# Patient Record
Sex: MALE | Race: WHITE | NOT HISPANIC OR LATINO | Employment: OTHER | URBAN - NONMETROPOLITAN AREA
[De-identification: names, ages, dates, MRNs, and addresses within clinical notes are randomized per-mention and may not be internally consistent; named-entity substitution may affect disease eponyms.]

---

## 2017-01-10 ENCOUNTER — TRANSCRIBE ORDERS (OUTPATIENT)
Dept: ADMINISTRATIVE | Facility: HOSPITAL | Age: 82
End: 2017-01-10

## 2017-01-10 DIAGNOSIS — R13.10 DYSPHAGIA, UNSPECIFIED TYPE: Primary | ICD-10-CM

## 2017-01-12 ENCOUNTER — HOSPITAL ENCOUNTER (OUTPATIENT)
Dept: GENERAL RADIOLOGY | Facility: HOSPITAL | Age: 82
Discharge: HOME | End: 2017-01-12
Admitting: NURSE PRACTITIONER

## 2017-01-12 DIAGNOSIS — R13.10 DYSPHAGIA, UNSPECIFIED TYPE: ICD-10-CM

## 2017-01-12 PROCEDURE — 74220 X-RAY XM ESOPHAGUS 1CNTRST: CPT | Performed by: RADIOLOGY

## 2017-01-12 PROCEDURE — 74220 X-RAY XM ESOPHAGUS 1CNTRST: CPT

## 2017-11-03 ENCOUNTER — TRANSCRIBE ORDERS (OUTPATIENT)
Dept: ADMINISTRATIVE | Facility: HOSPITAL | Age: 82
End: 2017-11-03

## 2017-11-03 DIAGNOSIS — I77.9 RIGHT-SIDED CAROTID ARTERY DISEASE (HCC): Primary | ICD-10-CM

## 2017-11-06 ENCOUNTER — TRANSCRIBE ORDERS (OUTPATIENT)
Dept: ADMINISTRATIVE | Facility: HOSPITAL | Age: 82
End: 2017-11-06

## 2017-11-06 DIAGNOSIS — I65.29 OCCLUSION AND STENOSIS OF CAROTID ARTERY: Primary | ICD-10-CM

## 2017-11-10 ENCOUNTER — TRANSCRIBE ORDERS (OUTPATIENT)
Dept: ADMINISTRATIVE | Facility: HOSPITAL | Age: 82
End: 2017-11-10

## 2017-11-10 DIAGNOSIS — R09.89 RESPIRATORY SYMPTOMS: Primary | ICD-10-CM

## 2017-11-27 ENCOUNTER — TELEPHONE (OUTPATIENT)
Dept: GENERAL RADIOLOGY | Facility: HOSPITAL | Age: 82
End: 2017-11-27

## 2017-11-27 ENCOUNTER — HOSPITAL ENCOUNTER (OUTPATIENT)
Dept: CARDIOLOGY | Facility: HOSPITAL | Age: 82
Discharge: HOME | End: 2017-11-27
Admitting: NURSE PRACTITIONER

## 2017-11-27 DIAGNOSIS — R09.89 RESPIRATORY SYMPTOMS: ICD-10-CM

## 2017-11-27 PROCEDURE — 93880 EXTRACRANIAL BILAT STUDY: CPT

## 2017-11-27 PROCEDURE — 93880 EXTRACRANIAL BILAT STUDY: CPT | Performed by: RADIOLOGY

## 2018-01-10 ENCOUNTER — OFFICE VISIT (OUTPATIENT)
Dept: CARDIAC SURGERY | Facility: CLINIC | Age: 83
End: 2018-01-10

## 2018-01-10 VITALS
WEIGHT: 162 LBS | HEIGHT: 68 IN | BODY MASS INDEX: 24.55 KG/M2 | SYSTOLIC BLOOD PRESSURE: 116 MMHG | DIASTOLIC BLOOD PRESSURE: 59 MMHG

## 2018-01-10 DIAGNOSIS — I65.23 BILATERAL CAROTID ARTERY STENOSIS: Primary | ICD-10-CM

## 2018-01-10 PROCEDURE — 99204 OFFICE O/P NEW MOD 45 MIN: CPT | Performed by: THORACIC SURGERY (CARDIOTHORACIC VASCULAR SURGERY)

## 2018-01-10 RX ORDER — ASPIRIN 81 MG/1
81 TABLET ORAL DAILY
COMMUNITY

## 2018-01-10 RX ORDER — LOSARTAN POTASSIUM 100 MG/1
100 TABLET ORAL DAILY
COMMUNITY
End: 2018-02-04

## 2018-01-10 RX ORDER — CLOPIDOGREL BISULFATE 75 MG/1
75 TABLET ORAL DAILY
COMMUNITY

## 2018-01-10 RX ORDER — AMLODIPINE BESYLATE 5 MG/1
5 TABLET ORAL DAILY
COMMUNITY

## 2018-01-10 RX ORDER — ALLOPURINOL 300 MG/1
300 TABLET ORAL DAILY
COMMUNITY

## 2018-01-15 ENCOUNTER — TRANSCRIBE ORDERS (OUTPATIENT)
Dept: ADMINISTRATIVE | Facility: HOSPITAL | Age: 83
End: 2018-01-15

## 2018-01-24 ENCOUNTER — HOSPITAL ENCOUNTER (OUTPATIENT)
Dept: MRI IMAGING | Facility: HOSPITAL | Age: 83
Discharge: HOME | End: 2018-01-24

## 2018-01-24 ENCOUNTER — HOSPITAL ENCOUNTER (OUTPATIENT)
Dept: CT IMAGING | Facility: HOSPITAL | Age: 83
Discharge: HOME | End: 2018-01-24
Admitting: THORACIC SURGERY (CARDIOTHORACIC VASCULAR SURGERY)

## 2018-01-24 LAB — CREAT BLDA-MCNC: 1.2 MG/DL (ref 0.6–1.3)

## 2018-01-24 PROCEDURE — 70498 CT ANGIOGRAPHY NECK: CPT

## 2018-01-24 PROCEDURE — 70498 CT ANGIOGRAPHY NECK: CPT | Performed by: RADIOLOGY

## 2018-01-24 PROCEDURE — 0 IOPAMIDOL 61 % SOLUTION: Performed by: THORACIC SURGERY (CARDIOTHORACIC VASCULAR SURGERY)

## 2018-01-24 PROCEDURE — 82565 ASSAY OF CREATININE: CPT

## 2018-01-24 RX ADMIN — IOPAMIDOL 60 ML: 612 INJECTION, SOLUTION INTRAVENOUS at 09:30

## 2018-01-25 ENCOUNTER — EPISODE CHANGES (OUTPATIENT)
Dept: CASE MANAGEMENT | Facility: OTHER | Age: 83
End: 2018-01-25

## 2018-01-26 ENCOUNTER — PREP FOR SURGERY (OUTPATIENT)
Dept: OTHER | Facility: HOSPITAL | Age: 83
End: 2018-01-26

## 2018-01-26 DIAGNOSIS — I65.22 STENOSIS OF LEFT CAROTID ARTERY: Primary | ICD-10-CM

## 2018-01-26 RX ORDER — CHLORHEXIDINE GLUCONATE 500 MG/1
1 CLOTH TOPICAL EVERY 12 HOURS PRN
Status: CANCELLED | OUTPATIENT
Start: 2018-02-04

## 2018-01-26 RX ORDER — CEFAZOLIN SODIUM 2 G/100ML
2 INJECTION, SOLUTION INTRAVENOUS ONCE
Status: CANCELLED | OUTPATIENT
Start: 2018-02-05 | End: 2018-02-05

## 2018-01-31 ENCOUNTER — HOSPITAL ENCOUNTER (OUTPATIENT)
Dept: MRI IMAGING | Facility: HOSPITAL | Age: 83
Discharge: HOME | End: 2018-01-31
Admitting: THORACIC SURGERY (CARDIOTHORACIC VASCULAR SURGERY)

## 2018-01-31 PROCEDURE — 70553 MRI BRAIN STEM W/O & W/DYE: CPT

## 2018-01-31 PROCEDURE — 70553 MRI BRAIN STEM W/O & W/DYE: CPT | Performed by: RADIOLOGY

## 2018-01-31 PROCEDURE — A9577 INJ MULTIHANCE: HCPCS | Performed by: THORACIC SURGERY (CARDIOTHORACIC VASCULAR SURGERY)

## 2018-01-31 PROCEDURE — 0 GADOBENATE DIMEGLUMINE 529 MG/ML SOLUTION: Performed by: THORACIC SURGERY (CARDIOTHORACIC VASCULAR SURGERY)

## 2018-01-31 RX ADMIN — GADOBENATE DIMEGLUMINE 14 ML: 529 INJECTION, SOLUTION INTRAVENOUS at 08:30

## 2018-02-04 ENCOUNTER — APPOINTMENT (OUTPATIENT)
Dept: PREADMISSION TESTING | Facility: HOSPITAL | Age: 83
End: 2018-02-04

## 2018-02-04 ENCOUNTER — ANESTHESIA EVENT (OUTPATIENT)
Dept: PERIOP | Facility: HOSPITAL | Age: 83
End: 2018-02-04

## 2018-02-04 ENCOUNTER — HOSPITAL ENCOUNTER (OUTPATIENT)
Dept: GENERAL RADIOLOGY | Facility: HOSPITAL | Age: 83
Discharge: HOME | End: 2018-02-04
Admitting: PHYSICIAN ASSISTANT

## 2018-02-04 VITALS — HEIGHT: 68 IN | BODY MASS INDEX: 25.73 KG/M2 | WEIGHT: 169.75 LBS

## 2018-02-04 DIAGNOSIS — I65.22 STENOSIS OF LEFT CAROTID ARTERY: ICD-10-CM

## 2018-02-04 LAB
ABO GROUP BLD: NORMAL
ANION GAP SERPL CALCULATED.3IONS-SCNC: 10 MMOL/L (ref 3–11)
BLD GP AB SCN SERPL QL: NEGATIVE
BUN BLD-MCNC: 27 MG/DL (ref 9–23)
BUN/CREAT SERPL: 20.8 (ref 7–25)
CALCIUM SPEC-SCNC: 9.9 MG/DL (ref 8.7–10.4)
CHLORIDE SERPL-SCNC: 104 MMOL/L (ref 99–109)
CO2 SERPL-SCNC: 27 MMOL/L (ref 20–31)
CREAT BLD-MCNC: 1.3 MG/DL (ref 0.6–1.3)
DEPRECATED RDW RBC AUTO: 50.4 FL (ref 37–54)
ERYTHROCYTE [DISTWIDTH] IN BLOOD BY AUTOMATED COUNT: 13.9 % (ref 11.3–14.5)
GFR SERPL CREATININE-BSD FRML MDRD: 53 ML/MIN/1.73
GLUCOSE BLD-MCNC: 168 MG/DL (ref 70–100)
HBA1C MFR BLD: 6 % (ref 4.8–5.6)
HCT VFR BLD AUTO: 48.8 % (ref 38.9–50.9)
HGB BLD-MCNC: 16.1 G/DL (ref 13.1–17.5)
INR PPP: 0.97
MCH RBC QN AUTO: 32.8 PG (ref 27–31)
MCHC RBC AUTO-ENTMCNC: 33 G/DL (ref 32–36)
MCV RBC AUTO: 99.4 FL (ref 80–99)
PLATELET # BLD AUTO: 222 10*3/MM3 (ref 150–450)
PMV BLD AUTO: 13.4 FL (ref 6–12)
POTASSIUM BLD-SCNC: 3.8 MMOL/L (ref 3.5–5.5)
PROTHROMBIN TIME: 10.6 SECONDS (ref 9.6–11.5)
RBC # BLD AUTO: 4.91 10*6/MM3 (ref 4.2–5.76)
RH BLD: POSITIVE
SODIUM BLD-SCNC: 141 MMOL/L (ref 132–146)
WBC NRBC COR # BLD: 8.27 10*3/MM3 (ref 3.5–10.8)

## 2018-02-04 PROCEDURE — 71046 X-RAY EXAM CHEST 2 VIEWS: CPT

## 2018-02-04 PROCEDURE — 36415 COLL VENOUS BLD VENIPUNCTURE: CPT

## 2018-02-04 PROCEDURE — 85027 COMPLETE CBC AUTOMATED: CPT | Performed by: PHYSICIAN ASSISTANT

## 2018-02-04 PROCEDURE — 85610 PROTHROMBIN TIME: CPT | Performed by: PHYSICIAN ASSISTANT

## 2018-02-04 PROCEDURE — 83036 HEMOGLOBIN GLYCOSYLATED A1C: CPT | Performed by: PHYSICIAN ASSISTANT

## 2018-02-04 PROCEDURE — 86850 RBC ANTIBODY SCREEN: CPT | Performed by: PHYSICIAN ASSISTANT

## 2018-02-04 PROCEDURE — 93005 ELECTROCARDIOGRAM TRACING: CPT

## 2018-02-04 PROCEDURE — 86900 BLOOD TYPING SEROLOGIC ABO: CPT | Performed by: PHYSICIAN ASSISTANT

## 2018-02-04 PROCEDURE — 86901 BLOOD TYPING SEROLOGIC RH(D): CPT | Performed by: PHYSICIAN ASSISTANT

## 2018-02-04 PROCEDURE — 80048 BASIC METABOLIC PNL TOTAL CA: CPT | Performed by: PHYSICIAN ASSISTANT

## 2018-02-04 PROCEDURE — 93010 ELECTROCARDIOGRAM REPORT: CPT | Performed by: INTERNAL MEDICINE

## 2018-02-04 RX ORDER — LOSARTAN POTASSIUM AND HYDROCHLOROTHIAZIDE 25; 100 MG/1; MG/1
1 TABLET ORAL DAILY
COMMUNITY

## 2018-02-05 ENCOUNTER — APPOINTMENT (OUTPATIENT)
Dept: NEUROLOGY | Facility: HOSPITAL | Age: 83
End: 2018-02-05
Attending: THORACIC SURGERY (CARDIOTHORACIC VASCULAR SURGERY)

## 2018-02-05 ENCOUNTER — HOSPITAL ENCOUNTER (INPATIENT)
Facility: HOSPITAL | Age: 83
LOS: 1 days | Discharge: HOME | End: 2018-02-06
Attending: THORACIC SURGERY (CARDIOTHORACIC VASCULAR SURGERY) | Admitting: THORACIC SURGERY (CARDIOTHORACIC VASCULAR SURGERY)

## 2018-02-05 ENCOUNTER — ANESTHESIA (OUTPATIENT)
Dept: PERIOP | Facility: HOSPITAL | Age: 83
End: 2018-02-05

## 2018-02-05 DIAGNOSIS — I65.23 BILATERAL CAROTID ARTERY STENOSIS: ICD-10-CM

## 2018-02-05 DIAGNOSIS — I65.22 STENOSIS OF LEFT CAROTID ARTERY: ICD-10-CM

## 2018-02-05 PROBLEM — I10 HYPERTENSION: Status: ACTIVE | Noted: 2018-02-05

## 2018-02-05 PROBLEM — K21.9 GERD (GASTROESOPHAGEAL REFLUX DISEASE): Status: ACTIVE | Noted: 2018-02-05

## 2018-02-05 PROBLEM — I65.29 CAROTID STENOSIS: Status: ACTIVE | Noted: 2018-02-05

## 2018-02-05 PROCEDURE — 25010000002 NEOSTIGMINE 10 MG/10ML SOLUTION: Performed by: NURSE ANESTHETIST, CERTIFIED REGISTERED

## 2018-02-05 PROCEDURE — 88304 TISSUE EXAM BY PATHOLOGIST: CPT | Performed by: THORACIC SURGERY (CARDIOTHORACIC VASCULAR SURGERY)

## 2018-02-05 PROCEDURE — 25010000002 PROPOFOL 10 MG/ML EMULSION: Performed by: NURSE ANESTHETIST, CERTIFIED REGISTERED

## 2018-02-05 PROCEDURE — 95955 EEG DURING SURGERY: CPT

## 2018-02-05 PROCEDURE — 25010000002 DEXAMETHASONE PER 1 MG: Performed by: NURSE ANESTHETIST, CERTIFIED REGISTERED

## 2018-02-05 PROCEDURE — 35301 RECHANNELING OF ARTERY: CPT | Performed by: PHYSICIAN ASSISTANT

## 2018-02-05 PROCEDURE — 25010000002 HEPARIN (PORCINE) PER 1000 UNITS: Performed by: THORACIC SURGERY (CARDIOTHORACIC VASCULAR SURGERY)

## 2018-02-05 PROCEDURE — 03CL0ZZ EXTIRPATION OF MATTER FROM LEFT INTERNAL CAROTID ARTERY, OPEN APPROACH: ICD-10-PCS | Performed by: THORACIC SURGERY (CARDIOTHORACIC VASCULAR SURGERY)

## 2018-02-05 PROCEDURE — 25010000002 FENTANYL CITRATE (PF) 100 MCG/2ML SOLUTION: Performed by: NURSE ANESTHETIST, CERTIFIED REGISTERED

## 2018-02-05 PROCEDURE — 25010000003 CEFAZOLIN IN DEXTROSE 2-4 GM/100ML-% SOLUTION: Performed by: PHYSICIAN ASSISTANT

## 2018-02-05 PROCEDURE — C1768 GRAFT, VASCULAR: HCPCS | Performed by: THORACIC SURGERY (CARDIOTHORACIC VASCULAR SURGERY)

## 2018-02-05 PROCEDURE — 25010000002 PHENYLEPHRINE PER 1 ML: Performed by: NURSE ANESTHETIST, CERTIFIED REGISTERED

## 2018-02-05 PROCEDURE — 25010000002 HEPARIN (PORCINE) PER 1000 UNITS: Performed by: NURSE ANESTHETIST, CERTIFIED REGISTERED

## 2018-02-05 PROCEDURE — 88311 DECALCIFY TISSUE: CPT | Performed by: THORACIC SURGERY (CARDIOTHORACIC VASCULAR SURGERY)

## 2018-02-05 PROCEDURE — 03UL0KZ SUPPLEMENT LEFT INTERNAL CAROTID ARTERY WITH NONAUTOLOGOUS TISSUE SUBSTITUTE, OPEN APPROACH: ICD-10-PCS | Performed by: THORACIC SURGERY (CARDIOTHORACIC VASCULAR SURGERY)

## 2018-02-05 PROCEDURE — 35301 RECHANNELING OF ARTERY: CPT | Performed by: THORACIC SURGERY (CARDIOTHORACIC VASCULAR SURGERY)

## 2018-02-05 PROCEDURE — 95816 EEG AWAKE AND DROWSY: CPT

## 2018-02-05 PROCEDURE — 25010000002 ONDANSETRON PER 1 MG: Performed by: NURSE ANESTHETIST, CERTIFIED REGISTERED

## 2018-02-05 PROCEDURE — 99291 CRITICAL CARE FIRST HOUR: CPT | Performed by: NURSE PRACTITIONER

## 2018-02-05 DEVICE — VASCU-GUARD PERIPHERAL VASCULAR PATCH IS PREPARED FROM BOVINE PERICARDIUM WHICH IS CROSS-LINKED WITH GLUTARALDEHYDE. THE PERICARDIUM IS PROCURED FROM CATTLE ORIGINATING IN THE UNITED STATES. VASCU-GUARD PERIPHERAL VASCULAR PATCH IS CHEMICALLY STERILIZED USING ETHANOL AND PROPYLENE OXIDE. VASCU-GUARD PERIPHERAL VASCULAR PATCH HAS BEEN TREATED WITH 1 MOLAR SODIUM HYDROXIDE FOR A MINIMUM OF 60 MINUTES AT 20 - 25 C.  VASCU-GUARD PERIPHERAL VASCULAR PATCH IS PACKAGED IN A CONTAINER FILLED WITH STERILE, NON-PYROGENIC WATER CONTAINING PROPYLENE OXIDE. THE CONTENTS OF THE UNOPENED, UNDAMAGED CONTAINER ARE STERILE.
Type: IMPLANTABLE DEVICE | Site: CAROTID | Status: FUNCTIONAL
Brand: VASCU-GUARD PERIPHERAL VASCULAR PATCH

## 2018-02-05 RX ORDER — NEOSTIGMINE METHYLSULFATE 1 MG/ML
INJECTION, SOLUTION INTRAVENOUS AS NEEDED
Status: DISCONTINUED | OUTPATIENT
Start: 2018-02-05 | End: 2018-02-05 | Stop reason: SURG

## 2018-02-05 RX ORDER — SODIUM CHLORIDE 0.9 % (FLUSH) 0.9 %
1-10 SYRINGE (ML) INJECTION AS NEEDED
Status: DISCONTINUED | OUTPATIENT
Start: 2018-02-05 | End: 2018-02-06 | Stop reason: HOSPADM

## 2018-02-05 RX ORDER — FAMOTIDINE 20 MG/1
20 TABLET, FILM COATED ORAL DAILY
Status: DISCONTINUED | OUTPATIENT
Start: 2018-02-06 | End: 2018-02-06 | Stop reason: HOSPADM

## 2018-02-05 RX ORDER — IPRATROPIUM BROMIDE AND ALBUTEROL SULFATE 2.5; .5 MG/3ML; MG/3ML
3 SOLUTION RESPIRATORY (INHALATION) EVERY 6 HOURS PRN
Status: DISCONTINUED | OUTPATIENT
Start: 2018-02-05 | End: 2018-02-06 | Stop reason: HOSPADM

## 2018-02-05 RX ORDER — HYDROCODONE BITARTRATE AND ACETAMINOPHEN 7.5; 325 MG/1; MG/1
1 TABLET ORAL EVERY 4 HOURS PRN
Status: DISCONTINUED | OUTPATIENT
Start: 2018-02-05 | End: 2018-02-06 | Stop reason: HOSPADM

## 2018-02-05 RX ORDER — SODIUM CHLORIDE 450 MG/100ML
30 INJECTION, SOLUTION INTRAVENOUS CONTINUOUS
Status: DISCONTINUED | OUTPATIENT
Start: 2018-02-05 | End: 2018-02-06 | Stop reason: HOSPADM

## 2018-02-05 RX ORDER — NALOXONE HCL 0.4 MG/ML
0.4 VIAL (ML) INJECTION
Status: DISCONTINUED | OUTPATIENT
Start: 2018-02-05 | End: 2018-02-06 | Stop reason: HOSPADM

## 2018-02-05 RX ORDER — PROPOFOL 10 MG/ML
VIAL (ML) INTRAVENOUS AS NEEDED
Status: DISCONTINUED | OUTPATIENT
Start: 2018-02-05 | End: 2018-02-05 | Stop reason: SURG

## 2018-02-05 RX ORDER — HYDROMORPHONE HYDROCHLORIDE 1 MG/ML
0.5 INJECTION, SOLUTION INTRAMUSCULAR; INTRAVENOUS; SUBCUTANEOUS
Status: DISCONTINUED | OUTPATIENT
Start: 2018-02-05 | End: 2018-02-05 | Stop reason: HOSPADM

## 2018-02-05 RX ORDER — ONDANSETRON 2 MG/ML
4 INJECTION INTRAMUSCULAR; INTRAVENOUS ONCE AS NEEDED
Status: DISCONTINUED | OUTPATIENT
Start: 2018-02-05 | End: 2018-02-05 | Stop reason: HOSPADM

## 2018-02-05 RX ORDER — LIDOCAINE HYDROCHLORIDE 10 MG/ML
INJECTION, SOLUTION EPIDURAL; INFILTRATION; INTRACAUDAL; PERINEURAL AS NEEDED
Status: DISCONTINUED | OUTPATIENT
Start: 2018-02-05 | End: 2018-02-05 | Stop reason: SURG

## 2018-02-05 RX ORDER — FENTANYL CITRATE 50 UG/ML
INJECTION, SOLUTION INTRAMUSCULAR; INTRAVENOUS AS NEEDED
Status: DISCONTINUED | OUTPATIENT
Start: 2018-02-05 | End: 2018-02-05 | Stop reason: SURG

## 2018-02-05 RX ORDER — SODIUM CHLORIDE 9 MG/ML
INJECTION, SOLUTION INTRAVENOUS AS NEEDED
Status: DISCONTINUED | OUTPATIENT
Start: 2018-02-05 | End: 2018-02-05 | Stop reason: HOSPADM

## 2018-02-05 RX ORDER — HEPARIN SODIUM 1000 [USP'U]/ML
INJECTION, SOLUTION INTRAVENOUS; SUBCUTANEOUS AS NEEDED
Status: DISCONTINUED | OUTPATIENT
Start: 2018-02-05 | End: 2018-02-05 | Stop reason: SURG

## 2018-02-05 RX ORDER — LIDOCAINE HYDROCHLORIDE 10 MG/ML
0.5 INJECTION, SOLUTION EPIDURAL; INFILTRATION; INTRACAUDAL; PERINEURAL ONCE AS NEEDED
Status: COMPLETED | OUTPATIENT
Start: 2018-02-05 | End: 2018-02-05

## 2018-02-05 RX ORDER — CEFAZOLIN SODIUM 2 G/100ML
2 INJECTION, SOLUTION INTRAVENOUS EVERY 8 HOURS
Status: COMPLETED | OUTPATIENT
Start: 2018-02-05 | End: 2018-02-06

## 2018-02-05 RX ORDER — DEXAMETHASONE SODIUM PHOSPHATE 4 MG/ML
INJECTION, SOLUTION INTRA-ARTICULAR; INTRALESIONAL; INTRAMUSCULAR; INTRAVENOUS; SOFT TISSUE AS NEEDED
Status: DISCONTINUED | OUTPATIENT
Start: 2018-02-05 | End: 2018-02-05 | Stop reason: SURG

## 2018-02-05 RX ORDER — AMLODIPINE BESYLATE 5 MG/1
5 TABLET ORAL DAILY
Status: DISCONTINUED | OUTPATIENT
Start: 2018-02-06 | End: 2018-02-06 | Stop reason: HOSPADM

## 2018-02-05 RX ORDER — LIDOCAINE HYDROCHLORIDE 10 MG/ML
INJECTION, SOLUTION INFILTRATION; PERINEURAL AS NEEDED
Status: DISCONTINUED | OUTPATIENT
Start: 2018-02-05 | End: 2018-02-05 | Stop reason: HOSPADM

## 2018-02-05 RX ORDER — CHLORHEXIDINE GLUCONATE 500 MG/1
1 CLOTH TOPICAL EVERY 12 HOURS PRN
Status: DISCONTINUED | OUTPATIENT
Start: 2018-02-05 | End: 2018-02-06 | Stop reason: HOSPADM

## 2018-02-05 RX ORDER — CEFAZOLIN SODIUM 2 G/100ML
2 INJECTION, SOLUTION INTRAVENOUS ONCE
Status: COMPLETED | OUTPATIENT
Start: 2018-02-05 | End: 2018-02-05

## 2018-02-05 RX ORDER — SODIUM CHLORIDE 0.9 % (FLUSH) 0.9 %
1-10 SYRINGE (ML) INJECTION AS NEEDED
Status: DISCONTINUED | OUTPATIENT
Start: 2018-02-05 | End: 2018-02-05 | Stop reason: HOSPADM

## 2018-02-05 RX ORDER — FAMOTIDINE 20 MG/1
20 TABLET, FILM COATED ORAL ONCE
Status: COMPLETED | OUTPATIENT
Start: 2018-02-05 | End: 2018-02-05

## 2018-02-05 RX ORDER — ATRACURIUM BESYLATE 10 MG/ML
INJECTION, SOLUTION INTRAVENOUS AS NEEDED
Status: DISCONTINUED | OUTPATIENT
Start: 2018-02-05 | End: 2018-02-05 | Stop reason: SURG

## 2018-02-05 RX ORDER — LOSARTAN POTASSIUM AND HYDROCHLOROTHIAZIDE 12.5; 5 MG/1; MG/1
2 TABLET ORAL DAILY
Status: DISCONTINUED | OUTPATIENT
Start: 2018-02-06 | End: 2018-02-06 | Stop reason: HOSPADM

## 2018-02-05 RX ORDER — FAMOTIDINE 10 MG/ML
20 INJECTION, SOLUTION INTRAVENOUS ONCE
Status: DISCONTINUED | OUTPATIENT
Start: 2018-02-05 | End: 2018-02-05

## 2018-02-05 RX ORDER — MORPHINE SULFATE 2 MG/ML
4 INJECTION, SOLUTION INTRAMUSCULAR; INTRAVENOUS EVERY 4 HOURS PRN
Status: DISCONTINUED | OUTPATIENT
Start: 2018-02-05 | End: 2018-02-06 | Stop reason: HOSPADM

## 2018-02-05 RX ORDER — GLYCOPYRROLATE 0.2 MG/ML
INJECTION INTRAMUSCULAR; INTRAVENOUS AS NEEDED
Status: DISCONTINUED | OUTPATIENT
Start: 2018-02-05 | End: 2018-02-05 | Stop reason: SURG

## 2018-02-05 RX ORDER — ASPIRIN 81 MG/1
81 TABLET ORAL DAILY
Status: DISCONTINUED | OUTPATIENT
Start: 2018-02-06 | End: 2018-02-06 | Stop reason: HOSPADM

## 2018-02-05 RX ORDER — ALLOPURINOL 300 MG/1
300 TABLET ORAL DAILY
Status: DISCONTINUED | OUTPATIENT
Start: 2018-02-05 | End: 2018-02-06 | Stop reason: HOSPADM

## 2018-02-05 RX ORDER — SODIUM CHLORIDE, SODIUM LACTATE, POTASSIUM CHLORIDE, CALCIUM CHLORIDE 600; 310; 30; 20 MG/100ML; MG/100ML; MG/100ML; MG/100ML
9 INJECTION, SOLUTION INTRAVENOUS CONTINUOUS
Status: DISCONTINUED | OUTPATIENT
Start: 2018-02-05 | End: 2018-02-05 | Stop reason: HOSPADM

## 2018-02-05 RX ORDER — CLOPIDOGREL BISULFATE 75 MG/1
75 TABLET ORAL DAILY
Status: DISCONTINUED | OUTPATIENT
Start: 2018-02-06 | End: 2018-02-06 | Stop reason: HOSPADM

## 2018-02-05 RX ORDER — FENTANYL CITRATE 50 UG/ML
50 INJECTION, SOLUTION INTRAMUSCULAR; INTRAVENOUS
Status: DISCONTINUED | OUTPATIENT
Start: 2018-02-05 | End: 2018-02-05 | Stop reason: HOSPADM

## 2018-02-05 RX ORDER — ONDANSETRON 2 MG/ML
INJECTION INTRAMUSCULAR; INTRAVENOUS AS NEEDED
Status: DISCONTINUED | OUTPATIENT
Start: 2018-02-05 | End: 2018-02-05 | Stop reason: SURG

## 2018-02-05 RX ADMIN — GLYCOPYRROLATE 0.2 MG: 0.2 INJECTION, SOLUTION INTRAMUSCULAR; INTRAVENOUS at 09:05

## 2018-02-05 RX ADMIN — HEPARIN SODIUM 5000 UNITS: 1000 INJECTION, SOLUTION INTRAVENOUS; SUBCUTANEOUS at 08:08

## 2018-02-05 RX ADMIN — SODIUM CHLORIDE 0.1 MG/HR: 9 INJECTION, SOLUTION INTRAVENOUS at 07:40

## 2018-02-05 RX ADMIN — ATRACURIUM BESYLATE 10 MG: 10 INJECTION, SOLUTION INTRAVENOUS at 08:53

## 2018-02-05 RX ADMIN — LIDOCAINE HYDROCHLORIDE 60 MG: 10 INJECTION, SOLUTION EPIDURAL; INFILTRATION; INTRACAUDAL; PERINEURAL at 07:29

## 2018-02-05 RX ADMIN — LIDOCAINE HYDROCHLORIDE 2 ML: 10 INJECTION, SOLUTION EPIDURAL; INFILTRATION; INTRACAUDAL; PERINEURAL at 06:39

## 2018-02-05 RX ADMIN — PHENYLEPHRINE HYDROCHLORIDE 1 MCG/KG/MIN: 10 INJECTION INTRAVENOUS at 07:43

## 2018-02-05 RX ADMIN — CEFAZOLIN SODIUM 2 G: 2 INJECTION, SOLUTION INTRAVENOUS at 16:44

## 2018-02-05 RX ADMIN — PROPOFOL 100 MG: 10 INJECTION, EMULSION INTRAVENOUS at 07:29

## 2018-02-05 RX ADMIN — FENTANYL CITRATE 100 MCG: 50 INJECTION, SOLUTION INTRAMUSCULAR; INTRAVENOUS at 08:52

## 2018-02-05 RX ADMIN — SODIUM CHLORIDE 30 ML/HR: 4.5 INJECTION, SOLUTION INTRAVENOUS at 10:19

## 2018-02-05 RX ADMIN — PHENYLEPHRINE HYDROCHLORIDE 200 MCG: 10 INJECTION INTRAVENOUS at 07:35

## 2018-02-05 RX ADMIN — FENTANYL CITRATE 150 MCG: 50 INJECTION, SOLUTION INTRAMUSCULAR; INTRAVENOUS at 07:29

## 2018-02-05 RX ADMIN — CEFAZOLIN SODIUM 2 G: 2 INJECTION, SOLUTION INTRAVENOUS at 07:45

## 2018-02-05 RX ADMIN — NICARDIPINE HYDROCHLORIDE 2.5 MG/HR: 0.1 INJECTION, SOLUTION INTRAVENOUS at 16:44

## 2018-02-05 RX ADMIN — FAMOTIDINE 20 MG: 20 TABLET, FILM COATED ORAL at 06:36

## 2018-02-05 RX ADMIN — SODIUM CHLORIDE, POTASSIUM CHLORIDE, SODIUM LACTATE AND CALCIUM CHLORIDE 9 ML/HR: 600; 310; 30; 20 INJECTION, SOLUTION INTRAVENOUS at 06:40

## 2018-02-05 RX ADMIN — ATRACURIUM BESYLATE 50 MG: 10 INJECTION, SOLUTION INTRAVENOUS at 07:29

## 2018-02-05 RX ADMIN — NICARDIPINE HYDROCHLORIDE 7.5 MG/HR: 0.1 INJECTION, SOLUTION INTRAVENOUS at 23:29

## 2018-02-05 RX ADMIN — NEOSTIGMINE METHYLSULFATE 2 MG: 1 INJECTION, SOLUTION INTRAVENOUS at 09:05

## 2018-02-05 RX ADMIN — FENTANYL CITRATE 50 MCG: 50 INJECTION, SOLUTION INTRAMUSCULAR; INTRAVENOUS at 10:03

## 2018-02-05 RX ADMIN — CEFAZOLIN SODIUM 2 G: 2 INJECTION, SOLUTION INTRAVENOUS at 23:52

## 2018-02-05 RX ADMIN — ONDANSETRON 4 MG: 2 INJECTION INTRAMUSCULAR; INTRAVENOUS at 08:58

## 2018-02-05 RX ADMIN — NICARDIPINE HYDROCHLORIDE 10 MG/HR: 0.1 INJECTION, SOLUTION INTRAVENOUS at 10:43

## 2018-02-05 RX ADMIN — DEXAMETHASONE SODIUM PHOSPHATE 4 MG: 4 INJECTION, SOLUTION INTRAMUSCULAR; INTRAVENOUS at 07:29

## 2018-02-05 RX ADMIN — PHENYLEPHRINE HYDROCHLORIDE 200 MCG: 10 INJECTION INTRAVENOUS at 07:50

## 2018-02-05 RX ADMIN — SODIUM CHLORIDE, POTASSIUM CHLORIDE, SODIUM LACTATE AND CALCIUM CHLORIDE 9 ML/HR: 600; 310; 30; 20 INJECTION, SOLUTION INTRAVENOUS at 06:39

## 2018-02-05 RX ADMIN — NICARDIPINE HYDROCHLORIDE 7.5 MG/HR: 0.1 INJECTION, SOLUTION INTRAVENOUS at 20:34

## 2018-02-05 NOTE — INTERVAL H&P NOTE
"Pre-Op H&P (See Recent Office Note Attached for Full H&P)    Chief complaint: Carotid stenosis    Review of Systems:  General ROS:  no fever, chills, rashes, No change since last office visit  Cardiovascular ROS: no chest pain or dyspnea on exertion  Respiratory ROS: no cough, shortness of breath, or wheezing  Neuro:  No TIA/CVA symptoms    Immunization History:   Influenza:  Yes 2017  Pneumococcal:  Yes < 5 years  Tetanus:  unknown    Meds:    No current facility-administered medications on file prior to encounter.      Current Outpatient Prescriptions on File Prior to Encounter   Medication Sig Dispense Refill   • allopurinol (ZYLOPRIM) 300 MG tablet Take 300 mg by mouth Daily.     • amLODIPine (NORVASC) 5 MG tablet Take 5 mg by mouth Daily.     • aspirin 81 MG EC tablet Take 81 mg by mouth Daily.     • clopidogrel (PLAVIX) 75 MG tablet Take 75 mg by mouth Daily. Patient stated Dr. Neal told him to continue         Vital Signs:  /69 (BP Location: Right arm, Patient Position: Lying)  Pulse 64  Temp 98.2 °F (36.8 °C) (Temporal Artery )   Resp 18  Ht 172.7 cm (67.99\")  Wt 77 kg (169 lb 12.1 oz)  SpO2 96%  BMI 25.82 kg/m2    Physical Exam:    CV:  S1S2 regular rate and rhythm, no murmur               Resp:  Clear to auscultation; respirations regular, even and unlabored    Results Review:    I reviewed the patient's new clinical results.  1/24/18 CT angio carotid:  The right internal carotid artery appears occluded with  reconstitution of the right M1 segment probably from posterior  circulation flow. There is near complete left proximal internal carotid  artery occlusion of probably about 90%. The left vertebral artery is  enlarged and may be participating in reconstitution from a left  subclavian vessel and evaluation for subclavian steal syndrome is  suggested.   1/31/18:  MRI brain:  IMPRESSION:  1. Minimal chronic microangiopathic changes.  2. No mass, hemorrhage, or midline shift.  3. No " contrast-enhancing lesions.  Cancer Staging (if applicable)  Cancer Patient: __ yes _x_no __unknown; If yes, clinical stage T:__ N:__M:__, stage group or __N/A    A/P:  Bilateral carotid stenosis - BUDDY  and here today for left carotid endarterectomy    Marce Ayala, APRN  2/5/2018   6:50 AM

## 2018-02-05 NOTE — ANESTHESIA PROCEDURE NOTES
Arterial Line    Patient location during procedure: pre-op   Line placed for hemodynamic monitoring.  Preanesthetic Checklist  Completed: patient identified, site marked, surgical consent, pre-op evaluation, timeout performed, IV checked, risks and benefits discussed and monitors and equipment checked  Arterial Line Prep   Sterile Tech: cap, gloves and sterile barriers  Prep: ChloraPrep  Patient monitoring: blood pressure monitoring, continuous pulse oximetry and EKG  Arterial Line Procedure   Laterality:right  Location:  radial artery  Catheter size: 20 G   Guidance: palpation technique  Number of attempts: 1  Successful placement: yes          Post Assessment   Dressing Type: line sutured, occlusive dressing applied, secured with tape and wrist guard applied.   Complications no  Circ/Move/Sens Assessment: normal and unchanged.   Patient Tolerance: patient tolerated the procedure well with no apparent complications

## 2018-02-05 NOTE — PROGRESS NOTES
Pulmonary/Critical Care Follow-up     LOS: 0 days   Patient Care Team:  Del Lira MD as PCP - General  Del Lira MD as PCP - Family Medicine  Char Porras RN as Care Coordinator ([facility])    Chief Complaint:    No chief complaint on file.    Subjective     Interval History:   Chinedu Silva is a 84 y.o. male presented to [facility] for surgery today.  He had a left carotid endarterectomy per Dr. Neal.    He was having some mild confusion and difficulty getting his words out for approximately 3 months.  He saw his PCP in November 2017, a carotid ultrasound was ordered. This subsequently found a right internal carotid artery occlusion and a critical stenosis of the left carotid artery with a peak systolic velocity of 479cm/s.  He was referred to Dr. Neal for surgical options.         Currently, he is resting in bed.  He denies pain at this time.  He denies n/v/d/cough prior to his arrival today.  He is a non-smoker.  His family is not present at this time.      History taken from: patient    PMH/FH/Social History were reviewed and updated appropriately in the electronic medical record.     Review of Systems:    Review of 14 systems was completed with positives and pertinent negatives noted in the subjective section.  All other systems reviewed and are negative.     ENT:  positive for sore throat    Objective     Vital Signs  Temp:  [97 °F (36.1 °C)-98.2 °F (36.8 °C)] 97 °F (36.1 °C)  Heart Rate:  [57-68] 57  Resp:  [16-18] 16  BP: (111-150)/(59-69) 111/63  Arterial Line BP: (111-125)/(51-59) 111/53     Body mass index is 25.82 kg/(m^2).     Physical Exam:     Constitutional:    Alert, cooperative, in no acute distress   Head:    Normocephalic, without obvious abnormality, atraumatic   Eyes:            Lids and lashes normal, conjunctivae and sclerae normal, no   icterus, no pallor, corneas clear, PER   ENMT:   Ears appear intact with no abnormalities noted      No oral lesions, no  thrush, oral mucosa moist   Neck:   No adenopathy, supple, trachea midline, no thyromegaly, no JVD, left neck incision CDI, left LORENZA drain intact       Lungs/Resp:     Normal effort, symmetric chest rise, no crepitus, clear to      auscultation bilaterally, no chest wall tenderness                Heart/CV:    Regular rhythm and normal rate, normal S1 and S2, no            murmur   Abdomen/GI:     Normal bowel sounds, no masses, no organomegaly, soft        non-tender, non-distended   :     Deferred   Extremities/MSK:   No clubbing or cyanosis.  No edema.  Normal tone.  No deformities.    Pulses:   Pulses palpable and equal bilaterally   Skin:   No bleeding, bruising or rash   Heme/Lymph:   No cervical or supraclavicular adenopathy.    Neurologic:    Psychiatric:       Moves all extremities with no obvious focal motor deficit.  Cranial nerves 2 - 12 grossly intact, tongue midline   Oriented x 3, normal affect.             Results Review:     I reviewed the patient's new clinical results.     Results from last 7 days  Lab Units 02/04/18  1504   SODIUM mmol/L 141   POTASSIUM mmol/L 3.8   CHLORIDE mmol/L 104   CO2 mmol/L 27.0   BUN mg/dL 27*   CREATININE mg/dL 1.30   CALCIUM mg/dL 9.9   GLUCOSE mg/dL 168*       Results from last 7 days  Lab Units 02/04/18  1504   WBC 10*3/mm3 8.27   HEMOGLOBIN g/dL 16.1   HEMATOCRIT % 48.8   PLATELETS 10*3/mm3 222                   I reviewed the patient's new imaging including images and reports.    Medication Review:     allopurinol 300 mg Oral Daily   [START ON 2/6/2018] amLODIPine 5 mg Oral Daily   [START ON 2/6/2018] aspirin 81 mg Oral Daily   ceFAZolin 2 g Intravenous Q8H   [START ON 2/6/2018] clopidogrel 75 mg Oral Daily   [START ON 2/6/2018] losartan-hydrochlorothiazide 2 tablet Oral Daily       niCARdipine 5-15 mg/hr Last Rate: 10 mg/hr (02/05/18 1043)   phenylephrine (KEILY-SYNEPHRINE) 50 mg/250 (0.2 mg/mL) infusion 0.5-3 mcg/kg/min Last Rate: Stopped (02/05/18 1030)   sodium  chloride 30 mL/hr Last Rate: 30 mL/hr (02/05/18 1019)       Assessment/Plan     Principal Problem:    Carotid stenosis  Active Problems:    Bilateral carotid artery stenosis    GERD (gastroesophageal reflux disease)    Hypertension      Monitor in ICU  Post-op care per CTS  GI/DVT prophylaxis  Labs in am      [facility] Miller  Pulmonary and Critical Care Service  2/5/2018 11:11 AM      [facility] Chase      Time: Critical care 30 min

## 2018-02-05 NOTE — ANESTHESIA PROCEDURE NOTES
Airway  Urgency: elective    Airway not difficult    General Information and Staff    Patient location during procedure: OR    Indications and Patient Condition  Indications for airway management: airway protection    Preoxygenated: yes  MILS not maintained throughout  Mask difficulty assessment: 1 - vent by mask    Final Airway Details  Final airway type: endotracheal airway      Successful airway: ETT  Cuffed: yes   Successful intubation technique: direct laryngoscopy  Endotracheal tube insertion site: oral  Blade: Lenore  Blade size: #3  ETT size: 7.5 mm  Cormack-Lehane Classification: grade I - full view of glottis  Placement verified by: chest auscultation and capnometry   Number of attempts at approach: 1    Additional Comments  Negative epigastric sounds, Breath sound equal bilaterally with symmetric chest rise and fall

## 2018-02-05 NOTE — ANESTHESIA PREPROCEDURE EVALUATION
Anesthesia Evaluation            Airway   Mallampati: II  Dental      Pulmonary    Cardiovascular     (+) hypertension,       Neuro/Psych  GI/Hepatic/Renal/Endo    (+) obesity,      Musculoskeletal     Abdominal    Substance History      OB/GYN          Other                                                Anesthesia Plan    ASA 3     general     intravenous induction   Anesthetic plan and risks discussed with patient.

## 2018-02-05 NOTE — OP NOTE
DATE OF PROCEDURE: 2/5/2018     PREOPERATIVE DIAGNOSES:  1. Bilateral carotid artery disease  2. Right carotid artery occlusion  3. Critical left carotid artery stenosis  4. Hypertension  5. Hyperlipidemia  6. Tobacco abuse     POSTOPERATIVE DIAGNOSES:    1. Bilateral carotid artery disease  2. Right carotid artery occlusion  3. Critical left carotid artery stenosis  4. Hypertension  5. Hyperlipidemia  6. Tobacco abuse     PROCEDURES PERFORMED:    1. Left carotid endarterectomy with patch angioplasty and EEG monitoring    SURGEON: Del Neal MD       ASSISTANTS:    1. Faby Richey PA-C      ANESTHESIA: General endotracheal anesthesia with Leeann Ryan CRNA     ESTIMATED BLOOD LOSS: 100 mL.       Carotid clamp time: 36 minutes    INDICATIONS:  84 year old  male with a history of hypertension, hyperlipidemia and tobacco abuse who originally presented with dysphagia, confusion and word finding difficulties.  He was found to have greater than 90% left carotid stenosis and occlusion of the right carotid artery.  The patient was felt to be a reasonable candidate for left carotid endarterectomy. The risks and benefits of surgery were discussed with the patient including pain, bleeding, infection, stroke, dysphagia, hoarseness, myocardial infarction and death. The patient understood these risks and wished to proceed with surgery.      DESCRIPTION OF PROCEDURE: The patient was taken to the operating room and placed under general endotracheal anesthesia. He was prepped and draped in the usual sterile fashion and a timeout was performed including the patient's name, procedure and antibiotic administration.  An incision was made along the anterior border of the left sternocleidomastoid.  Electrocautery was utilized to dissect down to the carotid sheath.  The common, external and internal carotid arteries were circumferentially dissected sharply and encircled with vessel loops.  The vagus and hypoglossal  nerves were identified and carefully avoided during surgery.  The bifurcation was high in the neck.  5000 units of heparin were administered systemically.  A clamp was then placed on the internal carotid artery with no EEG changes at 2 minutes.  Clamps were also applied to the common and external carotid arteries.  A longitudinal arteriotomy was performed from the common to internal carotid artery.  A very soft yellow cheezy plaque was encountered that extended from the common to the internal and external carotid arteries.  Manipulation of this plaque with forceps would ooze cheezy material from the arterial wall.  This was difficult to remove and was very adherent to instrumentation.  The remaining wall plaque would not remove in one contiguous piece as in a normal carotid stenosis patient.  This plaque had to be removed carefully in piecemeal.  Around this point, there were minor EEG changes that subsequently normalized and did not require shunting.  The plaque was sent for permanent pathology.  Smooth proximal and distal transitions were achieved.  In order to achieve an adequate distal transition, a 7-0 Prolene tacking suture was placed.  This transition was high and above the hypoglossal nerve.  Visualization was difficult.  A bovine pericardial patch was fashioned and secured using a running 6-0 Prolene suture.  Prior to tying down this suture, the internal carotid artery was flashed and clamp reapplied.  The common carotid artery was also flashed and clamp reapplied.  The prolene suture was then tied down and clamps to the external and common carotid artery were removed.  After several seconds, the clamp to the internal carotid artery was removed and hemostasis was achieved. An excellent doppler signal was obtained on the distal internal carotid artery.  A 15 Thai drain was placed within the wound bed and secured using a 0 silk suture.  The sternocleidomastoid was reapproximated with a running 3-0 Vicryl  suture.  The platysma was reapproximated with a running 3-0 Vicryl suture followed by a 4-0 Monocryl subcuticular stitch.  Overlying skin glue was applied to this incision and gauze and tape to the drain site.  The patient was extubated in the operating room and moving all 4 extremities to command prior to transport to the recovery room in stable condition.

## 2018-02-05 NOTE — ANESTHESIA POSTPROCEDURE EVALUATION
Patient: Chinedu Silva    Procedure Summary     Date Anesthesia Start Anesthesia Stop Room / Location    02/05/18 0729 0932  [facility] / [facility]       Procedure Diagnosis Surgeon Provider    CAROTID ENDARTERECTOMY WITH EEG LEFT (Left Neck) Bilateral carotid artery stenosis  (Bilateral carotid artery stenosis [I65.23]) MD Sharad Rodriguez MD          Anesthesia Type: general  Last vitals  /68     Temp 97     Pulse 83     Resp     16   SpO2   94     Post Anesthesia Care and Evaluation    Patient location during evaluation: PACU  Patient participation: complete - patient participated  Level of consciousness: awake and alert  Pain score: 0  Pain management: adequate  Airway patency: patent  Anesthetic complications: No anesthetic complications  PONV Status: none  Cardiovascular status: hemodynamically stable and acceptable  Respiratory status: nonlabored ventilation, acceptable and nasal cannula  Hydration status: acceptable

## 2018-02-05 NOTE — H&P (VIEW-ONLY)
01/10/2018  Patient Information  Chinedu Silva                                                                                          [address]  [place] [address]   2/14/1933  'PCP/Referring Physician'  Trinidad Sandhu MD  [phone]  No ref. provider found    Chief Complaint   Patient presents with   • Consult     np per dr. trinidad sandhu for bilateral carotid artery stenosis   • Difficulty Swallowing   • Altered Mental Status       History of Present Illness:  Mr. Silva is an 84 year old  male with a history of hypertension, hyperlipidemia and tobacco abuse who presents for evaluation of his carotid disease.  He has been having problems with confusion and getting words out for approximately three months.  Patient has also had trouble swallowing for about a year.  Mr Silva denies focal weakness or vision loss.  He saw his primary care who ordered a carotid ultrasound which demonstrated continued complete occlusion of the right internal carotid artery and high grade stenosis on the left.     Patient Active Problem List   Diagnosis   • Bilateral carotid artery stenosis     Past Medical History:   Diagnosis Date   • Diverticulitis    • GERD (gastroesophageal reflux disease)    • Hypertension    • Prostate cancer      Past Surgical History:   Procedure Laterality Date   • CATARACT EXTRACTION     • CHOLECYSTECTOMY     • HERNIA REPAIR Right    • TOTAL KNEE ARTHROPLASTY Right    • TURP / TRANSURETHRAL INCISION / DRAINAGE PROSTATE         Current Outpatient Prescriptions:   •  allopurinol (ZYLOPRIM) 300 MG tablet, Take 300 mg by mouth Daily., Disp: , Rfl:   •  amLODIPine (NORVASC) 5 MG tablet, Take 5 mg by mouth Daily., Disp: , Rfl:   •  aspirin 81 MG EC tablet, Take 81 mg by mouth Daily., Disp: , Rfl:   •  clopidogrel (PLAVIX) 75 MG tablet, Take 75 mg by mouth Daily., Disp: , Rfl:   •  losartan (COZAAR) 100 MG tablet, Take 100 mg by mouth Daily., Disp: , Rfl:   Allergies   Allergen Reactions    • Ciprofloxacin    • Sulfa Antibiotics      Social History     Social History   • Marital status:      Spouse name: N/A   • Number of children: 1   • Years of education: N/A     Occupational History   • 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   HENT: Positive for hearing loss. Negative for odynophagia and sore throat.    Eyes: Positive for blurred vision.   Cardiovascular: Negative for chest pain, dyspnea on exertion, leg swelling, orthopnea and palpitations.   Respiratory: Negative for cough and hemoptysis.    Endocrine: Negative for cold intolerance, heat intolerance, polydipsia, polyphagia and polyuria.   Hematologic/Lymphatic: Bruises/bleeds easily.   Skin: Negative for itching and rash.   Musculoskeletal: Positive for joint pain and stiffness. Negative for joint swelling and myalgias.   Gastrointestinal: Positive for dysphagia. Negative for abdominal pain, constipation, diarrhea, hematemesis, hematochezia, melena, nausea and vomiting.   Genitourinary: Positive for hesitancy and incomplete emptying. Negative for dysuria, frequency and hematuria.   Neurological: Negative for focal weakness, headaches, numbness and seizures.   Psychiatric/Behavioral: Negative for suicidal ideas.   All other systems reviewed and are negative.    Vitals:    01/10/18 0935   BP: 116/59   BP Location: Left arm   Patient Position: Sitting   Weight: 73.5  "kg (162 lb)   Height: 172.7 cm (68\")      Physical Exam   Constitutional: He is oriented to person, place, and time. He appears well-developed and well-nourished. No distress.   HENT:   Head: Normocephalic and atraumatic.   Eyes: Conjunctivae are normal. No scleral icterus.   Neck: Normal range of motion. No JVD present. Carotid bruit is not present. No tracheal deviation present.   Cardiovascular: Normal rate, regular rhythm and normal heart sounds.  Exam reveals no gallop and no friction rub.    No murmur heard.  Pulmonary/Chest: Effort normal and breath sounds normal. No stridor. No respiratory distress. He has no wheezes. He has no rales.   Abdominal: Soft. He exhibits no distension and no mass. There is no tenderness. There is no rebound and no guarding.   Musculoskeletal: Normal range of motion. He exhibits no edema.   Neurological: He is alert and oriented to person, place, and time.   Skin: Skin is warm and dry. No rash noted. He is not diaphoretic. No erythema.   Psychiatric: He has a normal mood and affect. His behavior is normal. Judgment and thought content normal.       Labs/Imaging:  -Carotid ultrasound performed 11/27/17, personally reviewed, demonstrates right internal carotid artery occlusion.  The left internal carotid artery has critical stenosis from a plaque with a peak systolic velocity of 479 cm/s.   There is antegrade vertebral artery flow bilaterally.      Assessment/Plan:  84 year old  male with a history of hypertension, hyperlipidemia and tobacco abuse who presents with dysphagia, confusion and word finding difficulties.  He has right internal carotid occlusion and critical left carotid stenosis.  I recommended a CT scan of the carotids to better assess his carotid stenosis.  He has not had any imaging to assess for stroke and will need a brain MRI.  Assuming his CTA correlates with the carotid duplex, the plan will be to proceed with left carotid endarterectomy.  The risks and " benefits of surgery were discussed with the patient including pain, bleeding, infection, hoarseness, dysphagia, stroke, myocardial infarction and death.  He understood these risks and wished to proceed with surgery.  He and his wife are agreeable with the above plan.    I, Dr. Del Neal, personally performed the services described in the documentation as scribed in my presence and the documentation is both accurate and complete.        Patient Active Problem List   Diagnosis   • Bilateral carotid artery stenosis     Signed by: Del Neal MD    1/10/2018    CC:  Del Lira MD    Scribed for Del Neal MD by Breana Daniels. 1/10/2018  10:07 AM

## 2018-02-05 NOTE — PLAN OF CARE
Problem: Perioperative Period (Adult)  Goal: Signs and Symptoms of Listed Potential Problems Will be Absent or Manageable (Perioperative Period)  Outcome: Ongoing (interventions implemented as appropriate)   02/05/18 0634   Perioperative Period   Problems Assessed (Perioperative Period) all   Problems Present (Perioperative Period) none

## 2018-02-06 ENCOUNTER — PATIENT OUTREACH (OUTPATIENT)
Dept: CASE MANAGEMENT | Facility: OTHER | Age: 83
End: 2018-02-06

## 2018-02-06 VITALS
BODY MASS INDEX: 25.73 KG/M2 | SYSTOLIC BLOOD PRESSURE: 120 MMHG | TEMPERATURE: 98.8 F | OXYGEN SATURATION: 94 % | DIASTOLIC BLOOD PRESSURE: 62 MMHG | HEART RATE: 64 BPM | HEIGHT: 68 IN | RESPIRATION RATE: 18 BRPM | WEIGHT: 169.75 LBS

## 2018-02-06 LAB
ANION GAP SERPL CALCULATED.3IONS-SCNC: 5 MMOL/L (ref 3–11)
BASOPHILS # BLD AUTO: 0 10*3/MM3 (ref 0–0.2)
BASOPHILS NFR BLD AUTO: 0 % (ref 0–1)
BUN BLD-MCNC: 22 MG/DL (ref 9–23)
BUN/CREAT SERPL: 18.3 (ref 7–25)
CALCIUM SPEC-SCNC: 8.8 MG/DL (ref 8.7–10.4)
CHLORIDE SERPL-SCNC: 106 MMOL/L (ref 99–109)
CO2 SERPL-SCNC: 26 MMOL/L (ref 20–31)
CREAT BLD-MCNC: 1.2 MG/DL (ref 0.6–1.3)
CYTO UR: NORMAL
DEPRECATED RDW RBC AUTO: 47.7 FL (ref 37–54)
EOSINOPHIL # BLD AUTO: 0 10*3/MM3 (ref 0–0.3)
EOSINOPHIL NFR BLD AUTO: 0 % (ref 0–3)
ERYTHROCYTE [DISTWIDTH] IN BLOOD BY AUTOMATED COUNT: 13.4 % (ref 11.3–14.5)
GFR SERPL CREATININE-BSD FRML MDRD: 58 ML/MIN/1.73
GLUCOSE BLD-MCNC: 143 MG/DL (ref 70–100)
HCT VFR BLD AUTO: 39.6 % (ref 38.9–50.9)
HGB BLD-MCNC: 13.2 G/DL (ref 13.1–17.5)
IMM GRANULOCYTES # BLD: 0.04 10*3/MM3 (ref 0–0.03)
IMM GRANULOCYTES NFR BLD: 0.3 % (ref 0–0.6)
LAB AP CASE REPORT: NORMAL
LAB AP CLINICAL INFORMATION: NORMAL
LYMPHOCYTES # BLD AUTO: 1.08 10*3/MM3 (ref 0.6–4.8)
LYMPHOCYTES NFR BLD AUTO: 7.5 % (ref 24–44)
Lab: NORMAL
MCH RBC QN AUTO: 32.6 PG (ref 27–31)
MCHC RBC AUTO-ENTMCNC: 33.3 G/DL (ref 32–36)
MCV RBC AUTO: 97.8 FL (ref 80–99)
MONOCYTES # BLD AUTO: 0.91 10*3/MM3 (ref 0–1)
MONOCYTES NFR BLD AUTO: 6.4 % (ref 0–12)
NEUTROPHILS # BLD AUTO: 12.28 10*3/MM3 (ref 1.5–8.3)
NEUTROPHILS NFR BLD AUTO: 85.8 % (ref 41–71)
PATH REPORT.FINAL DX SPEC: NORMAL
PATH REPORT.GROSS SPEC: NORMAL
PLATELET # BLD AUTO: 177 10*3/MM3 (ref 150–450)
PMV BLD AUTO: 13 FL (ref 6–12)
POTASSIUM BLD-SCNC: 3.7 MMOL/L (ref 3.5–5.5)
RBC # BLD AUTO: 4.05 10*6/MM3 (ref 4.2–5.76)
SODIUM BLD-SCNC: 137 MMOL/L (ref 132–146)
WBC NRBC COR # BLD: 14.31 10*3/MM3 (ref 3.5–10.8)

## 2018-02-06 PROCEDURE — 80048 BASIC METABOLIC PNL TOTAL CA: CPT | Performed by: PHYSICIAN ASSISTANT

## 2018-02-06 PROCEDURE — 94799 UNLISTED PULMONARY SVC/PX: CPT

## 2018-02-06 PROCEDURE — 85025 COMPLETE CBC W/AUTO DIFF WBC: CPT | Performed by: PHYSICIAN ASSISTANT

## 2018-02-06 RX ORDER — HYDROCODONE BITARTRATE AND ACETAMINOPHEN 7.5; 325 MG/1; MG/1
1 TABLET ORAL EVERY 6 HOURS PRN
Qty: 20 TABLET | Refills: 0 | Status: SHIPPED | OUTPATIENT
Start: 2018-02-06 | End: 2018-02-15

## 2018-02-06 RX ADMIN — CLOPIDOGREL BISULFATE 75 MG: 75 TABLET ORAL at 08:15

## 2018-02-06 RX ADMIN — NICARDIPINE HYDROCHLORIDE 10 MG/HR: 0.1 INJECTION, SOLUTION INTRAVENOUS at 02:13

## 2018-02-06 RX ADMIN — NICARDIPINE HYDROCHLORIDE 10 MG/HR: 0.1 INJECTION, SOLUTION INTRAVENOUS at 07:02

## 2018-02-06 RX ADMIN — ASPIRIN 81 MG: 81 TABLET, COATED ORAL at 08:15

## 2018-02-06 RX ADMIN — FAMOTIDINE 20 MG: 20 TABLET, FILM COATED ORAL at 08:16

## 2018-02-06 RX ADMIN — NICARDIPINE HYDROCHLORIDE 10 MG/HR: 0.1 INJECTION, SOLUTION INTRAVENOUS at 03:57

## 2018-02-06 RX ADMIN — LOSARTAN POTASSIUM AND HYDROCHLOROTHIAZIDE 2 TABLET: 12.5; 5 TABLET ORAL at 08:16

## 2018-02-06 RX ADMIN — ALLOPURINOL 300 MG: 300 TABLET ORAL at 08:15

## 2018-02-06 RX ADMIN — AMLODIPINE BESYLATE 5 MG: 5 TABLET ORAL at 08:16

## 2018-02-06 NOTE — PLAN OF CARE
Problem: Patient Care Overview (Adult)  Goal: Plan of Care Review  Outcome: Outcome(s) achieved Date Met: 02/06/18    Goal: Adult Individualization and Mutuality  Outcome: Outcome(s) achieved Date Met: 02/06/18    Goal: Discharge Needs Assessment  Outcome: Outcome(s) achieved Date Met: 02/06/18      Problem: Perioperative Period (Adult)  Goal: Signs and Symptoms of Listed Potential Problems Will be Absent or Manageable (Perioperative Period)  Outcome: Outcome(s) achieved Date Met: 02/06/18      Problem: Fall Risk (Adult)  Goal: Identify Related Risk Factors and Signs and Symptoms  Outcome: Outcome(s) achieved Date Met: 02/06/18    Goal: Absence of Falls  Outcome: Outcome(s) achieved Date Met: 02/06/18      Problem: Pain, Acute (Adult)  Goal: Identify Related Risk Factors and Signs and Symptoms  Outcome: Outcome(s) achieved Date Met: 02/06/18    Goal: Acceptable Pain Control/Comfort Level  Outcome: Outcome(s) achieved Date Met: 02/06/18

## 2018-02-06 NOTE — PLAN OF CARE
Problem: Patient Care Overview (Adult)  Goal: Plan of Care Review  Outcome: Ongoing (interventions implemented as appropriate)   02/06/18 0423   Coping/Psychosocial Response Interventions   Plan Of Care Reviewed With patient;spouse   Patient Care Overview   Progress progress toward functional goals as expected   Outcome Evaluation   Outcome Summary/Follow up Plan POD 1 CEA. Pt stable, however requiring high dose cardene . Per MAR PO antihypertensives to be re-started in AM. no acute events over shift.     Goal: Adult Individualization and Mutuality  Outcome: Ongoing (interventions implemented as appropriate)   02/06/18 0423   Individualization   Patient Specific Preferences Pt prefers to not take pain meds   Patient Specific Goals to go home   Patient Specific Interventions educated on nonpharm pain control methods        Problem: Perioperative Period (Adult)  Goal: Signs and Symptoms of Listed Potential Problems Will be Absent or Manageable (Perioperative Period)  Outcome: Ongoing (interventions implemented as appropriate)   02/05/18 0634 02/06/18 0423   Perioperative Period   Problems Assessed (Perioperative Period) all --    Problems Present (Perioperative Period) --  none       Problem: Fall Risk (Adult)  Goal: Identify Related Risk Factors and Signs and Symptoms  Outcome: Ongoing (interventions implemented as appropriate)   02/06/18 0423   Fall Risk   Fall Risk: Related Risk Factors age-related changes;impaired vision;polypharmacy;sleep pattern alteration;environment unfamiliar   Fall Risk: Signs and Symptoms presence of risk factors     Goal: Absence of Falls  Outcome: Ongoing (interventions implemented as appropriate)   02/06/18 0423   Fall Risk (Adult)   Absence of Falls making progress toward outcome       Problem: Pain, Acute (Adult)  Goal: Identify Related Risk Factors and Signs and Symptoms  Outcome: Ongoing (interventions implemented as appropriate)   02/06/18 0423   Pain, Acute   Signs and Symptoms  (Acute Pain) sleep pattern alteration     Goal: Acceptable Pain Control/Comfort Level  Outcome: Ongoing (interventions implemented as appropriate)   02/06/18 0423   Pain, Acute (Adult)   Acceptable Pain Control/Comfort Level making progress toward outcome

## 2018-02-06 NOTE — PROGRESS NOTES
Cardiothoracic Surgery Progress Note      POD # 1 s/p Left CEA       LOS: 1 day      Subjective:  No complaints.  Pain controlled.  Denies dysphagia      Objective:  Vital Signs  Temp:  [96.9 °F (36.1 °C)-98.9 °F (37.2 °C)] 98.9 °F (37.2 °C)  Heart Rate:  [] 93  Resp:  [15-20] 18  BP: ()/() 103/76  Arterial Line BP: ()/(33-80) 123/54    Physical Exam:   General Appearance: alert, appears stated age and cooperative   Lungs: clear to auscultation, respirations regular, respirations even and respirations unlabored   Heart: regular rhythm & normal rate, normal S1, S2 and no murmur, no chika, no rub   Skin: Incision c/d/i   Neuro: Voice mildly scratchy, but patient feels that this is his baseline, tongue midline.  Moving all 4 ext  Results:    Results from last 7 days  Lab Units 02/06/18  0510   WBC 10*3/mm3 14.31*   HEMOGLOBIN g/dL 13.2   HEMATOCRIT % 39.6   PLATELETS 10*3/mm3 177       Results from last 7 days  Lab Units 02/06/18  0510   SODIUM mmol/L 137   POTASSIUM mmol/L 3.7   CHLORIDE mmol/L 106   CO2 mmol/L 26.0   BUN mg/dL 22   CREATININE mg/dL 1.20   GLUCOSE mg/dL 143*   CALCIUM mg/dL 8.8         Assessment:  POD # 1 s/p Left CEA, expected recovery, neurologically intact    Plan:  D/C A-line  Wean off cardene, oral antihypertensives  D/C home  Follow-up 2 weeks    Del Neal MD  02/06/18  8:06 AM

## 2018-02-06 NOTE — PROGRESS NOTES
Continued Stay Note  [facility]     Patient Name: Chinedu Silva  MRN: [id number]  Today's Date: 2/6/2018    Admit Date: 2/5/2018          Discharge Plan       02/06/18 1128    Case Management/Social Work Plan    Plan Home with wife's assistance    Patient/Family In Agreement With Plan yes    Additional Comments Spoke briefly with Mr. Silva at the bedside for discharge planning as he is being discharged to home.  He uses a rolling walker PRN for ambulation.  Is independent with his ADL's.  Mr. Silva's wife will be available to assist him at home as needed and his son is driving him home today.  No needs identified.                Discharge Codes     None        Expected Discharge Date and Time     Expected Discharge Date Expected Discharge Time    Feb 6, 2018             Brandy Shoemaker

## 2018-02-12 ENCOUNTER — PATIENT OUTREACH (OUTPATIENT)
Dept: CASE MANAGEMENT | Facility: OTHER | Age: 83
End: 2018-02-12

## 2018-02-12 NOTE — OUTREACH NOTE
Outreach with patient who reports he is doing well.  Patient is not affected by flood and has a follow up appointment tomorrow with PCP.  Will continue to monitor and outreach as needed.

## 2018-02-22 NOTE — OUTREACH NOTE
Outreach with patient who reports he is doing okay, but sore.  No questions or concerns at this time.  Will continue to monitor and follow up as needed.

## 2018-03-14 ENCOUNTER — OFFICE VISIT (OUTPATIENT)
Dept: CARDIAC SURGERY | Facility: CLINIC | Age: 83
End: 2018-03-14

## 2018-03-14 VITALS
SYSTOLIC BLOOD PRESSURE: 99 MMHG | HEART RATE: 89 BPM | BODY MASS INDEX: 25.19 KG/M2 | DIASTOLIC BLOOD PRESSURE: 60 MMHG | HEIGHT: 68 IN | WEIGHT: 166.2 LBS

## 2018-03-14 DIAGNOSIS — I65.23 BILATERAL CAROTID ARTERY STENOSIS: Primary | ICD-10-CM

## 2018-03-14 PROCEDURE — 99024 POSTOP FOLLOW-UP VISIT: CPT | Performed by: THORACIC SURGERY (CARDIOTHORACIC VASCULAR SURGERY)

## 2018-03-14 RX ORDER — AMOXICILLIN 500 MG/1
1000 CAPSULE ORAL 2 TIMES DAILY
COMMUNITY
End: 2018-11-14

## 2018-08-30 ENCOUNTER — HOSPITAL ENCOUNTER (EMERGENCY)
Facility: HOSPITAL | Age: 83
Discharge: HOME | End: 2018-08-31
Attending: FAMILY MEDICINE | Admitting: FAMILY MEDICINE

## 2018-08-30 ENCOUNTER — APPOINTMENT (OUTPATIENT)
Dept: CT IMAGING | Facility: HOSPITAL | Age: 83
End: 2018-08-30

## 2018-08-30 ENCOUNTER — APPOINTMENT (OUTPATIENT)
Dept: GENERAL RADIOLOGY | Facility: HOSPITAL | Age: 83
End: 2018-08-30

## 2018-08-30 DIAGNOSIS — R10.13 EPIGASTRIC PAIN: Primary | ICD-10-CM

## 2018-08-30 LAB
ALBUMIN SERPL-MCNC: 4 G/DL (ref 3.4–4.8)
ALBUMIN/GLOB SERPL: 1.4 G/DL (ref 1.5–2.5)
ALP SERPL-CCNC: 75 U/L (ref 40–129)
ALT SERPL W P-5'-P-CCNC: 37 U/L (ref 10–44)
ANION GAP SERPL CALCULATED.3IONS-SCNC: 7.6 MMOL/L (ref 3.6–11.2)
AST SERPL-CCNC: 64 U/L (ref 10–34)
BASOPHILS # BLD AUTO: 0.03 10*3/MM3 (ref 0–0.3)
BASOPHILS NFR BLD AUTO: 0.3 % (ref 0–2)
BILIRUB SERPL-MCNC: 1 MG/DL (ref 0.2–1.8)
BUN BLD-MCNC: 29 MG/DL (ref 7–21)
BUN/CREAT SERPL: 19.5 (ref 7–25)
CALCIUM SPEC-SCNC: 9.8 MG/DL (ref 7.7–10)
CHLORIDE SERPL-SCNC: 105 MMOL/L (ref 99–112)
CK MB SERPL-CCNC: 1.52 NG/ML (ref 0–5)
CK MB SERPL-RTO: 2 % (ref 0–3)
CK SERPL-CCNC: 76 U/L (ref 24–204)
CO2 SERPL-SCNC: 28.4 MMOL/L (ref 24.3–31.9)
CREAT BLD-MCNC: 1.49 MG/DL (ref 0.43–1.29)
DEPRECATED RDW RBC AUTO: 51.9 FL (ref 37–54)
EOSINOPHIL # BLD AUTO: 0.42 10*3/MM3 (ref 0–0.7)
EOSINOPHIL NFR BLD AUTO: 4.4 % (ref 0–7)
ERYTHROCYTE [DISTWIDTH] IN BLOOD BY AUTOMATED COUNT: 14.9 % (ref 11.5–14.5)
GFR SERPL CREATININE-BSD FRML MDRD: 45 ML/MIN/1.73
GLOBULIN UR ELPH-MCNC: 2.9 GM/DL
GLUCOSE BLD-MCNC: 139 MG/DL (ref 70–110)
GLUCOSE BLDC GLUCOMTR-MCNC: 113 MG/DL (ref 70–130)
HCT VFR BLD AUTO: 44.4 % (ref 42–52)
HGB BLD-MCNC: 14.8 G/DL (ref 14–18)
IMM GRANULOCYTES # BLD: 0.02 10*3/MM3 (ref 0–0.03)
IMM GRANULOCYTES NFR BLD: 0.2 % (ref 0–0.5)
LIPASE SERPL-CCNC: 43 U/L (ref 13–60)
LYMPHOCYTES # BLD AUTO: 2.63 10*3/MM3 (ref 1–3)
LYMPHOCYTES NFR BLD AUTO: 27.9 % (ref 16–46)
MCH RBC QN AUTO: 33 PG (ref 27–33)
MCHC RBC AUTO-ENTMCNC: 33.3 G/DL (ref 33–37)
MCV RBC AUTO: 99.1 FL (ref 80–94)
MONOCYTES # BLD AUTO: 0.84 10*3/MM3 (ref 0.1–0.9)
MONOCYTES NFR BLD AUTO: 8.9 % (ref 0–12)
MYOGLOBIN SERPL-MCNC: 90 NG/ML (ref 0–109)
NEUTROPHILS # BLD AUTO: 5.5 10*3/MM3 (ref 1.4–6.5)
NEUTROPHILS NFR BLD AUTO: 58.3 % (ref 40–75)
OSMOLALITY SERPL CALC.SUM OF ELEC: 289.3 MOSM/KG (ref 273–305)
PLATELET # BLD AUTO: 218 10*3/MM3 (ref 130–400)
PMV BLD AUTO: 13 FL (ref 6–10)
POTASSIUM BLD-SCNC: 3.8 MMOL/L (ref 3.5–5.3)
PROT SERPL-MCNC: 6.9 G/DL (ref 6–8)
RBC # BLD AUTO: 4.48 10*6/MM3 (ref 4.7–6.1)
SODIUM BLD-SCNC: 141 MMOL/L (ref 135–153)
TROPONIN I SERPL-MCNC: 0.01 NG/ML
TROPONIN I SERPL-MCNC: 0.01 NG/ML
WBC NRBC COR # BLD: 9.44 10*3/MM3 (ref 4.5–12.5)

## 2018-08-30 PROCEDURE — 82553 CREATINE MB FRACTION: CPT | Performed by: FAMILY MEDICINE

## 2018-08-30 PROCEDURE — 85025 COMPLETE CBC W/AUTO DIFF WBC: CPT | Performed by: FAMILY MEDICINE

## 2018-08-30 PROCEDURE — 36415 COLL VENOUS BLD VENIPUNCTURE: CPT

## 2018-08-30 PROCEDURE — 74018 RADEX ABDOMEN 1 VIEW: CPT

## 2018-08-30 PROCEDURE — 82962 GLUCOSE BLOOD TEST: CPT

## 2018-08-30 PROCEDURE — 83690 ASSAY OF LIPASE: CPT | Performed by: FAMILY MEDICINE

## 2018-08-30 PROCEDURE — 80053 COMPREHEN METABOLIC PANEL: CPT | Performed by: FAMILY MEDICINE

## 2018-08-30 PROCEDURE — 74176 CT ABD & PELVIS W/O CONTRAST: CPT | Performed by: RADIOLOGY

## 2018-08-30 PROCEDURE — 93010 ELECTROCARDIOGRAM REPORT: CPT | Performed by: INTERNAL MEDICINE

## 2018-08-30 PROCEDURE — 83874 ASSAY OF MYOGLOBIN: CPT | Performed by: FAMILY MEDICINE

## 2018-08-30 PROCEDURE — 82550 ASSAY OF CK (CPK): CPT | Performed by: FAMILY MEDICINE

## 2018-08-30 PROCEDURE — 99284 EMERGENCY DEPT VISIT MOD MDM: CPT

## 2018-08-30 PROCEDURE — 84484 ASSAY OF TROPONIN QUANT: CPT | Performed by: FAMILY MEDICINE

## 2018-08-30 PROCEDURE — 93005 ELECTROCARDIOGRAM TRACING: CPT | Performed by: FAMILY MEDICINE

## 2018-08-30 PROCEDURE — 71045 X-RAY EXAM CHEST 1 VIEW: CPT | Performed by: RADIOLOGY

## 2018-08-30 PROCEDURE — 74018 RADEX ABDOMEN 1 VIEW: CPT | Performed by: RADIOLOGY

## 2018-08-30 PROCEDURE — 71045 X-RAY EXAM CHEST 1 VIEW: CPT

## 2018-08-30 PROCEDURE — 74176 CT ABD & PELVIS W/O CONTRAST: CPT

## 2018-08-30 PROCEDURE — 93005 ELECTROCARDIOGRAM TRACING: CPT | Performed by: EMERGENCY MEDICINE

## 2018-08-30 RX ORDER — ALUMINA, MAGNESIA, AND SIMETHICONE 2400; 2400; 240 MG/30ML; MG/30ML; MG/30ML
15 SUSPENSION ORAL ONCE
Status: COMPLETED | OUTPATIENT
Start: 2018-08-30 | End: 2018-08-30

## 2018-08-30 RX ORDER — ASPIRIN 81 MG/1
324 TABLET, CHEWABLE ORAL ONCE
Status: DISCONTINUED | OUTPATIENT
Start: 2018-08-30 | End: 2018-08-31 | Stop reason: HOSPADM

## 2018-08-30 RX ORDER — PHENOBARBITAL, HYOSCYAMINE SULFATE, ATROPINE SULFATE AND SCOPOLAMINE HYDROBROMIDE .0194; .1037; 16.2; .0065 MG/1; MG/1; MG/1; MG/1
2 TABLET ORAL ONCE
Status: COMPLETED | OUTPATIENT
Start: 2018-08-30 | End: 2018-08-30

## 2018-08-30 RX ORDER — SODIUM CHLORIDE 0.9 % (FLUSH) 0.9 %
10 SYRINGE (ML) INJECTION AS NEEDED
Status: DISCONTINUED | OUTPATIENT
Start: 2018-08-30 | End: 2018-08-31 | Stop reason: HOSPADM

## 2018-08-30 RX ADMIN — SODIUM CHLORIDE 500 ML: 9 INJECTION, SOLUTION INTRAVENOUS at 22:49

## 2018-08-30 RX ADMIN — ALUMINUM HYDROXIDE, MAGNESIUM HYDROXIDE, AND DIMETHICONE 15 ML: 400; 400; 40 SUSPENSION ORAL at 20:21

## 2018-08-30 RX ADMIN — LIDOCAINE HYDROCHLORIDE 15 ML: 20 SOLUTION ORAL; TOPICAL at 20:21

## 2018-08-30 RX ADMIN — PHENOBARBITAL, HYOSCYAMINE SULFATE, ATROPINE SULFATE, SCOPOLAMINE HYDROBROMIDE 32.4 MG: 16.2; .1037; .0194; .0065 TABLET ORAL at 20:21

## 2018-08-31 ENCOUNTER — TRANSCRIBE ORDERS (OUTPATIENT)
Dept: ADMINISTRATIVE | Facility: HOSPITAL | Age: 83
End: 2018-08-31

## 2018-08-31 VITALS
WEIGHT: 162 LBS | BODY MASS INDEX: 25.43 KG/M2 | TEMPERATURE: 98.2 F | HEART RATE: 88 BPM | HEIGHT: 67 IN | SYSTOLIC BLOOD PRESSURE: 136 MMHG | DIASTOLIC BLOOD PRESSURE: 82 MMHG | RESPIRATION RATE: 16 BRPM | OXYGEN SATURATION: 96 %

## 2018-08-31 DIAGNOSIS — R07.9 CHEST PAIN, UNSPECIFIED TYPE: Primary | ICD-10-CM

## 2018-09-06 ENCOUNTER — EPISODE CHANGES (OUTPATIENT)
Dept: CASE MANAGEMENT | Facility: OTHER | Age: 83
End: 2018-09-06

## 2018-09-06 ENCOUNTER — APPOINTMENT (OUTPATIENT)
Dept: CARDIOLOGY | Facility: HOSPITAL | Age: 83
End: 2018-09-06
Attending: FAMILY MEDICINE

## 2018-09-12 ENCOUNTER — OFFICE VISIT (OUTPATIENT)
Dept: ORTHOPEDIC SURGERY | Facility: CLINIC | Age: 83
End: 2018-09-12

## 2018-09-12 ENCOUNTER — HOSPITAL ENCOUNTER (OUTPATIENT)
Dept: GENERAL RADIOLOGY | Facility: HOSPITAL | Age: 83
Discharge: HOME | End: 2018-09-12
Attending: ORTHOPAEDIC SURGERY | Admitting: ORTHOPAEDIC SURGERY

## 2018-09-12 ENCOUNTER — HOSPITAL ENCOUNTER (OUTPATIENT)
Dept: GENERAL RADIOLOGY | Facility: HOSPITAL | Age: 83
End: 2018-09-12
Attending: ORTHOPAEDIC SURGERY

## 2018-09-12 VITALS
DIASTOLIC BLOOD PRESSURE: 61 MMHG | WEIGHT: 168 LBS | SYSTOLIC BLOOD PRESSURE: 108 MMHG | HEART RATE: 80 BPM | HEIGHT: 67 IN | BODY MASS INDEX: 26.37 KG/M2

## 2018-09-12 DIAGNOSIS — M51.36 DEGENERATIVE DISC DISEASE, LUMBAR: Primary | ICD-10-CM

## 2018-09-12 DIAGNOSIS — M25.552 LEFT HIP PAIN: ICD-10-CM

## 2018-09-12 DIAGNOSIS — M54.5 CHRONIC LEFT-SIDED LOW BACK PAIN, WITH SCIATICA PRESENCE UNSPECIFIED: ICD-10-CM

## 2018-09-12 DIAGNOSIS — G89.29 CHRONIC LEFT-SIDED LOW BACK PAIN, WITH SCIATICA PRESENCE UNSPECIFIED: ICD-10-CM

## 2018-09-12 PROBLEM — Z97.3 WEARS GLASSES: Status: ACTIVE | Noted: 2018-09-12

## 2018-09-12 PROBLEM — I25.10 CORONARY ARTERY DISEASE: Status: ACTIVE | Noted: 2018-09-12

## 2018-09-12 PROCEDURE — 72100 X-RAY EXAM L-S SPINE 2/3 VWS: CPT | Performed by: RADIOLOGY

## 2018-09-12 PROCEDURE — 99203 OFFICE O/P NEW LOW 30 MIN: CPT | Performed by: ORTHOPAEDIC SURGERY

## 2018-09-12 PROCEDURE — 99406 BEHAV CHNG SMOKING 3-10 MIN: CPT | Performed by: ORTHOPAEDIC SURGERY

## 2018-09-12 PROCEDURE — 72100 X-RAY EXAM L-S SPINE 2/3 VWS: CPT

## 2018-09-12 NOTE — PROGRESS NOTES
New Patient Visit      Patient: Chinedu Silva  YOB: 1933  Date of Encounter: 09/12/2018        Chief Complaint:   Chief Complaint   Patient presents with   • Right Hip - Pain       HPI:   Chinedu Silva, 85 y.o. male, referred by Del Lira MD presents today with posterior left hip pain.  He reports symptoms for the past 6 months and possibly longer.  He denies injury.  At times he feels the pain radiate from his left hip to the mid lateral aspect of his left leg.  He denies weakness in his leg.  He denies bowel or bladder problems.  He has no significant pain in his right leg.  He has had right total knee replacement about 6 years ago in [place].  Since his knee replacement he has always felt that his left leg is his strongest leg and often leads with his left leg.  Recently he has converted to leading to his right leg especially going up and down steps.  He feels somewhat unstable but has not fallen.  A month or 2 ago he was so weak in his left leg that he required a cane.  He has improved somewhat since then.  His medical history is significant for gout.  He also had mother and brother with gout.  His social history is remarkable for occasional alcohol.  He also acknowledges a balance issue.  He quit smoking many years ago.    Active Problem List:  Patient Active Problem List   Diagnosis   • Bilateral carotid artery stenosis   • Carotid stenosis   • GERD (gastroesophageal reflux disease)   • Hypertension   • Coronary artery disease   • Wears glasses       Past Medical History:  Past Medical History:   Diagnosis Date   • Coronary artery disease    • Diverticulitis    • GERD (gastroesophageal reflux disease)    • Gout    • Hearing aid worn    • Hypertension    • Obese    • Prostate cancer (CMS/[facility], located [facility])    • Wears glasses        Past Surgical History:  Past Surgical History:   Procedure Laterality Date   • CAROTID ENDARTERECTOMY Left 2/5/2018    Procedure: CAROTID ENDARTERECTOMY WITH  EEG LEFT;  Surgeon: Del Neal MD;  Location: [facility];  Service:    • CATARACT EXTRACTION     • CATARACT EXTRACTION WITH INTRAOCULAR LENS IMPLANT Right    • CHOLECYSTECTOMY     • COLONOSCOPY W/ POLYPECTOMY     • HERNIA REPAIR Right    • TOTAL KNEE ARTHROPLASTY Right    • TURP / TRANSURETHRAL INCISION / DRAINAGE PROSTATE         Family History:  Family History   Problem Relation Age of Onset   • Hypertension Mother    • Glaucoma Mother    • Diverticulitis Mother    • Gout Mother    • ALS Father    • Gout Sister    • Broken bones Paternal Grandfather    • Broken bones Son        Social History:  Social History     Social History   • Marital status:      Spouse name: N/A   • Number of children: 1   • Years of education: N/A     Occupational History   • retired      [employer]     Social History Main Topics   • Smoking status: Former Smoker     Packs/day: 1.00     Years: 12.00     Types: Cigarettes     Quit date: 1962   • Smokeless tobacco: Current User     Types: Chew   • Alcohol use No   • Drug use: No   • Sexual activity: Defer     Other Topics Concern   • Not on file     Social History Narrative   • No narrative on file     Patient's Body mass index is 26.31 kg/m². BMI is above normal parameters. Recommendations include: educational material.  I advised Chinedu of the risks of continuing to use tobacco, and I provided him with tobacco cessation educational materials in the After Visit Summary.     During this visit, I spent 3 minutes counseling the patient regarding tobacco cessation.      Medications:  Current Outpatient Prescriptions   Medication Sig Dispense Refill   • allopurinol (ZYLOPRIM) 300 MG tablet Take 300 mg by mouth Daily.     • amLODIPine (NORVASC) 5 MG tablet Take 5 mg by mouth Daily.     • aspirin 81 MG EC tablet Take 81 mg by mouth Daily.     • clopidogrel (PLAVIX) 75 MG tablet Take 75 mg by mouth Daily. Patient stated Dr. Neal told him to continue     • losartan-hydrochlorothiazide  "(HYZAAR) 100-25 MG per tablet Take 1 tablet by mouth Daily.     • Misc Natural Products (OSTEO BI-FLEX/5-LOXIN ADVANCED PO) Take  by mouth 2 (Two) Times a Day.     • amoxicillin (AMOXIL) 500 MG capsule Take 1,000 mg by mouth 2 (Two) Times a Day.       No current facility-administered medications for this visit.        Allergies:  Allergies   Allergen Reactions   • Ciprofloxacin Other (See Comments)     Muscle Pains   • Lisinopril Cough   • Sulfa Antibiotics Itching and Rash       Review of Systems:   Review of Systems   Constitutional: Positive for activity change and appetite change.   HENT: Positive for hearing loss, sore throat, tinnitus and voice change.    Eyes: Negative.    Respiratory: Negative.    Cardiovascular: Negative.    Gastrointestinal: Positive for abdominal distention.   Endocrine: Negative.    Genitourinary: Negative.    Musculoskeletal: Positive for gait problem and myalgias.   Skin: Negative.    Allergic/Immunologic: Negative.    Neurological:        Negative   Hematological: Bruises/bleeds easily.   Psychiatric/Behavioral: Negative.        Physical Exam:   Physical Exam  GENERAL: 85 y.o. male, alert and oriented X 3 in no acute distress.    Visit Vitals  /61   Pulse 80   Ht 170.2 cm (67\")   Wt 76.2 kg (168 lb)   BMI 26.31 kg/m²     Musculoskeletal:   Dorsolumbar spine evaluation reveals mild tenderness lower lumbar area.  He can forward flex to 90°.  I am unable to elicit any tenderness along the posterior or lateral aspect of his left hip.  He demonstrates negative straight leg raising on the left.      Left hip examination reveals to 90° with 30° of internal rotation 40° of external rotation.  Carly test is negative.  He demonstrates no sensory deficit to his left leg and no focal areas of muscle weakness.  His quadriceps is equal in size and strength to the right.      On the right lower extremity he has moderate discomfort at 60° of flexion.  He feels a tightness in the posterior " aspect of his hip.  His internal and external rotation slightly greater than the left.  He has no motor or sensory deficit.     Radiology/Labs:    Radiographs that he brings with him today bilateral hips show minimal if any degenerative changes of the hip joints bilaterally.  There is evidence of degenerative disc disease lumbar spine.      X-rays obtained today AP lateral lumbar spine reviewed show significant degenerative disc disease at the L4 5 and L5-S1 level.  There are moderate osteophytes anteriorly and mild encroachment on foramina bilaterally.     Assessment & Plan:   85 y.o. male with clinical presentation today consistent with lumbar spine as the source of his pain.  Given his near full motion of bilateral hips with relatively negative x-rays, I do not think he has  significant arthritis.  Today we discussed his options.  We will schedule MRI of his lumbar spine and see him back when completed.  I cannot elicit an area of tenderness left hip worthy of steroid injection.      ICD-10-CM ICD-9-CM   1. Degenerative disc disease, lumbar M51.36 722.52   2. Left hip pain M25.552 719.45   3. Chronic left-sided low back pain, with sciatica presence unspecified M54.5 724.2    G89.29 338.29               Cc:   Del Lira MD          Scribed for Vahe Amaya MD by Megan Amaya RN.8:15 AM 09/12/2018

## 2018-09-17 ENCOUNTER — HOSPITAL ENCOUNTER (OUTPATIENT)
Dept: MRI IMAGING | Facility: HOSPITAL | Age: 83
Discharge: HOME | End: 2018-09-17
Attending: ORTHOPAEDIC SURGERY | Admitting: ORTHOPAEDIC SURGERY

## 2018-09-17 DIAGNOSIS — M51.36 DEGENERATIVE DISC DISEASE, LUMBAR: ICD-10-CM

## 2018-09-17 DIAGNOSIS — M54.5 CHRONIC LEFT-SIDED LOW BACK PAIN, WITH SCIATICA PRESENCE UNSPECIFIED: ICD-10-CM

## 2018-09-17 DIAGNOSIS — G89.29 CHRONIC LEFT-SIDED LOW BACK PAIN, WITH SCIATICA PRESENCE UNSPECIFIED: ICD-10-CM

## 2018-09-17 DIAGNOSIS — M25.552 LEFT HIP PAIN: ICD-10-CM

## 2018-09-17 PROCEDURE — 72148 MRI LUMBAR SPINE W/O DYE: CPT

## 2018-09-17 PROCEDURE — 72148 MRI LUMBAR SPINE W/O DYE: CPT | Performed by: RADIOLOGY

## 2018-09-26 ENCOUNTER — OFFICE VISIT (OUTPATIENT)
Dept: ORTHOPEDIC SURGERY | Facility: CLINIC | Age: 83
End: 2018-09-26

## 2018-09-26 VITALS — BODY MASS INDEX: 26.37 KG/M2 | WEIGHT: 167.99 LBS | HEIGHT: 67 IN

## 2018-09-26 DIAGNOSIS — M51.36 DEGENERATIVE DISC DISEASE, LUMBAR: Primary | ICD-10-CM

## 2018-09-26 DIAGNOSIS — M54.5 CHRONIC LEFT-SIDED LOW BACK PAIN, WITH SCIATICA PRESENCE UNSPECIFIED: ICD-10-CM

## 2018-09-26 DIAGNOSIS — G89.29 CHRONIC LEFT-SIDED LOW BACK PAIN, WITH SCIATICA PRESENCE UNSPECIFIED: ICD-10-CM

## 2018-09-26 DIAGNOSIS — M25.552 LEFT HIP PAIN: ICD-10-CM

## 2018-09-26 PROCEDURE — 99406 BEHAV CHNG SMOKING 3-10 MIN: CPT | Performed by: ORTHOPAEDIC SURGERY

## 2018-09-26 PROCEDURE — 99213 OFFICE O/P EST LOW 20 MIN: CPT | Performed by: ORTHOPAEDIC SURGERY

## 2018-09-26 NOTE — PROGRESS NOTES
Follow-up Visit         Patient: Chinedu Silva  YOB: 1933  Date of Encounter: 09/26/2018      Chief  Complaint:   Chief Complaint   Patient presents with   • Lumbar Spine - Follow-up         HPI:  Chinedu Silva, 85 y.o. male returns today in follow-up with posterior left hip pain is his primary complaint.  He does have radiation of pain distally.  He uses a cane at times but is not totally dependent.  He uses his cane primarily for weakness in his left leg.  He has had right total knee replacement in [place] about 6 years ago this is his strongest leg for most activities.  He's frustrated that he is not able to do the things that he is done in the past.  When last seen MRI was requested of his lumbar spine and he presents today for review.  His symptoms have not significantly worsened.    Medical History:  Patient Active Problem List   Diagnosis   • Bilateral carotid artery stenosis   • Carotid stenosis   • GERD (gastroesophageal reflux disease)   • Hypertension   • Coronary artery disease   • Wears glasses     Past Medical History:   Diagnosis Date   • Coronary artery disease    • Diverticulitis    • GERD (gastroesophageal reflux disease)    • Gout    • Hearing aid worn    • Hypertension    • Obese    • Prostate cancer (CMS/HCC)    • Wears glasses        Social History:  Social History     Social History   • Marital status:      Spouse name: N/A   • Number of children: 1   • Years of education: N/A     Occupational History   • 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     During this visit, I spent 3 minutes counseling the patient regarding tobacco cessation.    Surgical History:  Past Surgical History:   Procedure Laterality Date   • CAROTID ENDARTERECTOMY Left 2/5/2018    Procedure: CAROTID ENDARTERECTOMY WITH EEG LEFT;  Surgeon: Del Neal MD;  Location: [facility];  Service:    • CATARACT EXTRACTION     • CATARACT EXTRACTION WITH INTRAOCULAR LENS IMPLANT Right    • CHOLECYSTECTOMY     • COLONOSCOPY W/ POLYPECTOMY     • HERNIA REPAIR Right    • TOTAL KNEE ARTHROPLASTY Right    • TURP / TRANSURETHRAL INCISION / DRAINAGE PROSTATE         Radiology:   MRI Lumbar Spine: My review of his MRI shows degenerative disc disease at L3-4, L4-5 and L5-S1.  At L4-5 there is near complete loss of joint  with disc bulging posteriorly and on axial films it appears to encroach the left 4- 5 foramina.    IMPRESSION: Arthritic change. Disc space at L4-5 is narrowed and there  are endplate changes at L4-5.     Disc bulges at L3-4, L4-5, and L5-S1      This report was finalized on 9/17/2018 3:25 PM by Dr. Bakari Jensen MD.    Examination:   He demonstrates 90° of forward flexion and reasonable extension lumbar spine without significant pain.      Assessment & Plan:   85 y.o. male with moderate lower back and left posterior hip pain with MRI showing significant degenerative disc disease at the lower  3 lumbar disc with what appears to be foraminal encroachment left greater than right.  We discussed his options.  I think he would benefit from consultation with neurosurgeon although I'm not convinced that he wishes to proceed with surgery, he is very interested in the finding a solution to increase his activity level.         Diagnosis Plan   1. Degenerative disc disease, lumbar     2. Left hip pain     3. Chronic left-sided low back pain, with sciatica presence unspecified               Cc:  Del Lira MD  Cc   Cosmo Ambrose    Scribed for Vahe Amaya MD by Megan Amaya RN.9:45 AM 09/26/2018

## 2018-11-14 RX ORDER — OMEPRAZOLE 20 MG/1
CAPSULE, DELAYED RELEASE ORAL
COMMUNITY
Start: 2018-09-05 | End: 2018-11-14

## 2018-11-14 RX ORDER — OMEPRAZOLE 20 MG/1
CAPSULE, DELAYED RELEASE ORAL
COMMUNITY
Start: 2018-09-05

## 2018-11-15 ENCOUNTER — OFFICE VISIT (OUTPATIENT)
Dept: NEUROSURGERY | Facility: CLINIC | Age: 83
End: 2018-11-15

## 2018-11-15 VITALS
BODY MASS INDEX: 26.78 KG/M2 | SYSTOLIC BLOOD PRESSURE: 110 MMHG | DIASTOLIC BLOOD PRESSURE: 63 MMHG | WEIGHT: 171 LBS | OXYGEN SATURATION: 94 % | HEART RATE: 69 BPM

## 2018-11-15 DIAGNOSIS — M51.36 DEGENERATIVE DISC DISEASE, LUMBAR: Primary | ICD-10-CM

## 2018-11-15 PROCEDURE — 99203 OFFICE O/P NEW LOW 30 MIN: CPT | Performed by: NEUROLOGICAL SURGERY

## 2018-11-15 RX ORDER — NABUMETONE 750 MG/1
750 TABLET, FILM COATED ORAL 2 TIMES DAILY
Qty: 30 TABLET | Refills: 0 | Status: SHIPPED | OUTPATIENT
Start: 2018-11-15

## 2018-11-15 RX ORDER — METHOCARBAMOL 750 MG/1
750 TABLET, FILM COATED ORAL NIGHTLY
Qty: 30 TABLET | Refills: 0 | Status: SHIPPED | OUTPATIENT
Start: 2018-11-15 | End: 2018-12-15

## 2018-11-15 NOTE — PROGRESS NOTES
"Chinedu RUBIO Ricardo  2/14/1933  [id number]      Chief Complaint   Patient presents with   • Hip Pain     LEFT       HISTORY OF PRESENT ILLNESS:  [This is a very youthful 85-year-old male presenting with pain in his left hip with some radiation his left leg of several months duration.  He is seen in orthopedics who have told him his hip is \"okay\".  Lumbar MRI was performed and he is referred for neurosurgical consultation.  The predominance of his pain is in his left hip.  As a past history of degenerative joint disease; having had his right knee replaced.  The symptoms that he has are most consistent with the clinical diagnosis of degenerative joint disease.  He has no history of claudication or radiculopathy. ]    Past Medical History:   Diagnosis Date   • Coronary artery disease    • Diverticulitis    • GERD (gastroesophageal reflux disease)    • Gout    • Hearing aid worn    • Hypertension    • Obese    • Prostate cancer (CMS/HCC)    • Wears glasses        Past Surgical History:   Procedure Laterality Date   • CATARACT EXTRACTION     • CATARACT EXTRACTION WITH INTRAOCULAR LENS IMPLANT Right    • CHOLECYSTECTOMY     • COLONOSCOPY W/ POLYPECTOMY     • HERNIA REPAIR Right    • TOTAL KNEE ARTHROPLASTY Right    • TURP / TRANSURETHRAL INCISION / DRAINAGE PROSTATE         Family History   Problem Relation Age of Onset   • Hypertension Mother    • Glaucoma Mother    • Diverticulitis Mother    • Gout Mother    • ALS Father    • Gout Sister    • Broken bones Paternal Grandfather    • Broken bones Son    • Cancer Son    • Diabetes Brother        Social History     Socioeconomic History   • Marital status:      Spouse name: Not on file   • Number of children: 1   • Years of education: Not on file   • Highest education level: Not on file   Social Needs   • Financial resource strain: Not on file   • Food insecurity - worry: Not on file   • Food insecurity - inability: Not on file   • Transportation needs - medical: Not on " file   • Transportation needs - non-medical: Not on file   Occupational History   • 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   Tobacco Use   • Smoking status: Former Smoker     Packs/day: 1.00     Years: 12.00     Pack years: 12.00     Types: Cigarettes     Last attempt to quit:      Years since quittin.9   • Smokeless tobacco: Current User     Types: Chew   Substance and Sexual Activity   • Alcohol use: No   • Drug use: No   • Sexual activity: Defer   Other Topics Concern   • Not on file   Social History Narrative   • Not on file       Allergies   Allergen Reactions   • Ciprofloxacin Other (See Comments)     Muscle Pains   • Lisinopril Cough   • Sulfa Antibiotics Itching and Rash         Current Outpatient Medications:   •  allopurinol (ZYLOPRIM) 300 MG tablet, Take 300 mg by mouth Daily., Disp: , Rfl:   •  amLODIPine (NORVASC) 5 MG tablet, Take 5 mg by mouth Daily., Disp: , Rfl:   •  aspirin 81 MG EC tablet, Take 81 mg by mouth Daily., Disp: , Rfl:   •  clopidogrel (PLAVIX) 75 MG tablet, Take 75 mg by mouth Daily. Patient stated Dr. Neal told him to continue, Disp: , Rfl:   •  losartan-hydrochlorothiazide (HYZAAR) 100-25 MG per tablet, Take 1 tablet by mouth Daily., Disp: , Rfl:   •  Misc Natural Products (OSTEO BI-FLEX/5-LOXIN ADVANCED PO), Take  by mouth 2 (Two) Times a Day., Disp: , Rfl:   •  omeprazole (priLOSEC) 20 MG capsule, , Disp: , Rfl:     Review of Systems   HENT: Positive for hearing loss and trouble swallowing.    Gastrointestinal: Positive for abdominal pain.   Musculoskeletal: Positive for back pain, gait problem and joint swelling.       Vitals:    11/15/18 0902   BP: 110/63   BP Location: Left arm   Patient Position: Sitting   Cuff Size: Adult   Pulse: 69   SpO2: 94%   Weight: 77.6 kg (171 lb)       Neurological Examination: Mental status/speech: The patient is alert and oriented.  Speech is clear without aphysia or dysarthria.  No overt cognitive deficits.    Cranial nerve  examination:    Olfaction: Smell is intact.  Vision: Vision is intact without visual field abnormalities.  Funduscopic examination is normal.  No pupillary irregularity.  Ocular motor examination: The extraocular muscles are intact.  There is no diplopia.  The pupil is round and reactive to both light and accommodation.  There is no nystagmus.  Facial movement/sensation: There is no facial weakness.  Sensation is intact in the first, second, and third divisions of the trigeminal nerve.  The corneal reflex is intact.  Auditory: Hearing is intact to finger rub bilaterally.  Cranial nerves IX, X, XI, XII: Phonation is normal.  No dysphagia.  Tongue is protruded in the midline without atrophy.  The gag reflex is intact.  Shoulder shrug is normal.    Musculoligamentous ligamentous examination: He has decreased range of motion of lumbar spine.  Straight leg raising and Maxi's test are mildly positive.  His strength is excellent.  Sensation is intact.  The deep tendon reflexes are slightly hypoactive.  There is no Babinski, David or clonus.            Medical Decision Making:     Diagnostic Data Set:  The lumbar MRI data set show the presence of degenerative osteoarthritis in the lumbar spine particularly at L4-L5 and L5-S1.  He does not have high-grade spinal stenosis.  There is no evidence of an acquired spondylolisthesis.  X-rays of the hip show degenerative changes bilaterally perhaps a bit worse on the left than the right.      Assessment:  Degenerative osteoarthritis of the lumbar spine          Recommendations:  I have recommended a course of physical therapy; Relafen 750 mg twice a day and Robaxin 750 mg at night.  He will call me in 1 month if he is not better.  If that be the case I would recommend facet block and further investigation of his hip to include lumbar MRI.  I would expect, however, for him to improve considerably with medications and therapy.        I greatly appreciate the opportunity to see  and evaluate this individual.  If you have questions or concerns regarding issues that I may have overlooked please call me at any time: [phone].  Nikhil Ambrose M.D.  [facility]  [address].  [facility]  [id number]

## 2018-11-15 NOTE — PATIENT INSTRUCTIONS
call Dr. Ambrose on a Monday or Tuesday with an update in 1 month.  Ask for  Lucina  and leave a message for  Dr. Ambrose.  He will call you back at the end of the day as soon as he can.     [phone]

## 2019-03-20 ENCOUNTER — TELEPHONE (OUTPATIENT)
Dept: CARDIAC SURGERY | Facility: CLINIC | Age: 84
End: 2019-03-20

## 2019-03-20 DIAGNOSIS — I65.23 BILATERAL CAROTID ARTERY STENOSIS: Primary | ICD-10-CM

## 2019-03-20 NOTE — TELEPHONE ENCOUNTER
PT CALLING TO SCHEDULE YEARLY F/U. PT DIDN'T RECEIVE A RECALL LETTER, BUT I VERIFIED ADDRESS AND IT IS CORRECT. PT STATED THAT HE IS HAVING HEADACHES AND NOT BEEN FEELING WELL. PT STATES HE CAN BE SEEN AT [facility] OR [place].

## 2019-04-02 ENCOUNTER — HOSPITAL ENCOUNTER (OUTPATIENT)
Dept: CARDIOLOGY | Facility: HOSPITAL | Age: 84
Discharge: HOME | End: 2019-04-02
Admitting: PHYSICIAN ASSISTANT

## 2019-04-02 DIAGNOSIS — I65.23 BILATERAL CAROTID ARTERY STENOSIS: ICD-10-CM

## 2019-04-02 PROCEDURE — 93880 EXTRACRANIAL BILAT STUDY: CPT

## 2019-04-02 PROCEDURE — 93880 EXTRACRANIAL BILAT STUDY: CPT | Performed by: RADIOLOGY

## 2019-06-12 ENCOUNTER — OFFICE VISIT (OUTPATIENT)
Dept: CARDIAC SURGERY | Facility: CLINIC | Age: 84
End: 2019-06-12

## 2019-06-12 VITALS
DIASTOLIC BLOOD PRESSURE: 60 MMHG | HEART RATE: 65 BPM | OXYGEN SATURATION: 97 % | SYSTOLIC BLOOD PRESSURE: 120 MMHG | WEIGHT: 162 LBS | BODY MASS INDEX: 25.43 KG/M2 | HEIGHT: 67 IN

## 2019-06-12 DIAGNOSIS — I65.23 BILATERAL CAROTID ARTERY STENOSIS: Primary | ICD-10-CM

## 2019-06-12 PROCEDURE — 99213 OFFICE O/P EST LOW 20 MIN: CPT | Performed by: PHYSICIAN ASSISTANT

## 2019-06-12 NOTE — PROGRESS NOTES
06/12/2019  Patient Information  Chinedu Silva                                                                                          [address] [facility]  [place] [address]   2/14/1933  'PCP/Referring Physician'  Del Lira MD  [phone]  No ref. provider found    Chief Complaint   Patient presents with   • Follow-up     1 YR FU with Carotid Duplex for Carotid Stenosis       History of Present Illness:  Patient is an 86-year-old  male with history of hypertension, GERD and bilateral carotid artery stenosis who underwent left carotid endarterectomy on 2/5/2018 who presents to office for 1 year follow-up for review and discussion of recent carotid duplex.  The patient was last seen on 3/14/2018 and states that he has an occasional left-sided headache and forgetfulness with word finding, but otherwise denies any unilateral weakness, speech difficulty or visual loss.  Family members are present today in office who states that sometimes it takes him a little bit of time to understand what you are saying during discussion.  The patient is on Plavix and 81 mg of aspirin.  He is otherwise doing well.    Patient Active Problem List   Diagnosis   • Bilateral carotid artery stenosis   • Carotid stenosis   • GERD (gastroesophageal reflux disease)   • Hypertension   • Coronary artery disease   • Wears glasses     Past Medical History:   Diagnosis Date   • Carotid stenosis    • Coronary artery disease    • Diverticulitis    • GERD (gastroesophageal reflux disease)    • Gout    • Hearing aid worn    • Hypertension    • Obese    • Prostate cancer (CMS/HCC)    • Wears glasses      Past Surgical History:   Procedure Laterality Date   • CAROTID ENDARTERECTOMY Left 2/5/2018    Procedure: CAROTID ENDARTERECTOMY WITH EEG LEFT;  Surgeon: Del Neal MD;  Location: [facility];  Service:    • CATARACT EXTRACTION     • CATARACT EXTRACTION WITH INTRAOCULAR LENS IMPLANT Right    • CHOLECYSTECTOMY     • COLONOSCOPY  W/ POLYPECTOMY     • HERNIA REPAIR Right    • TOTAL KNEE ARTHROPLASTY Right    • TURP / TRANSURETHRAL INCISION / DRAINAGE PROSTATE         Current Outpatient Medications:   •  allopurinol (ZYLOPRIM) 300 MG tablet, Take 300 mg by mouth Daily., Disp: , Rfl:   •  amLODIPine (NORVASC) 5 MG tablet, Take 5 mg by mouth Daily., Disp: , Rfl:   •  aspirin 81 MG EC tablet, Take 81 mg by mouth Daily., Disp: , Rfl:   •  clopidogrel (PLAVIX) 75 MG tablet, Take 75 mg by mouth Daily. Patient stated Dr. Neal told him to continue, Disp: , Rfl:   •  losartan-hydrochlorothiazide (HYZAAR) 100-25 MG per tablet, Take 1 tablet by mouth Daily., Disp: , Rfl:   •  Misc Natural Products (OSTEO BI-FLEX/5-LOXIN ADVANCED PO), Take  by mouth 2 (Two) Times a Day., Disp: , Rfl:   •  nabumetone (RELAFEN) 750 MG tablet, Take 1 tablet by mouth 2 (Two) Times a Day., Disp: 30 tablet, Rfl: 0  •  omeprazole (priLOSEC) 20 MG capsule, , Disp: , Rfl:   Allergies   Allergen Reactions   • Ciprofloxacin Other (See Comments)     Muscle Pains   • Lisinopril Cough   • Sulfa Antibiotics Itching and Rash     Social History     Socioeconomic History   • Marital status:      Spouse name: Not on file   • Number of children: 1   • Years of education: Not on file   • Highest education level: Not on file   Occupational History   • 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      Family History   Problem Relation Age of Onset   • Hypertension Mother    • Glaucoma Mother    • Diverticulitis Mother    • Gout Mother    • ALS Father    • Gout Sister    • Broken bones Paternal Grandfather  "   • Broken bones Son    • Cancer Son    • Diabetes Brother      Review of Systems   Constitution: Negative for chills, fever, malaise/fatigue, night sweats and weight loss.   HENT: Negative for hearing loss, odynophagia and sore throat.    Cardiovascular: Negative for chest pain, dyspnea on exertion, leg swelling, orthopnea and palpitations.   Respiratory: Negative for cough and hemoptysis.    Endocrine: Negative for cold intolerance, heat intolerance, polydipsia, polyphagia and polyuria.   Hematologic/Lymphatic: Does not bruise/bleed easily.   Skin: Negative for itching and rash.   Musculoskeletal: Positive for back pain and joint pain. Negative for joint swelling and myalgias.   Gastrointestinal: Negative for abdominal pain, constipation, diarrhea, hematemesis, hematochezia, melena, nausea and vomiting.   Genitourinary: Negative for dysuria, frequency and hematuria.   Neurological: Negative for focal weakness, headaches, numbness and seizures.   Psychiatric/Behavioral: Negative for suicidal ideas.   Allergic/Immunologic: Positive for environmental allergies.   All other systems reviewed and are negative.    Vitals:    19 1226   BP: 120/60   BP Location: Left arm   Patient Position: Sitting   Pulse: 65   SpO2: 97%   Weight: 73.5 kg (162 lb)   Height: 170.2 cm (67\")      Physical Exam:  Gen - NAD, pleasant, cooperative  CV - Regular rate and rhythm, no murmur gallop or rub  Pulm - Lungs clear to auscultation without wheeze or rhonchi   GI - Soft, normoactive bowel sounds, non-tender  Ext - Without edema  Neuro - CN II - XII grossly intact, tongue midline, voice normal    Labs/Imagin19 carotid duplex  Today's exam is compared with an earlier exam from 2017.     TECHNIQUE:  Multiple real-time color doppler images were obtained.  M-mode Doppler was used for velocity determination and spectral pattern.  Stenosis was evaluated using the NASCET or similar measurement  technique.     RIGHT SIDE: The common " carotid artery is normal in caliber. Peak  systolic and diastolic velocities were 79 and 0 cm/s. At the carotid  bifurcation, there was no demonstratable flow in the ICA. There is  antegrade flow in the vertebral.     LEFT SIDE: The common carotid artery is normal in caliber. Peak systolic  and diastolic velocities are 113 and 18 cm/s. At the carotid  bifurcation, there was no stenosis in the internal carotid artery. Peak  systolic and diastolic velocities were 94 and 27 cm/s. Vertebral artery  flow is antegrade.     IMPRESSION:  Continued complete occlusion of the internal carotid artery  near its origin. On the left side, no significant plaques or stenoses  were demonstrated. Both vertebral arteries show antegrade flow.    Assessment/Plan:  Patient is an 86-year-old  male with history of hypertension, GERD and bilateral carotid artery stenosis who underwent left carotid endarterectomy on 2/5/2018 who presents to office for 1 year follow-up for review and discussion of recent carotid duplex.  Since last being seen, the patient has occasional headache with forgetfulness and word finding, but is otherwise doing well.  He takes his Plavix and aspirin 81 mg without difficulty.  I reviewed the patient's recent carotid duplex which shows continued total occlusion of the right internal carotid artery and no significant plaque in the left internal carotid artery.  I would like for the patient to follow-up in 1 year with carotid duplex for continued evaluation.  If he has any questions, concerns or acutely worsening symptoms during the interval he may call our office or present to the emergency department immediately.    Patient Active Problem List   Diagnosis   • Bilateral carotid artery stenosis   • Carotid stenosis   • GERD (gastroesophageal reflux disease)   • Hypertension   • Coronary artery disease   • Wears glasses

## 2020-06-22 ENCOUNTER — TELEPHONE (OUTPATIENT)
Dept: CARDIAC SURGERY | Facility: CLINIC | Age: 85
End: 2020-06-22

## 2020-06-23 DIAGNOSIS — I65.23 BILATERAL CAROTID ARTERY STENOSIS: Primary | ICD-10-CM

## 2020-07-09 ENCOUNTER — HOSPITAL ENCOUNTER (OUTPATIENT)
Dept: CARDIOLOGY | Facility: HOSPITAL | Age: 85
Discharge: HOME | End: 2020-07-09
Admitting: PHYSICIAN ASSISTANT

## 2020-07-09 ENCOUNTER — APPOINTMENT (OUTPATIENT)
Dept: ULTRASOUND IMAGING | Facility: HOSPITAL | Age: 85
End: 2020-07-09

## 2020-07-09 DIAGNOSIS — I65.23 BILATERAL CAROTID ARTERY STENOSIS: ICD-10-CM

## 2020-07-09 PROCEDURE — 93880 EXTRACRANIAL BILAT STUDY: CPT | Performed by: RADIOLOGY

## 2020-07-09 PROCEDURE — 93880 EXTRACRANIAL BILAT STUDY: CPT

## 2020-11-11 ENCOUNTER — OFFICE VISIT (OUTPATIENT)
Dept: CARDIAC SURGERY | Facility: CLINIC | Age: 85
End: 2020-11-11

## 2020-11-11 VITALS
HEIGHT: 68 IN | HEART RATE: 66 BPM | OXYGEN SATURATION: 97 % | BODY MASS INDEX: 24.71 KG/M2 | TEMPERATURE: 97.7 F | DIASTOLIC BLOOD PRESSURE: 68 MMHG | WEIGHT: 163 LBS | SYSTOLIC BLOOD PRESSURE: 137 MMHG

## 2020-11-11 DIAGNOSIS — I65.23 BILATERAL CAROTID ARTERY STENOSIS: Primary | ICD-10-CM

## 2020-11-11 PROCEDURE — 99213 OFFICE O/P EST LOW 20 MIN: CPT | Performed by: NURSE PRACTITIONER

## 2020-11-11 NOTE — PROGRESS NOTES
[facility] Cardiothoracic Surgery Office Follow Up Note     Date of Encounter: 11/11/2020     MRN Number: [id number]  Name: Chinedu Silva  Phone Number: [phone]     Referred By: No ref. provider found  PCP: Jr Segura PA    Chief Complaint:    Chief Complaint   Patient presents with   • Follow-up     1 year follow up for carotid stenosis to discuss carotid duplex results.   • Carotid Artery Disease       History of Present Illness:    Chinedu Silva is a 87 y.o. male with a history of hypertension, nonobstructive coronary artery disease and bilateral carotid stenosis with known right carotid occlusion status post left carotid endarterectomy EEG on 2/5/2018 with Dr. Neal.  Presents today for annual follow-up with his carotid duplex.  Last seen in our office on 6/12/2019. Continues on aspirin and Plavix.  Denies any TIA/CVA symptoms.    Review of Systems:  Review of Systems   Constitution: Positive for weight gain. Negative for chills, decreased appetite, diaphoresis, fever, malaise/fatigue, night sweats and weight loss.   HENT: Positive for hearing loss. Negative for congestion, hoarse voice, sore throat and stridor.    Cardiovascular: Positive for dyspnea on exertion (Baseline). Negative for chest pain, claudication, irregular heartbeat, leg swelling, near-syncope, orthopnea, palpitations, paroxysmal nocturnal dyspnea and syncope.   Respiratory: Negative for cough, hemoptysis, shortness of breath, sleep disturbances due to breathing, snoring, sputum production and wheezing.    Hematologic/Lymphatic: Negative for adenopathy and bleeding problem. Bruises/bleeds easily.   Skin: Negative for color change, dry skin, itching, poor wound healing and rash.   Musculoskeletal: Positive for back pain and joint pain. Negative for arthritis, falls and muscle weakness.   Gastrointestinal: Positive for diarrhea. Negative for abdominal pain, anorexia, constipation, hematochezia, melena, nausea and  "vomiting.   Neurological: Positive for dizziness and loss of balance. Negative for difficulty with concentration, disturbances in coordination, numbness, seizures, vertigo and weakness.   Psychiatric/Behavioral: Negative for altered mental status, depression, memory loss and substance abuse. The patient does not have insomnia and is not nervous/anxious.    Allergic/Immunologic: Negative for persistent infections.       I have reviewed the review of systems as entered by my clinical support staff and have updated it as appropriate.     Allergies:  Allergies   Allergen Reactions   • Ciprofloxacin Other (See Comments)     Muscle Pains   • Lisinopril Cough   • Sulfa Antibiotics Itching and Rash       Medications:      Current Outpatient Medications:   •  allopurinol (ZYLOPRIM) 300 MG tablet, Take 300 mg by mouth Daily., Disp: , Rfl:   •  amLODIPine (NORVASC) 5 MG tablet, Take 5 mg by mouth Daily., Disp: , Rfl:   •  aspirin 81 MG EC tablet, Take 81 mg by mouth Daily., Disp: , Rfl:   •  clopidogrel (PLAVIX) 75 MG tablet, Take 75 mg by mouth Daily. Patient stated Dr. Neal told him to continue, Disp: , Rfl:   •  losartan-hydrochlorothiazide (HYZAAR) 100-25 MG per tablet, Take 1 tablet by mouth Daily., Disp: , Rfl:   •  Misc Natural Products (OSTEO BI-FLEX/5-LOXIN ADVANCED PO), Take  by mouth 2 (Two) Times a Day., Disp: , Rfl:   •  nabumetone (RELAFEN) 750 MG tablet, Take 1 tablet by mouth 2 (Two) Times a Day., Disp: 30 tablet, Rfl: 0  •  omeprazole (priLOSEC) 20 MG capsule, , Disp: , Rfl:     Physical Exam:  Vital Signs:    Vitals:    11/11/20 0917   BP: 137/68   BP Location: Right arm   Patient Position: Sitting   Pulse: 66   Temp: 97.7 °F (36.5 °C)   SpO2: 97%   Weight: 73.9 kg (163 lb)   Height: 172.7 cm (68\")     Body mass index is 24.78 kg/m².     Physical Exam  Vitals signs and nursing note reviewed.   Constitutional:       Appearance: Normal appearance. He is well-developed and well-groomed.   HENT:      Head: " Normocephalic and atraumatic.   Neck:      Musculoskeletal: Neck supple.   Cardiovascular:      Rate and Rhythm: Normal rate and regular rhythm.      Heart sounds: Normal heart sounds, S1 normal and S2 normal. No murmur. No friction rub.   Pulmonary:      Comments: Unlabored, Clear to auscultation bilaterally  Abdominal:      General: Bowel sounds are normal.      Palpations: Abdomen is soft.      Tenderness: There is no abdominal tenderness.   Musculoskeletal:      Right lower leg: No edema.      Left lower leg: No edema.   Skin:     General: Skin is warm and dry.   Neurological:      Mental Status: He is alert and oriented to person, place, and time.   Psychiatric:         Attention and Perception: Attention normal.         Mood and Affect: Mood normal.         Speech: Speech normal.         Behavior: Behavior is cooperative.         Labs/Imagin/9/20 carotid duplex:  RIGHT:  Continued occlusion of the right ICA is noted.  RIGHT CCA PSV:82 cm/s  RIGHT ICA PSV: Occluded.   RIGHT ICA EDV: Occluded.   Right ICA/CCA Ratio: N/A  Anterograde flow is demonstrated in RIGHT vertebral artery.     LEFT:  Left endarterectomy.  LEFT CCA PSV: 132 cm/s  LEFT ICA PSV: 106 cm/s  LEFT EDV: 31 cm/s  Left ICA/CCA Ratio: 0.8  Anterograde flow is demonstrated in LEFT vertebral artery.        IMPRESSION:  1. Occlusion of right ICA.  2. No hemodynamically significant stenosis of LEFT ICA.  3. Antegrade flow noted both vertebral arteries.    Assessment / Plan:  Diagnoses and all orders for this visit:    1. Bilateral carotid artery stenosis (Primary)     Chinedu Silva is a 87 y.o. male with a history of hypertension, nonobstructive coronary artery disease and bilateral carotid stenosis with known right carotid occlusion status post left carotid endarterectomy EEG on 2018 with Dr. Neal.  Reviewed carotid duplex findings with patient.  Patient is now 2 years postop left carotid endarterectomy with stable carotid duplex.   Denies any TIA/CVA symptoms.  Continues on aspirin and Plavix.  Patient to return to clinic on an as-needed basis.    Marce Ayala [facility] Cardiothoracic Surgery    Please note that portions of this note may have been completed with a voice recognition program. Efforts were made to edit the dictations, but occasionally words are mistranscribed.

## 2021-02-11 ENCOUNTER — IMMUNIZATION (OUTPATIENT)
Dept: VACCINE CLINIC | Facility: HOSPITAL | Age: 86
End: 2021-02-11

## 2021-02-11 PROCEDURE — 0001A: CPT | Performed by: INTERNAL MEDICINE

## 2021-02-11 PROCEDURE — 91300 HC SARSCOV02 VAC 30MCG/0.3ML IM: CPT | Performed by: INTERNAL MEDICINE

## 2021-03-04 ENCOUNTER — IMMUNIZATION (OUTPATIENT)
Dept: VACCINE CLINIC | Facility: HOSPITAL | Age: 86
End: 2021-03-04

## 2021-03-04 PROCEDURE — 91300 HC SARSCOV02 VAC 30MCG/0.3ML IM: CPT | Performed by: INTERNAL MEDICINE

## 2021-03-04 PROCEDURE — 0002A: CPT | Performed by: INTERNAL MEDICINE

## 2021-12-21 ENCOUNTER — TRANSCRIBE ORDERS (OUTPATIENT)
Dept: ADMINISTRATIVE | Facility: HOSPITAL | Age: 86
End: 2021-12-21

## 2021-12-21 DIAGNOSIS — G44.52 NEW DAILY PERSISTENT HEADACHE: Primary | ICD-10-CM

## 2021-12-30 ENCOUNTER — APPOINTMENT (OUTPATIENT)
Dept: MRI IMAGING | Facility: HOSPITAL | Age: 86
End: 2021-12-30

## 2022-08-17 ENCOUNTER — HOSPITAL ENCOUNTER (EMERGENCY)
Facility: HOSPITAL | Age: 87
Discharge: ANOTHER HOSPITAL | End: 2022-08-18
Attending: STUDENT IN AN ORGANIZED HEALTH CARE EDUCATION/TRAINING PROGRAM | Admitting: STUDENT IN AN ORGANIZED HEALTH CARE EDUCATION/TRAINING PROGRAM

## 2022-08-17 ENCOUNTER — APPOINTMENT (OUTPATIENT)
Dept: GENERAL RADIOLOGY | Facility: HOSPITAL | Age: 87
End: 2022-08-17

## 2022-08-17 DIAGNOSIS — K80.50 CHOLEDOCHOLITHIASIS: Primary | ICD-10-CM

## 2022-08-17 LAB
A-A DO2: 19.4 MMHG (ref 0–300)
ALBUMIN SERPL-MCNC: 4.31 G/DL (ref 3.5–5.2)
ALBUMIN/GLOB SERPL: 1.5 G/DL
ALP SERPL-CCNC: 145 U/L (ref 39–117)
ALT SERPL W P-5'-P-CCNC: 331 U/L (ref 1–41)
ANION GAP SERPL CALCULATED.3IONS-SCNC: 10.8 MMOL/L (ref 5–15)
APTT PPP: 32.3 SECONDS (ref 26.5–34.5)
ARTERIAL PATENCY WRIST A: ABNORMAL
AST SERPL-CCNC: 560 U/L (ref 1–40)
ATMOSPHERIC PRESS: 727 MMHG
BASE EXCESS BLDA CALC-SCNC: -0.7 MMOL/L (ref 0–2)
BASOPHILS # BLD AUTO: 0.03 10*3/MM3 (ref 0–0.2)
BASOPHILS NFR BLD AUTO: 0.3 % (ref 0–1.5)
BDY SITE: ABNORMAL
BILIRUB SERPL-MCNC: 1.5 MG/DL (ref 0–1.2)
BODY TEMPERATURE: 0 C
BUN SERPL-MCNC: 25 MG/DL (ref 8–23)
BUN/CREAT SERPL: 17.4 (ref 7–25)
CALCIUM SPEC-SCNC: 10.4 MG/DL (ref 8.6–10.5)
CHLORIDE SERPL-SCNC: 100 MMOL/L (ref 98–107)
CK SERPL-CCNC: 35 U/L (ref 20–200)
CO2 BLDA-SCNC: 25.2 MMOL/L (ref 22–33)
CO2 SERPL-SCNC: 26.2 MMOL/L (ref 22–29)
COHGB MFR BLD: 0.6 % (ref 0–5)
CREAT SERPL-MCNC: 1.44 MG/DL (ref 0.76–1.27)
D-LACTATE SERPL-SCNC: 2.2 MMOL/L (ref 0.5–2)
DEPRECATED RDW RBC AUTO: 54.4 FL (ref 37–54)
EGFRCR SERPLBLD CKD-EPI 2021: 46.4 ML/MIN/1.73
EOSINOPHIL # BLD AUTO: 0.07 10*3/MM3 (ref 0–0.4)
EOSINOPHIL NFR BLD AUTO: 0.7 % (ref 0.3–6.2)
ERYTHROCYTE [DISTWIDTH] IN BLOOD BY AUTOMATED COUNT: 13.5 % (ref 12.3–15.4)
FLUAV SUBTYP SPEC NAA+PROBE: NOT DETECTED
FLUBV RNA ISLT QL NAA+PROBE: NOT DETECTED
GLOBULIN UR ELPH-MCNC: 2.9 GM/DL
GLUCOSE SERPL-MCNC: 190 MG/DL (ref 65–99)
HCO3 BLDA-SCNC: 24 MMOL/L (ref 20–26)
HCT VFR BLD AUTO: 43.3 % (ref 37.5–51)
HCT VFR BLD CALC: 43.4 % (ref 38–51)
HGB BLD-MCNC: 14.5 G/DL (ref 13–17.7)
HGB BLDA-MCNC: 14.2 G/DL (ref 14–18)
IMM GRANULOCYTES # BLD AUTO: 0.04 10*3/MM3 (ref 0–0.05)
IMM GRANULOCYTES NFR BLD AUTO: 0.4 % (ref 0–0.5)
INHALED O2 CONCENTRATION: 21 %
INR PPP: 0.94 (ref 0.9–1.1)
LYMPHOCYTES # BLD AUTO: 1 10*3/MM3 (ref 0.7–3.1)
LYMPHOCYTES NFR BLD AUTO: 9.3 % (ref 19.6–45.3)
Lab: ABNORMAL
MAGNESIUM SERPL-MCNC: 1.8 MG/DL (ref 1.6–2.4)
MCH RBC QN AUTO: 36.3 PG (ref 26.6–33)
MCHC RBC AUTO-ENTMCNC: 33.5 G/DL (ref 31.5–35.7)
MCV RBC AUTO: 108.3 FL (ref 79–97)
METHGB BLD QL: 0.4 % (ref 0–3)
MODALITY: ABNORMAL
MONOCYTES # BLD AUTO: 0.22 10*3/MM3 (ref 0.1–0.9)
MONOCYTES NFR BLD AUTO: 2.1 % (ref 5–12)
NEUTROPHILS NFR BLD AUTO: 87.2 % (ref 42.7–76)
NEUTROPHILS NFR BLD AUTO: 9.37 10*3/MM3 (ref 1.7–7)
NOTE: ABNORMAL
NRBC BLD AUTO-RTO: 0 /100 WBC (ref 0–0.2)
NT-PROBNP SERPL-MCNC: 270.5 PG/ML (ref 0–1800)
OXYHGB MFR BLDV: 95 % (ref 94–99)
PCO2 BLDA: 39.1 MM HG (ref 35–45)
PCO2 TEMP ADJ BLD: ABNORMAL MM[HG]
PH BLDA: 7.4 PH UNITS (ref 7.35–7.45)
PH, TEMP CORRECTED: ABNORMAL
PLATELET # BLD AUTO: 191 10*3/MM3 (ref 140–450)
PMV BLD AUTO: 12.5 FL (ref 6–12)
PO2 BLDA: 79.4 MM HG (ref 83–108)
PO2 TEMP ADJ BLD: ABNORMAL MM[HG]
POTASSIUM SERPL-SCNC: 4.7 MMOL/L (ref 3.5–5.2)
PROT SERPL-MCNC: 7.2 G/DL (ref 6–8.5)
PROTHROMBIN TIME: 12.8 SECONDS (ref 12.1–14.7)
RBC # BLD AUTO: 4 10*6/MM3 (ref 4.14–5.8)
SAO2 % BLDCOA: 96 % (ref 94–99)
SARS-COV-2 RNA PNL SPEC NAA+PROBE: NOT DETECTED
SODIUM SERPL-SCNC: 137 MMOL/L (ref 136–145)
TROPONIN T SERPL-MCNC: <0.01 NG/ML (ref 0–0.03)
VENTILATOR MODE: ABNORMAL
WBC NRBC COR # BLD: 10.73 10*3/MM3 (ref 3.4–10.8)

## 2022-08-17 PROCEDURE — 83050 HGB METHEMOGLOBIN QUAN: CPT

## 2022-08-17 PROCEDURE — 82550 ASSAY OF CK (CPK): CPT | Performed by: PHYSICIAN ASSISTANT

## 2022-08-17 PROCEDURE — 82375 ASSAY CARBOXYHB QUANT: CPT

## 2022-08-17 PROCEDURE — 82805 BLOOD GASES W/O2 SATURATION: CPT

## 2022-08-17 PROCEDURE — 85025 COMPLETE CBC W/AUTO DIFF WBC: CPT | Performed by: PHYSICIAN ASSISTANT

## 2022-08-17 PROCEDURE — 84484 ASSAY OF TROPONIN QUANT: CPT | Performed by: PHYSICIAN ASSISTANT

## 2022-08-17 PROCEDURE — 83880 ASSAY OF NATRIURETIC PEPTIDE: CPT | Performed by: PHYSICIAN ASSISTANT

## 2022-08-17 PROCEDURE — 87636 SARSCOV2 & INF A&B AMP PRB: CPT | Performed by: STUDENT IN AN ORGANIZED HEALTH CARE EDUCATION/TRAINING PROGRAM

## 2022-08-17 PROCEDURE — 93010 ELECTROCARDIOGRAM REPORT: CPT | Performed by: INTERNAL MEDICINE

## 2022-08-17 PROCEDURE — 83735 ASSAY OF MAGNESIUM: CPT | Performed by: PHYSICIAN ASSISTANT

## 2022-08-17 PROCEDURE — 93005 ELECTROCARDIOGRAM TRACING: CPT | Performed by: STUDENT IN AN ORGANIZED HEALTH CARE EDUCATION/TRAINING PROGRAM

## 2022-08-17 PROCEDURE — 87150 DNA/RNA AMPLIFIED PROBE: CPT | Performed by: PHYSICIAN ASSISTANT

## 2022-08-17 PROCEDURE — 83605 ASSAY OF LACTIC ACID: CPT | Performed by: PHYSICIAN ASSISTANT

## 2022-08-17 PROCEDURE — 85730 THROMBOPLASTIN TIME PARTIAL: CPT | Performed by: PHYSICIAN ASSISTANT

## 2022-08-17 PROCEDURE — 87186 SC STD MICRODIL/AGAR DIL: CPT | Performed by: PHYSICIAN ASSISTANT

## 2022-08-17 PROCEDURE — 99283 EMERGENCY DEPT VISIT LOW MDM: CPT

## 2022-08-17 PROCEDURE — 80053 COMPREHEN METABOLIC PANEL: CPT | Performed by: PHYSICIAN ASSISTANT

## 2022-08-17 PROCEDURE — 36600 WITHDRAWAL OF ARTERIAL BLOOD: CPT

## 2022-08-17 PROCEDURE — 80074 ACUTE HEPATITIS PANEL: CPT | Performed by: STUDENT IN AN ORGANIZED HEALTH CARE EDUCATION/TRAINING PROGRAM

## 2022-08-17 PROCEDURE — 71045 X-RAY EXAM CHEST 1 VIEW: CPT

## 2022-08-17 PROCEDURE — 36415 COLL VENOUS BLD VENIPUNCTURE: CPT

## 2022-08-17 PROCEDURE — 74018 RADEX ABDOMEN 1 VIEW: CPT

## 2022-08-17 PROCEDURE — 87040 BLOOD CULTURE FOR BACTERIA: CPT | Performed by: PHYSICIAN ASSISTANT

## 2022-08-17 PROCEDURE — 87077 CULTURE AEROBIC IDENTIFY: CPT | Performed by: PHYSICIAN ASSISTANT

## 2022-08-17 PROCEDURE — 85610 PROTHROMBIN TIME: CPT | Performed by: PHYSICIAN ASSISTANT

## 2022-08-18 ENCOUNTER — APPOINTMENT (OUTPATIENT)
Dept: CT IMAGING | Facility: HOSPITAL | Age: 87
End: 2022-08-18

## 2022-08-18 VITALS
SYSTOLIC BLOOD PRESSURE: 103 MMHG | HEART RATE: 81 BPM | WEIGHT: 157 LBS | RESPIRATION RATE: 18 BRPM | OXYGEN SATURATION: 96 % | HEIGHT: 67 IN | DIASTOLIC BLOOD PRESSURE: 70 MMHG | TEMPERATURE: 99 F | BODY MASS INDEX: 24.64 KG/M2

## 2022-08-18 LAB
BACTERIA BLD CULT: ABNORMAL
BACTERIA BLD CULT: ABNORMAL
BACTERIA ID TEST ISLT QL CULT: ABNORMAL
BOTTLE TYPE: ABNORMAL
BOTTLE TYPE: ABNORMAL
D-LACTATE SERPL-SCNC: 1.7 MMOL/L (ref 0.5–2)
HAV IGM SERPL QL IA: NORMAL
HBV CORE IGM SERPL QL IA: NORMAL
HBV SURFACE AG SERPL QL IA: NORMAL
HCV AB SER DONR QL: NORMAL
HOLD SPECIMEN: NORMAL
HOLD SPECIMEN: NORMAL
LIPASE SERPL-CCNC: 39 U/L (ref 13–60)
QT INTERVAL: 352 MS
QTC INTERVAL: 428 MS
TROPONIN T SERPL-MCNC: <0.01 NG/ML (ref 0–0.03)
WHOLE BLOOD HOLD COAG: NORMAL
WHOLE BLOOD HOLD SPECIMEN: NORMAL

## 2022-08-18 PROCEDURE — 84484 ASSAY OF TROPONIN QUANT: CPT | Performed by: PHYSICIAN ASSISTANT

## 2022-08-18 PROCEDURE — 25010000002 IOPAMIDOL 61 % SOLUTION: Performed by: STUDENT IN AN ORGANIZED HEALTH CARE EDUCATION/TRAINING PROGRAM

## 2022-08-18 PROCEDURE — 83605 ASSAY OF LACTIC ACID: CPT | Performed by: PHYSICIAN ASSISTANT

## 2022-08-18 PROCEDURE — 83690 ASSAY OF LIPASE: CPT | Performed by: STUDENT IN AN ORGANIZED HEALTH CARE EDUCATION/TRAINING PROGRAM

## 2022-08-18 PROCEDURE — 74177 CT ABD & PELVIS W/CONTRAST: CPT

## 2022-08-18 RX ORDER — DEXTROSE AND SODIUM CHLORIDE 5; .45 G/100ML; G/100ML
75 INJECTION, SOLUTION INTRAVENOUS CONTINUOUS
Status: DISCONTINUED | OUTPATIENT
Start: 2022-08-18 | End: 2022-08-18 | Stop reason: HOSPADM

## 2022-08-18 RX ADMIN — DEXTROSE AND SODIUM CHLORIDE 75 ML/HR: 5; 450 INJECTION, SOLUTION INTRAVENOUS at 07:30

## 2022-08-18 RX ADMIN — IOPAMIDOL 70 ML: 612 INJECTION, SOLUTION INTRAVENOUS at 03:21

## 2022-08-18 NOTE — ED PROVIDER NOTES
Subjective   History of Present Illness     Mr. Silva is a 88 yo male w/ PMH for CAD, GERD, cholecystectomy (2012), presenting to the emergency department for epigastric abdominal pain. Patient reports that he began to have severe, sharp pain around 5pm which has since resolved. He also describes diaphoresis during this time. He denies any chest pain. No dyspnea. No fevers, chills, N/V, bowel changes or urinary sx. No sick contacts or recent illness prior to symptom onset. Reports similar symptoms, which typically resolve with biscodyl, which have not been effective durign current episodes.     Review of Systems   Constitutional: Negative.  Negative for fever.   HENT: Negative.    Respiratory: Negative.    Cardiovascular: Negative.  Negative for chest pain.        Diaphoresis   Gastrointestinal: Positive for abdominal pain (epigastric pain).   Endocrine: Negative.    Genitourinary: Negative.  Negative for dysuria.   Skin: Negative.    Neurological: Negative.    Psychiatric/Behavioral: Negative.    All other systems reviewed and are negative.      Past Medical History:   Diagnosis Date   • Carotid stenosis    • Coronary artery disease    • Diverticulitis    • GERD (gastroesophageal reflux disease)    • Gout    • Hearing aid worn    • Hypertension    • Obese    • Prostate cancer (CMS/HCC)    • Wears glasses        Allergies   Allergen Reactions   • Ciprofloxacin Other (See Comments)     Muscle Pains   • Lisinopril Cough   • Sulfa Antibiotics Itching and Rash       Past Surgical History:   Procedure Laterality Date   • CAROTID ENDARTERECTOMY Left 2/5/2018    Procedure: CAROTID ENDARTERECTOMY WITH EEG LEFT;  Surgeon: Del Neal MD;  Location: [facility];  Service:    • CATARACT EXTRACTION     • CATARACT EXTRACTION WITH INTRAOCULAR LENS IMPLANT Right    • CHOLECYSTECTOMY     • COLONOSCOPY W/ POLYPECTOMY     • HERNIA REPAIR Right    • TOTAL KNEE ARTHROPLASTY Right    • TURP / TRANSURETHRAL INCISION / DRAINAGE  PROSTATE         Family History   Problem Relation Age of Onset   • Hypertension Mother    • Glaucoma Mother    • Diverticulitis Mother    • Gout Mother    • ALS Father    • Gout Sister    • Broken bones Paternal Grandfather    • Broken bones Son    • Cancer Son    • Diabetes Brother        Social History     Socioeconomic History   • Marital status:    • Number of children: 1   Tobacco Use   • Smoking status: Former Smoker     Packs/day: 1.00     Years: 12.00     Pack years: 12.00     Types: Cigarettes     Quit date:      Years since quittin.6   • Smokeless tobacco: Current User     Types: Chew   Substance and Sexual Activity   • Alcohol use: No   • Drug use: No   • Sexual activity: Defer           Objective   Physical Exam  Constitutional:       General: He is not in acute distress.     Appearance: Normal appearance. He is not ill-appearing.   HENT:      Head: Normocephalic and atraumatic.      Nose: No congestion or rhinorrhea.   Eyes:      Extraocular Movements: Extraocular movements intact.      Pupils: Pupils are equal, round, and reactive to light.   Cardiovascular:      Rate and Rhythm: Normal rate and regular rhythm.   Pulmonary:      Effort: Pulmonary effort is normal.      Breath sounds: Normal breath sounds.   Abdominal:      General: Bowel sounds are normal.      Palpations: Abdomen is soft.   Musculoskeletal:         General: Normal range of motion.      Cervical back: Normal range of motion.   Skin:     General: Skin is warm.      Capillary Refill: Capillary refill takes less than 2 seconds.   Neurological:      General: No focal deficit present.      Mental Status: He is alert and oriented to person, place, and time.      Cranial Nerves: No cranial nerve deficit.      Sensory: No sensory deficit.      Motor: No weakness.      Deep Tendon Reflexes: Reflexes normal.         Procedures           ED Course                                           MDM  Number of Diagnoses or Management  Options  Choledocholithiasis  Diagnosis management comments: Mr. Silva is a 88 yo male presenting to the ED for acute onset epigastric abdominal pain, which has since resolved. Patient is afebrile and hemodynamically stable on arrival, non toxic appearing. Physical exam patient has no abdominal pain at this time. Otherwise benign exam. Differentials to consider but not limited to include: GERD/PUD, ACS, SBO, MSK. ECG shows no acute signs of ischemia. Bedside CXR shows normal cardiopulmonary silhouette. Basic labs, lactic acid, lipase and trop are obtained for further evaluation results are remarkable for transaminiits, hyperbilirubinemia, normal lipase and normal WBC. Labs draw concern for choledoclethiaisis. Given clinical picture low suspicion for ascending cholangitis at this time. CT abdomen pelvis w/ contrast obtained which shows evidence of biliary duct dilation, no obstructive process noted or evidence of malignancy. GI consulted for ERCP at [facility]. Dr. Vargas is the admit physician. Bed #308. Patient awaiting transfer to [facility].        Amount and/or Complexity of Data Reviewed  Clinical lab tests: ordered and reviewed  Tests in the radiology section of CPT®: ordered and reviewed  Tests in the medicine section of CPT®: ordered and reviewed  Decide to obtain previous medical records or to obtain history from someone other than the patient: yes  Review and summarize past medical records: yes  Independent visualization of images, tracings, or specimens: yes    Risk of Complications, Morbidity, and/or Mortality  Presenting problems: high  Diagnostic procedures: high  Management options: high        Final diagnoses:   Choledocholithiasis       ED Disposition  ED Disposition     ED Disposition   Transfer to Another Facility     Condition   --    Comment   --             No follow-up provider specified.       Medication List      No changes were made to your prescriptions during this visit.           Yoanna Mayorga MD  08/18/22 0650       Yoanna Mayorga MD  08/18/22 8893

## 2022-08-18 NOTE — ED NOTES
Marce with [facility] called. Patient is going to [facility]. Number for report is [phone], fax number for face sheet is [phone].

## 2022-08-18 NOTE — ED NOTES
Called [facility] spoke with [facility], they do not have any beds to accept transfers at this time.

## 2022-08-18 NOTE — ED NOTES
Called [facility] spoke with [facility]. She states they do not have beds, and there is 3 patients on the admit list at this time. Per Dr. Mayorga try some where else.

## 2022-08-18 NOTE — ED NOTES
MEDICAL SCREENING:    Reason for Visit: chest pain    Patient initially seen in triage.  The patient was advised further evaluation and diagnostic testing will be needed, some of the treatment and testing will be initiated in the lobby in order to begin the process.  The patient will be returned to the waiting area for the time being and possibly be re-assessed by a subsequent ED provider.  The patient will be brought back to the treatment area in as timely manner as possible.         Young Cuevas II, PA  08/17/22 1796

## 2022-08-18 NOTE — ED NOTES
Called [facility] to see if they have GI at [place]. They do not have GI the rest of the week.

## 2022-08-18 NOTE — ED NOTES
Called the [facility] center for GI transfer. Spoke with Bret, he states they have GI but they don't take call till the morning.

## 2022-08-18 NOTE — ED NOTES
ASSESSMENT NOTE- Pt said he started having pain below his sternum at 1700 today. Pt says he has no pain now. Pt said his pain was a 10 at 1700, he drank milk and took tums for relief. No s/sx of acute distress noted. No nausea and no vomiting noted.

## 2022-08-18 NOTE — ED NOTES
Called [facility] spoke with Marce for transfer. She will check bed status at all facilities and call me back.

## 2022-08-20 LAB
BACTERIA SPEC AEROBE CULT: ABNORMAL
GRAM STN SPEC: ABNORMAL
ISOLATED FROM: ABNORMAL

## 2022-08-23 LAB
BACTERIA SPEC AEROBE CULT: ABNORMAL
GRAM STN SPEC: ABNORMAL
ISOLATED FROM: ABNORMAL

## 2024-03-12 ENCOUNTER — TRANSCRIBE ORDERS (OUTPATIENT)
Dept: ADMINISTRATIVE | Facility: HOSPITAL | Age: 89
End: 2024-03-12
Payer: MEDICARE

## 2024-03-12 DIAGNOSIS — E04.1 THYROID NODULE: Primary | ICD-10-CM

## 2024-03-22 ENCOUNTER — HOSPITAL ENCOUNTER (OUTPATIENT)
Dept: ULTRASOUND IMAGING | Facility: HOSPITAL | Age: 89
Discharge: HOME | End: 2024-03-22
Payer: MEDICARE

## 2024-03-22 DIAGNOSIS — E04.1 THYROID NODULE: ICD-10-CM

## 2024-03-22 PROCEDURE — 76536 US EXAM OF HEAD AND NECK: CPT

## 2024-06-24 ENCOUNTER — APPOINTMENT (OUTPATIENT)
Dept: GENERAL RADIOLOGY | Facility: HOSPITAL | Age: 89
DRG: 808 | End: 2024-06-24
Payer: MEDICARE

## 2024-06-24 ENCOUNTER — HOSPITAL ENCOUNTER (INPATIENT)
Facility: HOSPITAL | Age: 89
LOS: 2 days | Discharge: HOME | DRG: 808 | End: 2024-06-26
Attending: EMERGENCY MEDICINE | Admitting: INTERNAL MEDICINE
Payer: MEDICARE

## 2024-06-24 DIAGNOSIS — D64.9 ANEMIA, UNSPECIFIED TYPE: Primary | ICD-10-CM

## 2024-06-24 DIAGNOSIS — N18.9 CHRONIC KIDNEY DISEASE, UNSPECIFIED CKD STAGE: ICD-10-CM

## 2024-06-24 DIAGNOSIS — E87.5 HYPERKALEMIA: ICD-10-CM

## 2024-06-24 PROBLEM — D61.9 APLASTIC ANEMIA: Status: ACTIVE | Noted: 2024-06-24

## 2024-06-24 LAB
ABO GROUP BLD: NORMAL
ABO GROUP BLD: NORMAL
ALBUMIN SERPL-MCNC: 3.7 G/DL (ref 3.5–5.2)
ALBUMIN/GLOB SERPL: 1.3 G/DL
ALP SERPL-CCNC: 79 U/L (ref 39–117)
ALT SERPL W P-5'-P-CCNC: 25 U/L (ref 1–41)
ANION GAP SERPL CALCULATED.3IONS-SCNC: 10.6 MMOL/L (ref 5–15)
ANION GAP SERPL CALCULATED.3IONS-SCNC: 15.4 MMOL/L (ref 5–15)
ANISOCYTOSIS BLD QL: NORMAL
AST SERPL-CCNC: 17 U/L (ref 1–40)
BASOPHILS # BLD AUTO: 0.01 10*3/MM3 (ref 0–0.2)
BASOPHILS # BLD AUTO: 0.02 10*3/MM3 (ref 0–0.2)
BASOPHILS NFR BLD AUTO: 0.1 % (ref 0–1.5)
BASOPHILS NFR BLD AUTO: 0.2 % (ref 0–1.5)
BILIRUB SERPL-MCNC: 1.1 MG/DL (ref 0–1.2)
BLD GP AB SCN SERPL QL: NEGATIVE
BUN SERPL-MCNC: 35 MG/DL (ref 8–23)
BUN SERPL-MCNC: 36 MG/DL (ref 8–23)
BUN/CREAT SERPL: 18.8 (ref 7–25)
BUN/CREAT SERPL: 19.8 (ref 7–25)
CALCIUM SPEC-SCNC: 10.1 MG/DL (ref 8.2–9.6)
CALCIUM SPEC-SCNC: 10.2 MG/DL (ref 8.2–9.6)
CHLORIDE SERPL-SCNC: 105 MMOL/L (ref 98–107)
CHLORIDE SERPL-SCNC: 105 MMOL/L (ref 98–107)
CO2 SERPL-SCNC: 19.6 MMOL/L (ref 22–29)
CO2 SERPL-SCNC: 23.4 MMOL/L (ref 22–29)
CREAT SERPL-MCNC: 1.82 MG/DL (ref 0.76–1.27)
CREAT SERPL-MCNC: 1.86 MG/DL (ref 0.76–1.27)
DAT POLY-SP REAG RBC QL: NEGATIVE
DEPRECATED RDW RBC AUTO: 59 FL (ref 37–54)
DEPRECATED RDW RBC AUTO: 74.3 FL (ref 37–54)
EGFRCR SERPLBLD CKD-EPI 2021: 33.7 ML/MIN/1.73
EGFRCR SERPLBLD CKD-EPI 2021: 34.6 ML/MIN/1.73
ELLIPTOCYTES BLD QL SMEAR: NORMAL
EOSINOPHIL # BLD AUTO: 0 10*3/MM3 (ref 0–0.4)
EOSINOPHIL # BLD AUTO: 0.01 10*3/MM3 (ref 0–0.4)
EOSINOPHIL NFR BLD AUTO: 0 % (ref 0.3–6.2)
EOSINOPHIL NFR BLD AUTO: 0.1 % (ref 0.3–6.2)
ERYTHROCYTE [DISTWIDTH] IN BLOOD BY AUTOMATED COUNT: 14.7 % (ref 12.3–15.4)
ERYTHROCYTE [DISTWIDTH] IN BLOOD BY AUTOMATED COUNT: 20.4 % (ref 12.3–15.4)
FERRITIN SERPL-MCNC: 1420 NG/ML (ref 30–400)
GEN 5 2HR TROPONIN T REFLEX: 92 NG/L
GLOBULIN UR ELPH-MCNC: 2.9 GM/DL
GLUCOSE SERPL-MCNC: 120 MG/DL (ref 65–99)
GLUCOSE SERPL-MCNC: 124 MG/DL (ref 65–99)
HCT VFR BLD AUTO: 11.4 % (ref 37.5–51)
HCT VFR BLD AUTO: 14 % (ref 37.5–51)
HGB BLD-MCNC: 3.7 G/DL (ref 13–17.7)
HGB BLD-MCNC: 4.6 G/DL (ref 13–17.7)
HOLD SPECIMEN: NORMAL
HOLD SPECIMEN: NORMAL
HYPOCHROMIA BLD QL: NORMAL
IMM GRANULOCYTES # BLD AUTO: 0.03 10*3/MM3 (ref 0–0.05)
IMM GRANULOCYTES # BLD AUTO: 0.05 10*3/MM3 (ref 0–0.05)
IMM GRANULOCYTES NFR BLD AUTO: 0.3 % (ref 0–0.5)
IMM GRANULOCYTES NFR BLD AUTO: 0.5 % (ref 0–0.5)
IRON 24H UR-MRATE: 254 MCG/DL (ref 59–158)
IRON SATN MFR SERPL: 80 % (ref 20–50)
LDH SERPL-CCNC: 178 U/L (ref 135–225)
LIPASE SERPL-CCNC: 33 U/L (ref 13–60)
LYMPHOCYTES # BLD AUTO: 1.15 10*3/MM3 (ref 0.7–3.1)
LYMPHOCYTES # BLD AUTO: 1.21 10*3/MM3 (ref 0.7–3.1)
LYMPHOCYTES NFR BLD AUTO: 10.9 % (ref 19.6–45.3)
LYMPHOCYTES NFR BLD AUTO: 12.4 % (ref 19.6–45.3)
MAGNESIUM SERPL-MCNC: 2.2 MG/DL (ref 1.7–2.3)
MCH RBC QN AUTO: 33.6 PG (ref 26.6–33)
MCH RBC QN AUTO: 37 PG (ref 26.6–33)
MCHC RBC AUTO-ENTMCNC: 32.5 G/DL (ref 31.5–35.7)
MCHC RBC AUTO-ENTMCNC: 32.9 G/DL (ref 31.5–35.7)
MCV RBC AUTO: 102.2 FL (ref 79–97)
MCV RBC AUTO: 114 FL (ref 79–97)
MONOCYTES # BLD AUTO: 0.77 10*3/MM3 (ref 0.1–0.9)
MONOCYTES # BLD AUTO: 0.87 10*3/MM3 (ref 0.1–0.9)
MONOCYTES NFR BLD AUTO: 6.9 % (ref 5–12)
MONOCYTES NFR BLD AUTO: 9.4 % (ref 5–12)
NEUTROPHILS NFR BLD AUTO: 7.15 10*3/MM3 (ref 1.7–7)
NEUTROPHILS NFR BLD AUTO: 77.4 % (ref 42.7–76)
NEUTROPHILS NFR BLD AUTO: 81.8 % (ref 42.7–76)
NEUTROPHILS NFR BLD AUTO: 9.08 10*3/MM3 (ref 1.7–7)
NRBC BLD AUTO-RTO: 0 /100 WBC (ref 0–0.2)
NRBC BLD AUTO-RTO: 0 /100 WBC (ref 0–0.2)
PLAT MORPH BLD: NORMAL
PLATELET # BLD AUTO: 251 10*3/MM3 (ref 140–450)
PLATELET # BLD AUTO: 284 10*3/MM3 (ref 140–450)
PMV BLD AUTO: 11.7 FL (ref 6–12)
PMV BLD AUTO: 12 FL (ref 6–12)
POTASSIUM SERPL-SCNC: 4.7 MMOL/L (ref 3.5–5.2)
POTASSIUM SERPL-SCNC: 5.8 MMOL/L (ref 3.5–5.2)
PROT SERPL-MCNC: 6.6 G/DL (ref 6–8.5)
PTH-INTACT SERPL-MCNC: 124.4 PG/ML (ref 15–65)
QT INTERVAL: 344 MS
QTC INTERVAL: 434 MS
RBC # BLD AUTO: 1 10*6/MM3 (ref 4.14–5.8)
RBC # BLD AUTO: 1.37 10*6/MM3 (ref 4.14–5.8)
RETICS # AUTO: <0.01 10*6/MM3 (ref 0.02–0.13)
RETICS/RBC NFR AUTO: 0.4 % (ref 0.7–1.9)
RH BLD: POSITIVE
RH BLD: POSITIVE
SODIUM SERPL-SCNC: 139 MMOL/L (ref 136–145)
SODIUM SERPL-SCNC: 140 MMOL/L (ref 136–145)
T&S EXPIRATION DATE: NORMAL
TIBC SERPL-MCNC: 319 MCG/DL (ref 298–536)
TRANSFERRIN SERPL-MCNC: 214 MG/DL (ref 200–360)
TROPONIN T DELTA: 26 NG/L
TROPONIN T SERPL HS-MCNC: 66 NG/L
TSH SERPL DL<=0.05 MIU/L-ACNC: 1.28 UIU/ML (ref 0.27–4.2)
VIT B12 BLD-MCNC: 412 PG/ML (ref 211–946)
WBC NRBC COR # BLD AUTO: 11.1 10*3/MM3 (ref 3.4–10.8)
WBC NRBC COR # BLD AUTO: 9.25 10*3/MM3 (ref 3.4–10.8)
WHOLE BLOOD HOLD COAG: NORMAL
WHOLE BLOOD HOLD SPECIMEN: NORMAL

## 2024-06-24 PROCEDURE — 83521 IG LIGHT CHAINS FREE EACH: CPT | Performed by: NURSE PRACTITIONER

## 2024-06-24 PROCEDURE — 93005 ELECTROCARDIOGRAM TRACING: CPT | Performed by: EMERGENCY MEDICINE

## 2024-06-24 PROCEDURE — 82397 CHEMILUMINESCENT ASSAY: CPT | Performed by: INTERNAL MEDICINE

## 2024-06-24 PROCEDURE — 94640 AIRWAY INHALATION TREATMENT: CPT

## 2024-06-24 PROCEDURE — 84165 PROTEIN E-PHORESIS SERUM: CPT | Performed by: NURSE PRACTITIONER

## 2024-06-24 PROCEDURE — 86900 BLOOD TYPING SEROLOGIC ABO: CPT

## 2024-06-24 PROCEDURE — 83970 ASSAY OF PARATHORMONE: CPT | Performed by: INTERNAL MEDICINE

## 2024-06-24 PROCEDURE — 83690 ASSAY OF LIPASE: CPT | Performed by: EMERGENCY MEDICINE

## 2024-06-24 PROCEDURE — 82668 ASSAY OF ERYTHROPOIETIN: CPT | Performed by: NURSE PRACTITIONER

## 2024-06-24 PROCEDURE — 82728 ASSAY OF FERRITIN: CPT | Performed by: NURSE PRACTITIONER

## 2024-06-24 PROCEDURE — 82607 VITAMIN B-12: CPT | Performed by: INTERNAL MEDICINE

## 2024-06-24 PROCEDURE — 86901 BLOOD TYPING SEROLOGIC RH(D): CPT | Performed by: EMERGENCY MEDICINE

## 2024-06-24 PROCEDURE — 85007 BL SMEAR W/DIFF WBC COUNT: CPT | Performed by: INTERNAL MEDICINE

## 2024-06-24 PROCEDURE — 99223 1ST HOSP IP/OBS HIGH 75: CPT | Performed by: INTERNAL MEDICINE

## 2024-06-24 PROCEDURE — 25810000003 SODIUM CHLORIDE 0.9 % SOLUTION: Performed by: INTERNAL MEDICINE

## 2024-06-24 PROCEDURE — P9016 RBC LEUKOCYTES REDUCED: HCPCS

## 2024-06-24 PROCEDURE — 93010 ELECTROCARDIOGRAM REPORT: CPT | Performed by: INTERNAL MEDICINE

## 2024-06-24 PROCEDURE — 83540 ASSAY OF IRON: CPT | Performed by: INTERNAL MEDICINE

## 2024-06-24 PROCEDURE — 71045 X-RAY EXAM CHEST 1 VIEW: CPT

## 2024-06-24 PROCEDURE — 86850 RBC ANTIBODY SCREEN: CPT | Performed by: EMERGENCY MEDICINE

## 2024-06-24 PROCEDURE — 86923 COMPATIBILITY TEST ELECTRIC: CPT

## 2024-06-24 PROCEDURE — 83735 ASSAY OF MAGNESIUM: CPT | Performed by: EMERGENCY MEDICINE

## 2024-06-24 PROCEDURE — 84466 ASSAY OF TRANSFERRIN: CPT | Performed by: INTERNAL MEDICINE

## 2024-06-24 PROCEDURE — 94799 UNLISTED PULMONARY SVC/PX: CPT

## 2024-06-24 PROCEDURE — 36415 COLL VENOUS BLD VENIPUNCTURE: CPT | Performed by: EMERGENCY MEDICINE

## 2024-06-24 PROCEDURE — 85045 AUTOMATED RETICULOCYTE COUNT: CPT | Performed by: INTERNAL MEDICINE

## 2024-06-24 PROCEDURE — 83615 LACTATE (LD) (LDH) ENZYME: CPT | Performed by: INTERNAL MEDICINE

## 2024-06-24 PROCEDURE — 84484 ASSAY OF TROPONIN QUANT: CPT | Performed by: EMERGENCY MEDICINE

## 2024-06-24 PROCEDURE — 83010 ASSAY OF HAPTOGLOBIN QUANT: CPT | Performed by: NURSE PRACTITIONER

## 2024-06-24 PROCEDURE — 36430 TRANSFUSION BLD/BLD COMPNT: CPT

## 2024-06-24 PROCEDURE — 86900 BLOOD TYPING SEROLOGIC ABO: CPT | Performed by: EMERGENCY MEDICINE

## 2024-06-24 PROCEDURE — 84443 ASSAY THYROID STIM HORMONE: CPT | Performed by: INTERNAL MEDICINE

## 2024-06-24 PROCEDURE — 86880 COOMBS TEST DIRECT: CPT | Performed by: INTERNAL MEDICINE

## 2024-06-24 PROCEDURE — 71045 X-RAY EXAM CHEST 1 VIEW: CPT | Performed by: RADIOLOGY

## 2024-06-24 PROCEDURE — 80048 BASIC METABOLIC PNL TOTAL CA: CPT | Performed by: INTERNAL MEDICINE

## 2024-06-24 PROCEDURE — 85025 COMPLETE CBC W/AUTO DIFF WBC: CPT | Performed by: EMERGENCY MEDICINE

## 2024-06-24 PROCEDURE — 99285 EMERGENCY DEPT VISIT HI MDM: CPT

## 2024-06-24 PROCEDURE — 80053 COMPREHEN METABOLIC PANEL: CPT | Performed by: EMERGENCY MEDICINE

## 2024-06-24 PROCEDURE — 99223 1ST HOSP IP/OBS HIGH 75: CPT | Performed by: NURSE PRACTITIONER

## 2024-06-24 PROCEDURE — 85025 COMPLETE CBC W/AUTO DIFF WBC: CPT | Performed by: INTERNAL MEDICINE

## 2024-06-24 PROCEDURE — 86901 BLOOD TYPING SEROLOGIC RH(D): CPT

## 2024-06-24 RX ORDER — SODIUM CHLORIDE 9 MG/ML
40 INJECTION, SOLUTION INTRAVENOUS AS NEEDED
Status: DISCONTINUED | OUTPATIENT
Start: 2024-06-24 | End: 2024-06-26 | Stop reason: HOSPADM

## 2024-06-24 RX ORDER — ASPIRIN 81 MG/1
324 TABLET, CHEWABLE ORAL ONCE
Status: DISCONTINUED | OUTPATIENT
Start: 2024-06-24 | End: 2024-06-24

## 2024-06-24 RX ORDER — ALLOPURINOL 300 MG/1
300 TABLET ORAL DAILY
Status: DISCONTINUED | OUTPATIENT
Start: 2024-06-25 | End: 2024-06-26 | Stop reason: HOSPADM

## 2024-06-24 RX ORDER — SODIUM CHLORIDE 0.9 % (FLUSH) 0.9 %
10 SYRINGE (ML) INJECTION AS NEEDED
Status: DISCONTINUED | OUTPATIENT
Start: 2024-06-24 | End: 2024-06-26 | Stop reason: HOSPADM

## 2024-06-24 RX ORDER — ASPIRIN 81 MG/1
243 TABLET, CHEWABLE ORAL ONCE
Status: COMPLETED | OUTPATIENT
Start: 2024-06-24 | End: 2024-06-24

## 2024-06-24 RX ORDER — NITROGLYCERIN 0.4 MG/1
0.4 TABLET SUBLINGUAL
Status: DISCONTINUED | OUTPATIENT
Start: 2024-06-24 | End: 2024-06-26 | Stop reason: HOSPADM

## 2024-06-24 RX ORDER — ASPIRIN 81 MG/1
81 TABLET ORAL DAILY
Status: CANCELLED | OUTPATIENT
Start: 2024-06-25

## 2024-06-24 RX ORDER — POLYETHYLENE GLYCOL 3350 17 G/17G
17 POWDER, FOR SOLUTION ORAL DAILY PRN
Status: DISCONTINUED | OUTPATIENT
Start: 2024-06-24 | End: 2024-06-26 | Stop reason: HOSPADM

## 2024-06-24 RX ORDER — SODIUM CHLORIDE 9 MG/ML
75 INJECTION, SOLUTION INTRAVENOUS CONTINUOUS
Status: DISCONTINUED | OUTPATIENT
Start: 2024-06-24 | End: 2024-06-26

## 2024-06-24 RX ORDER — AMOXICILLIN 250 MG
2 CAPSULE ORAL 2 TIMES DAILY PRN
Status: DISCONTINUED | OUTPATIENT
Start: 2024-06-24 | End: 2024-06-26 | Stop reason: HOSPADM

## 2024-06-24 RX ORDER — BISACODYL 5 MG/1
5 TABLET, DELAYED RELEASE ORAL DAILY PRN
Status: DISCONTINUED | OUTPATIENT
Start: 2024-06-24 | End: 2024-06-26 | Stop reason: HOSPADM

## 2024-06-24 RX ORDER — BISACODYL 10 MG
10 SUPPOSITORY, RECTAL RECTAL DAILY PRN
Status: DISCONTINUED | OUTPATIENT
Start: 2024-06-24 | End: 2024-06-26 | Stop reason: HOSPADM

## 2024-06-24 RX ORDER — IPRATROPIUM BROMIDE AND ALBUTEROL SULFATE 2.5; .5 MG/3ML; MG/3ML
3 SOLUTION RESPIRATORY (INHALATION) ONCE
Status: COMPLETED | OUTPATIENT
Start: 2024-06-24 | End: 2024-06-24

## 2024-06-24 RX ORDER — VALSARTAN 80 MG/1
80 TABLET ORAL DAILY
COMMUNITY

## 2024-06-24 RX ORDER — SODIUM CHLORIDE 0.9 % (FLUSH) 0.9 %
10 SYRINGE (ML) INJECTION EVERY 12 HOURS SCHEDULED
Status: DISCONTINUED | OUTPATIENT
Start: 2024-06-24 | End: 2024-06-26 | Stop reason: HOSPADM

## 2024-06-24 RX ORDER — VALSARTAN 80 MG/1
80 TABLET ORAL DAILY
Status: CANCELLED | OUTPATIENT
Start: 2024-06-25

## 2024-06-24 RX ORDER — CLOPIDOGREL BISULFATE 75 MG/1
75 TABLET ORAL DAILY
Status: CANCELLED | OUTPATIENT
Start: 2024-06-25

## 2024-06-24 RX ADMIN — SODIUM CHLORIDE 75 ML/HR: 9 INJECTION, SOLUTION INTRAVENOUS at 16:28

## 2024-06-24 RX ADMIN — ASPIRIN 243 MG: 81 TABLET, CHEWABLE ORAL at 08:39

## 2024-06-24 RX ADMIN — IPRATROPIUM BROMIDE AND ALBUTEROL SULFATE 3 ML: .5; 2.5 SOLUTION RESPIRATORY (INHALATION) at 08:57

## 2024-06-24 RX ADMIN — SODIUM ZIRCONIUM CYCLOSILICATE 10 G: 10 POWDER, FOR SUSPENSION ORAL at 11:15

## 2024-06-24 NOTE — CONSULTS
Date:  06/24/24    Referring Provider: Dr. Lopez    Reason for Consultation: Anemia    Patient Care Team:  Jr Segura PA as PCP - General (Physician Assistant)  Cosmo Ambrose MD (Inactive) as Consulting Physician (Neurosurgery)    Chief complaint: Generalized weakness, dyspnea on exertion    History of present illness:  Chinedu Silva is a 89 y.o. year-old male seen today at the request of Dr. Nikhil Loepz MD for evaluation and treatment of anemia. Patient's wife is at bedside during examination today. Patient presented to the ED this morning with history of progressive weakness, dyspnea on exertion. He states that symptoms began around 2-3 months ago but have significantly worsened over the past 2 weeks. His wife reports that a few weeks ago he was evaluated at his primary care physician after she noticed a change in his skin color. She states that they requested that labs be drawn at that time but were told that he was not due to have labs and therefore none were drawn. He was having shortness of breath and chest pain with exertion, as well as dizziness with position changes. His wife states that he was recommended to see cardiology, which he initially declined, but when symptoms worsened he agreed and was scheduled to see them today at 1230. However, upon awakening this morning, weakness was so severe that she brought him to the emergency department. Upon arrival to ED, patient found to have severe anemia with Hg 3.7. He denies any blood loss from any source. He reports last endoscopy around 10 years ago with Dr. Richard after suffering GI bleed. He denies any bruising or bleeding. He denies personal or family history of autoimmune disease or blood disorder. He denies recent illness. He denies fever, chills, nausea, vomiting, diarrhea, abdominal pain or other symptoms. He is otherwise without specific complaints.     History  Past Medical History:   Diagnosis Date    Carotid stenosis     Coronary  artery disease     Diverticulitis     GERD (gastroesophageal reflux disease)     Gout     Hearing aid worn     Hypertension     Obese     Prostate cancer     Wears glasses        Past Surgical History:   Procedure Laterality Date    CAROTID ENDARTERECTOMY Left 2018    Procedure: CAROTID ENDARTERECTOMY WITH EEG LEFT;  Surgeon: Del Neal MD;  Location: [facility];  Service:     CATARACT EXTRACTION      CATARACT EXTRACTION WITH INTRAOCULAR LENS IMPLANT Right     CHOLECYSTECTOMY      COLONOSCOPY W/ POLYPECTOMY      HERNIA REPAIR Right     TOTAL KNEE ARTHROPLASTY Right     TURP / TRANSURETHRAL INCISION / DRAINAGE PROSTATE         Family History   Problem Relation Age of Onset    Hypertension Mother     Glaucoma Mother     Diverticulitis Mother     Gout Mother     ALS Father     Gout Sister     Broken bones Paternal Grandfather     Broken bones Son     Cancer Son     Diabetes Brother        Social History     Tobacco Use    Smoking status: Former     Current packs/day: 0.00     Average packs/day: 1 pack/day for 12.0 years (12.0 ttl pk-yrs)     Types: Cigarettes     Start date:      Quit date:      Years since quittin.5    Smokeless tobacco: Current     Types: Chew   Substance Use Topics    Alcohol use: No    Drug use: No       Allergies:  Ciprofloxacin, Lisinopril, and Sulfa antibiotics    Outpatient Medications:  (Not in a hospital admission)      Inpatient Medications:  Scheduled Meds:       Continuous Infusions:       PRN Meds:    sodium chloride    Review of Systems  A comprehensive 14 point review of systems was performed.  Significant findings as mentioned above.  All other systems reviewed and are negative.       Physical Exam:  Vital Signs   Patient Vitals for the past 24 hrs:   BP Temp Temp src Pulse Resp SpO2 Height Weight   24 1417 133/64 99.4 °F (37.4 °C) Oral 73 18 96 % -- --   24 1402 128/58 99.4 °F (37.4 °C) Oral 76 18 99 % -- --   24 1302 125/76 98.8 °F (37.1 °C) Oral  "88 18 99 % -- --   06/24/24 1232 119/52 98.9 °F (37.2 °C) Oral 80 18 99 % -- --   06/24/24 1217 114/64 98.8 °F (37.1 °C) -- 85 18 97 % -- --   06/24/24 1202 121/54 99.4 °F (37.4 °C) -- 89 18 97 % -- --   06/24/24 1128 92/71 98.9 °F (37.2 °C) Oral 86 18 98 % -- --   06/24/24 1051 116/55 -- -- 80 -- 97 % -- --   06/24/24 1036 97/53 -- -- 90 -- 97 % -- --   06/24/24 1021 114/56 -- -- 80 -- 99 % -- --   06/24/24 1006 114/52 -- -- 79 -- 100 % -- --   06/24/24 0951 110/46 -- -- 76 -- 97 % -- --   06/24/24 0921 105/52 -- -- 72 -- 98 % -- --   06/24/24 0906 118/52 -- -- 90 -- 99 % -- --   06/24/24 0903 -- -- -- 89 18 -- -- --   06/24/24 0857 -- -- -- 93 17 93 % -- --   06/24/24 0821 106/50 -- -- 101 -- 100 % -- --   06/24/24 0807 119/55 98.4 °F (36.9 °C) Oral 96 18 96 % 172.7 cm (68\") 70.3 kg (155 lb)       General:  Awake, alert and oriented, in no distress, skin pallor, ill-appearing  HEENT:  Pupils are equal, round and reactive to light and accommodation  Neck:  No JVD, thyromegaly or lymphadenopathy  CV:  Regular rate and rhythm, no murmurs, rubs or gallops  Resp:  Lungs are clear to auscultation bilaterally  Abd:  Soft, non-tender, non-distended, bowel sounds present, no organomegaly  Ext:  No clubbing, cyanosis or edema  Lymph:  No cervical, supraclavicular, axillary, inguinal or femoral adenopathy  Neuro:  MS as above, CN II-XII intact, grossly non-focal exam      Labs / Studies:  Lab Results   Component Value Date    WBC 11.10 (H) 06/24/2024    HGB 3.7 (C) 06/24/2024    HCT 11.4 (C) 06/24/2024    .0 (H) 06/24/2024    RDW 14.7 06/24/2024     06/24/2024    NEUTRORELPCT 81.8 (H) 06/24/2024    LYMPHORELPCT 10.9 (L) 06/24/2024    MONORELPCT 6.9 06/24/2024    EOSRELPCT 0.0 (L) 06/24/2024    BASORELPCT 0.1 06/24/2024    NEUTROABS 9.08 (H) 06/24/2024    LYMPHSABS 1.21 06/24/2024       Lab Results   Component Value Date     06/24/2024    K 5.8 (H) 06/24/2024    CO2 19.6 (L) 06/24/2024     06/24/2024 "    BUN 35 (H) 06/24/2024    CREATININE 1.86 (H) 06/24/2024    EGFRIFNONA 45 (L) 08/30/2018    GLUCOSE 120 (H) 06/24/2024    CALCIUM 10.2 (H) 06/24/2024    ALKPHOS 79 06/24/2024    AST 17 06/24/2024    ALT 25 06/24/2024    BILITOT 1.1 06/24/2024    ALBUMIN 3.7 06/24/2024    PROTEINTOT 6.6 06/24/2024    MG 2.2 06/24/2024       Imaging Results (Last 72 Hours)       Procedure Component Value Units Date/Time    XR Chest 1 View [[id number]] Collected: 06/24/24 0914     Updated: 06/24/24 0916    Narrative:      EXAM:    XR Chest, 1 View     EXAM DATE:    6/24/2024 8:20 AM     CLINICAL HISTORY:    Chest Pain Protocol     TECHNIQUE:    Frontal view of the chest.     COMPARISON:    No relevant prior studies available.     FINDINGS:    LUNGS AND PLEURAL SPACES:  Unremarkable as visualized.  No  consolidation.  No pneumothorax.    HEART:  Unremarkable as visualized.  No cardiomegaly.    MEDIASTINUM:  Unremarkable as visualized.  Normal mediastinal contour.    BONES/JOINTS:  Unremarkable as visualized.  No acute fracture.       Impression:        Unremarkable exam. No acute cardiopulmonary findings identified.        This report was finalized on 6/24/2024 9:14 AM by Dr. Timur Rivers MD.                 Assessment & Plan:  Chinedu Silva is a 89 y.o. year-old male presenting with:    1. Macrocytic anemia  - Previous available CBC's were reviewed for comparison of trend. CBC in August 2022 with Hg 14.5, Hct 43.3, .3. WBC and platelets were normal.  - Presented to the ED on 06/24/24 with reports of progressive weakness, as well as chest pain and shortness of breath on exertion, dizziness with position changes over the past 2-3 months but significantly worsened over the past 2-3 weeks.  - Further evaluation in ED revealed ongoing macrocytic anemia with Hg 3.7, Hct 11.4, .0. WBC and platelets are within normal.   - Iron profile and ferritin are without cause for concern. B12 and folate are pending.  - LDH and  bilirubin are normal and not suggestive of hemolytic process.  Haptoglobin and erythropoietin level are pending.   - Reticulocyte count significantly low given degree of anemic state.   - Heber test, peripheral blood smear currently pending.  - Ordered for SPEP with [facility] and [facility].   - Ordered for 2 units of PRBC. Would recommend to give irradiated blood products if possible moving forward.  - ED physician discussed with hematology at  and does not recommend transfer at this time. I suspect aplastic anemia based on the above.   - Discussed bone marrow biopsy with patient and his spouse at bedside. Risks and benefits were explained. Aware that this is recommendation for further determination of treatment plan and are agreeable. Will plan for BMBx in AM.         Thank you for taking such good care of Mr. Silva.  We will continue to follow with you. Please don't hesitate to call with questions or concerns.       ZULEYKA Barakat  06/24/24  14:42 EDT      A total of 75 minutes were spent helping to coordinate this patient’s care today; ~50 minutes of this time were spent face-to-face with the patient in his hospital room this morning, reviewing his interim medical history and counseling on the current treatment and follow up plans. All questions were answered to his satisfaction.

## 2024-06-24 NOTE — ED NOTES
EKG performed by [facility] @ 0889. Given to Dr. Law @ 1698   What Type Of Note Output Would You Prefer (Optional)?: Standard Output How Severe Is Your Skin Lesion?: mild Has Your Skin Lesion Been Treated?: not been treated Is This A New Presentation, Or A Follow-Up?: Skin Lesion

## 2024-06-24 NOTE — ED PROVIDER NOTES
Subjective   History of Present Illness  Chinedu is a 89-year-old male who presents to the ED for chief complaint of weakness, his family member at bedside states that he has been weak for roughly 2 weeks, he states he gets weak and out of breath when he stands up.  Patient is supine in bed in a position of comfort he is not tachycardic he is not tachypneic he does have pale conjunctive and evidence of pallor.  He denies any chest pain he denies any black tarry stools or grossly bloody stools.  He denies any vomiting.        Review of Systems   All other systems reviewed and are negative.      Past Medical History:   Diagnosis Date    Carotid stenosis     Coronary artery disease     Diverticulitis     GERD (gastroesophageal reflux disease)     Gout     Hearing aid worn     Hypertension     Obese     Prostate cancer     Wears glasses        Allergies   Allergen Reactions    Ciprofloxacin Other (See Comments)     Muscle Pains    Lisinopril Cough    Sulfa Antibiotics Itching and Rash       Past Surgical History:   Procedure Laterality Date    CAROTID ENDARTERECTOMY Left 2/5/2018    Procedure: CAROTID ENDARTERECTOMY WITH EEG LEFT;  Surgeon: Del Neal MD;  Location: [facility];  Service:     CATARACT EXTRACTION      CATARACT EXTRACTION WITH INTRAOCULAR LENS IMPLANT Right     CHOLECYSTECTOMY      COLONOSCOPY W/ POLYPECTOMY      HERNIA REPAIR Right     TOTAL KNEE ARTHROPLASTY Right     TURP / TRANSURETHRAL INCISION / DRAINAGE PROSTATE         Family History   Problem Relation Age of Onset    Hypertension Mother     Glaucoma Mother     Diverticulitis Mother     Gout Mother     ALS Father     Gout Sister     Broken bones Paternal Grandfather     Broken bones Son     Cancer Son     Diabetes Brother        Social History     Socioeconomic History    Marital status:     Number of children: 1   Tobacco Use    Smoking status: Former     Current packs/day: 0.00     Average packs/day: 1 pack/day for 12.0 years (12.0 ttl  pk-yrs)     Types: Cigarettes     Start date:      Quit date:      Years since quittin.5    Smokeless tobacco: Current     Types: Chew   Substance and Sexual Activity    Alcohol use: No    Drug use: No    Sexual activity: Defer           Objective   Physical Exam  Vitals reviewed.   Constitutional:       Appearance: Normal appearance. He is normal weight.   HENT:      Head: Normocephalic and atraumatic.      Right Ear: External ear normal.      Left Ear: External ear normal.      Nose: Nose normal.      Mouth/Throat:      Mouth: Mucous membranes are moist. Mucous membranes are dry.      Pharynx: Oropharynx is clear.   Eyes:      Extraocular Movements: Extraocular movements intact.      Conjunctiva/sclera: Conjunctivae normal.      Pupils: Pupils are equal, round, and reactive to light.      Comments: Pale conjunctival palpebral   Cardiovascular:      Rate and Rhythm: Normal rate and regular rhythm.      Pulses: Normal pulses.      Heart sounds: Normal heart sounds.   Pulmonary:      Effort: Pulmonary effort is normal.      Breath sounds: Normal breath sounds.   Abdominal:      General: Bowel sounds are normal.      Palpations: Abdomen is soft.   Musculoskeletal:         General: Normal range of motion.      Cervical back: Normal range of motion and neck supple. No rigidity.   Skin:     General: Skin is warm and dry.      Capillary Refill: Capillary refill takes less than 2 seconds.   Neurological:      General: No focal deficit present.      Mental Status: He is alert and oriented to person, place, and time.   Psychiatric:         Mood and Affect: Mood normal.         Procedures           ED Course                                             Medical Decision Making  Chinedu is a 89-year-old male who presents to the ED for chief complaint of weakness, patient with labs and imaging to evaluate for metabolic causes of his shortness of breath.  Also the DuoNeb.    Patient's EKG was obtained and revealed  sinus rhythm with a ventricular rate of 96, no acute ST changes signify ischemia normal axis normal intervals  ----    08:52 EDT  Lab called with a critical hemoglobin of 3.7, we will redraw this compartment and then will also order type and screen at this time and prepare the patient for transfusion he denies again grossly bloody stools will discuss the case with the hospitalist regarding admission here or transfer to a facility with GI abilities.  ---    09:03 EDT  Patient repeat hemoglobin returns again at 3.7, patient will require blood and will discuss with him being admitted versus transferred.    09:36 EDT  Discussed case with the hospitalist.    Patient will be admitted to the hospital here.    Problems Addressed:  Anemia, unspecified type: complicated acute illness or injury  Chronic kidney disease, unspecified CKD stage: complicated acute illness or injury  Hyperkalemia: complicated acute illness or injury    Amount and/or Complexity of Data Reviewed  Labs: ordered.  Radiology: ordered.  ECG/medicine tests: ordered.    Risk  OTC drugs.  Prescription drug management.  Decision regarding hospitalization.        Final diagnoses:   Anemia, unspecified type   Chronic kidney disease, unspecified CKD stage   Hyperkalemia       ED Disposition  ED Disposition       ED Disposition   Decision to Admit    Condition   --    Comment   --               No follow-up provider specified.       Medication List      No changes were made to your prescriptions during this visit.            Nahid Law MD  06/24/24 0761

## 2024-06-24 NOTE — ED NOTES
Patient is being transported to [address] [place] [address] at this time via stretcher by transport. Patient is alert and oriented x4, respirations even and unlabored, IV remains intact and patent with no signs of infiltration, no acute distress noted on departure of ER.

## 2024-06-24 NOTE — NURSING NOTE
Pt was found with brusing/ecchymosis in L AC with packed red blood cells. Blood was transferred to IV in R AC where this IV infiltrated. R AC was swollen and ecchymotic. Proper protocol and orders placed. MD aware.

## 2024-06-24 NOTE — H&P
[facility]    Patient Identification:  Name:  Chinedu Silva  Age:  89 y.o.  Sex:  male  :  1933  MRN:  [id number]   Visit Number:  [id number]  Admit Date: 2024   Room number:  102/02  Primary Care Physician:  Jr Segura PA     Subjective     Chief complaint:    Chief Complaint   Patient presents with    Shortness of Breath    Weakness - Generalized       History of presenting illness:  89 y.o. male who presents with 2 weeks of shortness of breath, fatigue. He denies any signs of bleeding. No change in color of Bms. No easy bruising of bleeding from anywhere else. Patient notes he had a GI bleed around 10 years ago and had endoscopy per Dr. Richard. He does not recall what intervention was done if any. He denied any chest pain. SOB has been exertional and weakness progressive.     Wife supportive bedside helping provide history.     In the ED patient found to be severely anemic with hemoglobin of 3.7. I added labs to initial collections prior to receiving ordered RBCs. Retic count inappropriately severely low. Discussed with heme/onc and [facility] transfer providers. Patient and wife agreeable for transfer or to stay here. [facility] heme/onc provider recommended to keep patient here for bone marrow biopsy then possibly look at transfer depending on the results.   ---------------------------------------------------------------------------------------------------------------------   Review of Systems   Constitutional:  Positive for fatigue.   HENT: Negative.     Eyes: Negative.    Respiratory: Negative.     Cardiovascular: Negative.    Gastrointestinal: Negative.    Endocrine: Negative.    Genitourinary: Negative.    Musculoskeletal: Negative.    Skin: Negative.    Allergic/Immunologic: Negative.    Neurological:  Positive for weakness.   Hematological: Negative.    Psychiatric/Behavioral: Negative.        ---------------------------------------------------------------------------------------------------------------------   Past Medical History:   Diagnosis Date    Carotid stenosis     Coronary artery disease     Diverticulitis     GERD (gastroesophageal reflux disease)     Gout     Hearing aid worn     Hypertension     Obese     Prostate cancer     Wears glasses      Past Surgical History:   Procedure Laterality Date    CAROTID ENDARTERECTOMY Left 2018    Procedure: CAROTID ENDARTERECTOMY WITH EEG LEFT;  Surgeon: Del Neal MD;  Location: [facility];  Service:     CATARACT EXTRACTION      CATARACT EXTRACTION WITH INTRAOCULAR LENS IMPLANT Right     CHOLECYSTECTOMY      COLONOSCOPY W/ POLYPECTOMY      HERNIA REPAIR Right     TOTAL KNEE ARTHROPLASTY Right     TURP / TRANSURETHRAL INCISION / DRAINAGE PROSTATE       Family History   Problem Relation Age of Onset    Hypertension Mother     Glaucoma Mother     Diverticulitis Mother     Gout Mother     ALS Father     Gout Sister     Broken bones Paternal Grandfather     Broken bones Son     Cancer Son     Diabetes Brother      Social History     Socioeconomic History    Marital status:     Number of children: 1   Tobacco Use    Smoking status: Former     Current packs/day: 0.00     Average packs/day: 1 pack/day for 12.0 years (12.0 ttl pk-yrs)     Types: Cigarettes     Start date:      Quit date:      Years since quittin.5    Smokeless tobacco: Current     Types: Chew   Substance and Sexual Activity    Alcohol use: No    Drug use: No    Sexual activity: Defer     ---------------------------------------------------------------------------------------------------------------------   Allergies:  Ciprofloxacin, Lisinopril, and Sulfa antibiotics  ---------------------------------------------------------------------------------------------------------------------   Medications below are reported home medications pulling from within the system; at  this time, these medications have not been reconciled unless otherwise specified and are in the verification process for further verifcation as current home medications.    Prior to Admission Medications       Prescriptions Last Dose Informant Patient Reported? Taking?    allopurinol (ZYLOPRIM) 300 MG tablet  Medication Bottle Yes No    Take 300 mg by mouth Daily.    amLODIPine (NORVASC) 5 MG tablet  Medication Bottle Yes No    Take 5 mg by mouth Daily.    aspirin 81 MG EC tablet  Medication Bottle Yes No    Take 81 mg by mouth Daily.    clopidogrel (PLAVIX) 75 MG tablet  Medication Bottle Yes No    Take 75 mg by mouth Daily. Patient stated Dr. Neal told him to continue    losartan-hydrochlorothiazide (HYZAAR) 100-25 MG per tablet  Medication Bottle Yes No    Take 1 tablet by mouth Daily.    Misc Natural Products (OSTEO BI-FLEX/5-LOXIN ADVANCED PO)   Yes No    Take  by mouth 2 (Two) Times a Day.    nabumetone (RELAFEN) 750 MG tablet   No No    Take 1 tablet by mouth 2 (Two) Times a Day.    omeprazole (priLOSEC) 20 MG capsule   Yes No          Objective     Vital Signs:  Temp:  [98.4 °F (36.9 °C)] 98.4 °F (36.9 °C)  Heart Rate:  [] 80  Resp:  [17-18] 18  BP: ()/(46-56) 116/55    Mean Arterial Pressure (Non-Invasive) for the past 24 hrs (Last 3 readings):   Noninvasive MAP (mmHg)   06/24/24 1051 77   06/24/24 1036 65   06/24/24 1021 85     SpO2:  [93 %-100 %] 97 %  on   ;   Device (Oxygen Therapy): room air  Body mass index is 23.57 kg/m².    Wt Readings from Last 3 Encounters:   06/24/24 70.3 kg (155 lb)   08/17/22 71.2 kg (157 lb)   11/11/20 73.9 kg (163 lb)      ---------------------------------------------------------------------------------------------------------------------   Physical Exam:  Constitutional:  Pale elderly male.   HENT:  Head: Normocephalic and atraumatic.  Mouth:  Moist mucous membranes.    Eyes:  Conjunctivae and EOM are normal.  Pupils are equal, round, and reactive to light.  No  scleral icterus.  Cardiovascular:  Normal rate, regular rhythm and normal heart sounds with no murmur.  Pulmonary/Chest:  No respiratory distress, no wheezes, no crackles, with normal breath sounds and good air movement.  Abdominal:  Soft.  Bowel sounds are normal.  No distension and no tenderness.   Musculoskeletal:  No edema, no tenderness, and no deformity.  No red or swollen joints anywhere.    Neurological:  Alert and oriented to person, place, and time.  No cranial nerve deficit.  No focal neurological deficit.   Skin:  Skin is warm and dry.  No rash noted.  No pallor.   Peripheral vascular:  No edema and strong pulses on all 4 extremities.    ---------------------------------------------------------------------------------------------------------------------  EKG:    ECG 12 Lead Chest Pain   Preliminary Result   Test Reason : Chest Pain   Blood Pressure :   */*   mmHG   Vent. Rate :  96 BPM     Atrial Rate :  96 BPM      P-R Int : 162 ms          QRS Dur :  94 ms       QT Int : 344 ms       P-R-T Axes :  54 -41  92 degrees      QTc Int : 434 ms      Normal sinus rhythm   Left axis deviation   Incomplete right bundle branch block   Septal infarct , age undetermined   Abnormal ECG   When compared with ECG of 17-AUG-2022 22:49,   ST now depressed in Inferior leads   ST now depressed in Anterolateral leads   T wave inversion now evident in Lateral leads      Referred By: ELLIOTT           Confirmed By:           Telemetry:      I have personally looked at both the EKG and the telemetry strips.    Last echocardiogram:    --------------------------------------------------------------------------------------------------------------------  Labs:  Results from last 7 days   Lab Units 06/24/24  0854   WBC 10*3/mm3 11.10*   HEMOGLOBIN g/dL 3.7*   HEMATOCRIT % 11.4*   MCV fL 114.0*   MCHC g/dL 32.5   PLATELETS 10*3/mm3 284         Results from last 7 days   Lab Units 06/24/24  0944   SODIUM mmol/L 140   POTASSIUM mmol/L  "5.8*   MAGNESIUM mg/dL 2.2   CHLORIDE mmol/L 105   CO2 mmol/L 19.6*   BUN mg/dL 35*   CREATININE mg/dL 1.86*   CALCIUM mg/dL 10.2*   GLUCOSE mg/dL 120*   ALBUMIN g/dL 3.7   BILIRUBIN mg/dL 1.1   ALK PHOS U/L 79   AST (SGOT) U/L 17   ALT (SGPT) U/L 25   Estimated Creatinine Clearance: 25.7 mL/min (A) (by C-G formula based on SCr of 1.86 mg/dL (H)).    No results found for: \"AMMONIA\"  Results from last 7 days   Lab Units 06/24/24  0944   HSTROP T ng/L 66*         No results found for: \"HGBA1C\", \"POCGLU\"  Lab Results   Component Value Date    TSH 1.280 06/24/2024     No results found for: \"PREGTESTUR\", \"PREGSERUM\", \"HCG\", \"HCGQUANT\"  Pain Management Panel           No data to display              Brief Urine Lab Results       None          No results found for: \"BLOODCX\"  No results found for: \"URINECX\"  No results found for: \"WOUNDCX\"  No results found for: \"STOOLCX\"    I have personally looked at the labs and they are summarized above.  ----------------------------------------------------------------------------------------------------------------------  Detailed radiology reports for the last 24 hours:    Imaging Results (Last 24 Hours)       Procedure Component Value Units Date/Time    XR Chest 1 View [[id number]] Collected: 06/24/24 0914     Updated: 06/24/24 0916    Narrative:      EXAM:    XR Chest, 1 View     EXAM DATE:    6/24/2024 8:20 AM     CLINICAL HISTORY:    Chest Pain Protocol     TECHNIQUE:    Frontal view of the chest.     COMPARISON:    No relevant prior studies available.     FINDINGS:    LUNGS AND PLEURAL SPACES:  Unremarkable as visualized.  No  consolidation.  No pneumothorax.    HEART:  Unremarkable as visualized.  No cardiomegaly.    MEDIASTINUM:  Unremarkable as visualized.  Normal mediastinal contour.    BONES/JOINTS:  Unremarkable as visualized.  No acute fracture.       Impression:        Unremarkable exam. No acute cardiopulmonary findings identified.        This report was finalized on " 6/24/2024 9:14 AM by Dr. Timur Rivers MD.             Final impressions for the last 30 days of radiology reports:    XR Chest 1 View    Result Date: 6/24/2024    Unremarkable exam. No acute cardiopulmonary findings identified.   This report was finalized on 6/24/2024 9:14 AM by Dr. Timur Rivers MD.     I have personally looked at the radiology images and read the final radiology report.    Assessment & Plan       #Suspected aplastic anemia vs. Pure red cell aplasia   #Macrocytosis   - Iron studies not consistent with iron depletion.   - Normal LDH, Total bilirubin, not suggestive of hemolytic process   - Reticulocyte count impressively low for anemic state. Suspect hyperproliferation  in bone marrow driving anemia.   - Folate, B12, TSH pending.   - Heber, peripheral blood smear pending.   - Will monitor response to 2 units of pRBC ordered in the emergency room  - Heme/onc consulted and planning on bone marrow biopsy   - Will monitor for signs of bleeding     #KUNAL vs. CKD  #Hyperkalemia   #Mild hypercalcemia   - Unclear baseline Cr. In 2022 Cr was 1.4. Cr on presentaiton 1.8 with mild hypercalemia of 5.8.   - Lokelma ordered. Will monitor response.   - Will gently hydrate with normal saline 75 cc/hr   - Will get PTH, and PTHrP    #NSTEMI, type II  #Hx of CAD  #Hx of FABIEN  - HS troponin 66 with repeat pending   - EKG does show serial nonspecific ST changes since 2022  - No chest pain.   - Suspect type II from anemia and renal dysfunction.     #GERD  - Continue home regimen.     Code status: Full     Dispo: Admit to tele. Pending bone marrow biopsy results.       Nikhil Lopez MD  [facility] Hospitalist  06/24/24  11:01 EDT

## 2024-06-25 LAB
ALBUMIN SERPL ELPH-MCNC: 3.3 G/DL (ref 2.9–4.4)
ALBUMIN SERPL-MCNC: 3.5 G/DL (ref 3.5–5.2)
ALBUMIN/GLOB SERPL: 1.2 {RATIO} (ref 0.7–1.7)
ALBUMIN/GLOB SERPL: 1.3 G/DL
ALP SERPL-CCNC: 71 U/L (ref 39–117)
ALPHA1 GLOB SERPL ELPH-MCNC: 0.2 G/DL (ref 0–0.4)
ALPHA2 GLOB SERPL ELPH-MCNC: 0.9 G/DL (ref 0.4–1)
ALT SERPL W P-5'-P-CCNC: 28 U/L (ref 1–41)
ANION GAP SERPL CALCULATED.3IONS-SCNC: 10.5 MMOL/L (ref 5–15)
AST SERPL-CCNC: 23 U/L (ref 1–40)
B-GLOBULIN SERPL ELPH-MCNC: 0.7 G/DL (ref 0.7–1.3)
BASOPHILS # BLD AUTO: 0.02 10*3/MM3 (ref 0–0.2)
BASOPHILS NFR BLD AUTO: 0.3 % (ref 0–1.5)
BH BB BLOOD EXPIRATION DATE: NORMAL
BH BB BLOOD TYPE BARCODE: 6200
BH BB DISPENSE STATUS: NORMAL
BH BB PRODUCT CODE: NORMAL
BH BB UNIT NUMBER: NORMAL
BILIRUB SERPL-MCNC: 1.6 MG/DL (ref 0–1.2)
BUN SERPL-MCNC: 36 MG/DL (ref 8–23)
BUN/CREAT SERPL: 21.7 (ref 7–25)
CALCIUM SPEC-SCNC: 9.6 MG/DL (ref 8.2–9.6)
CHLORIDE SERPL-SCNC: 106 MMOL/L (ref 98–107)
CO2 SERPL-SCNC: 22.5 MMOL/L (ref 22–29)
CREAT SERPL-MCNC: 1.66 MG/DL (ref 0.76–1.27)
CROSSMATCH INTERPRETATION: NORMAL
DEPRECATED RDW RBC AUTO: 62.3 FL (ref 37–54)
EGFRCR SERPLBLD CKD-EPI 2021: 38.7 ML/MIN/1.73
EOSINOPHIL # BLD AUTO: 0.13 10*3/MM3 (ref 0–0.4)
EOSINOPHIL NFR BLD AUTO: 1.6 % (ref 0.3–6.2)
ERYTHROCYTE [DISTWIDTH] IN BLOOD BY AUTOMATED COUNT: 18 % (ref 12.3–15.4)
FOLATE SERPL-MCNC: >20 NG/ML (ref 4.78–24.2)
GAMMA GLOB SERPL ELPH-MCNC: 1 G/DL (ref 0.4–1.8)
GLOBULIN SER CALC-MCNC: 2.8 G/DL (ref 2.2–3.9)
GLOBULIN UR ELPH-MCNC: 2.6 GM/DL
GLUCOSE SERPL-MCNC: 103 MG/DL (ref 65–99)
HAPTOGLOB SERPL-MCNC: 224 MG/DL (ref 30–200)
HCT VFR BLD AUTO: 21.5 % (ref 37.5–51)
HGB BLD-MCNC: 7.3 G/DL (ref 13–17.7)
IMM GRANULOCYTES # BLD AUTO: 0.03 10*3/MM3 (ref 0–0.05)
IMM GRANULOCYTES NFR BLD AUTO: 0.4 % (ref 0–0.5)
KAPPA LC FREE SER-MCNC: 62.8 MG/L (ref 3.3–19.4)
KAPPA LC FREE/LAMBDA FREE SER: 2.95 {RATIO} (ref 0.26–1.65)
LABORATORY COMMENT REPORT: NORMAL
LAMBDA LC FREE SERPL-MCNC: 21.3 MG/L (ref 5.7–26.3)
LYMPHOCYTES # BLD AUTO: 1.46 10*3/MM3 (ref 0.7–3.1)
LYMPHOCYTES NFR BLD AUTO: 18.3 % (ref 19.6–45.3)
M PROTEIN SERPL ELPH-MCNC: NORMAL G/DL
MCH RBC QN AUTO: 32.9 PG (ref 26.6–33)
MCHC RBC AUTO-ENTMCNC: 34 G/DL (ref 31.5–35.7)
MCV RBC AUTO: 96.8 FL (ref 79–97)
MONOCYTES # BLD AUTO: 0.78 10*3/MM3 (ref 0.1–0.9)
MONOCYTES NFR BLD AUTO: 9.8 % (ref 5–12)
NEUTROPHILS NFR BLD AUTO: 5.55 10*3/MM3 (ref 1.7–7)
NEUTROPHILS NFR BLD AUTO: 69.6 % (ref 42.7–76)
NRBC BLD AUTO-RTO: 0 /100 WBC (ref 0–0.2)
PLATELET # BLD AUTO: 215 10*3/MM3 (ref 140–450)
PMV BLD AUTO: 12 FL (ref 6–12)
POTASSIUM SERPL-SCNC: 4.6 MMOL/L (ref 3.5–5.2)
PROT PATTERN SERPL ELPH-IMP: NORMAL
PROT SERPL-MCNC: 6.1 G/DL (ref 6–8.5)
PROT SERPL-MCNC: 6.1 G/DL (ref 6–8.5)
RBC # BLD AUTO: 2.22 10*6/MM3 (ref 4.14–5.8)
REF LAB TEST METHOD: NORMAL
SODIUM SERPL-SCNC: 139 MMOL/L (ref 136–145)
UNIT  ABO: NORMAL
UNIT  RH: NORMAL
WBC NRBC COR # BLD AUTO: 7.97 10*3/MM3 (ref 3.4–10.8)

## 2024-06-25 PROCEDURE — 99232 SBSQ HOSP IP/OBS MODERATE 35: CPT | Performed by: NURSE PRACTITIONER

## 2024-06-25 PROCEDURE — 85025 COMPLETE CBC W/AUTO DIFF WBC: CPT | Performed by: INTERNAL MEDICINE

## 2024-06-25 PROCEDURE — 99232 SBSQ HOSP IP/OBS MODERATE 35: CPT | Performed by: INTERNAL MEDICINE

## 2024-06-25 PROCEDURE — 82525 ASSAY OF COPPER: CPT | Performed by: INTERNAL MEDICINE

## 2024-06-25 PROCEDURE — 80053 COMPREHEN METABOLIC PANEL: CPT | Performed by: INTERNAL MEDICINE

## 2024-06-25 PROCEDURE — 97161 PT EVAL LOW COMPLEX 20 MIN: CPT

## 2024-06-25 PROCEDURE — 25810000003 SODIUM CHLORIDE 0.9 % SOLUTION: Performed by: INTERNAL MEDICINE

## 2024-06-25 PROCEDURE — 84630 ASSAY OF ZINC: CPT | Performed by: INTERNAL MEDICINE

## 2024-06-25 PROCEDURE — 82746 ASSAY OF FOLIC ACID SERUM: CPT | Performed by: INTERNAL MEDICINE

## 2024-06-25 PROCEDURE — 97165 OT EVAL LOW COMPLEX 30 MIN: CPT

## 2024-06-25 RX ADMIN — Medication 10 ML: at 09:06

## 2024-06-25 RX ADMIN — ALLOPURINOL 300 MG: 300 TABLET ORAL at 09:05

## 2024-06-25 RX ADMIN — Medication 10 ML: at 21:55

## 2024-06-25 RX ADMIN — SODIUM CHLORIDE 75 ML/HR: 9 INJECTION, SOLUTION INTRAVENOUS at 05:02

## 2024-06-25 NOTE — PROGRESS NOTES
"Date:  06/25/24    CC: None    Subjective:  Mr. Silva was seen in his hospital room this morning. His wife was at bedside. He states that he is feeling much better and is asking when he may be able to go home. He has been able to walk around in his room without dyspnea on exertion or chest pain. He is otherwise without specific complaints.       Objective:  Medications:  Scheduled Meds:allopurinol, 300 mg, Oral, Daily  sodium chloride, 10 mL, Intravenous, Q12H      Continuous Infusions:sodium chloride, 75 mL/hr, Last Rate: 75 mL/hr (06/25/24 0502)      PRN Meds:.  senna-docusate sodium **AND** polyethylene glycol **AND** bisacodyl **AND** bisacodyl    Calcium Replacement - Follow Nurse / BPA Driven Protocol    Magnesium Low Dose Replacement - Follow Nurse / BPA Driven Protocol    nitroglycerin    Phosphorus Replacement - Follow Nurse / BPA Driven Protocol    Potassium Replacement - Follow Nurse / BPA Driven Protocol    sodium chloride    sodium chloride    sodium chloride    Physical Exam:  Vital Signs     Patient Vitals for the past 24 hrs:   BP Temp Temp src Pulse Resp SpO2 Height Weight   06/25/24 0657 131/53 97.7 °F (36.5 °C) Oral 62 18 98 % -- --   06/25/24 0500 -- -- -- -- -- -- -- 73 kg (160 lb 14.4 oz)   06/25/24 0401 150/67 -- -- 63 18 98 % -- --   06/24/24 2342 150/78 98.6 °F (37 °C) Oral 68 18 97 % -- --   06/24/24 2241 141/82 98.5 °F (36.9 °C) Oral 67 18 94 % -- --   06/24/24 2039 129/67 97.5 °F (36.4 °C) Axillary 70 20 99 % -- --   06/24/24 1953 128/83 98.3 °F (36.8 °C) Oral 68 18 96 % -- --   06/24/24 1938 121/60 98 °F (36.7 °C) Oral 84 18 96 % -- --   06/24/24 1922 117/61 98 °F (36.7 °C) Oral 69 20 98 % -- --   06/24/24 1901 130/55 98.3 °F (36.8 °C) Oral 71 18 97 % -- --   06/24/24 1608 137/58 97.5 °F (36.4 °C) Oral 77 18 97 % -- --   06/24/24 1546 133/59 98.2 °F (36.8 °C) Oral 80 18 97 % -- --   06/24/24 1507 123/52 98.5 °F (36.9 °C) Oral 84 18 97 % 172.7 cm (68\") 72.3 kg (159 lb 6.2 oz) "   06/24/24 1436 132/61 -- -- 77 -- 96 % -- --   06/24/24 1417 133/64 99.4 °F (37.4 °C) Oral 73 18 96 % -- --   06/24/24 1402 128/58 99.4 °F (37.4 °C) Oral 76 18 99 % -- --   06/24/24 1302 125/76 98.8 °F (37.1 °C) Oral 88 18 99 % -- --   06/24/24 1232 119/52 98.9 °F (37.2 °C) Oral 80 18 99 % -- --   06/24/24 1217 114/64 98.8 °F (37.1 °C) -- 85 18 97 % -- --   06/24/24 1202 121/54 99.4 °F (37.4 °C) -- 89 18 97 % -- --   06/24/24 1128 92/71 98.9 °F (37.2 °C) Oral 86 18 98 % -- --   06/24/24 1051 116/55 -- -- 80 -- 97 % -- --   06/24/24 1036 97/53 -- -- 90 -- 97 % -- --   06/24/24 1021 114/56 -- -- 80 -- 99 % -- --   06/24/24 1006 114/52 -- -- 79 -- 100 % -- --       General:  Awake, alert and oriented, in no distress, skin pallor, ill-appearing  HEENT:  Pupils are equal, round and reactive to light and accommodation  Neck:  No JVD, thyromegaly or lymphadenopathy  CV:  Regular rate and rhythm, no murmurs, rubs or gallops  Resp:  Lungs are clear to auscultation bilaterally  Abd:  Soft, non-tender, non-distended, bowel sounds present, no organomegaly  Ext:  No clubbing, cyanosis or edema  Lymph:  No cervical, supraclavicular, axillary, inguinal or femoral adenopathy  Neuro:  MS as above, CN II-XII intact, grossly non-focal exam    Labs / Studies:    Lab Results   Component Value Date    WBC 7.97 06/25/2024    HGB 7.3 (L) 06/25/2024    HCT 21.5 (L) 06/25/2024    MCV 96.8 06/25/2024    RDW 18.0 (H) 06/25/2024     06/25/2024    NEUTRORELPCT 69.6 06/25/2024    LYMPHORELPCT 18.3 (L) 06/25/2024    MONORELPCT 9.8 06/25/2024    EOSRELPCT 1.6 06/25/2024    BASORELPCT 0.3 06/25/2024    NEUTROABS 5.55 06/25/2024    LYMPHSABS 1.46 06/25/2024       Lab Results   Component Value Date     06/25/2024    K 4.6 06/25/2024    CO2 22.5 06/25/2024     06/25/2024    BUN 36 (H) 06/25/2024    CREATININE 1.66 (H) 06/25/2024    EGFRIFNONA 45 (L) 08/30/2018    GLUCOSE 103 (H) 06/25/2024    CALCIUM 9.6 06/25/2024    ALKPHOS 71  06/25/2024    AST 23 06/25/2024    ALT 28 06/25/2024    BILITOT 1.6 (H) 06/25/2024    ALBUMIN 3.5 06/25/2024    PROTEINTOT 6.1 06/25/2024    MG 2.2 06/24/2024       Imaging Results (Last 72 Hours)       Procedure Component Value Units Date/Time    XR Chest 1 View [[id number]] Collected: 06/24/24 0914     Updated: 06/24/24 0916    Narrative:      EXAM:    XR Chest, 1 View     EXAM DATE:    6/24/2024 8:20 AM     CLINICAL HISTORY:    Chest Pain Protocol     TECHNIQUE:    Frontal view of the chest.     COMPARISON:    No relevant prior studies available.     FINDINGS:    LUNGS AND PLEURAL SPACES:  Unremarkable as visualized.  No  consolidation.  No pneumothorax.    HEART:  Unremarkable as visualized.  No cardiomegaly.    MEDIASTINUM:  Unremarkable as visualized.  Normal mediastinal contour.    BONES/JOINTS:  Unremarkable as visualized.  No acute fracture.       Impression:        Unremarkable exam. No acute cardiopulmonary findings identified.        This report was finalized on 6/24/2024 9:14 AM by Dr. Timur Rivers MD.               Assessment & Plan:  Chinedu Silva is a 89 y.o. year-old male with     1. Macrocytic anemia  - Previous available CBC's were reviewed for comparison of trend. CBC in August 2022 with Hg 14.5, Hct 43.3, .3. WBC and platelets were normal.  - Presented to the ED on 06/24/24 with reports of progressive weakness, as well as chest pain and shortness of breath on exertion, dizziness with position changes over the past 2-3 months but significantly worsened over the past 2-3 weeks.  - Further evaluation in ED revealed ongoing macrocytic anemia with Hg 3.7, Hct 11.4, .0. WBC and platelets are within normal.   - Iron profile and ferritin are without cause for concern. B12 normal. Folate is pending.  - LDH, haptoglobin and bilirubin are normal and not suggestive of hemolytic process. Erythropoietin level remains pending.  - Reticulocyte count significantly low given degree of anemic  state.   - Heber test negative, peripheral blood smear currently pending.  - Ordered for SPEP with [facility] and [facility].   - ED physician discussed with hematology at  and does not recommend transfer at this time. There is suspicion for aplastic anemia.  - Received 3 units of PRBC on 06/24/24. Would recommend to give irradiated blood products if possible.  - CBC today with WBC 7.97, Hg 7.3, Hct 21.5, platelets 215,000.  - Discussed bone marrow biopsy with patient and his spouse at bedside. Risks and benefits were explained. Aware that this is recommendation for further determination of treatment plan and are agreeable. Will await additional nutritional studies. May require BMBx tomorrow AM.      Thank you for taking such good care of Mr. Silva.  We will continue to follow with you. Please don't hesitate to call with questions or concerns.       ZULEYKA Barakat  06/25/24  10:03 EDT      A total of 35 minutes were spent helping to coordinate this patient’s care today; ~20 minutes of this time were spent face-to-face with the patient in his hospital room this morning, reviewing his interim medical history and counseling on the current treatment and follow up plans. All questions were answered to his satisfaction.

## 2024-06-25 NOTE — THERAPY EVALUATION
Patient Name: Chinedu Silva  : 1933    MRN: [id number]                              Today's Date: 2024       Admit Date: 2024    Visit Dx:     ICD-10-CM ICD-9-CM   1. Anemia, unspecified type  D64.9 285.9   2. Chronic kidney disease, unspecified CKD stage  N18.9 585.9   3. Hyperkalemia  E87.5 276.7     Patient Active Problem List   Diagnosis    Bilateral carotid artery stenosis    Carotid stenosis    GERD (gastroesophageal reflux disease)    Hypertension    Coronary artery disease    Wears glasses    Aplastic anemia     Past Medical History:   Diagnosis Date    Carotid stenosis     Coronary artery disease     Diverticulitis     GERD (gastroesophageal reflux disease)     Gout     Hearing aid worn     Hypertension     Obese     Prostate cancer     Wears glasses      Past Surgical History:   Procedure Laterality Date    CAROTID ENDARTERECTOMY Left 2018    Procedure: CAROTID ENDARTERECTOMY WITH EEG LEFT;  Surgeon: Del Neal MD;  Location: [facility];  Service:     CATARACT EXTRACTION      CATARACT EXTRACTION WITH INTRAOCULAR LENS IMPLANT Right     CHOLECYSTECTOMY      COLONOSCOPY W/ POLYPECTOMY      HERNIA REPAIR Right     TOTAL KNEE ARTHROPLASTY Right     TURP / TRANSURETHRAL INCISION / DRAINAGE PROSTATE        General Information       Row Name 24 1501          OT Time and Intention    Document Type evaluation  -     Mode of Treatment individual therapy;occupational therapy  -       Row Name 24 1501          General Information    Patient Profile Reviewed yes  -     Prior Level of Function independent:;ADL's;driving;all household mobility;community mobility  -     Existing Precautions/Restrictions no known precautions/restrictions  -     Barriers to Rehab none identified  -       Row Name 24 1501          Occupational Profile    Reason for Services/Referral (Occupational Profile) Patient admitted to [facility] on 2024. He was referred for OT  evaluation due to change in functional performance with ADLs, functional mobility, and/or transfers.  -       Row Name 06/25/24 1501          Living Environment    People in Home spouse  -       Row Name 06/25/24 1501          Cognition    Orientation Status (Cognition) oriented x 3  -               User Key  (r) = Recorded By, (t) = Taken By, (c) = Cosigned By      Initials Name Provider Type     Izabela Cash OT Occupational Therapist                     Mobility/ADL's       Row Name 06/25/24 1509          Bed Mobility    Bed Mobility supine-sit  -     Supine-Sit [place] (Bed Mobility) standby assist  [facility]     Assistive Device (Bed Mobility) bed rails;head of bed elevated  -       Row Name 06/25/24 1509          Transfers    Transfers sit-stand transfer;stand-sit transfer  -[facility] Name 06/25/24 1509          Sit-Stand Transfer    Sit-Stand [place] (Transfers) standby assist  -       Row Name 06/25/24 1509          Stand-Sit Transfer    Stand-Sit [place] (Transfers) standby assist  [facility] Name 06/25/24 1509          Activities of Daily Living    BADL Assessment/Intervention bathing;upper body dressing;lower body dressing;grooming;toileting  -       Row Name 06/25/24 1509          Bathing Assessment/Intervention    [place] Level (Bathing) bathing skills;standby assist  [facility] Name 06/25/24 1509          Upper Body Dressing Assessment/Training    [place] Level (Upper Body Dressing) upper body dressing skills;standby assist  -[facility] Name 06/25/24 1509          Lower Body Dressing Assessment/Training    [place] Level (Lower Body Dressing) lower body dressing skills;standby assist  -[facility] Name 06/25/24 1509          Grooming Assessment/Training    [place] Level (Grooming) grooming skills;standby assist  -[facility] Name 06/25/24 1509          Self-Feeding Assessment/Training    [place] Level (Feeding) feeding skills;set up  -        Row Name 06/25/24 1509          Toileting Assessment/Training    [place] Level (Toileting) toileting skills;standby assist  -               User Key  (r) = Recorded By, (t) = Taken By, (c) = Cosigned By      Initials Name Provider Type    Izabela Garcia OT Occupational Therapist                   Obj/Interventions       Row Name 06/25/24 1511          Sensory Assessment (Somatosensory)    Sensory Assessment (Somatosensory) UE sensation intact  -KP       Row Name 06/25/24 1511          Vision Assessment/Intervention    Visual Impairment/Limitations WFL;corrective lenses full-time  -KP       [facility] Name 06/25/24 1511          Range of Motion Comprehensive    General Range of Motion bilateral upper extremity ROM WFL  -KP       [facility] Name 06/25/24 1511          Strength Comprehensive (MMT)    Comment, General Manual Muscle Testing (MMT) Assessment 5/5 MMT in BUEs  -[facility] Name 06/25/24 1511          Motor Skills    Motor Skills coordination;functional endurance  -     Coordination WFL;upper extremity;bilateral  -KP     Functional Endurance fair plus/good  -KP       Row Name 06/25/24 1511          Balance    Balance Assessment sitting static balance;standing static balance  -     Static Sitting Balance independent  -     Static Standing Balance standby assist  -               User Key  (r) = Recorded By, (t) = Taken By, (c) = Cosigned By      Initials Name Provider Type    Izabela Garcia OT Occupational Therapist                   Goals/Plan    No documentation.                  Clinical Impression       Row Name 06/25/24 1514          Pain Assessment    Pretreatment Pain Rating 0/10 - no pain  -     Posttreatment Pain Rating 0/10 - no pain  -KP       Row Name 06/25/24 1514          Plan of Care Review    Plan of Care Reviewed With patient  -     Progress no change  -     Outcome Evaluation Patient seen for OT evaluation. He presents at standby assist level or better at this time with  simple ADLs and functional transfers. No further OT services recommended at this time.  -       Row Name 06/25/24 1514          Therapy Assessment/Plan (OT)    Criteria for Skilled Therapeutic Interventions Met (OT) no;no problems identified which require skilled intervention  -     Therapy Frequency (OT) evaluation only  -       Row Name 06/25/24 1514          Therapy Plan Review/Discharge Plan (OT)    Anticipated Discharge Disposition (OT) home;home with assist  -       Row Name 06/25/24 1514          Positioning and Restraints    Pre-Treatment Position in bed  -KP     Post Treatment Position chair  -KP     In Chair sitting;call light within reach;with family/caregiver  -               User Key  (r) = Recorded By, (t) = Taken By, (c) = Cosigned By      Initials Name Provider Type    Izabela Garcia, OT Occupational Therapist                   Outcome Measures       Row Name 06/25/24 0908          How much help from another person do you currently need...    Turning from your back to your side while in flat bed without using bedrails? 4  -KJ     Moving from lying on back to sitting on the side of a flat bed without bedrails? 4  -KJ     Moving to and from a bed to a chair (including a wheelchair)? 3  -KJ     Standing up from a chair using your arms (e.g., wheelchair, bedside chair)? 3  -KJ     Climbing 3-5 steps with a railing? 2  -KJ     To walk in hospital room? 3  -KJ     AM-PAC 6 Clicks Score (PT) 19  -KJ     Highest Level of Mobility Goal 6 --> Walk 10 steps or more  -KJ               User Key  (r) = Recorded By, (t) = Taken By, (c) = Cosigned By      Initials Name Provider Type    Ariana Barrios, RN Registered Nurse                      OT Recommendation and Plan  Therapy Frequency (OT): evaluation only  Plan of Care Review  Plan of Care Reviewed With: patient  Progress: no change  Outcome Evaluation: Patient seen for OT evaluation. He presents at standby assist level or better at this time  with simple ADLs and functional transfers. No further OT services recommended at this time.     Time Calculation:         Time Calculation- OT       Row Name 06/25/24 1516             Time Calculation- OT    OT Received On 06/25/24  -                User Key  (r) = Recorded By, (t) = Taken By, (c) = Cosigned By      Initials Name Provider Type    Izabela Garcia OT Occupational Therapist                  Therapy Charges for Today       Code Description Service Date Service Provider Modifiers Qty    17909916379 HC OT EVAL LOW COMPLEXITY 4 6/25/2024 Izabela Cash OT GO 1                 Izabela Cash OT  6/25/2024

## 2024-06-25 NOTE — PROGRESS NOTES
[facility] HOSPITALIST PROGRESS NOTE     Patient Identification:  Name:  Chinedu Silva  Age:  89 y.o.  Sex:  male  :  1933  MRN:  [id number]  Visit Number:  [id number]  ROOM: [address] [place] [address]     Primary Care Provider:  Jr Segura PA    Length of stay in inpatient status:  1    Subjective     Chief Compliant:    Chief Complaint   Patient presents with    Shortness of Breath    Weakness - Generalized       History of Presenting Illness:    Patient reports much better. Chest pressure he was having at home has resolved after transfusion.     ROS:  Otherwise 10 point ROS negative other than documented above in HPI.     Objective     Current Hospital Meds:allopurinol, 300 mg, Oral, Daily  sodium chloride, 10 mL, Intravenous, Q12H    sodium chloride, 75 mL/hr, Last Rate: 75 mL/hr (24 0502)        Current Antimicrobial Therapy:  Anti-Infectives (From admission, onward)      None          Current Diuretic Therapy:  Diuretics (From admission, onward)      None          ----------------------------------------------------------------------------------------------------------------------  Vital Signs:  Temp:  [97.5 °F (36.4 °C)-98.6 °F (37 °C)] 98.3 °F (36.8 °C)  Heart Rate:  [62-84] 68  Resp:  [18-20] 18  BP: (111-150)/(53-83) 145/55  SpO2:  [94 %-99 %] 97 %  on   ;   Device (Oxygen Therapy): room air  Body mass index is 24.46 kg/m².    Wt Readings from Last 3 Encounters:   24 73 kg (160 lb 14.4 oz)   22 71.2 kg (157 lb)   20 73.9 kg (163 lb)     Intake & Output (last 3 days)          07 0700 06 0700    P.O.  240 600    Blood  1045     Total Intake(mL/kg)  1285 (17.6) 600 (8.2)    Net  +1285 +600           Urine Unmeasured Occurrence  2 x 1 x    Stool Unmeasured Occurrence  1 x           Diet: Cardiac, Diabetic; Healthy Heart (2-3 Na+); Consistent Carbohydrate; Fluid Consistency: Thin (IDDSI  0)  ----------------------------------------------------------------------------------------------------------------------  Physical exam:  Constitutional:  Well-developed and well-nourished.  No respiratory distress.      HENT:  Head:  Normocephalic and atraumatic.  Mouth:  Moist mucous membranes.    Eyes:  Conjunctivae and EOM are normal. No scleral icterus.    Neck:  Neck supple.  No JVD present.    Cardiovascular:  Normal rate, regular rhythm and normal heart sounds with no murmur.  Pulmonary/Chest:  No respiratory distress, no wheezes, no crackles, with normal breath sounds and good air movement.  Abdominal:  Soft.  Bowel sounds are normal.  No distension and no tenderness.   Musculoskeletal:  No edema, no tenderness, and no deformity.  No red or swollen joints anywhere.    Neurological:  Alert and oriented to person, place, and time.  No cranial nerve deficit.  No tongue deviation.  No facial droop.  No slurred speech.   Skin:  Skin is warm and dry. No rash noted. No pallor.   Peripheral vascular:  Pulses in all 4 extremities with no clubbing, no cyanosis, no edema.  ----------------------------------------------------------------------------------------------------------------------  Tele:    ----------------------------------------------------------------------------------------------------------------------  Results from last 7 days   Lab Units 06/25/24  0112 06/24/24  1518 06/24/24  0854   WBC 10*3/mm3 7.97 9.25 11.10*   HEMOGLOBIN g/dL 7.3* 4.6* 3.7*   HEMATOCRIT % 21.5* 14.0* 11.4*   MCV fL 96.8 102.2* 114.0*   MCHC g/dL 34.0 32.9 32.5   PLATELETS 10*3/mm3 215 254 284         Results from last 7 days   Lab Units 06/25/24  0112 06/24/24  1518 06/24/24  1305 06/24/24  0944   SODIUM mmol/L 139 139  --  140   POTASSIUM mmol/L 4.6 4.7  --  5.8*   MAGNESIUM mg/dL  --   --   --  2.2   CHLORIDE mmol/L 106 105  --  105   CO2 mmol/L 22.5 23.4  --  19.6*   BUN mg/dL 36* 36*  --  35*   CREATININE mg/dL 1.66* 1.82*   "--  1.86*   CALCIUM mg/dL 9.6 10.1*  --  10.2*   GLUCOSE mg/dL 103* 124*  --  120*   ALBUMIN g/dL 3.5  --  3.3 3.7   BILIRUBIN mg/dL 1.6*  --   --  1.1   ALK PHOS U/L 71  --   --  79   AST (SGOT) U/L 23  --   --  17   ALT (SGPT) U/L 28  --   --  25   Estimated Creatinine Clearance: 29.9 mL/min (A) (by C-G formula based on SCr of 1.66 mg/dL (H)).  No results found for: \"AMMONIA\"  Results from last 7 days   Lab Units 06/24/24  1305 06/24/24  0944   HSTROP T ng/L 92* 66*             No results found for: \"HGBA1C\", \"POCGLU\"  Lab Results   Component Value Date    TSH 1.280 06/24/2024     No results found for: \"PREGTESTUR\", \"PREGSERUM\", \"HCG\", \"HCGQUANT\"  Pain Management Panel           No data to display              Brief Urine Lab Results       None          No results found for: \"BLOODCX\"  No results found for: \"URINECX\"  No results found for: \"WOUNDCX\"  No results found for: \"STOOLCX\"  No results found for: \"RESPCX\"  No results found for: \"AFBCX\"        I have personally looked at the labs and they are summarized above.  ----------------------------------------------------------------------------------------------------------------------  Detailed radiology reports for the last 24 hours:    Imaging Results (Last 24 Hours)       ** No results found for the last 24 hours. **          Assessment & Plan    #Suspected aplastic anemia vs. Pure red cell aplasia   #Macrocytosis   - Iron studies not consistent with iron depletion.   - Normal LDH, Total bilirubin, not suggestive of hemolytic process   - Reticulocyte count impressively low for anemic state. Suspect hyperproliferation  in bone marrow driving anemia.   - Folate, B12, TSH all wnl  - [place], peripheral blood smear pending.   - Agree with myeloma w/u sent by oncology   - 3 units of pRBC given with appropriate improvement to 7.3.   - Heme/onc consulted and considering bone marrow biopsy   - Will monitor for signs of bleeding     #KUNAL vs. CKD  #Hyperkalemia   #Mild " hypercalcemia   - Unclear baseline Cr. In 2022 Cr was 1.4. Cr on presentaiton 1.8 with mild hypercalemia of 10.8.   - Lokelma ordered. Will monitor response.   - Will gently hydrate with normal saline 75 cc/hr   - PTH elevated, likely parathyroid mediated. PTHrP pending. Calcium normalized can be further w/u as outpatient.      #NSTEMI, type II  #Hx of CAD  #Hx of FABIEN  - HS troponin 66=>92  - EKG does show serial nonspecific ST changes since 2022  - No chest pain since being transfused or CHF symptoms since transfused.   - Will get echo and repeat troponin given increase.   - Suspect type II from anemia and renal dysfunction.      #GERD  - Continue home regimen.      Code status: Full      Dispo: Admit to tele. Pending bone marrow biopsy results.        Nikhil Lopez MD  [facility] Hospitalist  06/25/24  19:07 EDT

## 2024-06-25 NOTE — THERAPY EVALUATION
Acute Care - Physical Therapy Initial Evaluation   [place]     Patient Name: Chinedu Silva  : 1933  MRN: [id number]  Today's Date: 2024      Visit Dx:     ICD-10-CM ICD-9-CM   1. Anemia, unspecified type  D64.9 285.9   2. Chronic kidney disease, unspecified CKD stage  N18.9 585.9   3. Hyperkalemia  E87.5 276.7     Patient Active Problem List   Diagnosis    Bilateral carotid artery stenosis    Carotid stenosis    GERD (gastroesophageal reflux disease)    Hypertension    Coronary artery disease    Wears glasses    Aplastic anemia     Past Medical History:   Diagnosis Date    Carotid stenosis     Coronary artery disease     Diverticulitis     GERD (gastroesophageal reflux disease)     Gout     Hearing aid worn     Hypertension     Obese     Prostate cancer     Wears glasses      Past Surgical History:   Procedure Laterality Date    CAROTID ENDARTERECTOMY Left 2018    Procedure: CAROTID ENDARTERECTOMY WITH EEG LEFT;  Surgeon: Del Neal MD;  Location: [facility];  Service:     CATARACT EXTRACTION      CATARACT EXTRACTION WITH INTRAOCULAR LENS IMPLANT Right     CHOLECYSTECTOMY      COLONOSCOPY W/ POLYPECTOMY      HERNIA REPAIR Right     TOTAL KNEE ARTHROPLASTY Right     TURP / TRANSURETHRAL INCISION / DRAINAGE PROSTATE       PT Assessment (Last 12 Hours)       PT Evaluation and Treatment       Row Name 24 1520          Physical Therapy Time and Intention    Document Type evaluation  -KM     Mode of Treatment physical therapy  -KM     Patient Effort good  -KM     Symptoms Noted During/After Treatment none  -KM       Row Name 24 1520          General Information    Patient Profile Reviewed yes  -KM     Patient Observations alert;cooperative;agree to therapy  -KM     Prior Level of Function independent:;all household mobility;ADL's  -KM     Existing Precautions/Restrictions no known precautions/restrictions  -KM     Risks Reviewed patient:;LOB;nausea/vomiting;dizziness;increased  discomfort  -KM     Benefits Reviewed patient:;improve function;increase independence;increase strength;increase balance  -KM     Barriers to Rehab none identified  -KM       Row Name 06/25/24 1520          Living Environment    Current Living Arrangements home  -KM     People in Home spouse  -KM     Primary Care Provided by self  -KM       Row Name 06/25/24 1520          Home Use of Assistive/Adaptive Equipment    Equipment Currently Used at Home --  pt. reports that he doesn't use ADs at baseline  -KM       Row Name 06/25/24 1520          Cognition    Affect/Mental Status (Cognition) WF  -KM     Orientation Status (Cognition) oriented x 3  -KM     Follows Commands (Cognition) WFL  -KM       Row Name 06/25/24 1520          Range of Motion (ROM)    Range of Motion bilateral lower extremities;ROM is [facility]  -[facility] Name 06/25/24 1520          Strength (Manual Muscle Testing)    Strength (Manual Muscle Testing) bilateral lower extremities;strength is [facility]  -[facility] Name 06/25/24 1520          Bed Mobility    Bed Mobility supine-sit  -KM     Supine-Sit [place] (Bed Mobility) standby assist  -     Assistive Device (Bed Mobility) bed rails;head of bed elevated  -KM       Row Name 06/25/24 1520          Transfers    Transfers sit-stand transfer;stand-sit transfer  -KM       Row Name 06/25/24 1520          Sit-Stand Transfer    Sit-Stand [place] (Transfers) standby assist  -KM       Row Name 06/25/24 1520          Stand-Sit Transfer    Stand-Sit [place] (Transfers) standby assist  -KM       Row Name 06/25/24 1520          Gait/Stairs (Locomotion)    Gait/Stairs Locomotion gait/ambulation independence;distance ambulated  -KM     [place] Level (Gait) standby assist  -     Patient was able to Ambulate yes  -KM     Distance in Feet (Gait) 200  -KM     Pattern (Gait) step-through  -KM       Row Name 06/25/24 1520          Balance    Balance Assessment sitting static balance;standing dynamic  balance  -KM     Static Sitting Balance independent  -KM     Static Standing Balance standby assist  -KM       Row Name             Wound 06/24/24 1655 Left antecubital Extravasation    Wound - Properties Group Placement Date: 06/24/24  -RG Placement Time: 1655  -RG Side: Left  -RG Location: antecubital  -RG Primary Wound Type: Extravasatio  -RG    Retired Wound - Properties Group Placement Date: 06/24/24  -RG Placement Time: 1655  -RG Side: Left  -RG Location: antecubital  -RG Primary Wound Type: Extravasatio  -RG    Retired Wound - Properties Group Date first assessed: 06/24/24  -RG Time first assessed: 1655  -RG Side: Left  -RG Location: antecubital  -RG Primary Wound Type: Extravasatio  -RG      Row Name             Wound 06/24/24 1746 Right upper arm Extravasation    Wound - Properties Group Placement Date: 06/24/24  -RG Placement Time: 1746  -RG Side: Right  -RG Orientation: upper  -RG Location: arm  -RG Primary Wound Type: Extravasatio  -RG    Retired Wound - Properties Group Placement Date: 06/24/24  -RG Placement Time: 1746  -RG Side: Right  -RG Orientation: upper  -RG Location: arm  -RG Primary Wound Type: Extravasatio  -RG    Retired Wound - Properties Group Date first assessed: 06/24/24  -RG Time first assessed: 1746  -RG Side: Right  -RG Location: arm  -RG Primary Wound Type: Extravasatio  -RG      Row Name 06/25/24 1520          Plan of Care Review    Plan of Care Reviewed With patient  -KM     Outcome Evaluation Pt. evaluation completed during PT session. He was able to perform functional mobility skills independently. He ambulated increased distance w/ no AD. He tolerated session w/ no complaints. Pt. does not require skilled PT services at this time.  -KM       Row Name 06/25/24 1520          Therapy Assessment/Plan (PT)    Functional Level at Time of Evaluation (PT) independent  -KM     Criteria for Skilled Interventions Met (PT) no;no problems identified which require skilled intervention;does not  meet criteria for skilled intervention  -     Therapy Frequency (PT) evaluation only  -       Row Name 06/25/24 1520          Therapy Plan Review/Discharge Plan (PT)    Therapy Plan Review (PT) evaluation/treatment results reviewed;patient  -               User Key  (r) = Recorded By, (t) = Taken By, (c) = Cosigned By      Initials Name Provider Type     Casey Pineda, PT Physical Therapist    Dio Joseph, RN Registered Nurse                    Physical Therapy Education       Title: PT OT SLP Therapies (Done)       Topic: Physical Therapy (Done)       Point: Mobility training (Done)       Learning Progress Summary             Patient Acceptance, E,TB, VU by  at 6/25/2024 1525                         Point: Home exercise program (Done)       Learning Progress Summary             Patient Acceptance, E,TB, VU by  at 6/25/2024 1525                         Point: Body mechanics (Done)       Learning Progress Summary             Patient Acceptance, E,TB, VU by  at 6/25/2024 1525                         Point: Precautions (Done)       Learning Progress Summary             Patient Acceptance, E,TB, VU by  at 6/25/2024 1525                                         User Key       Initials Effective Dates Name Provider Type [facility] 05/24/22 -  Casey Pineda, PT Physical Therapist PT                  PT Recommendation and Plan  Anticipated Discharge Disposition (PT): home, home with assist  Therapy Frequency (PT): evaluation only  Plan of Care Reviewed With: patient  Outcome Evaluation: Pt. evaluation completed during PT session. He was able to perform functional mobility skills independently. He ambulated increased distance w/ no AD. He tolerated session w/ no complaints. Pt. does not require skilled PT services at this time.       Time Calculation:    PT Charges       Row Name 06/25/24 1518             Time Calculation    PT Received On 06/25/24  -                User Key  (r) = Recorded By,  (t) = Taken By, (c) = Cosigned By      Initials Name Provider Type    KM Casey Pineda, PT Physical Therapist                  Therapy Charges for Today       Code Description Service Date Service Provider Modifiers Qty    51252980545 HC PT EVAL LOW COMPLEXITY 4 6/25/2024 Casey Pineda, PT GP 1            PT G-Codes  AM-PAC 6 Clicks Score (PT): 19    Casey Pineda PT  6/25/2024

## 2024-06-25 NOTE — CASE MANAGEMENT/SOCIAL WORK
Discharge Planning Assessment  [facility]     Patient Name: Chinedu Silva  MRN: [id number]  Today's Date: 6/25/2024    Admit Date: 6/24/2024    Plan: SS received consult per Case Management for advanced age and discharge planning.  SS spoke with pt and spouse at bedside.  Pt resides at home with spouse, [place], at [address] and plans to return home at discharge.  Pt currently does not utilize home health services.  Pt has available rolling walker and cane but does not utilize these on a daily basis.  Pt's PCP is Jr Segura.  Pt utilizes [facility] at [facility].  Pt drives self and family transports via private auto.  SS will follow and assist with discharge needs.   Discharge Needs Assessment       Row Name 06/25/24 1055       Living Environment    People in Home spouse    Name(s) of People in Home Lives with spouse, [place].    Current Living Arrangements home    Potentially Unsafe Housing Conditions none    In the past 12 months has the electric, gas, oil, or water company threatened to shut off services in your home? No    Primary Care Provided by self;spouse/significant other    Provides Primary Care For no one    Family Caregiver if Needed spouse    Family Caregiver Names Hollie Correa    Quality of Family Relationships helpful;involved;supportive    Able to Return to Prior Arrangements yes       Resource/Environmental Concerns    Resource/Environmental Concerns none       Transportation Needs    In the past 12 months, has lack of transportation kept you from medical appointments or from getting medications? no    In the past 12 months, has lack of transportation kept you from meetings, work, or from getting things needed for daily living? No       Food Insecurity    Within the past 12 months, you worried that your food would run out before you got the money to buy more. Never true    Within the past 12 months, the food you bought just didn't last and you didn't have money to  get more. Never true       Transition Planning    Patient/Family Anticipates Transition to home with family    Patient/Family Anticipated Services at Transition none    Transportation Anticipated family or friend will provide       Discharge Needs Assessment    Equipment Currently Used at Home walker, rolling;cane, straight                   Discharge Plan       Row Name 06/25/24 1057       Plan    Plan SS received consult per Case Management for advanced age and discharge planning.  SS spoke with pt and spouse at bedside.  Pt resides at home with spouse, [place], at [address] and plans to return home at discharge.  Pt currently does not utilize home health services.  Pt has available rolling walker and cane but does not utilize these on a daily basis.  Pt's PCP is Jr Segura.  Pt utilizes [facility] at [facility].  Pt drives self and family transports via private auto.  SS will follow and assist with discharge needs.                  Continued Care and Services - Admitted Since 6/24/2024    No active coordination exists for this encounter.       Expected Discharge Date and Time       Expected Discharge Date Expected Discharge Time    Jun 27, 2024            Demographic Summary       Row Name 06/25/24 1056       General Information    Admission Type inpatient    Referral Source nursing    Reason for Consult discharge planning    Preferred Language English       Contact Information    Permission Granted to Share Info With                   TERRIE Miller

## 2024-06-26 ENCOUNTER — APPOINTMENT (OUTPATIENT)
Dept: CARDIOLOGY | Facility: HOSPITAL | Age: 89
DRG: 808 | End: 2024-06-26
Payer: MEDICARE

## 2024-06-26 ENCOUNTER — READMISSION MANAGEMENT (OUTPATIENT)
Dept: CALL CENTER | Facility: HOSPITAL | Age: 89
End: 2024-06-26
Payer: MEDICARE

## 2024-06-26 VITALS
BODY MASS INDEX: 24.99 KG/M2 | TEMPERATURE: 98.5 F | HEART RATE: 65 BPM | DIASTOLIC BLOOD PRESSURE: 60 MMHG | WEIGHT: 164.9 LBS | RESPIRATION RATE: 20 BRPM | SYSTOLIC BLOOD PRESSURE: 132 MMHG | OXYGEN SATURATION: 99 % | HEIGHT: 68 IN

## 2024-06-26 LAB
ALBUMIN SERPL-MCNC: 3.1 G/DL (ref 3.5–5.2)
ALBUMIN/GLOB SERPL: 1.1 G/DL
ALP SERPL-CCNC: 67 U/L (ref 39–117)
ALT SERPL W P-5'-P-CCNC: 23 U/L (ref 1–41)
ANION GAP SERPL CALCULATED.3IONS-SCNC: 9.1 MMOL/L (ref 5–15)
AST SERPL-CCNC: 17 U/L (ref 1–40)
BASOPHILS # BLD AUTO: 0.02 10*3/MM3 (ref 0–0.2)
BASOPHILS NFR BLD AUTO: 0.3 % (ref 0–1.5)
BH CV ECHO MEAS - AO MAX PG: 13.7 MMHG
BH CV ECHO MEAS - AO MEAN PG: 9 MMHG
BH CV ECHO MEAS - AO ROOT DIAM: 2.6 CM
BH CV ECHO MEAS - AO V2 MAX: 185 CM/SEC
BH CV ECHO MEAS - AO V2 VTI: 40.3 CM
BH CV ECHO MEAS - AVA(I,D): 0.98 CM2
BH CV ECHO MEAS - EDV(CUBED): 44.4 ML
BH CV ECHO MEAS - EDV(MOD-SP4): 36.7 ML
BH CV ECHO MEAS - EF(MOD-SP4): 69.8 %
BH CV ECHO MEAS - ESV(CUBED): 33.4 ML
BH CV ECHO MEAS - ESV(MOD-SP4): 11.1 ML
BH CV ECHO MEAS - FS: 9 %
BH CV ECHO MEAS - IVS/LVPW: 1.13 CM
BH CV ECHO MEAS - IVSD: 1.85 CM
BH CV ECHO MEAS - LA DIMENSION: 4 CM
BH CV ECHO MEAS - LAT PEAK E' VEL: 8.1 CM/SEC
BH CV ECHO MEAS - LV DIASTOLIC VOL/BSA (35-75): 19.5 CM2
BH CV ECHO MEAS - LV MASS(C)D: 251.5 GRAMS
BH CV ECHO MEAS - LV MAX PG: 4.2 MMHG
BH CV ECHO MEAS - LV MEAN PG: 2 MMHG
BH CV ECHO MEAS - LV SYSTOLIC VOL/BSA (12-30): 5.9 CM2
BH CV ECHO MEAS - LV V1 MAX: 103 CM/SEC
BH CV ECHO MEAS - LV V1 VTI: 22.4 CM
BH CV ECHO MEAS - LVIDD: 3.5 CM
BH CV ECHO MEAS - LVIDS: 3.2 CM
BH CV ECHO MEAS - LVOT AREA: 1.77 CM2
BH CV ECHO MEAS - LVOT DIAM: 1.5 CM
BH CV ECHO MEAS - LVPWD: 1.63 CM
BH CV ECHO MEAS - MED PEAK E' VEL: 8.8 CM/SEC
BH CV ECHO MEAS - MV A MAX VEL: 84.8 CM/SEC
BH CV ECHO MEAS - MV E MAX VEL: 123 CM/SEC
BH CV ECHO MEAS - MV E/A: 1.45
BH CV ECHO MEAS - PA ACC TIME: 0.09 SEC
BH CV ECHO MEAS - RAP SYSTOLE: 10 MMHG
BH CV ECHO MEAS - RVSP: 22.5 MMHG
BH CV ECHO MEAS - SV(LVOT): 39.6 ML
BH CV ECHO MEAS - SV(MOD-SP4): 25.6 ML
BH CV ECHO MEAS - SVI(LVOT): 21.1 ML/M2
BH CV ECHO MEAS - SVI(MOD-SP4): 13.6 ML/M2
BH CV ECHO MEAS - TAPSE (>1.6): 3 CM
BH CV ECHO MEAS - TR MAX PG: 12.5 MMHG
BH CV ECHO MEAS - TR MAX VEL: 177 CM/SEC
BH CV ECHO MEASUREMENTS AVERAGE E/E' RATIO: 14.56
BILIRUB SERPL-MCNC: 0.7 MG/DL (ref 0–1.2)
BUN SERPL-MCNC: 33 MG/DL (ref 8–23)
BUN/CREAT SERPL: 24.3 (ref 7–25)
CALCIUM SPEC-SCNC: 9.2 MG/DL (ref 8.2–9.6)
CHLORIDE SERPL-SCNC: 108 MMOL/L (ref 98–107)
CO2 SERPL-SCNC: 20.9 MMOL/L (ref 22–29)
CREAT SERPL-MCNC: 1.36 MG/DL (ref 0.76–1.27)
DEPRECATED RDW RBC AUTO: 64.3 FL (ref 37–54)
EGFRCR SERPLBLD CKD-EPI 2021: 49.1 ML/MIN/1.73
EOSINOPHIL # BLD AUTO: 0.43 10*3/MM3 (ref 0–0.4)
EOSINOPHIL NFR BLD AUTO: 6.4 % (ref 0.3–6.2)
EPO SERPL-ACNC: 646.3 MIU/ML (ref 2.6–18.5)
ERYTHROCYTE [DISTWIDTH] IN BLOOD BY AUTOMATED COUNT: 18.2 % (ref 12.3–15.4)
GEN 5 2HR TROPONIN T REFLEX: 244 NG/L
GLOBULIN UR ELPH-MCNC: 2.8 GM/DL
GLUCOSE SERPL-MCNC: 102 MG/DL (ref 65–99)
HCT VFR BLD AUTO: 21.1 % (ref 37.5–51)
HCT VFR BLD AUTO: 27.5 % (ref 37.5–51)
HGB BLD-MCNC: 7.1 G/DL (ref 13–17.7)
HGB BLD-MCNC: 9 G/DL (ref 13–17.7)
IMM GRANULOCYTES # BLD AUTO: 0.02 10*3/MM3 (ref 0–0.05)
IMM GRANULOCYTES NFR BLD AUTO: 0.3 % (ref 0–0.5)
LEFT ATRIUM VOLUME INDEX: 32.3 ML/M2
LYMPHOCYTES # BLD AUTO: 1.14 10*3/MM3 (ref 0.7–3.1)
LYMPHOCYTES NFR BLD AUTO: 17.1 % (ref 19.6–45.3)
MCH RBC QN AUTO: 33.2 PG (ref 26.6–33)
MCHC RBC AUTO-ENTMCNC: 33.6 G/DL (ref 31.5–35.7)
MCV RBC AUTO: 98.6 FL (ref 79–97)
MONOCYTES # BLD AUTO: 0.63 10*3/MM3 (ref 0.1–0.9)
MONOCYTES NFR BLD AUTO: 9.4 % (ref 5–12)
NEUTROPHILS NFR BLD AUTO: 4.43 10*3/MM3 (ref 1.7–7)
NEUTROPHILS NFR BLD AUTO: 66.5 % (ref 42.7–76)
NRBC BLD AUTO-RTO: 0 /100 WBC (ref 0–0.2)
PLATELET # BLD AUTO: 198 10*3/MM3 (ref 140–450)
PMV BLD AUTO: 12.1 FL (ref 6–12)
POTASSIUM SERPL-SCNC: 4.5 MMOL/L (ref 3.5–5.2)
PROT SERPL-MCNC: 5.9 G/DL (ref 6–8.5)
QT INTERVAL: 390 MS
QTC INTERVAL: 438 MS
RBC # BLD AUTO: 2.14 10*6/MM3 (ref 4.14–5.8)
SODIUM SERPL-SCNC: 138 MMOL/L (ref 136–145)
TROPONIN T DELTA: 15 NG/L
TROPONIN T SERPL HS-MCNC: 229 NG/L
WBC NRBC COR # BLD AUTO: 6.67 10*3/MM3 (ref 3.4–10.8)

## 2024-06-26 PROCEDURE — 88300 SURGICAL PATH GROSS: CPT

## 2024-06-26 PROCEDURE — 93306 TTE W/DOPPLER COMPLETE: CPT | Performed by: INTERNAL MEDICINE

## 2024-06-26 PROCEDURE — 07DR3ZX EXTRACTION OF ILIAC BONE MARROW, PERCUTANEOUS APPROACH, DIAGNOSTIC: ICD-10-PCS | Performed by: INTERNAL MEDICINE

## 2024-06-26 PROCEDURE — 93005 ELECTROCARDIOGRAM TRACING: CPT

## 2024-06-26 PROCEDURE — 88184 FLOWCYTOMETRY/ TC 1 MARKER: CPT

## 2024-06-26 PROCEDURE — 079T3ZX DRAINAGE OF BONE MARROW, PERCUTANEOUS APPROACH, DIAGNOSTIC: ICD-10-PCS | Performed by: INTERNAL MEDICINE

## 2024-06-26 PROCEDURE — 88341 IMHCHEM/IMCYTCHM EA ADD ANTB: CPT

## 2024-06-26 PROCEDURE — 80053 COMPREHEN METABOLIC PANEL: CPT | Performed by: INTERNAL MEDICINE

## 2024-06-26 PROCEDURE — 84484 ASSAY OF TROPONIN QUANT: CPT | Performed by: INTERNAL MEDICINE

## 2024-06-26 PROCEDURE — 88311 DECALCIFY TISSUE: CPT

## 2024-06-26 PROCEDURE — 88342 IMHCHEM/IMCYTCHM 1ST ANTB: CPT

## 2024-06-26 PROCEDURE — 25810000003 SODIUM CHLORIDE 0.9 % SOLUTION: Performed by: INTERNAL MEDICINE

## 2024-06-26 PROCEDURE — 38222 DX BONE MARROW BX & ASPIR: CPT | Performed by: INTERNAL MEDICINE

## 2024-06-26 PROCEDURE — 85025 COMPLETE CBC W/AUTO DIFF WBC: CPT | Performed by: INTERNAL MEDICINE

## 2024-06-26 PROCEDURE — 88305 TISSUE EXAM BY PATHOLOGIST: CPT

## 2024-06-26 PROCEDURE — 36430 TRANSFUSION BLD/BLD COMPNT: CPT

## 2024-06-26 PROCEDURE — 88360 TUMOR IMMUNOHISTOCHEM/MANUAL: CPT

## 2024-06-26 PROCEDURE — P9016 RBC LEUKOCYTES REDUCED: HCPCS

## 2024-06-26 PROCEDURE — 93306 TTE W/DOPPLER COMPLETE: CPT

## 2024-06-26 PROCEDURE — 88313 SPECIAL STAINS GROUP 2: CPT

## 2024-06-26 PROCEDURE — 86900 BLOOD TYPING SEROLOGIC ABO: CPT

## 2024-06-26 PROCEDURE — 88185 FLOWCYTOMETRY/TC ADD-ON: CPT

## 2024-06-26 PROCEDURE — 85014 HEMATOCRIT: CPT | Performed by: INTERNAL MEDICINE

## 2024-06-26 PROCEDURE — 85018 HEMOGLOBIN: CPT | Performed by: INTERNAL MEDICINE

## 2024-06-26 PROCEDURE — 99239 HOSP IP/OBS DSCHRG MGMT >30: CPT | Performed by: INTERNAL MEDICINE

## 2024-06-26 PROCEDURE — 93010 ELECTROCARDIOGRAM REPORT: CPT | Performed by: INTERNAL MEDICINE

## 2024-06-26 RX ORDER — LIDOCAINE HYDROCHLORIDE 20 MG/ML
20 INJECTION, SOLUTION INFILTRATION; PERINEURAL ONCE
Status: COMPLETED | OUTPATIENT
Start: 2024-06-26 | End: 2024-06-26

## 2024-06-26 RX ADMIN — SODIUM CHLORIDE 75 ML/HR: 9 INJECTION, SOLUTION INTRAVENOUS at 06:06

## 2024-06-26 RX ADMIN — ALLOPURINOL 300 MG: 300 TABLET ORAL at 11:43

## 2024-06-26 RX ADMIN — Medication 10 ML: at 08:54

## 2024-06-26 RX ADMIN — LIDOCAINE HYDROCHLORIDE 20 ML: 20 INJECTION, SOLUTION INFILTRATION; PERINEURAL at 08:28

## 2024-06-26 NOTE — DISCHARGE SUMMARY
[facility] HOSPITALISTS DISCHARGE SUMMARY    Patient Identification:  Name:  Chinedu Silva  Age:  89 y.o.  Sex:  male  :  1933  MRN:  [id number]  Visit Number:  [id number]    Date of Admission: 2024  Date of Discharge:  2024    PCP: Jr Segura PA    DISCHARGE DIAGNOSIS  #Hypoproliferative anemia from suspected aplastic anemia   #Macrocytosis   #KUNAL on CKD (Cr now back to baseline)  #Hyperkalemia (resolved)  #Mild hypercalcemia (resolved)  #NSTEMI, type II  #Hx of CAD  #Hx of FABIEN  #GERD    CONSULTS   Hematology   Cardiology    PROCEDURES PERFORMED  Bone marrow biopsy : Results pending     HOSPITAL COURSE  Patient is a 89 y.o. male presented on  to [facility] complaining of shortness of breatha nd weakness .  Please see the admitting history and physical for further details.      Mr. Silva is our 89 yo M with hx of CAD, FABIEN, GERD who presented with shortness of breath in particular on exertion and generalized weakness. He denies any signs of bleeding. No change in color of Bms. No easy bruising of bleeding from anywhere else. Patient notes he had a GI bleed around 10 years ago and had endoscopy per Dr. Richard. He does not recall what intervention was done if any. He denied any chest pain just some pressure at times. None on presentation. SOB has been exertional and weakness progressive. In the ED patient found to be severely anemic with hemoglobin of 3.7. I added labs to initial collections prior to receiving ordered RBCs. Retic count inappropriately severely low. Discussed with heme/onc and [facility] transfer providers on admission.     Iron studies not consistent with iron depletion. Normal LDH, Total bilirubin, not suggestive of hemolytic process . Reticulocyte count impressively low for anemic state. Suspect hyperproliferation  in bone marrow driving anemia. Folate, B12, TSH all wnl. PBS revealed macrocytic anemia. 3 units of pRBC given with appropriate  improvement to 7.3. Heme/onc consulted and performed bone marrow biopsy on 6/26 with results pending. They recommended 1 more unit of pRBC and outpatient f/u given recovery of blood counts and patient stability. Patient also had elevated troponin. No chest pressure since transfusion. Cardiology consulted and noted likely from anemia. Patient with appointment with Dr. King to discuss bone marrow results on 7/1. Patient to also f/u with PCP and cardiology.     VITAL SIGNS:  Temp:  [97.6 °F (36.4 °C)-98.8 °F (37.1 °C)] 98.5 °F (36.9 °C)  Heart Rate:  [61-79] 65  Resp:  [18-20] 20  BP: (119-156)/(50-78) 132/60  SpO2:  [90 %-99 %] 99 %  on   ;   Device (Oxygen Therapy): room air    Body mass index is 25.07 kg/m².  Wt Readings from Last 3 Encounters:   06/26/24 74.8 kg (164 lb 14.4 oz)   08/17/22 71.2 kg (157 lb)   11/11/20 73.9 kg (163 lb)       PHYSICAL EXAM:  Constitutional:  Well-developed and well-nourished.  No respiratory distress.      HENT:  Head:  Normocephalic and atraumatic.  Mouth:  Moist mucous membranes.    Eyes:  Conjunctivae and EOM are normal.  Pupils are equal, round, and reactive to light.  No scleral icterus.    Cardiovascular:  Normal rate, regular rhythm and normal heart sounds with no murmur.  Pulmonary/Chest:  No respiratory distress, no wheezes, no crackles, with normal breath sounds and good air movement.  Abdominal:  Soft.  Bowel sounds are normal.  No distension and no tenderness.   Musculoskeletal:  No edema, no tenderness, and no deformity.  No red or swollen joints anywhere.    Neurological:  Alert and oriented to person, place, and time.  No gross neurological deficit.   Skin:  Skin is warm and dry. No rash noted. No pallor.   Peripheral vascular:  Strong pulses in all 4 extremities with no clubbing, no cyanosis, no edema.    DISCHARGE DISPOSITION   Stable    DISCHARGE MEDICATIONS:     Discharge Medications        Continue These Medications        Instructions Start Date   allopurinol 300  MG tablet  Commonly known as: ZYLOPRIM   300 mg, Oral, Daily      aspirin 81 MG EC tablet   81 mg, Oral, Daily      valsartan 80 MG tablet  Commonly known as: DIOVAN   80 mg, Oral, Daily             Stop These Medications      clopidogrel 75 MG tablet  Commonly known as: PLAVIX              Diet Instructions    Resume as tolerated.         Activity Instructions    Resume as tolerated.          Follow-up Information       Jr Segura PA Follow up in 1 week(s).    Specialty: Physician Assistant  Why: PATIENT WILL BE NOTIFIED ABOUT APPT ON THURS 6/27 AM  Contact information:  [address]  [facility] [address]  [phone]               Farhat Marina MD Follow up in 1 month(s).    Specialty: Cardiology  Why: PATIENT WILL BE NOTIFIED ABOUT APPT ON THURS 6/27 AM  Contact information:  [address] [facility] [address]  [phone]                              TEST  RESULTS PENDING AT DISCHARGE  Pending Labs       Order Current Status    Copper, Serum In process    PTH-related Peptide In process    TISSUE EXAM, [facility] ([facility]) In process    Zinc In process             CODE STATUS  Code Status and Medical Interventions:   Ordered at: 06/24/24 1058     Code Status (Patient has no pulse and is not breathing):    CPR (Attempt to Resuscitate)     Medical Interventions (Patient has pulse or is breathing):    Full Support       Nikhil Lopez MD  [facility] Hospitalist  06/26/24  17:23 EDT    Please note that this discharge summary required more than 30 minutes to complete.

## 2024-06-26 NOTE — CONSULTS
Consults  Date of Admit: 6/24/2024  Date of Consult: 06/26/24  No ref. provider found  Chinedu Silva  2/14/1933    Consulting Physician: Joshua Colindres MD    Cardiology consultation    Reason for consultation:  NSTEMI      History of Present Illness    Subjective     Chief Complaint   Patient presents with    Shortness of Breath    Weakness - Generalized       Chinedu Silva is a 89 y.o. male with coronary artery disease (records deficient), carotid stenosis status post endarterectomy on the left 2018, hypertension, diverticulitis, and gout.    He presented to the emergency room 06/24/2024 with complaints of 2-week history of fatigue and shortness of air.  On admission his hemoglobin was found to be 3.7.  He received 2 units of packed red blood cells with a resultant increase in hemoglobin to 7.3.  As his reticulocyte count was severely depressed, hematology/oncology was consulted.    Admission labs and studies otherwise significant for high sensitivity troponin 63--244.  Patient's CMP remarkable for potassium of 5.8, CO2 of 19.6, anion gap 15.4, BUN 35, creatinine 1.86, glucose 120, calcium 10.2.  TSH normal.  Iron studies elevated.  Haptoglobin also elevated.   (normal range 15-65).    He has since received 2 more units of blood and undergone bone marrow biopsy.    His wife and another adult female at the bedside.  Mrs. Silva contributes to the history.  She reports that he did complain of chest discomfort when he was up and trying to move around along with his shortness of breath and weakness in the last few weeks.          Cardiac risk factors:cerebral vascular disease and hypertension    Last Echo: Ordered but not yet performed      Last Stress: No test on record      Last Cath: No test on record      Past Medical History:   Diagnosis Date    Carotid stenosis     Coronary artery disease     Diverticulitis     GERD (gastroesophageal reflux disease)     Gout     Hearing aid worn      Hypertension     Obese     Prostate cancer     Wears glasses      Past Surgical History:   Procedure Laterality Date    CAROTID ENDARTERECTOMY Left 2018    Procedure: CAROTID ENDARTERECTOMY WITH EEG LEFT;  Surgeon: Del Neal MD;  [facility];  Service:     CATARACT EXTRACTION      CATARACT EXTRACTION WITH INTRAOCULAR LENS IMPLANT Right     CHOLECYSTECTOMY      COLONOSCOPY W/ POLYPECTOMY      HERNIA REPAIR Right     TOTAL KNEE ARTHROPLASTY Right     TURP / TRANSURETHRAL INCISION / DRAINAGE PROSTATE       Family History   Problem Relation Age of Onset    Hypertension Mother     Glaucoma Mother     Diverticulitis Mother     Gout Mother     ALS Father     Gout Sister     Broken bones Paternal Grandfather     Broken bones Son     Cancer Son     Diabetes Brother      Social History     Tobacco Use    Smoking status: Former     Current packs/day: 0.00     Average packs/day: 1 pack/day for 12.0 years (12.0 ttl pk-yrs)     Types: Cigarettes     Start date:      Quit date:      Years since quittin.5    Smokeless tobacco: Current     Types: Chew   Vaping Use    Vaping status: Never Used   Substance Use Topics    Alcohol use: No    Drug use: No     Medications Prior to Admission   Medication Sig Dispense Refill Last Dose    allopurinol (ZYLOPRIM) 300 MG tablet Take 1 tablet by mouth Daily.   2024 at 0730    aspirin 81 MG EC tablet Take 1 tablet by mouth Daily.   2024 at 0730    clopidogrel (PLAVIX) 75 MG tablet Take 1 tablet by mouth Daily. Patient stated Dr. Neal told him to continue   2024 at 0730    valsartan (DIOVAN) 80 MG tablet Take 1 tablet by mouth Daily.   2024 at 0730     Allergies:  Ciprofloxacin, Lisinopril, and Sulfa antibiotics    Review of Systems   Constitutional:  Positive for fatigue.   Respiratory:  Positive for shortness of breath.    Cardiovascular:  Positive for chest pain (Characterized as discomfort).   Gastrointestinal:  Negative for blood in stool and  diarrhea.   Skin:  Positive for pallor.   Neurological:  Positive for weakness.          Objective      Vital Signs  Temp:  [97.6 °F (36.4 °C)-98.6 °F (37 °C)] 98.1 °F (36.7 °C)  Heart Rate:  [61-79] 65  Resp:  [18-20] 20  BP: (119-156)/(50-78) 122/53  Body mass index is 25.07 kg/m².    Intake/Output Summary (Last 24 hours) at 6/26/2024 1252  Last data filed at 6/26/2024 0400  Gross per 24 hour   Intake 480 ml   Output --   Net 480 ml       Physical Exam  Vitals and nursing note reviewed. Exam conducted with a chaperone present (Wife and adult female at bedside).   Constitutional:       Appearance: He is ill-appearing (Chronically).   HENT:      Head: Normocephalic and atraumatic.   Eyes:      Conjunctiva/sclera: Conjunctivae normal.   Cardiovascular:      Heart sounds: Murmur heard.   Pulmonary:      Effort: Pulmonary effort is normal.      Breath sounds: Normal breath sounds.   Skin:     General: Skin is warm and dry.      Coloration: Skin is pale.   Neurological:      Mental Status: Mental status is at baseline.   Psychiatric:         Mood and Affect: Mood normal.         Telemetry: Sinus 60s    Results Review:   I reviewed the patient's new clinical results.  Results from last 7 days   Lab Units 06/26/24  0238 06/26/24  0052 06/24/24  1305 06/24/24  0944   HSTROP T ng/L 244* 229* 92* 66*     Results from last 7 days   Lab Units 06/26/24  0052 06/25/24  0112 06/24/24  1518 06/24/24  0854   WBC 10*3/mm3 6.67 7.97 9.25 11.10*   HEMOGLOBIN g/dL 7.1* 7.3* 4.6* 3.7*   PLATELETS 10*3/mm3 198 215 251 284     Results from last 7 days   Lab Units 06/26/24  0052 06/25/24  0112 06/24/24  1518 06/24/24  0944   SODIUM mmol/L 138 139 139 140   POTASSIUM mmol/L 4.5 4.6 4.7 5.8*   CHLORIDE mmol/L 108* 106 105 105   CO2 mmol/L 20.9* 22.5 23.4 19.6*   BUN mg/dL 33* 36* 36* 35*   CREATININE mg/dL 1.36* 1.66* 1.82* 1.86*   CALCIUM mg/dL 9.2 9.6 10.1* 10.2*   GLUCOSE mg/dL 102* 103* 124* 120*   ALT (SGPT) U/L 23 28  --  25   AST  (SGOT) U/L 17 23  --  17     Lab Results   Component Value Date    INR 0.94 08/17/2022    INR 0.97 02/04/2018    INR 0.91 03/15/2016     Lab Results   Component Value Date    MG 2.2 06/24/2024    MG 1.8 08/17/2022     Lab Results   Component Value Date    TSH 1.280 06/24/2024      Lab Results   Component Value Date    PROBNP 270.5 08/17/2022        EKG:         Imaging Results (Last 72 Hours)       Procedure Component Value Units Date/Time    XR Chest 1 View [[id number]] Collected: 06/24/24 0914     Updated: 06/24/24 0916    Narrative:      EXAM:    XR Chest, 1 View     EXAM DATE:    6/24/2024 8:20 AM     CLINICAL HISTORY:    Chest Pain Protocol     TECHNIQUE:    Frontal view of the chest.     COMPARISON:    No relevant prior studies available.     FINDINGS:    LUNGS AND PLEURAL SPACES:  Unremarkable as visualized.  No  consolidation.  No pneumothorax.    HEART:  Unremarkable as visualized.  No cardiomegaly.    MEDIASTINUM:  Unremarkable as visualized.  Normal mediastinal contour.    BONES/JOINTS:  Unremarkable as visualized.  No acute fracture.       Impression:        Unremarkable exam. No acute cardiopulmonary findings identified.        This report was finalized on 6/24/2024 9:14 AM by Dr. Timur Rivers MD.                Assessment:  1.  Severe anemia-suspected aplastic versus pure red cell aplasia  2.  NSTEMI type I versus type II  3.  Chest discomfort on exertion-resolved status post transfusions  4. carotid artery disease  5.  Hypertension        Plan:  1.  Resolving-currently receiving fourth unit of PRBC.  Managed by hospitalist and hematology  2.  Likely type II secondary to anemia and demand ischemia  3.  If symptoms return after normalization of hemoglobin-would strongly recommend ischemic workup in the outpatient setting  4. Stable  5.  At goal    Last echocardiogram results are markedly abnormal, he is stable for discharge from a cardiac standpoint.    Thank you very much for asking us to be involved  in this patient's care.  We will follow along with you.    Case discussed with Dr. Colindres and plan of care reflects his recommendations.        Electronically signed by ZULEYKA Hurtado, 06/26/24, 3:34 PM EDT.

## 2024-06-26 NOTE — PLAN OF CARE
Goal Outcome Evaluation:                     Pt currently resting in bed. No s/s of acute distress noted at this time. No complaints verbalized at this time. Blood infused this shift, repeat H&H resulted (see Results). Plan of care ongoing.                           
Goal Outcome Evaluation:  Plan of Care Reviewed With: patient        Progress: no change  Outcome Evaluation: Pt resting in bed. Pt transferred from ER. Will continue with plan of care.                               
Goal Outcome Evaluation: Patient has been pleasant this shift, currently resting in bed on RA, saturations maintaining above 90%. Esther SIERRA aware of critical Trop results, pt denies CP, no new orders. No acute s/s of distress at this time. VSS. Will continue plan of care.                                                
Patient has had a non-eventful day.  Patient waiting for bone marrow biopsy. Hemoglobin 7.3.  No signs of bleeding at this time. No signs of distress at this time. VSS at this time. Call light in reach, bed in lowest position, and bed alarm is set.                                               
Patient has had a non-eventful day. Patient had a bone marrow biopsy this morning. 1 unit of PRBCs administered per order to correct hemoglobin of 7.1. Patient tolerated well. VSS at this time. No complaints at this time.No signs of distress at this time. Call light in reach, bed in lowest position, and patient is currently refusing the bed alarm.                              
Pt to be discharged home today on room air. MIGNON Lane made aware. Family to transport.    
no

## 2024-06-26 NOTE — CASE MANAGEMENT/SOCIAL WORK
Discharge Planning Assessment  [facility]     Patient Name: Chinedu Silva  MRN: [id number]  Today's Date: 6/26/2024    Admit Date: 6/24/2024    Plan: Pt resides at home with spouse, [place], at [address] and plans to return home at discharge.  Pt currently does not utilize home health services.  Pt has available rolling walker and cane but does not utilize these on a daily basis.  Pt's PCP is Jr Segura.  Pt utilizes [facility] at [email or web address].  Pt drives self and family transports via private auto. SS will follow and assist with discharge needs.       Discharge Plan       Row Name 06/26/24 1002       Plan    Plan Pt resides at home with spouse, [place], at [address] and plans to return home at discharge.  Pt currently does not utilize home health services.  Pt has available rolling walker and cane but does not utilize these on a daily basis.  Pt's PCP is Jr Segura.  Pt utilizes [facility] at [email or web address].  Pt drives self and family transports via private auto. SS will follow and assist with discharge needs.                  Continued Care and Services - Admitted Since 6/24/2024    No active coordination exists for this encounter.       Expected Discharge Date and Time       Expected Discharge Date Expected Discharge Time    Jun 27, 2024           TERRIE Miller

## 2024-06-26 NOTE — OUTREACH NOTE
Prep Survey      Flowsheet Row Responses   [facility] facility patient discharged from? [facility]   Is LACE score < 7 ? No   Eligibility Readm Mgmt   Discharge diagnosis Aplastic anemia   Does the patient have one of the following disease processes/diagnoses(primary or secondary)? Other   Does the patient have Home health ordered? No   Is there a DME ordered? No   Prep survey completed? Yes            Susi TORRES - Registered Nurse

## 2024-06-26 NOTE — PROGRESS NOTES
PROCEDURE: Bone marrow biopsy and aspiration.    DATE: 06/26/2024    INDICATION: Refractory anemia.     The risks and benefits of the procedure were discussed in detail with the patient. Informed consent was obtained. A time out was taken just prior to starting the procedure, during which time the patient's identity was confirmed. The patient was then placed in the prone position; and the left, posterior, superior iliac crest was prepped and draped in the usual, sterile fashion. Local anesthesia with 1% lidocaine was achieved. The needle was then introduced and a total of about ~20 ml of aspirate were collected for slides, flow cytometry and cytogenetics. The needle was then further advanced and a core biopsy of bone marrow tissue was collected for histopathology. The patient tolerated the procedure overall well, and there were no immediate complications. Estimated blood loss throughout the procedure was < 5 ml.    As discussed this morning, would recommend transfusing one additional unit of (irradiated) PRBCs today, following which it would be fine from a hematology standpoint for him to be discharged. He should follow up with me in our outpatient clinic this coming Monday, 7/1 at 10:30 am to both go over the results of the bone marrow biopsy and make a longer term treatment plan based on these results.    Thank you for involving us in the care of Mr. Silva. Please do not hesitate to call with any questions or concerns that you may have.

## 2024-06-27 LAB
BH BB BLOOD EXPIRATION DATE: NORMAL
BH BB BLOOD TYPE BARCODE: 6200
BH BB DISPENSE STATUS: NORMAL
BH BB PRODUCT CODE: NORMAL
BH BB UNIT NUMBER: NORMAL
COPPER SERPL-MCNC: 135 UG/DL (ref 69–132)
CROSSMATCH INTERPRETATION: NORMAL
UNIT  ABO: NORMAL
UNIT  RH: NORMAL
ZINC SERPL-MCNC: 55 UG/DL (ref 44–115)

## 2024-06-28 DIAGNOSIS — D61.9 APLASTIC ANEMIA: Primary | ICD-10-CM

## 2024-06-28 LAB — REF LAB TEST METHOD: NORMAL

## 2024-06-28 NOTE — PAYOR COMM NOTE
"CONTACT:  IONA THOMPSON RN  UTILIZATION MANAGEMENT DEPT.   [facility]   [address] [facility] [address]   PHONE:  [phone]   FAX: [phone]       [place] 6/26/24 HOME----AUTH PENDING    REF # [id number]        Chinedu Walsh (89 y.o. Male)       Date of Birth   02/14/1933    Social Security Number       Address   [address] [place] [id number]    Home Phone   [phone]    MRN   [id number]       [facility]    Marital Status                               Admission Date   6/24/24    Admission Type   Emergency    Admitting Provider   Nikhil Lopez MD    Attending Provider       Department, Room/Bed   [facility], 3311/1S       Discharge Date   6/26/2024    Discharge Disposition   Home or Self Care    Discharge Destination   Home                              Attending Provider: (none)   Allergies: Ciprofloxacin, Lisinopril, Sulfa Antibiotics    Isolation: None   Infection: None   Code Status: Prior    Ht: 172.7 cm (68\")   Wt: 74.8 kg (164 lb 14.4 oz)    Admission Cmt: None   Principal Problem: Aplastic anemia [D61.9]                   Active Insurance as of 6/24/2024       Primary Coverage       Payor Plan Insurance Group Employer/Plan Group    [facility] MEDICARE REPLACEMENT [facility] GROUP [id number]       Payor Plan Address Payor Plan Phone Number Payor Plan Fax Number Effective Dates    [address] [address]   1/1/2024 - None Entered    [facility] [id number]         Subscriber Name Subscriber Birth Date Member ID       CHINEDU WALSH 2/14/1933 [id number]                     Emergency Contacts        (Rel.) Home Phone Work Phone Mobile Phone    Walsh (POENEDELIA)[place] (Spouse) [phone] -- [phone]                 Discharge Summary        Nikhil Lopez MD at 06/26/24 [facility]              [facility] HOSPITALISTS DISCHARGE SUMMARY    Patient Identification:  Name:  Chinedu Walsh  Age:  89 y.o.  Sex:  " male  :  1933  MRN:  [id number]  Visit Number:  [id number]    Date of Admission: 2024  Date of Discharge:  2024    PCP: Jr Segura PA    DISCHARGE DIAGNOSIS  #Hypoproliferative anemia from suspected aplastic anemia   #Macrocytosis   #KUNAL on CKD (Cr now back to baseline)  #Hyperkalemia (resolved)  #Mild hypercalcemia (resolved)  #NSTEMI, type II  #Hx of CAD  #Hx of FABIEN  #GERD    CONSULTS   Hematology   Cardiology    PROCEDURES PERFORMED  Bone marrow biopsy : Results pending     HOSPITAL COURSE  Patient is a 89 y.o. male presented on  to [facility] complaining of shortness of breatha nd weakness .  Please see the admitting history and physical for further details.      Mr. Silva is our 89 yo M with hx of CAD, FABIEN, GERD who presented with shortness of breath in particular on exertion and generalized weakness. He denies any signs of bleeding. No change in color of Bms. No easy bruising of bleeding from anywhere else. Patient notes he had a GI bleed around 10 years ago and had endoscopy per Dr. Richard. He does not recall what intervention was done if any. He denied any chest pain just some pressure at times. None on presentation. SOB has been exertional and weakness progressive. In the ED patient found to be severely anemic with hemoglobin of 3.7. I added labs to initial collections prior to receiving ordered RBCs. Retic count inappropriately severely low. Discussed with heme/onc and [facility] transfer providers on admission.     Iron studies not consistent with iron depletion. Normal LDH, Total bilirubin, not suggestive of hemolytic process . Reticulocyte count impressively low for anemic state. Suspect hyperproliferation  in bone marrow driving anemia. Folate, B12, TSH all wnl. PBS revealed macrocytic anemia. 3 units of pRBC given with appropriate improvement to 7.3. Heme/onc consulted and performed bone marrow biopsy on  with results pending. They recommended 1 more unit  of pRBC and outpatient f/u given recovery of blood counts and patient stability. Patient also had elevated troponin. No chest pressure since transfusion. Cardiology consulted and noted likely from anemia. Patient with appointment with Dr. King to discuss bone marrow results on 7/1. Patient to also f/u with PCP and cardiology.     VITAL SIGNS:  Temp:  [97.6 °F (36.4 °C)-98.8 °F (37.1 °C)] 98.5 °F (36.9 °C)  Heart Rate:  [61-79] 65  Resp:  [18-20] 20  BP: (119-156)/(50-78) 132/60  SpO2:  [90 %-99 %] 99 %  on   ;   Device (Oxygen Therapy): room air    Body mass index is 25.07 kg/m².  Wt Readings from Last 3 Encounters:   06/26/24 74.8 kg (164 lb 14.4 oz)   08/17/22 71.2 kg (157 lb)   11/11/20 73.9 kg (163 lb)       PHYSICAL EXAM:  Constitutional:  Well-developed and well-nourished.  No respiratory distress.      HENT:  Head:  Normocephalic and atraumatic.  Mouth:  Moist mucous membranes.    Eyes:  Conjunctivae and EOM are normal.  Pupils are equal, round, and reactive to light.  No scleral icterus.    Cardiovascular:  Normal rate, regular rhythm and normal heart sounds with no murmur.  Pulmonary/Chest:  No respiratory distress, no wheezes, no crackles, with normal breath sounds and good air movement.  Abdominal:  Soft.  Bowel sounds are normal.  No distension and no tenderness.   Musculoskeletal:  No edema, no tenderness, and no deformity.  No red or swollen joints anywhere.    Neurological:  Alert and oriented to person, place, and time.  No gross neurological deficit.   Skin:  Skin is warm and dry. No rash noted. No pallor.   Peripheral vascular:  Strong pulses in all 4 extremities with no clubbing, no cyanosis, no edema.    DISCHARGE DISPOSITION   Stable    DISCHARGE MEDICATIONS:     Discharge Medications        Continue These Medications        Instructions Start Date   allopurinol 300 MG tablet  Commonly known as: ZYLOPRIM   300 mg, Oral, Daily      aspirin 81 MG EC tablet   81 mg, Oral, Daily      valsartan 80  MG tablet  Commonly known as: DIOVAN   80 mg, Oral, Daily             Stop These Medications      clopidogrel 75 MG tablet  Commonly known as: PLAVIX              Diet Instructions    Resume as tolerated.         Activity Instructions    Resume as tolerated.          Follow-up Information       Jr Segura PA Follow up in 1 week(s).    Specialty: Physician Assistant  Why: PATIENT WILL BE NOTIFIED ABOUT APPT ON THURS 6/27 AM  Contact information:  [address] [place] [address]  [phone]               Farhat Marina MD Follow up in 1 month(s).    Specialty: Cardiology  Why: PATIENT WILL BE NOTIFIED ABOUT APPT ON THURS 6/27 AM  Contact information:  [address] [place] [address]  [phone]                              TEST  RESULTS PENDING AT DISCHARGE  Pending Labs       Order Current Status    Copper, Serum In process    PTH-related Peptide In process    TISSUE EXAM, [facility] ([facility]) In process    Zinc In process             CODE STATUS  Code Status and Medical Interventions:   Ordered at: 06/24/24 1058     Code Status (Patient has no pulse and is not breathing):    CPR (Attempt to Resuscitate)     Medical Interventions (Patient has pulse or is breathing):    Full Support       Jodee Whalen MD  [facility]  06/26/24  17:23 EDT    Please note that this discharge summary required more than 30 minutes to complete.      Electronically signed by Jodee Whalen MD at 06/26/24 1736       Discharge Order (From admission, onward)       Start     Ordered    06/26/24 1656  Discharge patient  Once        Expected Discharge Date: 06/26/24   Discharge Disposition: Home or Self Care   Physician of Record for Attribution - Please select from Treatment Team: JODEE WHALEN [[id number]]   Review needed by CMO to determine Physician of Record: No      Question Answer Comment   Physician of Record for Attribution - Please select from Treatment Team JODEE WHALEN    Review needed by CMO to  determine Physician of Record No        06/26/24 3618

## 2024-06-30 LAB — PTH RELATED PROT SERPL-SCNC: <2 PMOL/L

## 2024-07-01 ENCOUNTER — OFFICE VISIT (OUTPATIENT)
Dept: ONCOLOGY | Facility: CLINIC | Age: 89
End: 2024-07-01
Payer: MEDICARE

## 2024-07-01 ENCOUNTER — LAB (OUTPATIENT)
Dept: ONCOLOGY | Facility: CLINIC | Age: 89
End: 2024-07-01
Payer: MEDICARE

## 2024-07-01 VITALS
HEIGHT: 68 IN | HEART RATE: 72 BPM | TEMPERATURE: 98 F | OXYGEN SATURATION: 97 % | DIASTOLIC BLOOD PRESSURE: 69 MMHG | RESPIRATION RATE: 20 BRPM | SYSTOLIC BLOOD PRESSURE: 172 MMHG | WEIGHT: 164.8 LBS | BODY MASS INDEX: 24.98 KG/M2

## 2024-07-01 DIAGNOSIS — D60.9 ACQUIRED RED CELL APLASIA: Primary | ICD-10-CM

## 2024-07-01 DIAGNOSIS — D61.9 APLASTIC ANEMIA: ICD-10-CM

## 2024-07-01 LAB
ALBUMIN SERPL-MCNC: 3.6 G/DL (ref 3.5–5.2)
ALBUMIN/GLOB SERPL: 1.2 G/DL
ALP SERPL-CCNC: 73 U/L (ref 39–117)
ALT SERPL W P-5'-P-CCNC: 20 U/L (ref 1–41)
ANION GAP SERPL CALCULATED.3IONS-SCNC: 11.7 MMOL/L (ref 5–15)
AST SERPL-CCNC: 15 U/L (ref 1–40)
BASOPHILS # BLD AUTO: 0.04 10*3/MM3 (ref 0–0.2)
BASOPHILS NFR BLD AUTO: 0.5 % (ref 0–1.5)
BILIRUB SERPL-MCNC: 0.4 MG/DL (ref 0–1.2)
BUN SERPL-MCNC: 21 MG/DL (ref 8–23)
BUN/CREAT SERPL: 17.1 (ref 7–25)
CALCIUM SPEC-SCNC: 9.9 MG/DL (ref 8.2–9.6)
CHLORIDE SERPL-SCNC: 106 MMOL/L (ref 98–107)
CO2 SERPL-SCNC: 24.3 MMOL/L (ref 22–29)
CREAT SERPL-MCNC: 1.23 MG/DL (ref 0.76–1.27)
DEPRECATED RDW RBC AUTO: 54.9 FL (ref 37–54)
EGFRCR SERPLBLD CKD-EPI 2021: 55.4 ML/MIN/1.73
EOSINOPHIL # BLD AUTO: 0.5 10*3/MM3 (ref 0–0.4)
EOSINOPHIL NFR BLD AUTO: 6.7 % (ref 0.3–6.2)
ERYTHROCYTE [DISTWIDTH] IN BLOOD BY AUTOMATED COUNT: 15.6 % (ref 12.3–15.4)
GLOBULIN UR ELPH-MCNC: 3.1 GM/DL
GLUCOSE SERPL-MCNC: 105 MG/DL (ref 65–99)
HCT VFR BLD AUTO: 29.3 % (ref 37.5–51)
HGB BLD-MCNC: 9.4 G/DL (ref 13–17.7)
IMM GRANULOCYTES # BLD AUTO: 0.04 10*3/MM3 (ref 0–0.05)
IMM GRANULOCYTES NFR BLD AUTO: 0.5 % (ref 0–0.5)
LYMPHOCYTES # BLD AUTO: 2.01 10*3/MM3 (ref 0.7–3.1)
LYMPHOCYTES NFR BLD AUTO: 26.8 % (ref 19.6–45.3)
MCH RBC QN AUTO: 31.6 PG (ref 26.6–33)
MCHC RBC AUTO-ENTMCNC: 32.1 G/DL (ref 31.5–35.7)
MCV RBC AUTO: 98.7 FL (ref 79–97)
MONOCYTES # BLD AUTO: 0.77 10*3/MM3 (ref 0.1–0.9)
MONOCYTES NFR BLD AUTO: 10.3 % (ref 5–12)
NEUTROPHILS NFR BLD AUTO: 4.14 10*3/MM3 (ref 1.7–7)
NEUTROPHILS NFR BLD AUTO: 55.2 % (ref 42.7–76)
NRBC BLD AUTO-RTO: 0 /100 WBC (ref 0–0.2)
PLATELET # BLD AUTO: 272 10*3/MM3 (ref 140–450)
PMV BLD AUTO: 11.4 FL (ref 6–12)
POTASSIUM SERPL-SCNC: 4.2 MMOL/L (ref 3.5–5.2)
PROT SERPL-MCNC: 6.7 G/DL (ref 6–8.5)
RBC # BLD AUTO: 2.97 10*6/MM3 (ref 4.14–5.8)
SODIUM SERPL-SCNC: 142 MMOL/L (ref 136–145)
WBC NRBC COR # BLD AUTO: 7.5 10*3/MM3 (ref 3.4–10.8)

## 2024-07-01 PROCEDURE — 80053 COMPREHEN METABOLIC PANEL: CPT | Performed by: INTERNAL MEDICINE

## 2024-07-01 PROCEDURE — 85025 COMPLETE CBC W/AUTO DIFF WBC: CPT | Performed by: INTERNAL MEDICINE

## 2024-07-01 NOTE — PROGRESS NOTES
Name:  Chinedu Silva  :  1933  Date:  2024     REFERRING PHYSICIAN    PRIMARY CARE PROVIDER  Jr Segura PA    REASON FOR CONSULTATION  1. Acquired red cell aplasia    2. Aplastic anemia      CHIEF COMPLAINT  Fatigue.    Dear Mr. Ramoston,    HISTORY OF PRESENT ILLNESS:   I saw Mr. Silva in followup (from his recent hospitalization for severe anemia) today in our hematology/oncology clinic. As you are aware, he is a pleasant, 89 y.o., white male with a history of hypertension, CAD and GERD who presented to the [facility] ED on 2024 with progressive weakness and dyspnea on exertion. These symptoms had potentially first started ~two to three months prior; however, they significantly worsened over the course of the previous couple of weeks (by early 2024). Upon arrival to our ED, he was found to be severely anemic, with a Hg of 3.7 g/dL. His other cell lines (WBCs and platelets) were entirely WNL. Our service was consulted for further evaluation of this issue, and a bone marrow biopsy was performed on 2024. Meanwhile, as he was feeling substantially better after his HgB was transfused up to the ~9 g/dL range, he was able to be discharged to outpatient follow up in our clinic. He now returns to our clinic to go over the results of the bone marrow exam and for ongoing management.    INTERIM HISTORY:  Mr. Silva returns to clinic today for follow up accompanied by his wife. He confirms that he has still not noticed any bleeding from any source. He last underwent an endoscopy around ten years ago (with Dr. Richard) after suffering a GI bleed; but he had not had any significant, recurring issues along these lines since then. He denies any bruising or bleeding. He denies a personal or family history of autoimmune disease or blood disorder. In hindsight, he does recall that he presented to his PCP in early  with a fever to 105 degrees; however, a workup at that time, including swabs  for COVID and influenza, was unremarkable. He reports today that, since his discharge from the hospital the middle of last week, he has continued to feel better (compared to when he was first admitted). He has not experienced any recurrent fevers for the past month, since the 105 reading in early . He has no new or other specific complaints in clinic today.    Past Medical History:   Diagnosis Date    Carotid stenosis     Coronary artery disease     Diverticulitis     GERD (gastroesophageal reflux disease)     Gout     Hearing aid worn     Hypertension     Obese     Prostate cancer     Wears glasses        Past Surgical History:   Procedure Laterality Date    CAROTID ENDARTERECTOMY Left 2018    Procedure: CAROTID ENDARTERECTOMY WITH EEG LEFT;  Surgeon: Del Neal MD;  Location: [facility];  Service:     CATARACT EXTRACTION      CATARACT EXTRACTION WITH INTRAOCULAR LENS IMPLANT Right     CHOLECYSTECTOMY      COLONOSCOPY W/ POLYPECTOMY      HERNIA REPAIR Right     TOTAL KNEE ARTHROPLASTY Right     TURP / TRANSURETHRAL INCISION / DRAINAGE PROSTATE         Social History     Socioeconomic History    Marital status:     Number of children: 1   Tobacco Use    Smoking status: Former     Current packs/day: 0.00     Average packs/day: 1 pack/day for 12.0 years (12.0 ttl pk-yrs)     Types: Cigarettes     Start date:      Quit date:      Years since quittin.5    Smokeless tobacco: Current     Types: Chew   Vaping Use    Vaping status: Never Used   Substance and Sexual Activity    Alcohol use: No    Drug use: No    Sexual activity: Defer       Family History   Problem Relation Age of Onset    Hypertension Mother     Glaucoma Mother     Diverticulitis Mother     Gout Mother     ALS Father     Gout Sister     Broken bones Paternal Grandfather     Broken bones Son     Cancer Son     Diabetes Brother        Allergies   Allergen Reactions    Ciprofloxacin Other (See Comments)     Muscle Pains     "Lisinopril Cough    Sulfa Antibiotics Itching and Rash       Current Outpatient Medications   Medication Sig Dispense Refill    allopurinol (ZYLOPRIM) 300 MG tablet Take 1 tablet by mouth Daily.      aspirin 81 MG EC tablet Take 1 tablet by mouth Daily.      valsartan (DIOVAN) 80 MG tablet Take 1 tablet by mouth Daily.       No current facility-administered medications for this visit.     REVIEW OF SYSTEMS  CONSTITUTIONAL:  No fever, chills or night sweats.  EYES:  No blurry vision, diplopia or other vision changes.  ENT:  No hearing loss, nosebleeds or sore throat.  CARDIOVASCULAR:  No palpitations, arrhythmia, syncopal episodes or edema.  PULMONARY:  No hemoptysis, wheezing, chronic cough or shortness of breath.  GASTROINTESTINAL:  No nausea or vomiting.  No constipation or diarrhea.  No abdominal pain.  GENITOURINARY:  No hematuria, kidney stones or frequent urination.  MUSCULOSKELETAL:  No joint or back pains.  INTEGUMENTARY: No rashes or pruritus.  ENDOCRINE:  No excessive thirst or hot flashes.  HEMATOLOGIC:  No history of free bleeding, spontaneous bleeding or clotting.  IMMUNOLOGIC:  No allergies or frequent infections.  NEUROLOGIC: No numbness, tingling, seizures or weakness.  PSYCHIATRIC:  No anxiety or depression.    PHYSICAL EXAMINATION  /69   Pulse 72   Temp 98 °F (36.7 °C) (Temporal)   Resp 20   Ht 172.7 cm (68\")   Wt 74.8 kg (164 lb 12.8 oz)   SpO2 97%   BMI 25.06 kg/m²     Pain Score:  Pain Score    07/01/24 1023   PainSc: 0-No pain     PHQ-Score Total:  PHQ-9 Total Score:      GENERAL:  A well-developed, well-nourished, elderly, white male in no acute distress.  HEENT:  Pupils equally round and reactive to light.  Extraocular muscles intact.  CARDIOVASCULAR:  Regular rate and rhythm.  No murmurs, gallops or rubs.  LUNGS:  Clear to auscultation bilaterally.  ABDOMEN:  Soft, nontender, nondistended with positive bowel sounds.  EXTREMITIES:  No clubbing, cyanosis or edema " bilaterally.  SKIN:  No rashes or petechiae.  NEURO:  Cranial nerves grossly intact.  No focal deficits.  PSYCH:  Alert and oriented x3.    LABORATORY    Lab Results   Component Value Date    WBC 7.50 07/01/2024    HGB 9.4 (L) 07/01/2024    HCT 29.3 (L) 07/01/2024    MCV 98.7 (H) 07/01/2024     07/01/2024    NEUTROABS 4.14 07/01/2024       Lab Results   Component Value Date     07/01/2024    K 4.2 07/01/2024     07/01/2024    CO2 24.3 07/01/2024    BUN 21 07/01/2024    CREATININE 1.23 07/01/2024    GLUCOSE 105 (H) 07/01/2024    CALCIUM 9.9 (H) 07/01/2024    AST 15 07/01/2024    ALT 20 07/01/2024    ALKPHOS 73 07/01/2024    BILITOT 0.4 07/01/2024    PROTEINTOT 6.7 07/01/2024    ALBUMIN 3.6 07/01/2024     CBC (07/01/2024): WBCs: 7.50; HgB: 9.4; Hct: 29.3; platelets: 272; MCV: 98.7  CBC (06/26/2024): WBCs: 6.67; HgB: 9.0; Hct: 27.5; platelets: 198; MCV: 98.6    IMAGING    PATHOLOGY  Peripheral smear, bone marrow aspiration smear, bone marrow aspiration clot and bone marrow core biopsy (06/26/2024):  Normocellular bone marrow with increased granulopoiesis, essentially absent erythropoiesis, adequate megakaryopoiesis, increased stainable iron and slightly increased mixed chronic inflammation. No evidence of increased blasts or dysplasia. The most striking feature within the bone marrow is the essential absence of any erythropoiesis in the aspirate or core biopsy. There is mixed inflammation with some notable immunophenotypic findings by flow cytometry, but there is no evidence of bone marrow involvement by lymphoma. This is most suggestive of a mixed reactive lymphoid population.    IMPRESSION AND PLAN  Mr. Silva is a 89 y.o., white male with:  Pure red cell aplasia: I had a long discussion with the patient and his wife in clinic today regarding the results of the bone marrow biopsy he underwent last week (which are summarized above). In short, this is a fairly uncommon diagnosis that can either be  idiopathic or associated with a number of potential, underlying causes, including thymoma or parvovirus infection. Regardless, high dose steroids (prednisone 1-2 mg/kg daily) is the standard, first-line treatment; however, since today's (07/01/2024) repeat CBC shows that his HgB (9.4 g/dL) is now actually improved compared to what it was when he was discharged from our hospital following the bone marrow biopsy last week (9.0 g/dL), it is possible that he is now recovering on his own. Consequently, we can defer both starting prednisone and ordering any additional studies (to look for possible viral triggers; the 105 degree fever he experienced a couple of weeks prior to last week's hospitalization is probably not a coincidence) for now. We will check CBCs on a twice weekly basis between now and a follow up appointment in two weeks. The patient and his wife were in agreement with these plans.    It is a pleasure to participate in Mr. Silva's care. Please do not hesitate to call with any questions or concerns that you may have.    A total of 50 minutes were spent coordinating this patient’s care in clinic today; more than 50% of this time was face-to-face with the patient and his wife, reviewing his interim medical history, discussing the diagnosis and, in general terms, its prognosis and counseling on the current evaluation, potential treatment and followup plans. All questions were answered to their satisfaction.    FOLLOW UP  Defer starting prednisone for now. Return to our clinic in 2 weeks with a CBC and CMP. Twice weekly CBCs between now and then.          This document was electronically signed by RAMIRO King MD July 1, 2024 16:01 EDT      CC: RIGO Mccall

## 2024-07-01 NOTE — PROGRESS NOTES
Venipuncture Blood Specimen Collection  Venipuncture performed in right arm by Shena Robert MA with good hemostasis. Patient tolerated the procedure well without complications.   07/01/24   Shena Robert MA

## 2024-07-02 ENCOUNTER — OFFICE VISIT (OUTPATIENT)
Dept: CARDIOLOGY | Facility: CLINIC | Age: 89
End: 2024-07-02
Payer: MEDICARE

## 2024-07-02 ENCOUNTER — READMISSION MANAGEMENT (OUTPATIENT)
Dept: CALL CENTER | Facility: HOSPITAL | Age: 89
End: 2024-07-02
Payer: MEDICARE

## 2024-07-02 VITALS
BODY MASS INDEX: 24.8 KG/M2 | HEIGHT: 68 IN | DIASTOLIC BLOOD PRESSURE: 71 MMHG | SYSTOLIC BLOOD PRESSURE: 152 MMHG | OXYGEN SATURATION: 95 % | WEIGHT: 163.6 LBS | HEART RATE: 71 BPM

## 2024-07-02 DIAGNOSIS — I10 PRIMARY HYPERTENSION: ICD-10-CM

## 2024-07-02 DIAGNOSIS — I21.4 NSTEMI (NON-ST ELEVATED MYOCARDIAL INFARCTION): Primary | ICD-10-CM

## 2024-07-02 DIAGNOSIS — E78.5 HYPERLIPIDEMIA LDL GOAL <70: ICD-10-CM

## 2024-07-02 DIAGNOSIS — I65.23 BILATERAL CAROTID ARTERY STENOSIS: ICD-10-CM

## 2024-07-02 PROCEDURE — 99214 OFFICE O/P EST MOD 30 MIN: CPT | Performed by: NURSE PRACTITIONER

## 2024-07-02 PROCEDURE — 1159F MED LIST DOCD IN RCRD: CPT | Performed by: NURSE PRACTITIONER

## 2024-07-02 PROCEDURE — 1160F RVW MEDS BY RX/DR IN RCRD: CPT | Performed by: NURSE PRACTITIONER

## 2024-07-02 NOTE — PROGRESS NOTES
[facility]             NaomiZULEYKA Tran Joseph T, PA  Chinedu Silva  2/14/1933 07/02/2024    Patient Active Problem List   Diagnosis    Bilateral carotid artery stenosis    Carotid stenosis    GERD (gastroesophageal reflux disease)    Hypertension    Coronary artery disease    Wears glasses    Acquired red cell aplasia       Dear Jr Segura PA:    Subjective     Chief Complaint   Patient presents with    Follow-up     Hospital follow up       HPI:     This is a 89 y.o. male with known past medical history of coronary artery disease, carotid stenosis status post endarterectomy on the left in 2018, essential hypertension, diverticulitis, gout and hypertension.    Chinedu Silva presents today for hospital follow-up.  Patient was recently mated to [facility] with complaints of shortness of breath and weakness.In the ED patient found to be severely anemic with hemoglobin of 3.7. Iron studies not consistent with iron depletion. Normal LDH, Total bilirubin, not suggestive of hemolytic process . Reticulocyte count impressively low for anemic state. Suspect hyperproliferation  in bone marrow driving anemia. Folate, B12, TSH all wnl. PBS revealed macrocytic anemia. 3 units of pRBC given with appropriate improvement to 7.3. Heme/onc consulted and performed bone marrow biopsy on 6/26 with results pending. They recommended 1 more unit of pRBC and outpatient f/u given recovery of blood counts and patient stability. Patient also had elevated troponin. No chest pressure since transfusion. Cardiology consulted and noted likely from anemia.  Had echocardiogram which showed normal LV function    Patient states that prior to presenting to the ER he developed shortness of breath and chest pressure however after hemoglobin improved his chest pain and shortness of breath went away.  Was noted to have elevated troponin.  Blood pressure elevated in the  office today and wife states this has been ongoing since he developed anemia.  Scheduled to see hematology.  Has a history of carotid stenosis status post endarterectomy on the left in thousand 18 by Dr. Neal for which she has not been seen in many years.  Recent CBC showed improvement of hemoglobin to 9.4.  Recent CMP showed improvement of kidney function    Diagnostic Testing  Echocardiogram 6/2024: EF 66 to 70%     All other systems were reviewed and were negative.    Patient Active Problem List   Diagnosis    Bilateral carotid artery stenosis    Carotid stenosis    GERD (gastroesophageal reflux disease)    Hypertension    Coronary artery disease    Wears glasses    Acquired red cell aplasia       family history includes ALS in his father; Broken bones in his paternal grandfather and son; Cancer in his son; Diabetes in his brother; Diverticulitis in his mother; Glaucoma in his mother; Gout in his mother and sister; Hypertension in his mother.     reports that he quit smoking about 62 years ago. His smoking use included cigarettes. He started smoking about 74 years ago. He has a 12 pack-year smoking history. His smokeless tobacco use includes chew. He reports that he does not drink alcohol and does not use drugs.    Allergies   Allergen Reactions    Ciprofloxacin Other (See Comments)     Muscle Pains    Lisinopril Cough    Sulfa Antibiotics Itching and Rash         Current Outpatient Medications:     allopurinol (ZYLOPRIM) 300 MG tablet, Take 1 tablet by mouth Daily., Disp: , Rfl:     aspirin 81 MG EC tablet, Take 1 tablet by mouth Daily., Disp: , Rfl:     valsartan (DIOVAN) 80 MG tablet, Take 1 tablet by mouth Daily., Disp: , Rfl:       Physical Exam:  I have reviewed the patient's current vital signs as documented in the patient's EMR.   Vitals:    07/02/24 1453   BP: 152/71   Pulse: 71   SpO2: 95%     Body mass index is 24.88 kg/m².       07/02/24  1453   Weight: 74.2 kg (163 lb 9.6 oz)      Physical  Exam  Constitutional:       General: He is not in acute distress.     Appearance: Normal appearance. He is well-developed.   HENT:      Head: Normocephalic and atraumatic.      Mouth/Throat:      Mouth: Mucous membranes are moist.   Eyes:      Extraocular Movements: Extraocular movements intact.      Pupils: Pupils are equal, round, and reactive to light.   Neck:      Vascular: No JVD.   Cardiovascular:      Rate and Rhythm: Normal rate and regular rhythm.      Heart sounds: Normal heart sounds. No murmur heard.     No S3 or S4 sounds.   Pulmonary:      Effort: Pulmonary effort is normal. No respiratory distress.      Breath sounds: Normal breath sounds. No wheezing.   Abdominal:      General: Bowel sounds are normal. There is no distension.      Palpations: Abdomen is soft. There is no hepatomegaly.      Tenderness: There is no abdominal tenderness.   Musculoskeletal:         General: Normal range of motion.      Cervical back: Normal range of motion and neck supple.   Skin:     General: Skin is warm and dry.      Coloration: Skin is not jaundiced or pale.   Neurological:      General: No focal deficit present.      Mental Status: He is alert and oriented to person, place, and time. Mental status is at baseline.   Psychiatric:         Mood and Affect: Mood normal.         Behavior: Behavior normal.         Thought Content: Thought content normal.         Judgment: Judgment normal.            DATA REVIEWED:     TTE/TANIYA:  Results for orders placed during the hospital encounter of 06/24/24    Adult Transthoracic Echo Complete W/ Cont if Necessary Per Protocol    Interpretation Summary    Left ventricular ejection fraction appears to be 66 - 70%.    Left ventricular wall thickness is consistent with moderate concentric hypertrophy.    Left ventricular diastolic function is consistent with (grade I) impaired relaxation.    The right ventricular cavity is moderately dilated.    Estimated right ventricular systolic  "pressure from tricuspid regurgitation is normal (<35 mmHg).    Alll valves are grossly normal      Laboratory evaluations:    Lab Results   Component Value Date    GLUCOSE 105 (H) 07/01/2024    BUN 21 07/01/2024    CREATININE 1.23 07/01/2024    EGFRIFNONA 45 (L) 08/30/2018    BCR 17.1 07/01/2024    K 4.2 07/01/2024    CO2 24.3 07/01/2024    CALCIUM 9.9 (H) 07/01/2024    PROTENTOTREF 6.1 06/24/2024    ALBUMIN 3.6 07/01/2024    LABIL2 1.2 06/24/2024    AST 15 07/01/2024    ALT 20 07/01/2024     Lab Results   Component Value Date    WBC 7.50 07/01/2024    HGB 9.4 (L) 07/01/2024    HCT 29.3 (L) 07/01/2024    MCV 98.7 (H) 07/01/2024     07/01/2024     No results found for: \"CHOL\", \"CHLPL\", \"TRIG\", \"HDL\", \"LDL\", \"LDLDIRECT\"  Lab Results   Component Value Date    TSH 1.280 06/24/2024     Lab Results   Component Value Date    HGBA1C 6.00 (H) 02/04/2018     Lab Results   Component Value Date    ALT 20 07/01/2024     Lab Results   Component Value Date    HGBA1C 6.00 (H) 02/04/2018     Lab Results   Component Value Date    CREATININE 1.23 07/01/2024     Lab Results   Component Value Date    IRON 254 (H) 06/24/2024    TIBC 319 06/24/2024    FERRITIN 1,420.00 (H) 06/24/2024     Lab Results   Component Value Date    INR 0.94 08/17/2022    INR 0.97 02/04/2018    INR 0.91 03/15/2016    PROTIME 12.8 08/17/2022    PROTIME 10.6 02/04/2018    PROTIME 10.3 03/15/2016           --------------------------------------------------------------------------------------------------------------------------    ASSESSMENT/PLAN:      Diagnosis Plan   1. NSTEMI (non-ST elevated myocardial infarction)  Stress Test With Myocardial Perfusion One Day      2. Bilateral carotid artery stenosis  US Carotid Bilateral      3. Hyperlipidemia LDL goal <70  Lipid Panel          NSTEMI  Patient with recent hospitalization for anemia and NSTEMI.  Will obtain nuclear stress test to rule out any underlying ischemia given elevated troponin during his last " admission with chest pain.  Recent echocardiogram showed normal LV function.  Continue aspirin    Carotid artery disease  History of carotid endarterectomy.  No recent follow-up on this.  Obtain carotid ultrasound    3.  Hyperlipidemia  No recent lipid panel on file.  Obtain lipid panel and adjust statin as needed with an LDL goal less than 70    4.  Hypertension  Blood pressure elevated in office today.  Patient's wife states he has been having elevated blood pressures since hospitalization.  She states normally blood pressure is well-controlled.  Given some acute kidney injury recently I am hesitant to go up on valsartan.  Has been unable to tolerate lisinopril or amlodipine in the past.  For now we will continue to monitor to see if hypertension is secondary to recent stressors.  If it remains elevated at next visit we will consider adding additional medication    This document has been @Electronically signed by ZULEYKA Venegas, 07/02/24, 12:42 PM EDT.       Dictated Utilizing Dragon Dictation: Part of this note may be an electronic transcription/translation of spoken language to printed text using the Dragon Dictation System.    Follow-up appointment and medication changes provided in hand delivered After Visit Summary as well as reviewed in the room.

## 2024-07-02 NOTE — OUTREACH NOTE
Medical Week 1 Survey      Flowsheet Row Responses   [facility] facility patient discharged from? [facility]   Does the patient have one of the following disease processes/diagnoses(primary or secondary)? Other   Week 1 attempt successful? No   Unsuccessful attempts Attempt 1  [UTR both contact numbers]            Radha TORRES - Registered Nurse

## 2024-07-03 ENCOUNTER — CLINICAL SUPPORT (OUTPATIENT)
Dept: ONCOLOGY | Facility: CLINIC | Age: 89
End: 2024-07-03
Payer: MEDICARE

## 2024-07-03 VITALS
SYSTOLIC BLOOD PRESSURE: 156 MMHG | WEIGHT: 160.6 LBS | BODY MASS INDEX: 24.42 KG/M2 | DIASTOLIC BLOOD PRESSURE: 78 MMHG | RESPIRATION RATE: 18 BRPM | TEMPERATURE: 98 F | OXYGEN SATURATION: 96 % | HEART RATE: 67 BPM

## 2024-07-03 DIAGNOSIS — D60.9 ACQUIRED RED CELL APLASIA: ICD-10-CM

## 2024-07-03 DIAGNOSIS — E78.5 HYPERLIPIDEMIA LDL GOAL <70: ICD-10-CM

## 2024-07-03 DIAGNOSIS — D61.9 APLASTIC ANEMIA: ICD-10-CM

## 2024-07-03 LAB
BASOPHILS # BLD AUTO: 0.04 10*3/MM3 (ref 0–0.2)
BASOPHILS NFR BLD AUTO: 0.5 % (ref 0–1.5)
CHOLEST SERPL-MCNC: 106 MG/DL (ref 0–200)
DEPRECATED RDW RBC AUTO: 55.2 FL (ref 37–54)
EOSINOPHIL # BLD AUTO: 0.39 10*3/MM3 (ref 0–0.4)
EOSINOPHIL NFR BLD AUTO: 5.3 % (ref 0.3–6.2)
ERYTHROCYTE [DISTWIDTH] IN BLOOD BY AUTOMATED COUNT: 15.4 % (ref 12.3–15.4)
HCT VFR BLD AUTO: 29.5 % (ref 37.5–51)
HDLC SERPL-MCNC: 33 MG/DL (ref 40–60)
HGB BLD-MCNC: 9.5 G/DL (ref 13–17.7)
IMM GRANULOCYTES # BLD AUTO: 0.02 10*3/MM3 (ref 0–0.05)
IMM GRANULOCYTES NFR BLD AUTO: 0.3 % (ref 0–0.5)
LDLC SERPL CALC-MCNC: 51 MG/DL (ref 0–100)
LDLC/HDLC SERPL: 1.48 {RATIO}
LYMPHOCYTES # BLD AUTO: 2 10*3/MM3 (ref 0.7–3.1)
LYMPHOCYTES NFR BLD AUTO: 27.1 % (ref 19.6–45.3)
MCH RBC QN AUTO: 31.8 PG (ref 26.6–33)
MCHC RBC AUTO-ENTMCNC: 32.2 G/DL (ref 31.5–35.7)
MCV RBC AUTO: 98.7 FL (ref 79–97)
MONOCYTES # BLD AUTO: 0.74 10*3/MM3 (ref 0.1–0.9)
MONOCYTES NFR BLD AUTO: 10 % (ref 5–12)
NEUTROPHILS NFR BLD AUTO: 4.2 10*3/MM3 (ref 1.7–7)
NEUTROPHILS NFR BLD AUTO: 56.8 % (ref 42.7–76)
NRBC BLD AUTO-RTO: 0 /100 WBC (ref 0–0.2)
PLATELET # BLD AUTO: 301 10*3/MM3 (ref 140–450)
PMV BLD AUTO: 11.3 FL (ref 6–12)
RBC # BLD AUTO: 2.99 10*6/MM3 (ref 4.14–5.8)
TRIGL SERPL-MCNC: 121 MG/DL (ref 0–150)
VLDLC SERPL-MCNC: 22 MG/DL (ref 5–40)
WBC NRBC COR # BLD AUTO: 7.39 10*3/MM3 (ref 3.4–10.8)

## 2024-07-03 PROCEDURE — 80061 LIPID PANEL: CPT | Performed by: NURSE PRACTITIONER

## 2024-07-03 PROCEDURE — 85025 COMPLETE CBC W/AUTO DIFF WBC: CPT | Performed by: INTERNAL MEDICINE

## 2024-07-03 NOTE — PROGRESS NOTES
Venipuncture Blood Specimen Collection  Venipuncture performed in left arm by Mimi Batista MA with good hemostasis. Patient tolerated the procedure well without complications.   07/03/24   Mimi Batista MA

## 2024-07-08 ENCOUNTER — CLINICAL SUPPORT (OUTPATIENT)
Dept: ONCOLOGY | Facility: CLINIC | Age: 89
End: 2024-07-08
Payer: MEDICARE

## 2024-07-08 VITALS
OXYGEN SATURATION: 95 % | WEIGHT: 160.4 LBS | BODY MASS INDEX: 24.31 KG/M2 | DIASTOLIC BLOOD PRESSURE: 74 MMHG | TEMPERATURE: 97.8 F | HEIGHT: 68 IN | RESPIRATION RATE: 18 BRPM | SYSTOLIC BLOOD PRESSURE: 149 MMHG | HEART RATE: 84 BPM

## 2024-07-08 DIAGNOSIS — D60.9 ACQUIRED RED CELL APLASIA: ICD-10-CM

## 2024-07-08 DIAGNOSIS — D61.9 APLASTIC ANEMIA: ICD-10-CM

## 2024-07-08 LAB
BASOPHILS # BLD AUTO: 0.05 10*3/MM3 (ref 0–0.2)
BASOPHILS NFR BLD AUTO: 0.6 % (ref 0–1.5)
DEPRECATED RDW RBC AUTO: 54.8 FL (ref 37–54)
EOSINOPHIL # BLD AUTO: 0.41 10*3/MM3 (ref 0–0.4)
EOSINOPHIL NFR BLD AUTO: 5 % (ref 0.3–6.2)
ERYTHROCYTE [DISTWIDTH] IN BLOOD BY AUTOMATED COUNT: 15.4 % (ref 12.3–15.4)
HCT VFR BLD AUTO: 29.1 % (ref 37.5–51)
HGB BLD-MCNC: 9.4 G/DL (ref 13–17.7)
IMM GRANULOCYTES # BLD AUTO: 0.03 10*3/MM3 (ref 0–0.05)
IMM GRANULOCYTES NFR BLD AUTO: 0.4 % (ref 0–0.5)
LYMPHOCYTES # BLD AUTO: 2.55 10*3/MM3 (ref 0.7–3.1)
LYMPHOCYTES NFR BLD AUTO: 31 % (ref 19.6–45.3)
MCH RBC QN AUTO: 31.8 PG (ref 26.6–33)
MCHC RBC AUTO-ENTMCNC: 32.3 G/DL (ref 31.5–35.7)
MCV RBC AUTO: 98.3 FL (ref 79–97)
MONOCYTES # BLD AUTO: 0.74 10*3/MM3 (ref 0.1–0.9)
MONOCYTES NFR BLD AUTO: 9 % (ref 5–12)
NEUTROPHILS NFR BLD AUTO: 4.45 10*3/MM3 (ref 1.7–7)
NEUTROPHILS NFR BLD AUTO: 54 % (ref 42.7–76)
NRBC BLD AUTO-RTO: 0 /100 WBC (ref 0–0.2)
PLATELET # BLD AUTO: 325 10*3/MM3 (ref 140–450)
PMV BLD AUTO: 11.1 FL (ref 6–12)
RBC # BLD AUTO: 2.96 10*6/MM3 (ref 4.14–5.8)
WBC NRBC COR # BLD AUTO: 8.23 10*3/MM3 (ref 3.4–10.8)

## 2024-07-08 PROCEDURE — 36415 COLL VENOUS BLD VENIPUNCTURE: CPT | Performed by: INTERNAL MEDICINE

## 2024-07-08 PROCEDURE — 85025 COMPLETE CBC W/AUTO DIFF WBC: CPT | Performed by: INTERNAL MEDICINE

## 2024-07-08 NOTE — PROGRESS NOTES
Venipuncture Blood Specimen Collection  Venipuncture performed in Left arm by Sofi Dunham MA with good hemostasis. Patient tolerated procedure well.  07/08/24   Sofi Dunham MA

## 2024-07-09 LAB — REF LAB TEST METHOD: NORMAL

## 2024-07-11 ENCOUNTER — CLINICAL SUPPORT (OUTPATIENT)
Dept: ONCOLOGY | Facility: CLINIC | Age: 89
End: 2024-07-11
Payer: MEDICARE

## 2024-07-11 VITALS
TEMPERATURE: 98.2 F | OXYGEN SATURATION: 92 % | HEART RATE: 90 BPM | SYSTOLIC BLOOD PRESSURE: 121 MMHG | HEIGHT: 68 IN | RESPIRATION RATE: 18 BRPM | DIASTOLIC BLOOD PRESSURE: 65 MMHG | BODY MASS INDEX: 24.4 KG/M2

## 2024-07-11 DIAGNOSIS — D61.9 APLASTIC ANEMIA: Primary | ICD-10-CM

## 2024-07-11 DIAGNOSIS — D60.9 ACQUIRED RED CELL APLASIA: ICD-10-CM

## 2024-07-11 DIAGNOSIS — D61.9 APLASTIC ANEMIA: ICD-10-CM

## 2024-07-11 LAB
ALBUMIN SERPL-MCNC: 3.8 G/DL (ref 3.5–5.2)
ALBUMIN/GLOB SERPL: 1.2 G/DL
ALP SERPL-CCNC: 82 U/L (ref 39–117)
ALT SERPL W P-5'-P-CCNC: 27 U/L (ref 1–41)
ANION GAP SERPL CALCULATED.3IONS-SCNC: 12.4 MMOL/L (ref 5–15)
AST SERPL-CCNC: 18 U/L (ref 1–40)
BASOPHILS # BLD AUTO: 0.05 10*3/MM3 (ref 0–0.2)
BASOPHILS NFR BLD AUTO: 0.7 % (ref 0–1.5)
BILIRUB SERPL-MCNC: 0.5 MG/DL (ref 0–1.2)
BUN SERPL-MCNC: 18 MG/DL (ref 8–23)
BUN/CREAT SERPL: 13.5 (ref 7–25)
CALCIUM SPEC-SCNC: 10.1 MG/DL (ref 8.2–9.6)
CHLORIDE SERPL-SCNC: 104 MMOL/L (ref 98–107)
CO2 SERPL-SCNC: 23.6 MMOL/L (ref 22–29)
CREAT SERPL-MCNC: 1.33 MG/DL (ref 0.76–1.27)
DEPRECATED RDW RBC AUTO: 53.1 FL (ref 37–54)
EGFRCR SERPLBLD CKD-EPI 2021: 50.5 ML/MIN/1.73
EOSINOPHIL # BLD AUTO: 0.42 10*3/MM3 (ref 0–0.4)
EOSINOPHIL NFR BLD AUTO: 5.8 % (ref 0.3–6.2)
ERYTHROCYTE [DISTWIDTH] IN BLOOD BY AUTOMATED COUNT: 15.2 % (ref 12.3–15.4)
GLOBULIN UR ELPH-MCNC: 3.1 GM/DL
GLUCOSE SERPL-MCNC: 140 MG/DL (ref 65–99)
HCT VFR BLD AUTO: 29 % (ref 37.5–51)
HGB BLD-MCNC: 9.4 G/DL (ref 13–17.7)
IMM GRANULOCYTES # BLD AUTO: 0.02 10*3/MM3 (ref 0–0.05)
IMM GRANULOCYTES NFR BLD AUTO: 0.3 % (ref 0–0.5)
LYMPHOCYTES # BLD AUTO: 2.21 10*3/MM3 (ref 0.7–3.1)
LYMPHOCYTES NFR BLD AUTO: 30.5 % (ref 19.6–45.3)
MCH RBC QN AUTO: 31.4 PG (ref 26.6–33)
MCHC RBC AUTO-ENTMCNC: 32.4 G/DL (ref 31.5–35.7)
MCV RBC AUTO: 97 FL (ref 79–97)
MONOCYTES # BLD AUTO: 0.63 10*3/MM3 (ref 0.1–0.9)
MONOCYTES NFR BLD AUTO: 8.7 % (ref 5–12)
NEUTROPHILS NFR BLD AUTO: 3.92 10*3/MM3 (ref 1.7–7)
NEUTROPHILS NFR BLD AUTO: 54 % (ref 42.7–76)
NRBC BLD AUTO-RTO: 0 /100 WBC (ref 0–0.2)
PLATELET # BLD AUTO: 375 10*3/MM3 (ref 140–450)
PMV BLD AUTO: 10.6 FL (ref 6–12)
POTASSIUM SERPL-SCNC: 4.7 MMOL/L (ref 3.5–5.2)
PROT SERPL-MCNC: 6.9 G/DL (ref 6–8.5)
RBC # BLD AUTO: 2.99 10*6/MM3 (ref 4.14–5.8)
RETICS # AUTO: <0.01 10*6/MM3 (ref 0.02–0.13)
RETICS/RBC NFR AUTO: 0.25 % (ref 0.7–1.9)
SODIUM SERPL-SCNC: 140 MMOL/L (ref 136–145)
WBC NRBC COR # BLD AUTO: 7.25 10*3/MM3 (ref 3.4–10.8)

## 2024-07-11 PROCEDURE — 85025 COMPLETE CBC W/AUTO DIFF WBC: CPT | Performed by: INTERNAL MEDICINE

## 2024-07-11 PROCEDURE — 85045 AUTOMATED RETICULOCYTE COUNT: CPT | Performed by: INTERNAL MEDICINE

## 2024-07-11 PROCEDURE — 80053 COMPREHEN METABOLIC PANEL: CPT | Performed by: INTERNAL MEDICINE

## 2024-07-11 NOTE — PROGRESS NOTES
Venipuncture Blood Specimen Collection  Venipuncture performed in right arm by Carlos Fernandez MA with good hemostasis. Patient tolerated the procedure well without complications.   07/11/24   Carlos Fernandez MA

## 2024-07-15 ENCOUNTER — LAB (OUTPATIENT)
Dept: ONCOLOGY | Facility: CLINIC | Age: 89
End: 2024-07-15
Payer: MEDICARE

## 2024-07-15 ENCOUNTER — OFFICE VISIT (OUTPATIENT)
Dept: ONCOLOGY | Facility: CLINIC | Age: 89
End: 2024-07-15
Payer: MEDICARE

## 2024-07-15 VITALS
SYSTOLIC BLOOD PRESSURE: 118 MMHG | TEMPERATURE: 98 F | BODY MASS INDEX: 24.67 KG/M2 | HEIGHT: 67 IN | DIASTOLIC BLOOD PRESSURE: 62 MMHG | HEART RATE: 85 BPM | OXYGEN SATURATION: 94 % | RESPIRATION RATE: 20 BRPM | WEIGHT: 157.2 LBS

## 2024-07-15 DIAGNOSIS — D60.9 ACQUIRED RED CELL APLASIA: ICD-10-CM

## 2024-07-15 DIAGNOSIS — D61.9 APLASTIC ANEMIA: ICD-10-CM

## 2024-07-15 DIAGNOSIS — D60.9 ACQUIRED RED CELL APLASIA: Primary | ICD-10-CM

## 2024-07-15 DIAGNOSIS — D61.9 APLASTIC ANEMIA: Primary | ICD-10-CM

## 2024-07-15 DIAGNOSIS — E78.5 HYPERLIPIDEMIA LDL GOAL <70: ICD-10-CM

## 2024-07-15 LAB
ALBUMIN SERPL-MCNC: 3.8 G/DL (ref 3.5–5.2)
ALBUMIN/GLOB SERPL: 1.6 G/DL
ALP SERPL-CCNC: 82 U/L (ref 39–117)
ALT SERPL W P-5'-P-CCNC: 20 U/L (ref 1–41)
ANION GAP SERPL CALCULATED.3IONS-SCNC: 10.1 MMOL/L (ref 5–15)
AST SERPL-CCNC: 17 U/L (ref 1–40)
BASOPHILS # BLD AUTO: 0.02 10*3/MM3 (ref 0–0.2)
BASOPHILS NFR BLD AUTO: 0.2 % (ref 0–1.5)
BILIRUB SERPL-MCNC: 0.4 MG/DL (ref 0–1.2)
BUN SERPL-MCNC: 17 MG/DL (ref 8–23)
BUN/CREAT SERPL: 13.1 (ref 7–25)
CALCIUM SPEC-SCNC: 9.8 MG/DL (ref 8.2–9.6)
CHLORIDE SERPL-SCNC: 105 MMOL/L (ref 98–107)
CO2 SERPL-SCNC: 24.9 MMOL/L (ref 22–29)
CREAT SERPL-MCNC: 1.3 MG/DL (ref 0.76–1.27)
DEPRECATED RDW RBC AUTO: 54.4 FL (ref 37–54)
EGFRCR SERPLBLD CKD-EPI 2021: 51.9 ML/MIN/1.73
EOSINOPHIL # BLD AUTO: 0.36 10*3/MM3 (ref 0–0.4)
EOSINOPHIL NFR BLD AUTO: 4.5 % (ref 0.3–6.2)
ERYTHROCYTE [DISTWIDTH] IN BLOOD BY AUTOMATED COUNT: 15.4 % (ref 12.3–15.4)
GLOBULIN UR ELPH-MCNC: 2.4 GM/DL
GLUCOSE SERPL-MCNC: 124 MG/DL (ref 65–99)
HCT VFR BLD AUTO: 25.8 % (ref 37.5–51)
HGB BLD-MCNC: 8.3 G/DL (ref 13–17.7)
IMM GRANULOCYTES # BLD AUTO: 0.02 10*3/MM3 (ref 0–0.05)
IMM GRANULOCYTES NFR BLD AUTO: 0.2 % (ref 0–0.5)
LYMPHOCYTES # BLD AUTO: 2.59 10*3/MM3 (ref 0.7–3.1)
LYMPHOCYTES NFR BLD AUTO: 32.2 % (ref 19.6–45.3)
MCH RBC QN AUTO: 31.4 PG (ref 26.6–33)
MCHC RBC AUTO-ENTMCNC: 32.2 G/DL (ref 31.5–35.7)
MCV RBC AUTO: 97.7 FL (ref 79–97)
MONOCYTES # BLD AUTO: 0.85 10*3/MM3 (ref 0.1–0.9)
MONOCYTES NFR BLD AUTO: 10.6 % (ref 5–12)
NEUTROPHILS NFR BLD AUTO: 4.2 10*3/MM3 (ref 1.7–7)
NEUTROPHILS NFR BLD AUTO: 52.3 % (ref 42.7–76)
NRBC BLD AUTO-RTO: 0 /100 WBC (ref 0–0.2)
PLATELET # BLD AUTO: 340 10*3/MM3 (ref 140–450)
PMV BLD AUTO: 10.6 FL (ref 6–12)
POTASSIUM SERPL-SCNC: 4.6 MMOL/L (ref 3.5–5.2)
PROT SERPL-MCNC: 6.2 G/DL (ref 6–8.5)
RBC # BLD AUTO: 2.64 10*6/MM3 (ref 4.14–5.8)
RETICS # AUTO: <0.01 10*6/MM3 (ref 0.02–0.13)
RETICS/RBC NFR AUTO: 0.32 % (ref 0.7–1.9)
SODIUM SERPL-SCNC: 140 MMOL/L (ref 136–145)
WBC NRBC COR # BLD AUTO: 8.04 10*3/MM3 (ref 3.4–10.8)

## 2024-07-15 PROCEDURE — 80053 COMPREHEN METABOLIC PANEL: CPT | Performed by: INTERNAL MEDICINE

## 2024-07-15 PROCEDURE — 85025 COMPLETE CBC W/AUTO DIFF WBC: CPT | Performed by: INTERNAL MEDICINE

## 2024-07-15 PROCEDURE — 99214 OFFICE O/P EST MOD 30 MIN: CPT | Performed by: INTERNAL MEDICINE

## 2024-07-15 PROCEDURE — 85045 AUTOMATED RETICULOCYTE COUNT: CPT | Performed by: INTERNAL MEDICINE

## 2024-07-15 PROCEDURE — G2211 COMPLEX E/M VISIT ADD ON: HCPCS | Performed by: INTERNAL MEDICINE

## 2024-07-15 PROCEDURE — 1126F AMNT PAIN NOTED NONE PRSNT: CPT | Performed by: INTERNAL MEDICINE

## 2024-07-15 RX ORDER — DICYCLOMINE HCL 20 MG
20 TABLET ORAL EVERY 6 HOURS
COMMUNITY
Start: 2024-07-09

## 2024-07-15 RX ORDER — PREDNISONE 20 MG/1
80 TABLET ORAL DAILY
Qty: 120 TABLET | Refills: 0 | Status: SHIPPED | OUTPATIENT
Start: 2024-07-15 | End: 2024-08-14

## 2024-07-15 NOTE — PROGRESS NOTES
Venipuncture Blood Specimen Collection  Venipuncture performed in left arm by Shena Sewell MA with good hemostasis. Patient tolerated the procedure well without complications.   07/15/24   Shena Sewell MA

## 2024-07-15 NOTE — PROGRESS NOTES
Name:  Chinedu Silva  :  1933  Date:  7/15/2024     REFERRING PHYSICIAN    PRIMARY CARE PROVIDER  Jr Segura PA    REASON FOR FOLLOWUP  1. Acquired red cell aplasia      CHIEF COMPLAINT  Fatigue, about the same compared to last week.    Dear Mr. Segura,    HISTORY OF PRESENT ILLNESS:   I saw Mr. Silva in followup (from his recent hospitalization for severe anemia) today in our hematology/oncology clinic. As you are aware, he is a pleasant, 89 y.o., white male with a history of hypertension, CAD and GERD who presented to the [facility] ED on 2024 with progressive weakness and dyspnea on exertion. These symptoms had potentially first started ~two to three months prior; however, they significantly worsened over the course of the previous couple of weeks (by early 2024). Upon arrival to our ED, he was found to be severely anemic, with a Hg of 3.7 g/dL. His other cell lines (WBCs and platelets) were entirely WNL. Our service was consulted for further evaluation of this issue, and a bone marrow biopsy was performed on 2024. Meanwhile, as he was feeling substantially better after his HgB was transfused up to the ~9 g/dL range, he was able to be discharged to outpatient follow up in our clinic. He now returns to our clinic to go over the results of the bone marrow exam and for ongoing management.    INTERIM HISTORY:  Mr. Silva returns to clinic today for follow up accompanied by his wife. He confirms that he has still not noticed any bleeding from any source. He last underwent an endoscopy around ten years ago (with Dr. Richard) after suffering a GI bleed; but he had not had any significant, recurring issues along these lines since then. He denies any bruising or bleeding. He denies a personal or family history of autoimmune disease or blood disorder. In hindsight, he does recall that he presented to his PCP in early  with a fever to 105 degrees; however, a workup at that time,  including swabs for COVID and influenza, was unremarkable. He reiterates today that, since his discharge from the hospital a couple of weeks ago, he has continued to feel better (compared to when he was first admitted). He has still not experienced any recurrent fevers for the past month, since the 105 reading in early . He again has no new or other specific complaints.    Past Medical History:   Diagnosis Date    Carotid stenosis     Coronary artery disease     Diverticulitis     GERD (gastroesophageal reflux disease)     Gout     Hearing aid worn     Hypertension     Obese     Prostate cancer     Wears glasses        Past Surgical History:   Procedure Laterality Date    CAROTID ENDARTERECTOMY Left 2018    Procedure: CAROTID ENDARTERECTOMY WITH EEG LEFT;  Surgeon: Del Neal MD;  Location: [facility];  Service:     CATARACT EXTRACTION      CATARACT EXTRACTION WITH INTRAOCULAR LENS IMPLANT Right     CHOLECYSTECTOMY      COLONOSCOPY W/ POLYPECTOMY      HERNIA REPAIR Right     TOTAL KNEE ARTHROPLASTY Right     TURP / TRANSURETHRAL INCISION / DRAINAGE PROSTATE         Social History     Socioeconomic History    Marital status:     Number of children: 1   Tobacco Use    Smoking status: Former     Current packs/day: 0.00     Average packs/day: 1 pack/day for 12.0 years (12.0 ttl pk-yrs)     Types: Cigarettes     Start date:      Quit date:      Years since quittin.5    Smokeless tobacco: Current     Types: Chew   Vaping Use    Vaping status: Never Used   Substance and Sexual Activity    Alcohol use: No    Drug use: No    Sexual activity: Defer       Family History   Problem Relation Age of Onset    Hypertension Mother     Glaucoma Mother     Diverticulitis Mother     Gout Mother     ALS Father     Gout Sister     Broken bones Paternal Grandfather     Broken bones Son     Cancer Son     Diabetes Brother        Allergies   Allergen Reactions    Ciprofloxacin Other (See Comments)     Muscle  "Pains    Lisinopril Cough    Sulfa Antibiotics Itching and Rash       Current Outpatient Medications   Medication Sig Dispense Refill    allopurinol (ZYLOPRIM) 300 MG tablet Take 1 tablet by mouth Daily.      aspirin 81 MG EC tablet Take 1 tablet by mouth Daily.      dicyclomine (BENTYL) 20 MG tablet Take 1 tablet by mouth Every 6 (Six) Hours.      valsartan (DIOVAN) 80 MG tablet Take 1 tablet by mouth Daily.       No current facility-administered medications for this visit.     REVIEW OF SYSTEMS  CONSTITUTIONAL:  No fever, chills or night sweats.  EYES:  No blurry vision, diplopia or other vision changes.  ENT:  No hearing loss, nosebleeds or sore throat.  CARDIOVASCULAR:  No palpitations, arrhythmia, syncopal episodes or edema.  PULMONARY:  No hemoptysis, wheezing, chronic cough or shortness of breath.  GASTROINTESTINAL:  No nausea or vomiting. No constipation or diarrhea. No abdominal pain.  GENITOURINARY:  No hematuria, kidney stones or frequent urination.  MUSCULOSKELETAL:  No joint or back pains.  INTEGUMENTARY: No rashes or pruritus.  ENDOCRINE:  No excessive thirst or hot flashes.  HEMATOLOGIC:  No history of free bleeding, spontaneous bleeding or clotting.  IMMUNOLOGIC:  No allergies or frequent infections.  NEUROLOGIC: No numbness, tingling, seizures or weakness.  PSYCHIATRIC:  No anxiety or depression.    PHYSICAL EXAMINATION  /62   Pulse 85   Temp 98 °F (36.7 °C) (Temporal)   Resp 20   Ht 170.2 cm (67\")   Wt 71.3 kg (157 lb 3.2 oz)   SpO2 94%   BMI 24.62 kg/m²     Pain Score:  Pain Score    07/15/24 1137   PainSc: 0-No pain     PHQ-Score Total:  PHQ-9 Total Score:      GENERAL:  A well-developed, well-nourished, elderly, white male in no acute distress.  HEENT:  Pupils equally round and reactive to light.  Extraocular muscles intact.  CARDIOVASCULAR:  Regular rate and rhythm.  No murmurs, gallops or rubs.  LUNGS:  Clear to auscultation bilaterally.  ABDOMEN:  Soft, nontender, nondistended " with positive bowel sounds.  EXTREMITIES:  No clubbing, cyanosis or edema bilaterally.  SKIN:  No rashes or petechiae.  NEURO:  Cranial nerves grossly intact. No focal deficits.  PSYCH:  Alert and oriented x3.    The physical exam is unchanged from the one last week.    LABORATORY  Lab Results   Component Value Date    WBC 8.04 07/15/2024    HGB 8.3 (L) 07/15/2024    HCT 25.8 (L) 07/15/2024    MCV 97.7 (H) 07/15/2024     07/15/2024    NEUTROABS 4.20 07/15/2024       Lab Results   Component Value Date     07/11/2024    K 4.7 07/11/2024     07/11/2024    CO2 23.6 07/11/2024    BUN 18 07/11/2024    CREATININE 1.33 (H) 07/11/2024    GLUCOSE 140 (H) 07/11/2024    CALCIUM 10.1 (H) 07/11/2024    AST 18 07/11/2024    ALT 27 07/11/2024    ALKPHOS 82 07/11/2024    BILITOT 0.5 07/11/2024    PROTEINTOT 6.9 07/11/2024    ALBUMIN 3.8 07/11/2024     CBC (07/15/2024): WBCs: 8.04; HgB: 8.3; Hct: 25.8; platelets: 340; MCV: 97.7  CBC (07/11/2024): WBCs: 7.25; HgB: 9.4; Hct: 29.0; platelets: 375; MCV: 97.0  CBC (07/01/2024): WBCs: 7.50; HgB: 9.4; Hct: 29.3; platelets: 272; MCV: 98.7  CBC (06/26/2024): WBCs: 6.67; HgB: 9.0; Hct: 27.5; platelets: 198; MCV: 98.6    Reticulocyte: Percentage: 0.32; Absolute: < 0.0100    IMAGING    PATHOLOGY  Peripheral smear, bone marrow aspiration smear, bone marrow aspiration clot and bone marrow core biopsy (06/26/2024):  Normocellular bone marrow with increased granulopoiesis, essentially absent erythropoiesis, adequate megakaryopoiesis, increased stainable iron and slightly increased mixed chronic inflammation. No evidence of increased blasts or dysplasia. The most striking feature within the bone marrow is the essential absence of any erythropoiesis in the aspirate or core biopsy. There is mixed inflammation with some notable immunophenotypic findings by flow cytometry, but there is no evidence of bone marrow involvement by lymphoma. This is most suggestive of a mixed reactive  lymphoid population.    IMPRESSION AND PLAN  Mr. Silva is a 89 y.o., white male with:  Pure red cell aplasia: I had another long discussion with the patient and his wife in clinic today regarding the results of the bone marrow biopsy he underwent a couple of weeks ago (which are summarized above). In short, this is a fairly uncommon diagnosis that can either be idiopathic or associated with a number of potential, underlying causes, including thymoma or parvovirus infection. Regardless, high dose steroids (prednisone 1-2 mg/kg daily) is the standard, first-line treatment; however, since the repeat CBC drawn earlier this month (on 07/01/2024) showed that his HgB (9.4 g/dL) had improved compared to what it was when he was discharged from our hospital following the bone marrow biopsy the previous week (9.0 g/dL), it was hoped that he was satisfactorily recovering (from a viral trigger) on his own. However, now that today's (07/15/2024) repeat CBC shows that his HgB is dropping again (8.3 mg/dL); and his reticulocyte counts remain essentially zero, a Rx for prednisone 80 mg PO daily was provided today. We will see him back in our clinic in one week with a repeat CBC and CMP for ongoing, close monitoring. The patient and his wife were in agreement with these plans.    It is a pleasure to participate in Mr. Silva's care. Please do not hesitate to call with any questions or concerns that you may have.    A total of 30 minutes were spent coordinating this patient’s care in clinic today; more than 50% of this time was face-to-face with the patient and his wife, reviewing his interim medical history, discussing the results of today's repeat labwork and counseling on the current treatment and followup plans. All questions were answered to their satisfaction.    FOLLOW UP  Rx for a prolonged prednisone taper, beginning with 80 mg PO daily, provided today. Return to our clinic in 1 week with a CBC and CMP.            This  document was electronically signed by RAMIRO King MD July 15, 2024 11:47 EDT      CC: RIGO Mccall

## 2024-07-16 ENCOUNTER — READMISSION MANAGEMENT (OUTPATIENT)
Dept: CALL CENTER | Facility: HOSPITAL | Age: 89
End: 2024-07-16
Payer: MEDICARE

## 2024-07-16 NOTE — OUTREACH NOTE
Medical Week 3 Survey      Flowsheet Row Responses   [facility] patient discharged from? [facility]   Does the patient have one of the following disease processes/diagnoses(primary or secondary)? Other   Week 3 attempt successful? Yes   Call start time 1613   Call end time 1614   Discharge diagnosis Aplastic anemia   Person spoke with today (if not patient) and relationship patient   Does the patient have a primary care provider?  Yes   Comments regarding PCP Patient has seen Dr. Segura   Has home health visited the patient within 72 hours of discharge? N/A   Psychosocial issues? No   Did the patient receive a copy of their discharge instructions? Yes   Nursing interventions Reviewed instructions with patient   What is the patient's perception of their health status since discharge? Improving   Is the patient/caregiver able to teach back signs and symptoms related to disease process for when to call PCP? Yes   Is the patient/caregiver able to teach back signs and symptoms related to disease process for when to call 911? Yes   Is the patient/caregiver able to teach back the hierarchy of who to call/visit for symptoms/problems? PCP, Specialist, Home health nurse, Urgent Care, ED, 911 Yes   Week 3 Call Completed? Yes   Graduated Yes   Wrap up additional comments Patient reports doing well no concerns or questions noted.   Call end time 1614            Fabi THOMAS - Registered Nurse

## 2024-07-18 ENCOUNTER — CLINICAL SUPPORT (OUTPATIENT)
Dept: ONCOLOGY | Facility: CLINIC | Age: 89
End: 2024-07-18
Payer: MEDICARE

## 2024-07-18 VITALS
OXYGEN SATURATION: 96 % | BODY MASS INDEX: 24.81 KG/M2 | RESPIRATION RATE: 18 BRPM | DIASTOLIC BLOOD PRESSURE: 62 MMHG | WEIGHT: 158.4 LBS | HEART RATE: 71 BPM | TEMPERATURE: 97.7 F | SYSTOLIC BLOOD PRESSURE: 143 MMHG

## 2024-07-18 DIAGNOSIS — D60.9 ACQUIRED RED CELL APLASIA: ICD-10-CM

## 2024-07-18 DIAGNOSIS — D61.9 APLASTIC ANEMIA: ICD-10-CM

## 2024-07-18 LAB
ALBUMIN SERPL-MCNC: 3.8 G/DL (ref 3.5–5.2)
ALBUMIN/GLOB SERPL: 1.5 G/DL
ALP SERPL-CCNC: 78 U/L (ref 39–117)
ALT SERPL W P-5'-P-CCNC: 26 U/L (ref 1–41)
ANION GAP SERPL CALCULATED.3IONS-SCNC: 11.6 MMOL/L (ref 5–15)
AST SERPL-CCNC: 17 U/L (ref 1–40)
BASOPHILS # BLD AUTO: 0.01 10*3/MM3 (ref 0–0.2)
BASOPHILS NFR BLD AUTO: 0.1 % (ref 0–1.5)
BILIRUB SERPL-MCNC: 0.4 MG/DL (ref 0–1.2)
BUN SERPL-MCNC: 25 MG/DL (ref 8–23)
BUN/CREAT SERPL: 19.1 (ref 7–25)
CALCIUM SPEC-SCNC: 10.1 MG/DL (ref 8.2–9.6)
CHLORIDE SERPL-SCNC: 104 MMOL/L (ref 98–107)
CO2 SERPL-SCNC: 25.4 MMOL/L (ref 22–29)
CREAT SERPL-MCNC: 1.31 MG/DL (ref 0.76–1.27)
DEPRECATED RDW RBC AUTO: 51.3 FL (ref 37–54)
EGFRCR SERPLBLD CKD-EPI 2021: 51.4 ML/MIN/1.73
EOSINOPHIL # BLD AUTO: 0.05 10*3/MM3 (ref 0–0.4)
EOSINOPHIL NFR BLD AUTO: 0.4 % (ref 0.3–6.2)
ERYTHROCYTE [DISTWIDTH] IN BLOOD BY AUTOMATED COUNT: 14.9 % (ref 12.3–15.4)
GLOBULIN UR ELPH-MCNC: 2.6 GM/DL
GLUCOSE SERPL-MCNC: 127 MG/DL (ref 65–99)
HCT VFR BLD AUTO: 24.9 % (ref 37.5–51)
HGB BLD-MCNC: 8.2 G/DL (ref 13–17.7)
IMM GRANULOCYTES # BLD AUTO: 0.08 10*3/MM3 (ref 0–0.05)
IMM GRANULOCYTES NFR BLD AUTO: 0.7 % (ref 0–0.5)
LYMPHOCYTES # BLD AUTO: 1.67 10*3/MM3 (ref 0.7–3.1)
LYMPHOCYTES NFR BLD AUTO: 15 % (ref 19.6–45.3)
MCH RBC QN AUTO: 31.8 PG (ref 26.6–33)
MCHC RBC AUTO-ENTMCNC: 32.9 G/DL (ref 31.5–35.7)
MCV RBC AUTO: 96.5 FL (ref 79–97)
MONOCYTES # BLD AUTO: 0.69 10*3/MM3 (ref 0.1–0.9)
MONOCYTES NFR BLD AUTO: 6.2 % (ref 5–12)
NEUTROPHILS NFR BLD AUTO: 77.6 % (ref 42.7–76)
NEUTROPHILS NFR BLD AUTO: 8.63 10*3/MM3 (ref 1.7–7)
NRBC BLD AUTO-RTO: 0 /100 WBC (ref 0–0.2)
PLATELET # BLD AUTO: 375 10*3/MM3 (ref 140–450)
PMV BLD AUTO: 10.9 FL (ref 6–12)
POTASSIUM SERPL-SCNC: 4.4 MMOL/L (ref 3.5–5.2)
PROT SERPL-MCNC: 6.4 G/DL (ref 6–8.5)
RBC # BLD AUTO: 2.58 10*6/MM3 (ref 4.14–5.8)
SODIUM SERPL-SCNC: 141 MMOL/L (ref 136–145)
WBC NRBC COR # BLD AUTO: 11.13 10*3/MM3 (ref 3.4–10.8)

## 2024-07-18 PROCEDURE — 80053 COMPREHEN METABOLIC PANEL: CPT | Performed by: INTERNAL MEDICINE

## 2024-07-18 PROCEDURE — 85025 COMPLETE CBC W/AUTO DIFF WBC: CPT | Performed by: INTERNAL MEDICINE

## 2024-07-18 NOTE — PROGRESS NOTES
Venipuncture Blood Specimen Collection  Venipuncture performed in left arm by Mimi Batista MA with good hemostasis. Patient tolerated the procedure well without complications.   07/18/24   Mimi Batista MA

## 2024-07-23 ENCOUNTER — OFFICE VISIT (OUTPATIENT)
Dept: ONCOLOGY | Facility: CLINIC | Age: 89
End: 2024-07-23
Payer: MEDICARE

## 2024-07-23 ENCOUNTER — LAB (OUTPATIENT)
Dept: ONCOLOGY | Facility: CLINIC | Age: 89
End: 2024-07-23
Payer: MEDICARE

## 2024-07-23 VITALS
DIASTOLIC BLOOD PRESSURE: 66 MMHG | OXYGEN SATURATION: 98 % | HEIGHT: 67 IN | WEIGHT: 157.2 LBS | HEART RATE: 87 BPM | BODY MASS INDEX: 24.67 KG/M2 | TEMPERATURE: 97.7 F | RESPIRATION RATE: 18 BRPM | SYSTOLIC BLOOD PRESSURE: 137 MMHG

## 2024-07-23 DIAGNOSIS — D60.9 ACQUIRED RED CELL APLASIA: Primary | ICD-10-CM

## 2024-07-23 LAB
ALBUMIN SERPL-MCNC: 4 G/DL (ref 3.5–5.2)
ALBUMIN/GLOB SERPL: 1.4 G/DL
ALP SERPL-CCNC: 77 U/L (ref 39–117)
ALT SERPL W P-5'-P-CCNC: 34 U/L (ref 1–41)
ANION GAP SERPL CALCULATED.3IONS-SCNC: 10.5 MMOL/L (ref 5–15)
AST SERPL-CCNC: 15 U/L (ref 1–40)
BASOPHILS # BLD AUTO: 0.01 10*3/MM3 (ref 0–0.2)
BASOPHILS NFR BLD AUTO: 0.1 % (ref 0–1.5)
BILIRUB SERPL-MCNC: 0.5 MG/DL (ref 0–1.2)
BUN SERPL-MCNC: 28 MG/DL (ref 8–23)
BUN/CREAT SERPL: 19.7 (ref 7–25)
CALCIUM SPEC-SCNC: 10.4 MG/DL (ref 8.2–9.6)
CHLORIDE SERPL-SCNC: 103 MMOL/L (ref 98–107)
CO2 SERPL-SCNC: 28.5 MMOL/L (ref 22–29)
CREAT SERPL-MCNC: 1.42 MG/DL (ref 0.76–1.27)
DEPRECATED RDW RBC AUTO: 51.8 FL (ref 37–54)
EGFRCR SERPLBLD CKD-EPI 2021: 46.6 ML/MIN/1.73
EOSINOPHIL # BLD AUTO: 0.07 10*3/MM3 (ref 0–0.4)
EOSINOPHIL NFR BLD AUTO: 0.4 % (ref 0.3–6.2)
ERYTHROCYTE [DISTWIDTH] IN BLOOD BY AUTOMATED COUNT: 15.1 % (ref 12.3–15.4)
GLOBULIN UR ELPH-MCNC: 2.9 GM/DL
GLUCOSE SERPL-MCNC: 128 MG/DL (ref 65–99)
HCT VFR BLD AUTO: 25.8 % (ref 37.5–51)
HGB BLD-MCNC: 8.5 G/DL (ref 13–17.7)
IMM GRANULOCYTES # BLD AUTO: 0.32 10*3/MM3 (ref 0–0.05)
IMM GRANULOCYTES NFR BLD AUTO: 1.9 % (ref 0–0.5)
LYMPHOCYTES # BLD AUTO: 2.27 10*3/MM3 (ref 0.7–3.1)
LYMPHOCYTES NFR BLD AUTO: 13.8 % (ref 19.6–45.3)
MCH RBC QN AUTO: 31.7 PG (ref 26.6–33)
MCHC RBC AUTO-ENTMCNC: 32.9 G/DL (ref 31.5–35.7)
MCV RBC AUTO: 96.3 FL (ref 79–97)
MONOCYTES # BLD AUTO: 0.94 10*3/MM3 (ref 0.1–0.9)
MONOCYTES NFR BLD AUTO: 5.7 % (ref 5–12)
NEUTROPHILS NFR BLD AUTO: 12.82 10*3/MM3 (ref 1.7–7)
NEUTROPHILS NFR BLD AUTO: 78.1 % (ref 42.7–76)
NRBC BLD AUTO-RTO: 0 /100 WBC (ref 0–0.2)
PLATELET # BLD AUTO: 390 10*3/MM3 (ref 140–450)
PMV BLD AUTO: 11.1 FL (ref 6–12)
POTASSIUM SERPL-SCNC: 4.4 MMOL/L (ref 3.5–5.2)
PROT SERPL-MCNC: 6.9 G/DL (ref 6–8.5)
RBC # BLD AUTO: 2.68 10*6/MM3 (ref 4.14–5.8)
RETICS # AUTO: <0.01 10*6/MM3 (ref 0.02–0.13)
RETICS/RBC NFR AUTO: 0.25 % (ref 0.7–1.9)
SODIUM SERPL-SCNC: 142 MMOL/L (ref 136–145)
WBC NRBC COR # BLD AUTO: 16.43 10*3/MM3 (ref 3.4–10.8)

## 2024-07-23 PROCEDURE — 1160F RVW MEDS BY RX/DR IN RCRD: CPT | Performed by: NURSE PRACTITIONER

## 2024-07-23 PROCEDURE — 80053 COMPREHEN METABOLIC PANEL: CPT | Performed by: NURSE PRACTITIONER

## 2024-07-23 PROCEDURE — 85025 COMPLETE CBC W/AUTO DIFF WBC: CPT | Performed by: NURSE PRACTITIONER

## 2024-07-23 PROCEDURE — 99214 OFFICE O/P EST MOD 30 MIN: CPT | Performed by: NURSE PRACTITIONER

## 2024-07-23 PROCEDURE — 1159F MED LIST DOCD IN RCRD: CPT | Performed by: NURSE PRACTITIONER

## 2024-07-23 PROCEDURE — 1126F AMNT PAIN NOTED NONE PRSNT: CPT | Performed by: NURSE PRACTITIONER

## 2024-07-23 PROCEDURE — 85045 AUTOMATED RETICULOCYTE COUNT: CPT | Performed by: NURSE PRACTITIONER

## 2024-07-23 RX ORDER — PANTOPRAZOLE SODIUM 40 MG/1
40 TABLET, DELAYED RELEASE ORAL DAILY
Qty: 30 TABLET | Refills: 1 | Status: SHIPPED | OUTPATIENT
Start: 2024-07-23

## 2024-07-23 NOTE — PROGRESS NOTES
Name:  Chinedu Silva  :  1933  Date:  2024     REFERRING PHYSICIAN    PRIMARY CARE PROVIDER  Jr Segura PA    REASON FOR FOLLOWUP  1. Acquired red cell aplasia      CHIEF COMPLAINT  Fatigue, about the same compared to last week.    Dear Mr. Segura,    HISTORY OF PRESENT ILLNESS:   I saw Mr. Silva in followup (from his recent hospitalization for severe anemia) today in our hematology/oncology clinic. As you are aware, he is a pleasant, 89 y.o., white male with a history of hypertension, CAD and GERD who presented to the [facility] ED on 2024 with progressive weakness and dyspnea on exertion. These symptoms had potentially first started ~two to three months prior; however, they significantly worsened over the course of the previous couple of weeks (by early 2024). Upon arrival to our ED, he was found to be severely anemic, with a Hg of 3.7 g/dL. His other cell lines (WBCs and platelets) were entirely WNL. Our service was consulted for further evaluation of this issue, and a bone marrow biopsy was performed on 2024. Meanwhile, as he was feeling substantially better after his HgB was transfused up to the ~9 g/dL range, he was able to be discharged to outpatient follow up in our clinic. He now returns to our clinic to go over the results of the bone marrow exam and for ongoing management.    INTERIM HISTORY:  Mr. Silva returns to clinic today for follow up accompanied by his wife. He confirms that he has still not noticed any bleeding from any source. He last underwent an endoscopy around ten years ago (with Dr. Richard) after suffering a GI bleed; but he had not had any significant, recurring issues along these lines since then. He denies any bruising or bleeding. He denies a personal or family history of autoimmune disease or blood disorder. In hindsight, he does recall that he presented to his PCP in early  with a fever to 105 degrees; however, a workup at that time,  including swabs for COVID and influenza, was unremarkable. He reiterates today that, since his discharge from the hospital around one month ago, he has continued to feel better (compared to when he was first admitted). He has still not experienced any recurrent fevers for the past month, since the 105 reading in early . He again has no new or other specific complaints.    Past Medical History:   Diagnosis Date    Carotid stenosis     Coronary artery disease     Diverticulitis     GERD (gastroesophageal reflux disease)     Gout     Hearing aid worn     Hypertension     Obese     Prostate cancer     Wears glasses        Past Surgical History:   Procedure Laterality Date    CAROTID ENDARTERECTOMY Left 2018    Procedure: CAROTID ENDARTERECTOMY WITH EEG LEFT;  Surgeon: Del Neal MD;  Location: [facility];  Service:     CATARACT EXTRACTION      CATARACT EXTRACTION WITH INTRAOCULAR LENS IMPLANT Right     CHOLECYSTECTOMY      COLONOSCOPY W/ POLYPECTOMY      HERNIA REPAIR Right     TOTAL KNEE ARTHROPLASTY Right     TURP / TRANSURETHRAL INCISION / DRAINAGE PROSTATE         Social History     Socioeconomic History    Marital status:     Number of children: 1   Tobacco Use    Smoking status: Former     Current packs/day: 0.00     Average packs/day: 1 pack/day for 12.0 years (12.0 ttl pk-yrs)     Types: Cigarettes     Start date:      Quit date:      Years since quittin.6    Smokeless tobacco: Current     Types: Chew   Vaping Use    Vaping status: Never Used   Substance and Sexual Activity    Alcohol use: No    Drug use: No    Sexual activity: Defer       Family History   Problem Relation Age of Onset    Hypertension Mother     Glaucoma Mother     Diverticulitis Mother     Gout Mother     ALS Father     Gout Sister     Broken bones Paternal Grandfather     Broken bones Son     Cancer Son     Diabetes Brother        Allergies   Allergen Reactions    Ciprofloxacin Other (See Comments)     Muscle  "Pains    Lisinopril Cough    Sulfa Antibiotics Itching and Rash       Current Outpatient Medications   Medication Sig Dispense Refill    allopurinol (ZYLOPRIM) 300 MG tablet Take 1 tablet by mouth Daily.      aspirin 81 MG EC tablet Take 1 tablet by mouth Daily.      dicyclomine (BENTYL) 20 MG tablet Take 1 tablet by mouth Every 6 (Six) Hours.      predniSONE (DELTASONE) 20 MG tablet Take 4 tablets by mouth Daily for 30 days. 120 tablet 0    valsartan (DIOVAN) 80 MG tablet Take 1 tablet by mouth Daily.      pantoprazole (PROTONIX) 40 MG EC tablet Take 1 tablet by mouth Daily. 30 tablet 1     No current facility-administered medications for this visit.     REVIEW OF SYSTEMS  CONSTITUTIONAL:  No fever, chills or night sweats.  EYES:  No blurry vision, diplopia or other vision changes.  ENT:  No hearing loss, nosebleeds or sore throat.  CARDIOVASCULAR:  No palpitations, arrhythmia, syncopal episodes or edema.  PULMONARY:  No hemoptysis, wheezing, chronic cough or shortness of breath.  GASTROINTESTINAL:  No nausea or vomiting. No constipation or diarrhea. No abdominal pain.  GENITOURINARY:  No hematuria, kidney stones or frequent urination.  MUSCULOSKELETAL:  No joint or back pains.  INTEGUMENTARY: No rashes or pruritus.  ENDOCRINE:  No excessive thirst or hot flashes.  HEMATOLOGIC:  No history of free bleeding, spontaneous bleeding or clotting.  IMMUNOLOGIC:  No allergies or frequent infections.  NEUROLOGIC: No numbness, tingling, seizures or weakness.  PSYCHIATRIC:  No anxiety or depression.    PHYSICAL EXAMINATION  /66   Pulse 87   Temp 97.7 °F (36.5 °C) (Temporal)   Resp 18   Ht 170.2 cm (67\")   Wt 71.3 kg (157 lb 3.2 oz)   SpO2 98%   BMI 24.62 kg/m²     Pain Score:  Pain Score    07/23/24 0814   PainSc: 0-No pain     PHQ-Score Total:  PHQ-9 Total Score:      GENERAL:  A well-developed, well-nourished, elderly, white male in no acute distress.  HEENT:  Pupils equally round and reactive to light.  " Extraocular muscles intact.  CARDIOVASCULAR:  Regular rate and rhythm.  No murmurs, gallops or rubs.  LUNGS:  Clear to auscultation bilaterally.  ABDOMEN:  Soft, nontender, nondistended with positive bowel sounds.  EXTREMITIES:  No clubbing, cyanosis or edema bilaterally.  SKIN:  No rashes or petechiae.  NEURO:  Cranial nerves grossly intact. No focal deficits.  PSYCH:  Alert and oriented x3.    The physical exam is unchanged from the one last week.    LABORATORY  Lab Results   Component Value Date    WBC 16.43 (H) 07/23/2024    HGB 8.5 (L) 07/23/2024    HCT 25.8 (L) 07/23/2024    MCV 96.3 07/23/2024     07/23/2024    NEUTROABS 12.82 (H) 07/23/2024       Lab Results   Component Value Date     07/23/2024    K 4.4 07/23/2024     07/23/2024    CO2 28.5 07/23/2024    BUN 28 (H) 07/23/2024    CREATININE 1.42 (H) 07/23/2024    GLUCOSE 128 (H) 07/23/2024    CALCIUM 10.4 (H) 07/23/2024    AST 15 07/23/2024    ALT 34 07/23/2024    ALKPHOS 77 07/23/2024    BILITOT 0.5 07/23/2024    PROTEINTOT 6.9 07/23/2024    ALBUMIN 4.0 07/23/2024     Lab Results   Component Value Date    RETICCTPCT 0.25 (L) 07/23/2024       CBC (07/15/2024): WBCs: 8.04; HgB: 8.3; Hct: 25.8; platelets: 340; MCV: 97.7  CBC (07/11/2024): WBCs: 7.25; HgB: 9.4; Hct: 29.0; platelets: 375; MCV: 97.0  CBC (07/01/2024): WBCs: 7.50; HgB: 9.4; Hct: 29.3; platelets: 272; MCV: 98.7  CBC (06/26/2024): WBCs: 6.67; HgB: 9.0; Hct: 27.5; platelets: 198; MCV: 98.6    Reticulocyte (07/23/2024): Percentage: 0.25; Absolute: <0.0100  Reticulocyte (07/15/2024): Percentage: 0.32; Absolute: < 0.0100      IMAGING    PATHOLOGY  Peripheral smear, bone marrow aspiration smear, bone marrow aspiration clot and bone marrow core biopsy (06/26/2024):  Normocellular bone marrow with increased granulopoiesis, essentially absent erythropoiesis, adequate megakaryopoiesis, increased stainable iron and slightly increased mixed chronic inflammation. No evidence of increased  blasts or dysplasia. The most striking feature within the bone marrow is the essential absence of any erythropoiesis in the aspirate or core biopsy. There is mixed inflammation with some notable immunophenotypic findings by flow cytometry, but there is no evidence of bone marrow involvement by lymphoma. This is most suggestive of a mixed reactive lymphoid population.    IMPRESSION AND PLAN  Mr. Silva is a 89 y.o., white male with:    Pure red cell aplasia: I had another long discussion with the patient and his wife in clinic today regarding the results of the bone marrow biopsy he underwent a couple of weeks ago (which are summarized above). In short, this is a fairly uncommon diagnosis that can either be idiopathic or associated with a number of potential, underlying causes, including thymoma or parvovirus infection. Regardless, high dose steroids (prednisone 1-2 mg/kg daily) is the standard, first-line treatment; however, since the repeat CBC drawn earlier this month (on 07/01/2024) showed that his HgB (9.4 g/dL) had improved compared to what it was when he was discharged from our hospital following the bone marrow biopsy the previous week (9.0 g/dL), it was hoped that he was satisfactorily recovering (from a viral trigger) on his own. However, repeat CBC on 07/15/2024 revealed that his HgB is dropping again (8.3 mg/dL); and his reticulocyte counts remain essentially zero, a Rx for prednisone 80 mg PO daily was provided. Today, CBC with HgB 8.5, which is stable. Therefore, will decrease Prednisone to 60 mg today. We will see him back in our clinic in one week with a repeat CBC, CMP  and reticulocyte count for ongoing, close monitoring. The patient and his wife were in agreement with these plans.    It is a pleasure to participate in Mr. Silva's care. Please do not hesitate to call with any questions or concerns that you may have.    A total of 30 minutes were spent coordinating this patient’s care in clinic  today; more than 50% of this time was face-to-face with the patient and his wife, reviewing his interim medical history, discussing the results of today's repeat labwork and counseling on the current treatment and followup plans. All questions were answered to their satisfaction.    FOLLOW UP  Decrease Prednisone to 60 mg daily starting today. Start Protonix 40 mg daily for GI prophylaxis. Return to our clinic for repeat CBC, CMP and reticulocyte count on 07/26/2024 and follow up with NP in one week with CBC, CMP and reticulocyte count.        This document was electronically signed by ZULEYKA Barakat July 23, 2024 10:23 EDT      CC: RIGO Mccall

## 2024-07-23 NOTE — PROGRESS NOTES
Venipuncture Blood Specimen Collection  Venipuncture performed in left arm by Carlos Fernandez MA with good hemostasis. Patient tolerated the procedure well without complications.   07/23/24   Carlos Fernandez MA    
normal

## 2024-07-24 ENCOUNTER — HOSPITAL ENCOUNTER (OUTPATIENT)
Dept: CARDIOLOGY | Facility: HOSPITAL | Age: 89
Discharge: HOME | End: 2024-07-24
Payer: MEDICARE

## 2024-07-24 ENCOUNTER — HOSPITAL ENCOUNTER (OUTPATIENT)
Dept: NUCLEAR MEDICINE | Facility: HOSPITAL | Age: 89
Discharge: HOME | End: 2024-07-24
Payer: MEDICARE

## 2024-07-24 ENCOUNTER — HOSPITAL ENCOUNTER (OUTPATIENT)
Dept: ULTRASOUND IMAGING | Facility: HOSPITAL | Age: 89
Discharge: HOME | End: 2024-07-24
Payer: MEDICARE

## 2024-07-24 DIAGNOSIS — I65.23 BILATERAL CAROTID ARTERY STENOSIS: ICD-10-CM

## 2024-07-24 DIAGNOSIS — I21.4 NSTEMI (NON-ST ELEVATED MYOCARDIAL INFARCTION): ICD-10-CM

## 2024-07-24 PROCEDURE — 78452 HT MUSCLE IMAGE SPECT MULT: CPT

## 2024-07-24 PROCEDURE — 0 TECHNETIUM SESTAMIBI: Performed by: NURSE PRACTITIONER

## 2024-07-24 PROCEDURE — A9500 TC99M SESTAMIBI: HCPCS | Performed by: NURSE PRACTITIONER

## 2024-07-24 PROCEDURE — 25010000002 REGADENOSON 0.4 MG/5ML SOLUTION: Performed by: NURSE PRACTITIONER

## 2024-07-24 PROCEDURE — 93880 EXTRACRANIAL BILAT STUDY: CPT

## 2024-07-24 PROCEDURE — 93017 CV STRESS TEST TRACING ONLY: CPT

## 2024-07-24 PROCEDURE — 93880 EXTRACRANIAL BILAT STUDY: CPT | Performed by: RADIOLOGY

## 2024-07-24 RX ORDER — REGADENOSON 0.08 MG/ML
0.4 INJECTION, SOLUTION INTRAVENOUS
Status: COMPLETED | OUTPATIENT
Start: 2024-07-24 | End: 2024-07-24

## 2024-07-24 RX ADMIN — REGADENOSON 0.4 MG: 0.08 INJECTION, SOLUTION INTRAVENOUS at 09:23

## 2024-07-24 RX ADMIN — TECHNETIUM TC 99M SESTAMIBI 1 DOSE: 1 INJECTION INTRAVENOUS at 09:27

## 2024-07-24 RX ADMIN — TECHNETIUM TC 99M SESTAMIBI 1 DOSE: 1 INJECTION INTRAVENOUS at 08:10

## 2024-07-25 LAB
BH CV NUCLEAR PRIOR STUDY: 3
BH CV REST NUCLEAR ISOTOPE DOSE: 10.2 MCI
BH CV STRESS BP STAGE 1: NORMAL
BH CV STRESS COMMENTS STAGE 1: NORMAL
BH CV STRESS DOSE REGADENOSON STAGE 1: 0.4
BH CV STRESS DURATION MIN STAGE 1: 0
BH CV STRESS DURATION SEC STAGE 1: 10
BH CV STRESS HR STAGE 1: 79
BH CV STRESS NUCLEAR ISOTOPE DOSE: 30.2 MCI
BH CV STRESS PROTOCOL 1: NORMAL
BH CV STRESS RECOVERY BP: NORMAL MMHG
BH CV STRESS RECOVERY HR: 75 BPM
BH CV STRESS STAGE 1: 1
LV EF NUC BP: 65 %
MAXIMAL PREDICTED HEART RATE: 129 BPM
PERCENT MAX PREDICTED HR: 61.24 %
STRESS BASELINE BP: NORMAL MMHG
STRESS BASELINE HR: 57 BPM
STRESS PERCENT HR: 72 %
STRESS POST PEAK BP: NORMAL MMHG
STRESS POST PEAK HR: 79 BPM
STRESS TARGET HR: 110 BPM

## 2024-07-26 ENCOUNTER — CLINICAL SUPPORT (OUTPATIENT)
Dept: ONCOLOGY | Facility: CLINIC | Age: 89
End: 2024-07-26
Payer: MEDICARE

## 2024-07-26 DIAGNOSIS — D60.9 ACQUIRED RED CELL APLASIA: ICD-10-CM

## 2024-07-26 LAB
ALBUMIN SERPL-MCNC: 3.9 G/DL (ref 3.5–5.2)
ALBUMIN/GLOB SERPL: 1.4 G/DL
ALP SERPL-CCNC: 68 U/L (ref 39–117)
ALT SERPL W P-5'-P-CCNC: 36 U/L (ref 1–41)
ANION GAP SERPL CALCULATED.3IONS-SCNC: 9.3 MMOL/L (ref 5–15)
AST SERPL-CCNC: 18 U/L (ref 1–40)
BASOPHILS # BLD AUTO: 0.01 10*3/MM3 (ref 0–0.2)
BASOPHILS NFR BLD AUTO: 0.1 % (ref 0–1.5)
BILIRUB SERPL-MCNC: 0.7 MG/DL (ref 0–1.2)
BUN SERPL-MCNC: 31 MG/DL (ref 8–23)
BUN/CREAT SERPL: 22.5 (ref 7–25)
CALCIUM SPEC-SCNC: 9.8 MG/DL (ref 8.2–9.6)
CHLORIDE SERPL-SCNC: 104 MMOL/L (ref 98–107)
CO2 SERPL-SCNC: 26.7 MMOL/L (ref 22–29)
CREAT SERPL-MCNC: 1.38 MG/DL (ref 0.76–1.27)
DEPRECATED RDW RBC AUTO: 52.9 FL (ref 37–54)
EGFRCR SERPLBLD CKD-EPI 2021: 48.3 ML/MIN/1.73
EOSINOPHIL # BLD AUTO: 0.01 10*3/MM3 (ref 0–0.4)
EOSINOPHIL NFR BLD AUTO: 0.1 % (ref 0.3–6.2)
ERYTHROCYTE [DISTWIDTH] IN BLOOD BY AUTOMATED COUNT: 15.2 % (ref 12.3–15.4)
GLOBULIN UR ELPH-MCNC: 2.8 GM/DL
GLUCOSE SERPL-MCNC: 167 MG/DL (ref 65–99)
HCT VFR BLD AUTO: 24.8 % (ref 37.5–51)
HGB BLD-MCNC: 8.1 G/DL (ref 13–17.7)
IMM GRANULOCYTES # BLD AUTO: 0.26 10*3/MM3 (ref 0–0.05)
IMM GRANULOCYTES NFR BLD AUTO: 1.9 % (ref 0–0.5)
LYMPHOCYTES # BLD AUTO: 2.1 10*3/MM3 (ref 0.7–3.1)
LYMPHOCYTES NFR BLD AUTO: 15.6 % (ref 19.6–45.3)
MCH RBC QN AUTO: 31 PG (ref 26.6–33)
MCHC RBC AUTO-ENTMCNC: 32.7 G/DL (ref 31.5–35.7)
MCV RBC AUTO: 95 FL (ref 79–97)
MONOCYTES # BLD AUTO: 0.27 10*3/MM3 (ref 0.1–0.9)
MONOCYTES NFR BLD AUTO: 2 % (ref 5–12)
NEUTROPHILS NFR BLD AUTO: 10.85 10*3/MM3 (ref 1.7–7)
NEUTROPHILS NFR BLD AUTO: 80.3 % (ref 42.7–76)
NRBC BLD AUTO-RTO: 0 /100 WBC (ref 0–0.2)
PLATELET # BLD AUTO: 329 10*3/MM3 (ref 140–450)
PMV BLD AUTO: 10.8 FL (ref 6–12)
POTASSIUM SERPL-SCNC: 4.8 MMOL/L (ref 3.5–5.2)
PROT SERPL-MCNC: 6.7 G/DL (ref 6–8.5)
RBC # BLD AUTO: 2.61 10*6/MM3 (ref 4.14–5.8)
RETICS # AUTO: <0.01 10*6/MM3 (ref 0.02–0.13)
RETICS/RBC NFR AUTO: 0.18 % (ref 0.7–1.9)
SODIUM SERPL-SCNC: 140 MMOL/L (ref 136–145)
WBC NRBC COR # BLD AUTO: 13.5 10*3/MM3 (ref 3.4–10.8)

## 2024-07-26 PROCEDURE — 85045 AUTOMATED RETICULOCYTE COUNT: CPT | Performed by: NURSE PRACTITIONER

## 2024-07-26 PROCEDURE — 80053 COMPREHEN METABOLIC PANEL: CPT | Performed by: NURSE PRACTITIONER

## 2024-07-26 PROCEDURE — 85025 COMPLETE CBC W/AUTO DIFF WBC: CPT | Performed by: NURSE PRACTITIONER

## 2024-07-26 NOTE — PROGRESS NOTES
Venipuncture Blood Specimen Collection  Venipuncture performed in right arm by Carlos Fernandez MA with good hemostasis. Patient tolerated the procedure well without complications.   07/26/24   Carlos Fernandez MA

## 2024-07-31 ENCOUNTER — LAB (OUTPATIENT)
Dept: ONCOLOGY | Facility: CLINIC | Age: 89
End: 2024-07-31
Payer: MEDICARE

## 2024-07-31 ENCOUNTER — OFFICE VISIT (OUTPATIENT)
Dept: ONCOLOGY | Facility: CLINIC | Age: 89
End: 2024-07-31
Payer: MEDICARE

## 2024-07-31 VITALS
TEMPERATURE: 97.1 F | OXYGEN SATURATION: 97 % | HEART RATE: 116 BPM | WEIGHT: 156 LBS | DIASTOLIC BLOOD PRESSURE: 63 MMHG | SYSTOLIC BLOOD PRESSURE: 134 MMHG | RESPIRATION RATE: 18 BRPM | HEIGHT: 67 IN | BODY MASS INDEX: 24.48 KG/M2

## 2024-07-31 DIAGNOSIS — D60.9 ACQUIRED RED CELL APLASIA: ICD-10-CM

## 2024-07-31 DIAGNOSIS — D64.9 SYMPTOMATIC ANEMIA: ICD-10-CM

## 2024-07-31 DIAGNOSIS — D64.9 SYMPTOMATIC ANEMIA: Primary | ICD-10-CM

## 2024-07-31 LAB
ABO GROUP BLD: NORMAL
ALBUMIN SERPL-MCNC: 3.6 G/DL (ref 3.5–5.2)
ALBUMIN/GLOB SERPL: 1.5 G/DL
ALP SERPL-CCNC: 59 U/L (ref 39–117)
ALT SERPL W P-5'-P-CCNC: 34 U/L (ref 1–41)
ANION GAP SERPL CALCULATED.3IONS-SCNC: 13.7 MMOL/L (ref 5–15)
AST SERPL-CCNC: 15 U/L (ref 1–40)
BASOPHILS # BLD AUTO: 0.02 10*3/MM3 (ref 0–0.2)
BASOPHILS NFR BLD AUTO: 0.1 % (ref 0–1.5)
BILIRUB SERPL-MCNC: 0.6 MG/DL (ref 0–1.2)
BLD GP AB SCN SERPL QL: NEGATIVE
BUN SERPL-MCNC: 34 MG/DL (ref 8–23)
BUN/CREAT SERPL: 24.8 (ref 7–25)
CALCIUM SPEC-SCNC: 9.8 MG/DL (ref 8.2–9.6)
CHLORIDE SERPL-SCNC: 102 MMOL/L (ref 98–107)
CO2 SERPL-SCNC: 23.3 MMOL/L (ref 22–29)
CREAT SERPL-MCNC: 1.37 MG/DL (ref 0.76–1.27)
DEPRECATED RDW RBC AUTO: 52.7 FL (ref 37–54)
EGFRCR SERPLBLD CKD-EPI 2021: 48.7 ML/MIN/1.73
EOSINOPHIL # BLD AUTO: 0.01 10*3/MM3 (ref 0–0.4)
EOSINOPHIL NFR BLD AUTO: 0.1 % (ref 0.3–6.2)
ERYTHROCYTE [DISTWIDTH] IN BLOOD BY AUTOMATED COUNT: 15.2 % (ref 12.3–15.4)
GLOBULIN UR ELPH-MCNC: 2.4 GM/DL
GLUCOSE SERPL-MCNC: 207 MG/DL (ref 65–99)
HCT VFR BLD AUTO: 23.3 % (ref 37.5–51)
HGB BLD-MCNC: 7.6 G/DL (ref 13–17.7)
IMM GRANULOCYTES # BLD AUTO: 0.15 10*3/MM3 (ref 0–0.05)
IMM GRANULOCYTES NFR BLD AUTO: 0.9 % (ref 0–0.5)
LYMPHOCYTES # BLD AUTO: 1.31 10*3/MM3 (ref 0.7–3.1)
LYMPHOCYTES NFR BLD AUTO: 7.7 % (ref 19.6–45.3)
MCH RBC QN AUTO: 31.1 PG (ref 26.6–33)
MCHC RBC AUTO-ENTMCNC: 32.6 G/DL (ref 31.5–35.7)
MCV RBC AUTO: 95.5 FL (ref 79–97)
MONOCYTES # BLD AUTO: 0.19 10*3/MM3 (ref 0.1–0.9)
MONOCYTES NFR BLD AUTO: 1.1 % (ref 5–12)
NEUTROPHILS NFR BLD AUTO: 15.36 10*3/MM3 (ref 1.7–7)
NEUTROPHILS NFR BLD AUTO: 90.1 % (ref 42.7–76)
NRBC BLD AUTO-RTO: 0 /100 WBC (ref 0–0.2)
PLATELET # BLD AUTO: 265 10*3/MM3 (ref 140–450)
PMV BLD AUTO: 11 FL (ref 6–12)
POTASSIUM SERPL-SCNC: 4.9 MMOL/L (ref 3.5–5.2)
PROT SERPL-MCNC: 6 G/DL (ref 6–8.5)
RBC # BLD AUTO: 2.44 10*6/MM3 (ref 4.14–5.8)
RETICS # AUTO: <0.01 10*6/MM3 (ref 0.02–0.13)
RETICS/RBC NFR AUTO: 0.19 % (ref 0.7–1.9)
RH BLD: POSITIVE
SODIUM SERPL-SCNC: 139 MMOL/L (ref 136–145)
T&S EXPIRATION DATE: NORMAL
WBC NRBC COR # BLD AUTO: 17.04 10*3/MM3 (ref 3.4–10.8)

## 2024-07-31 PROCEDURE — 86900 BLOOD TYPING SEROLOGIC ABO: CPT | Performed by: NURSE PRACTITIONER

## 2024-07-31 PROCEDURE — 85045 AUTOMATED RETICULOCYTE COUNT: CPT | Performed by: NURSE PRACTITIONER

## 2024-07-31 PROCEDURE — 86923 COMPATIBILITY TEST ELECTRIC: CPT

## 2024-07-31 PROCEDURE — 1160F RVW MEDS BY RX/DR IN RCRD: CPT | Performed by: NURSE PRACTITIONER

## 2024-07-31 PROCEDURE — 99214 OFFICE O/P EST MOD 30 MIN: CPT | Performed by: NURSE PRACTITIONER

## 2024-07-31 PROCEDURE — 86850 RBC ANTIBODY SCREEN: CPT | Performed by: NURSE PRACTITIONER

## 2024-07-31 PROCEDURE — 80053 COMPREHEN METABOLIC PANEL: CPT | Performed by: NURSE PRACTITIONER

## 2024-07-31 PROCEDURE — 85025 COMPLETE CBC W/AUTO DIFF WBC: CPT | Performed by: NURSE PRACTITIONER

## 2024-07-31 PROCEDURE — 86901 BLOOD TYPING SEROLOGIC RH(D): CPT | Performed by: NURSE PRACTITIONER

## 2024-07-31 PROCEDURE — 1126F AMNT PAIN NOTED NONE PRSNT: CPT | Performed by: NURSE PRACTITIONER

## 2024-07-31 PROCEDURE — 1159F MED LIST DOCD IN RCRD: CPT | Performed by: NURSE PRACTITIONER

## 2024-07-31 RX ORDER — ACETAMINOPHEN 325 MG/1
650 TABLET ORAL ONCE
Status: CANCELLED | OUTPATIENT
Start: 2024-07-31 | End: 2024-07-31

## 2024-07-31 RX ORDER — SODIUM CHLORIDE 9 MG/ML
250 INJECTION, SOLUTION INTRAVENOUS AS NEEDED
Status: CANCELLED | OUTPATIENT
Start: 2024-07-31

## 2024-07-31 RX ORDER — DIPHENHYDRAMINE HCL 25 MG
25 CAPSULE ORAL ONCE
Status: CANCELLED | OUTPATIENT
Start: 2024-07-31 | End: 2024-07-31

## 2024-07-31 NOTE — PROGRESS NOTES
Name:  Chinedu Silva  :  1933  Date:  2024     REFERRING PHYSICIAN    PRIMARY CARE PROVIDER  Jr Segura PA    REASON FOR FOLLOWUP  1. Symptomatic anemia    2. Acquired red cell aplasia        CHIEF COMPLAINT  Fatigue, worsening    Dear Mr. Segura,    HISTORY OF PRESENT ILLNESS:   I saw Mr. Silva in followup (from his recent hospitalization for severe anemia) today in our hematology/oncology clinic. As you are aware, he is a pleasant, 89 y.o., white male with a history of hypertension, CAD and GERD who presented to the [facility] ED on 2024 with progressive weakness and dyspnea on exertion. These symptoms had potentially first started ~two to three months prior; however, they significantly worsened over the course of the previous couple of weeks (by early 2024). Upon arrival to our ED, he was found to be severely anemic, with a Hg of 3.7 g/dL. His other cell lines (WBCs and platelets) were entirely WNL. Our service was consulted for further evaluation of this issue, and a bone marrow biopsy was performed on 2024. Meanwhile, as he was feeling substantially better after his HgB was transfused up to the ~9 g/dL range, he was able to be discharged to outpatient follow up in our clinic. He now returns to our clinic to go over the results of the bone marrow exam and for ongoing management.    INTERIM HISTORY:  Mr. Silva returns to clinic today for follow up accompanied by his wife. He confirms that he has still not noticed any bleeding from any source. He last underwent an endoscopy around ten years ago (with Dr. Richard) after suffering a GI bleed; but he had not had any significant, recurring issues along these lines since then. He denies any bruising or bleeding. He denies a personal or family history of autoimmune disease or blood disorder. In hindsight, he does recall that he presented to his PCP in early  with a fever to 105 degrees; however, a workup at that time,  including swabs for COVID and influenza, was unremarkable. His only complaint today is worsening fatigue. He has still not experienced any recurrent fevers for the past month, since the 105 reading in early . He has no other specific complaints.    Past Medical History:   Diagnosis Date    Carotid stenosis     Coronary artery disease     Diverticulitis     GERD (gastroesophageal reflux disease)     Gout     Hearing aid worn     Hypertension     Obese     Prostate cancer     Wears glasses        Past Surgical History:   Procedure Laterality Date    CAROTID ENDARTERECTOMY Left 2018    Procedure: CAROTID ENDARTERECTOMY WITH EEG LEFT;  Surgeon: Del Neal MD;  Location: [facility];  Service:     CATARACT EXTRACTION      CATARACT EXTRACTION WITH INTRAOCULAR LENS IMPLANT Right     CHOLECYSTECTOMY      COLONOSCOPY W/ POLYPECTOMY      HERNIA REPAIR Right     TOTAL KNEE ARTHROPLASTY Right     TURP / TRANSURETHRAL INCISION / DRAINAGE PROSTATE         Social History     Socioeconomic History    Marital status:     Number of children: 1   Tobacco Use    Smoking status: Former     Current packs/day: 0.00     Average packs/day: 1 pack/day for 12.0 years (12.0 ttl pk-yrs)     Types: Cigarettes     Start date:      Quit date:      Years since quittin.6    Smokeless tobacco: Current     Types: Chew   Vaping Use    Vaping status: Never Used   Substance and Sexual Activity    Alcohol use: No    Drug use: No    Sexual activity: Defer       Family History   Problem Relation Age of Onset    Hypertension Mother     Glaucoma Mother     Diverticulitis Mother     Gout Mother     ALS Father     Gout Sister     Broken bones Paternal Grandfather     Broken bones Son     Cancer Son     Diabetes Brother        Allergies   Allergen Reactions    Ciprofloxacin Other (See Comments)     Muscle Pains    Lisinopril Cough    Sulfa Antibiotics Itching and Rash       Current Outpatient Medications   Medication Sig Dispense  "Refill    allopurinol (ZYLOPRIM) 300 MG tablet Take 1 tablet by mouth Daily.      aspirin 81 MG EC tablet Take 1 tablet by mouth Daily.      dicyclomine (BENTYL) 20 MG tablet Take 1 tablet by mouth Every 6 (Six) Hours.      pantoprazole (PROTONIX) 40 MG EC tablet Take 1 tablet by mouth Daily. 30 tablet 1    predniSONE (DELTASONE) 20 MG tablet Take 4 tablets by mouth Daily for 30 days. (Patient taking differently: Take 3 tablets by mouth Daily.) 120 tablet 0    valsartan (DIOVAN) 80 MG tablet Take 1 tablet by mouth Daily.       No current facility-administered medications for this visit.     REVIEW OF SYSTEMS  CONSTITUTIONAL:  No fever, chills or night sweats.  EYES:  No blurry vision, diplopia or other vision changes.  ENT:  No hearing loss, nosebleeds or sore throat.  CARDIOVASCULAR:  No palpitations, arrhythmia, syncopal episodes or edema.  PULMONARY:  No hemoptysis, wheezing, chronic cough or shortness of breath.  GASTROINTESTINAL:  No nausea or vomiting. No constipation or diarrhea. No abdominal pain.  GENITOURINARY:  No hematuria, kidney stones or frequent urination.  MUSCULOSKELETAL:  No joint or back pains.  INTEGUMENTARY: No rashes or pruritus.  ENDOCRINE:  No excessive thirst or hot flashes.  HEMATOLOGIC:  No history of free bleeding, spontaneous bleeding or clotting.  IMMUNOLOGIC:  No allergies or frequent infections.  NEUROLOGIC: No numbness, tingling, seizures or weakness.  PSYCHIATRIC:  No anxiety or depression.    PHYSICAL EXAMINATION  /63   Pulse 116   Temp 97.1 °F (36.2 °C) (Temporal)   Resp 18   Ht 170.2 cm (67\")   Wt 70.8 kg (156 lb)   SpO2 97%   BMI 24.43 kg/m²     Pain Score:  Pain Score    07/31/24 1423   PainSc: 0-No pain     PHQ-Score Total:  PHQ-9 Total Score:      GENERAL:  A well-developed, well-nourished, elderly, white male in no acute distress.  HEENT:  Pupils equally round and reactive to light.  Extraocular muscles intact.  CARDIOVASCULAR:  Regular rate and rhythm.  No " murmurs, gallops or rubs.  LUNGS:  Clear to auscultation bilaterally.  ABDOMEN:  Soft, nontender, nondistended with positive bowel sounds.  EXTREMITIES:  No clubbing, cyanosis or edema bilaterally.  SKIN:  No rashes or petechiae.  NEURO:  Cranial nerves grossly intact. No focal deficits.  PSYCH:  Alert and oriented x3.        LABORATORY  Lab Results   Component Value Date    WBC 17.04 (H) 07/31/2024    HGB 7.6 (L) 07/31/2024    HCT 23.3 (L) 07/31/2024    MCV 95.5 07/31/2024     07/31/2024    NEUTROABS 15.36 (H) 07/31/2024       Lab Results   Component Value Date     07/31/2024    K 4.9 07/31/2024     07/31/2024    CO2 23.3 07/31/2024    BUN 34 (H) 07/31/2024    CREATININE 1.37 (H) 07/31/2024    GLUCOSE 207 (H) 07/31/2024    CALCIUM 9.8 (H) 07/31/2024    AST 15 07/31/2024    ALT 34 07/31/2024    ALKPHOS 59 07/31/2024    BILITOT 0.6 07/31/2024    PROTEINTOT 6.0 07/31/2024    ALBUMIN 3.6 07/31/2024     Lab Results   Component Value Date    RETICCTPCT 0.19 (L) 07/31/2024       CBC (07/31/2024): WBCs: 17.04; HgB: 7.6; Hct: 23.3; platelets: 265; MCV: 95.5  CBC (07/26/2024): WBCs: 13.50; HgB: 8.1; Hct: 24.8; platelets: 329; MCV: 95.0  CBC (07/23/2024): WBCs: 16.43; HgB: 8.5; Hct: 25.8; platelets: 390; MCV: 96.3  CBC (07/15/2024): WBCs: 8.04; HgB: 8.3; Hct: 25.8; platelets: 340; MCV: 97.7  CBC (07/11/2024): WBCs: 7.25; HgB: 9.4; Hct: 29.0; platelets: 375; MCV: 97.0  CBC (07/01/2024): WBCs: 7.50; HgB: 9.4; Hct: 29.3; platelets: 272; MCV: 98.7  CBC (06/26/2024): WBCs: 6.67; HgB: 9.0; Hct: 27.5; platelets: 198; MCV: 98.6    Reticulocyte (07/31/2024): Percentage: 0.19; Absolute: <0.0100  Reticulocyte (07/26/2024): Percentage: 0.18; Absolute: <0.0100  Reticulocyte (07/23/2024): Percentage: 0.25; Absolute: <0.0100  Reticulocyte (07/15/2024): Percentage: 0.32; Absolute: < 0.0100      IMAGING    PATHOLOGY  Peripheral smear, bone marrow aspiration smear, bone marrow aspiration clot and bone marrow core biopsy  (06/26/2024):  Normocellular bone marrow with increased granulopoiesis, essentially absent erythropoiesis, adequate megakaryopoiesis, increased stainable iron and slightly increased mixed chronic inflammation. No evidence of increased blasts or dysplasia. The most striking feature within the bone marrow is the essential absence of any erythropoiesis in the aspirate or core biopsy. There is mixed inflammation with some notable immunophenotypic findings by flow cytometry, but there is no evidence of bone marrow involvement by lymphoma. This is most suggestive of a mixed reactive lymphoid population.    IMPRESSION AND PLAN  Mr. Silva is a 89 y.o., white male with:    Pure red cell aplasia: Dr. King had a long discussion with the patient and his wife in clinic regarding the results of the bone marrow biopsy he underwent several weeks ago (which are summarized above). In short, this is a fairly uncommon diagnosis that can either be idiopathic or associated with a number of potential, underlying causes, including thymoma or parvovirus infection. Regardless, high dose steroids (prednisone 1-2 mg/kg daily) is the standard, first-line treatment; however, since the repeat CBC drawn earlier this month (on 07/01/2024) showed that his HgB (9.4 g/dL) had improved compared to what it was when he was discharged from our hospital following the bone marrow biopsy the previous week (9.0 g/dL), it was hoped that he was satisfactorily recovering (from a viral trigger) on his own. However, repeat CBC on 07/15/2024 revealed that his HgB is dropping again (8.3 mg/dL); and his reticulocyte counts remain essentially zero, a Rx for prednisone 80 mg PO daily was provided. Today, CBC with HgB 7.6, which has dropped and he is having significant weakness/fatigue. Therefore, will continue Prednisone 60 mg daily. Will transfuse one unit PRBCs and repeat CBC, CMP and Reticulocyte count on 08/02/2024. We will see him back in our clinic in one  week with a repeat CBC, CMP  and reticulocyte count for ongoing, close monitoring. The patient and his wife were in agreement with these plans.    It is a pleasure to participate in Mr. Silva's care. Please do not hesitate to call with any questions or concerns that you may have.    A total of 30 minutes were spent coordinating this patient’s care in clinic today; more than 50% of this time was face-to-face with the patient and his wife, reviewing his interim medical history, discussing the results of today's repeat labwork and counseling on the current treatment and followup plans. All questions were answered to their satisfaction.    FOLLOW UP Continue Prednisone 60 mg daily starting. Continue Protonix 40 mg daily for GI prophylaxis. Will transfuse one unit PRBCs.  Return to our clinic for repeat CBC, CMP and reticulocyte count on 08/02/2024 and follow up with MD in one week with CBC, CMP and reticulocyte count.        This document was electronically signed by ZULEYKA Colmenares July 31, 2024 16:27 EDT      CC: RIGO Mccall

## 2024-07-31 NOTE — PROGRESS NOTES
Venipuncture Blood Specimen Collection  Venipuncture performed in right arm by Daniel Honeycutt MA with good hemostasis. Patient tolerated the procedure well without complications.   07/31/24   Daniel Honeycutt MA

## 2024-08-02 ENCOUNTER — INFUSION (OUTPATIENT)
Dept: ONCOLOGY | Facility: HOSPITAL | Age: 89
End: 2024-08-02
Payer: MEDICARE

## 2024-08-02 VITALS
HEART RATE: 59 BPM | SYSTOLIC BLOOD PRESSURE: 127 MMHG | DIASTOLIC BLOOD PRESSURE: 59 MMHG | RESPIRATION RATE: 18 BRPM | OXYGEN SATURATION: 98 % | TEMPERATURE: 98.2 F

## 2024-08-02 DIAGNOSIS — D60.9 ACQUIRED RED CELL APLASIA: ICD-10-CM

## 2024-08-02 DIAGNOSIS — D64.9 SYMPTOMATIC ANEMIA: ICD-10-CM

## 2024-08-02 LAB
ALBUMIN SERPL-MCNC: 3.4 G/DL (ref 3.5–5.2)
ALBUMIN/GLOB SERPL: 1.3 G/DL
ALP SERPL-CCNC: 51 U/L (ref 39–117)
ALT SERPL W P-5'-P-CCNC: 5 U/L (ref 1–41)
ANION GAP SERPL CALCULATED.3IONS-SCNC: 10.4 MMOL/L (ref 5–15)
AST SERPL-CCNC: 14 U/L (ref 1–40)
BASOPHILS # BLD AUTO: 0.01 10*3/MM3 (ref 0–0.2)
BASOPHILS NFR BLD AUTO: 0.1 % (ref 0–1.5)
BILIRUB SERPL-MCNC: 0.8 MG/DL (ref 0–1.2)
BUN SERPL-MCNC: 42 MG/DL (ref 8–23)
BUN/CREAT SERPL: 29.8 (ref 7–25)
CALCIUM SPEC-SCNC: 9.7 MG/DL (ref 8.2–9.6)
CHLORIDE SERPL-SCNC: 103 MMOL/L (ref 98–107)
CO2 SERPL-SCNC: 26.6 MMOL/L (ref 22–29)
CREAT SERPL-MCNC: 1.41 MG/DL (ref 0.76–1.27)
DEPRECATED RDW RBC AUTO: 54.8 FL (ref 37–54)
EGFRCR SERPLBLD CKD-EPI 2021: 47 ML/MIN/1.73
EOSINOPHIL # BLD AUTO: 0.01 10*3/MM3 (ref 0–0.4)
EOSINOPHIL NFR BLD AUTO: 0.1 % (ref 0.3–6.2)
ERYTHROCYTE [DISTWIDTH] IN BLOOD BY AUTOMATED COUNT: 17.1 % (ref 12.3–15.4)
GLOBULIN UR ELPH-MCNC: 2.6 GM/DL
GLUCOSE SERPL-MCNC: 136 MG/DL (ref 65–99)
HCT VFR BLD AUTO: 24.8 % (ref 37.5–51)
HGB BLD-MCNC: 8.1 G/DL (ref 13–17.7)
IMM GRANULOCYTES # BLD AUTO: 0.08 10*3/MM3 (ref 0–0.05)
IMM GRANULOCYTES NFR BLD AUTO: 0.7 % (ref 0–0.5)
LYMPHOCYTES # BLD AUTO: 1.18 10*3/MM3 (ref 0.7–3.1)
LYMPHOCYTES NFR BLD AUTO: 9.9 % (ref 19.6–45.3)
MCH RBC QN AUTO: 29.6 PG (ref 26.6–33)
MCHC RBC AUTO-ENTMCNC: 32.7 G/DL (ref 31.5–35.7)
MCV RBC AUTO: 90.5 FL (ref 79–97)
MONOCYTES # BLD AUTO: 0.26 10*3/MM3 (ref 0.1–0.9)
MONOCYTES NFR BLD AUTO: 2.2 % (ref 5–12)
NEUTROPHILS NFR BLD AUTO: 10.38 10*3/MM3 (ref 1.7–7)
NEUTROPHILS NFR BLD AUTO: 87 % (ref 42.7–76)
NRBC BLD AUTO-RTO: 0 /100 WBC (ref 0–0.2)
PLATELET # BLD AUTO: 204 10*3/MM3 (ref 140–450)
PMV BLD AUTO: 11.4 FL (ref 6–12)
POTASSIUM SERPL-SCNC: 4.9 MMOL/L (ref 3.5–5.2)
PROT SERPL-MCNC: 6 G/DL (ref 6–8.5)
RBC # BLD AUTO: 2.74 10*6/MM3 (ref 4.14–5.8)
RETICS # AUTO: 0.01 10*6/MM3 (ref 0.02–0.13)
RETICS/RBC NFR AUTO: 0.48 % (ref 0.7–1.9)
SODIUM SERPL-SCNC: 140 MMOL/L (ref 136–145)
WBC NRBC COR # BLD AUTO: 11.92 10*3/MM3 (ref 3.4–10.8)

## 2024-08-02 PROCEDURE — 80053 COMPREHEN METABOLIC PANEL: CPT

## 2024-08-02 PROCEDURE — 85045 AUTOMATED RETICULOCYTE COUNT: CPT

## 2024-08-02 PROCEDURE — P9016 RBC LEUKOCYTES REDUCED: HCPCS

## 2024-08-02 PROCEDURE — 36430 TRANSFUSION BLD/BLD COMPNT: CPT

## 2024-08-02 PROCEDURE — 85025 COMPLETE CBC W/AUTO DIFF WBC: CPT

## 2024-08-02 PROCEDURE — 86900 BLOOD TYPING SEROLOGIC ABO: CPT

## 2024-08-02 PROCEDURE — 63710000001 DIPHENHYDRAMINE PER 50 MG: Performed by: NURSE PRACTITIONER

## 2024-08-02 RX ORDER — SODIUM CHLORIDE 9 MG/ML
250 INJECTION, SOLUTION INTRAVENOUS AS NEEDED
Status: DISCONTINUED | OUTPATIENT
Start: 2024-08-02 | End: 2024-08-02 | Stop reason: HOSPADM

## 2024-08-02 RX ORDER — ACETAMINOPHEN 325 MG/1
650 TABLET ORAL ONCE
Status: COMPLETED | OUTPATIENT
Start: 2024-08-02 | End: 2024-08-02

## 2024-08-02 RX ORDER — DIPHENHYDRAMINE HCL 25 MG
25 CAPSULE ORAL ONCE
Status: COMPLETED | OUTPATIENT
Start: 2024-08-02 | End: 2024-08-02

## 2024-08-02 RX ADMIN — ACETAMINOPHEN 650 MG: 325 TABLET ORAL at 08:17

## 2024-08-02 RX ADMIN — DIPHENHYDRAMINE HYDROCHLORIDE 25 MG: 25 CAPSULE ORAL at 08:17

## 2024-08-03 LAB
BH BB BLOOD EXPIRATION DATE: NORMAL
BH BB BLOOD TYPE BARCODE: 6200
BH BB DISPENSE STATUS: NORMAL
BH BB PRODUCT CODE: NORMAL
BH BB UNIT NUMBER: NORMAL
CROSSMATCH INTERPRETATION: NORMAL
UNIT  ABO: NORMAL
UNIT  RH: NORMAL

## 2024-08-07 ENCOUNTER — LAB (OUTPATIENT)
Dept: ONCOLOGY | Facility: CLINIC | Age: 89
End: 2024-08-07
Payer: MEDICARE

## 2024-08-07 ENCOUNTER — OFFICE VISIT (OUTPATIENT)
Dept: ONCOLOGY | Facility: CLINIC | Age: 89
End: 2024-08-07
Payer: MEDICARE

## 2024-08-07 VITALS
TEMPERATURE: 97 F | WEIGHT: 153.8 LBS | OXYGEN SATURATION: 97 % | RESPIRATION RATE: 20 BRPM | HEART RATE: 104 BPM | BODY MASS INDEX: 24.14 KG/M2 | HEIGHT: 67 IN | SYSTOLIC BLOOD PRESSURE: 105 MMHG | DIASTOLIC BLOOD PRESSURE: 56 MMHG

## 2024-08-07 DIAGNOSIS — D60.9 ACQUIRED RED CELL APLASIA: ICD-10-CM

## 2024-08-07 DIAGNOSIS — D61.9 APLASTIC ANEMIA: Primary | ICD-10-CM

## 2024-08-07 DIAGNOSIS — D61.9 APLASTIC ANEMIA: ICD-10-CM

## 2024-08-07 LAB
ALBUMIN SERPL-MCNC: 3.4 G/DL (ref 3.5–5.2)
ALBUMIN/GLOB SERPL: 1.3 G/DL
ALP SERPL-CCNC: 50 U/L (ref 39–117)
ALT SERPL W P-5'-P-CCNC: 27 U/L (ref 1–41)
ANION GAP SERPL CALCULATED.3IONS-SCNC: 11.7 MMOL/L (ref 5–15)
AST SERPL-CCNC: 11 U/L (ref 1–40)
BASOPHILS # BLD AUTO: 0.01 10*3/MM3 (ref 0–0.2)
BASOPHILS NFR BLD AUTO: 0.1 % (ref 0–1.5)
BILIRUB SERPL-MCNC: 0.6 MG/DL (ref 0–1.2)
BUN SERPL-MCNC: 30 MG/DL (ref 8–23)
BUN/CREAT SERPL: 20.1 (ref 7–25)
CALCIUM SPEC-SCNC: 9.8 MG/DL (ref 8.2–9.6)
CHLORIDE SERPL-SCNC: 104 MMOL/L (ref 98–107)
CO2 SERPL-SCNC: 23.3 MMOL/L (ref 22–29)
CREAT SERPL-MCNC: 1.49 MG/DL (ref 0.76–1.27)
DEPRECATED RDW RBC AUTO: 52.5 FL (ref 37–54)
EGFRCR SERPLBLD CKD-EPI 2021: 44 ML/MIN/1.73
EOSINOPHIL # BLD AUTO: 0.1 10*3/MM3 (ref 0–0.4)
EOSINOPHIL NFR BLD AUTO: 0.8 % (ref 0.3–6.2)
ERYTHROCYTE [DISTWIDTH] IN BLOOD BY AUTOMATED COUNT: 16 % (ref 12.3–15.4)
GLOBULIN UR ELPH-MCNC: 2.7 GM/DL
GLUCOSE SERPL-MCNC: 197 MG/DL (ref 65–99)
HCT VFR BLD AUTO: 24.8 % (ref 37.5–51)
HGB BLD-MCNC: 8 G/DL (ref 13–17.7)
IMM GRANULOCYTES # BLD AUTO: 0.07 10*3/MM3 (ref 0–0.05)
IMM GRANULOCYTES NFR BLD AUTO: 0.6 % (ref 0–0.5)
LYMPHOCYTES # BLD AUTO: 1.18 10*3/MM3 (ref 0.7–3.1)
LYMPHOCYTES NFR BLD AUTO: 9.7 % (ref 19.6–45.3)
MCH RBC QN AUTO: 29.4 PG (ref 26.6–33)
MCHC RBC AUTO-ENTMCNC: 32.3 G/DL (ref 31.5–35.7)
MCV RBC AUTO: 91.2 FL (ref 79–97)
MONOCYTES # BLD AUTO: 0.62 10*3/MM3 (ref 0.1–0.9)
MONOCYTES NFR BLD AUTO: 5.1 % (ref 5–12)
NEUTROPHILS NFR BLD AUTO: 10.24 10*3/MM3 (ref 1.7–7)
NEUTROPHILS NFR BLD AUTO: 83.7 % (ref 42.7–76)
NRBC BLD AUTO-RTO: 0 /100 WBC (ref 0–0.2)
PLATELET # BLD AUTO: 274 10*3/MM3 (ref 140–450)
PMV BLD AUTO: 11.4 FL (ref 6–12)
POTASSIUM SERPL-SCNC: 4.3 MMOL/L (ref 3.5–5.2)
PROT SERPL-MCNC: 6.1 G/DL (ref 6–8.5)
RBC # BLD AUTO: 2.72 10*6/MM3 (ref 4.14–5.8)
RETICS # AUTO: <0.01 10*6/MM3 (ref 0.02–0.13)
RETICS/RBC NFR AUTO: 0.21 % (ref 0.7–1.9)
SODIUM SERPL-SCNC: 139 MMOL/L (ref 136–145)
WBC NRBC COR # BLD AUTO: 12.22 10*3/MM3 (ref 3.4–10.8)

## 2024-08-07 PROCEDURE — 1126F AMNT PAIN NOTED NONE PRSNT: CPT

## 2024-08-07 PROCEDURE — 99214 OFFICE O/P EST MOD 30 MIN: CPT

## 2024-08-07 PROCEDURE — 1159F MED LIST DOCD IN RCRD: CPT

## 2024-08-07 PROCEDURE — 85025 COMPLETE CBC W/AUTO DIFF WBC: CPT

## 2024-08-07 PROCEDURE — 80053 COMPREHEN METABOLIC PANEL: CPT

## 2024-08-07 PROCEDURE — 1160F RVW MEDS BY RX/DR IN RCRD: CPT

## 2024-08-07 PROCEDURE — 85045 AUTOMATED RETICULOCYTE COUNT: CPT

## 2024-08-07 NOTE — PROGRESS NOTES
Name:  Chinedu Silva  :  1933  Date:  2024     REFERRING PHYSICIAN    PRIMARY CARE PROVIDER  Jr Segura PA    REASON FOR FOLLOWUP  1. Aplastic anemia    2. Acquired red cell aplasia          CHIEF COMPLAINT  Fatigue, worsening    Dear Mr. Segura,    HISTORY OF PRESENT ILLNESS:   I saw Mr. Silva in followup (from his recent hospitalization for severe anemia) today in our hematology/oncology clinic. As you are aware, he is a pleasant, 89 y.o., white male with a history of hypertension, CAD and GERD who presented to the [facility] [place] ED on 2024 with progressive weakness and dyspnea on exertion. These symptoms had potentially first started ~two to three months prior; however, they significantly worsened over the course of the previous couple of weeks (by early 2024). Upon arrival to our ED, he was found to be severely anemic, with a Hg of 3.7 g/dL. His other cell lines (WBCs and platelets) were entirely WNL. Our service was consulted for further evaluation of this issue, and a bone marrow biopsy was performed on 2024. Meanwhile, as he was feeling substantially better after his HgB was transfused up to the ~9 g/dL range, he was able to be discharged to outpatient follow up in our clinic. He now returns to our clinic to go over the results of the bone marrow exam and for ongoing management.    INTERIM HISTORY:  Mr. Silva returns to clinic today for follow up accompanied by his wife. He confirms that he has still not noticed any bleeding from any source. He last underwent an endoscopy around ten years ago (with Dr. Richard) after suffering a GI bleed; but he had not had any significant, recurring issues along these lines since then. He denies any bruising or bleeding. He denies a personal or family history of autoimmune disease or blood disorder. In hindsight, he does recall that he presented to his PCP in early  with a fever to 105 degrees; however, a workup at that time,  including swabs for COVID and influenza, was unremarkable. His only complaint today is continued fatigue. He has still not experienced any recurrent fevers for the past month, since the 105 reading in early .  He received 1 unit of PRBCs last Friday for symptomatic anemia.  He states today he is feeling better.  He has no other specific complaints.    Past Medical History:   Diagnosis Date    Carotid stenosis     Coronary artery disease     Diverticulitis     GERD (gastroesophageal reflux disease)     Gout     Hearing aid worn     Hypertension     Obese     Prostate cancer     Wears glasses        Past Surgical History:   Procedure Laterality Date    CAROTID ENDARTERECTOMY Left 2018    Procedure: CAROTID ENDARTERECTOMY WITH EEG LEFT;  Surgeon: Del Neal MD;  Location: [facility];  Service:     CATARACT EXTRACTION      CATARACT EXTRACTION WITH INTRAOCULAR LENS IMPLANT Right     CHOLECYSTECTOMY      COLONOSCOPY W/ POLYPECTOMY      HERNIA REPAIR Right     TOTAL KNEE ARTHROPLASTY Right     TURP / TRANSURETHRAL INCISION / DRAINAGE PROSTATE         Social History     Socioeconomic History    Marital status:     Number of children: 1   Tobacco Use    Smoking status: Former     Current packs/day: 0.00     Average packs/day: 1 pack/day for 12.0 years (12.0 ttl pk-yrs)     Types: Cigarettes     Start date:      Quit date:      Years since quittin.6    Smokeless tobacco: Current     Types: Chew   Vaping Use    Vaping status: Never Used   Substance and Sexual Activity    Alcohol use: No    Drug use: No    Sexual activity: Defer       Family History   Problem Relation Age of Onset    Hypertension Mother     Glaucoma Mother     Diverticulitis Mother     Gout Mother     ALS Father     Gout Sister     Broken bones Paternal Grandfather     Broken bones Son     Cancer Son     Diabetes Brother        Allergies   Allergen Reactions    Ciprofloxacin Other (See Comments)     Muscle Pains    Lisinopril  "Cough    Sulfa Antibiotics Itching and Rash       Current Outpatient Medications   Medication Sig Dispense Refill    allopurinol (ZYLOPRIM) 300 MG tablet Take 1 tablet by mouth Daily.      aspirin 81 MG EC tablet Take 1 tablet by mouth Daily.      dicyclomine (BENTYL) 20 MG tablet Take 1 tablet by mouth Every 6 (Six) Hours.      pantoprazole (PROTONIX) 40 MG EC tablet Take 1 tablet by mouth Daily. 30 tablet 1    predniSONE (DELTASONE) 20 MG tablet Take 4 tablets by mouth Daily for 30 days. (Patient taking differently: Take 3 tablets by mouth Daily.) 120 tablet 0    valsartan (DIOVAN) 80 MG tablet Take 1 tablet by mouth Daily.       No current facility-administered medications for this visit.     REVIEW OF SYSTEMS  CONSTITUTIONAL:  No fever, chills or night sweats.  EYES:  No blurry vision, diplopia or other vision changes.  ENT:  No hearing loss, nosebleeds or sore throat.  CARDIOVASCULAR:  No palpitations, arrhythmia, syncopal episodes or edema.  PULMONARY:  No hemoptysis, wheezing, chronic cough or shortness of breath.  GASTROINTESTINAL:  No nausea or vomiting. No constipation or diarrhea. No abdominal pain.  GENITOURINARY:  No hematuria, kidney stones or frequent urination.  MUSCULOSKELETAL:  No joint or back pains.  INTEGUMENTARY: No rashes or pruritus.  ENDOCRINE:  No excessive thirst or hot flashes.  HEMATOLOGIC:  No history of free bleeding, spontaneous bleeding or clotting.  IMMUNOLOGIC:  No allergies or frequent infections.  NEUROLOGIC: No numbness, tingling, seizures or weakness.  PSYCHIATRIC:  No anxiety or depression.    PHYSICAL EXAMINATION  /56   Pulse 104   Temp 97 °F (36.1 °C) (Temporal)   Resp 20   Ht 170.2 cm (67\")   Wt 69.8 kg (153 lb 12.8 oz)   SpO2 97%   BMI 24.09 kg/m²     Pain Score:  Pain Score    08/07/24 0858   PainSc: 0-No pain       PHQ-Score Total:  PHQ-9 Total Score:      GENERAL:  A well-developed, well-nourished, elderly, white male in no acute distress.  HEENT:  Pupils " equally round and reactive to light.  Extraocular muscles intact.  CARDIOVASCULAR:  Regular rate and rhythm.  No murmurs, gallops or rubs.  LUNGS:  Clear to auscultation bilaterally.  No wheezing.  ABDOMEN:  Soft, nontender, nondistended with positive bowel sounds.  EXTREMITIES:  No clubbing, cyanosis or edema bilaterally.  SKIN:  No rashes or petechiae.  NEURO:  Cranial nerves grossly intact. No focal deficits.  PSYCH:  Alert and oriented x3.        LABORATORY  Lab Results   Component Value Date    WBC 12.22 (H) 08/07/2024    HGB 8.0 (L) 08/07/2024    HCT 24.8 (L) 08/07/2024    MCV 91.2 08/07/2024     08/07/2024    NEUTROABS 10.24 (H) 08/07/2024       Lab Results   Component Value Date     08/07/2024    K 4.3 08/07/2024     08/07/2024    CO2 23.3 08/07/2024    BUN 30 (H) 08/07/2024    CREATININE 1.49 (H) 08/07/2024    GLUCOSE 197 (H) 08/07/2024    CALCIUM 9.8 (H) 08/07/2024    AST 11 08/07/2024    ALT 27 08/07/2024    ALKPHOS 50 08/07/2024    BILITOT 0.6 08/07/2024    PROTEINTOT 6.1 08/07/2024    ALBUMIN 3.4 (L) 08/07/2024     Lab Results   Component Value Date    RETICCTPCT 0.21 (L) 08/07/2024     CBC (08/02/2024): WBCs: 11.92; HgB: 8.1; Hct: 24.8; platelets: 204; MCV: 90.5  CBC (07/31/2024): WBCs: 17.04; HgB: 7.6; Hct: 23.3; platelets: 265; MCV: 95.5  CBC (07/26/2024): WBCs: 13.50; HgB: 8.1; Hct: 24.8; platelets: 329; MCV: 95.0  CBC (07/23/2024): WBCs: 16.43; HgB: 8.5; Hct: 25.8; platelets: 390; MCV: 96.3  CBC (07/15/2024): WBCs: 8.04; HgB: 8.3; Hct: 25.8; platelets: 340; MCV: 97.7  CBC (07/11/2024): WBCs: 7.25; HgB: 9.4; Hct: 29.0; platelets: 375; MCV: 97.0  CBC (07/01/2024): WBCs: 7.50; HgB: 9.4; Hct: 29.3; platelets: 272; MCV: 98.7  CBC (06/26/2024): WBCs: 6.67; HgB: 9.0; Hct: 27.5; platelets: 198; MCV: 98.6    Reticulocyte (08/02/2024): Percentage: 0.48; Absolute: <0.0132  Reticulocyte (07/31/2024): Percentage: 0.19; Absolute: <0.0100  Reticulocyte (07/26/2024): Percentage: 0.18; Absolute:  <0.0100  Reticulocyte (07/23/2024): Percentage: 0.25; Absolute: <0.0100  Reticulocyte (07/15/2024): Percentage: 0.32; Absolute: < 0.0100      IMAGING    PATHOLOGY  Peripheral smear, bone marrow aspiration smear, bone marrow aspiration clot and bone marrow core biopsy (06/26/2024):  Normocellular bone marrow with increased granulopoiesis, essentially absent erythropoiesis, adequate megakaryopoiesis, increased stainable iron and slightly increased mixed chronic inflammation. No evidence of increased blasts or dysplasia. The most striking feature within the bone marrow is the essential absence of any erythropoiesis in the aspirate or core biopsy. There is mixed inflammation with some notable immunophenotypic findings by flow cytometry, but there is no evidence of bone marrow involvement by lymphoma. This is most suggestive of a mixed reactive lymphoid population.    IMPRESSION AND PLAN  Mr. Silva is a 89 y.o., white male with:    Pure red cell aplasia: Dr. King had a long discussion with the patient and his wife in clinic regarding the results of the bone marrow biopsy he underwent several weeks ago (which are summarized above). In short, this is a fairly uncommon diagnosis that can either be idiopathic or associated with a number of potential, underlying causes, including thymoma or parvovirus infection. Regardless, high dose steroids (prednisone 1-2 mg/kg daily) is the standard, first-line treatment; however, since the repeat CBC drawn earlier this month (on 07/01/2024) showed that his HgB (9.4 g/dL) had improved compared to what it was when he was discharged from our hospital following the bone marrow biopsy the previous week (9.0 g/dL), it was hoped that he was satisfactorily recovering (from a viral trigger) on his own. However, repeat CBC on 07/15/2024 revealed that his HgB is dropping again (8.3 mg/dL); and his reticulocyte counts remain essentially zero, a Rx for prednisone 80 mg PO daily was provided. On  7/31, CBC with HgB 7.6, which had dropped and he was having significant weakness/fatigue. Therefore, continued Prednisone 60 mg daily. Transfused one unit PRBCs and repeat CBC, CMP and Reticulocyte count on 08/02/2024.  On 8/2 hemoglobin had improved to 8.1.  He is back today, 8/7, and hemoglobin is 8.0.  Reticulocyte counts remain about the same.  Spoke with Dr. King about the patient's continued unimproved labs despite patient continuing to take prednisone 60 mg.  Dr. King spoke with patient today as well and he would like to send the patient to see Dr. Geeta Stark, benign hematologist at [facility], [place] for further management and evaluation.  For now he would like the patient to remain on the Prednisone 60 mg dose, and he would like to continue twice weekly labs with weekly NP visit for close monitoring.  He would like to see the patient back with him once he has been seen by Dr. Stark.  Therefore, we will see him back in our clinic in one week with NP with a repeat CBC, CMP and reticulocyte count for ongoing, close monitoring. The patient and his wife were in agreement with these plans.    It is a pleasure to participate in Mr. Silva's care. Please do not hesitate to call with any questions or concerns that you may have.    A total of 30 minutes were spent coordinating this patient’s care in clinic today; more than 50% of this time was face-to-face with the patient and his wife, reviewing his interim medical history, discussing the results of today's repeat labwork and counseling on the current treatment and followup plans. All questions were answered to their satisfaction.    FOLLOW UP Continue Prednisone 60 mg daily. Continue Protonix 40 mg daily for GI prophylaxis.  Referral to Dr. Geeta Stark at [facility], [place], benign hematology.  Continue to return to our clinic for repeat CBC, CMP and reticulocyte count twice a week (on Wednesdays and Fridays) with transfusional support as needed.   He will follow-up with NP weekly on Wednesdays for close monitoring.  He will follow-up with MD with CBC, CMP and reticulocyte count once he has been seen by [facility].          This document was electronically signed by ZULEYKA Gaston August 7, 2024 10:13 EDT      CC: RIGO Mccall

## 2024-08-07 NOTE — PROGRESS NOTES
Venipuncture Blood Specimen Collection  Venipuncture performed in right arm by Shena Sewell MA with good hemostasis. Patient tolerated the procedure well without complications.   08/07/24   Shena Sewell MA

## 2024-08-09 ENCOUNTER — INFUSION (OUTPATIENT)
Dept: ONCOLOGY | Facility: HOSPITAL | Age: 89
End: 2024-08-09
Payer: MEDICARE

## 2024-08-09 ENCOUNTER — CLINICAL SUPPORT (OUTPATIENT)
Dept: ONCOLOGY | Facility: CLINIC | Age: 89
End: 2024-08-09
Payer: MEDICARE

## 2024-08-09 VITALS
DIASTOLIC BLOOD PRESSURE: 62 MMHG | OXYGEN SATURATION: 100 % | SYSTOLIC BLOOD PRESSURE: 106 MMHG | HEART RATE: 113 BPM | TEMPERATURE: 97.1 F | RESPIRATION RATE: 18 BRPM

## 2024-08-09 VITALS
HEART RATE: 66 BPM | OXYGEN SATURATION: 99 % | SYSTOLIC BLOOD PRESSURE: 155 MMHG | RESPIRATION RATE: 18 BRPM | TEMPERATURE: 98.7 F | DIASTOLIC BLOOD PRESSURE: 75 MMHG

## 2024-08-09 DIAGNOSIS — D64.9 SYMPTOMATIC ANEMIA: Primary | ICD-10-CM

## 2024-08-09 DIAGNOSIS — D64.9 SYMPTOMATIC ANEMIA: ICD-10-CM

## 2024-08-09 DIAGNOSIS — D61.9 APLASTIC ANEMIA: ICD-10-CM

## 2024-08-09 DIAGNOSIS — D60.9 ACQUIRED RED CELL APLASIA: ICD-10-CM

## 2024-08-09 LAB
ABO GROUP BLD: NORMAL
ALBUMIN SERPL-MCNC: 3.7 G/DL (ref 3.5–5.2)
ALBUMIN/GLOB SERPL: 1.6 G/DL
ALP SERPL-CCNC: 52 U/L (ref 39–117)
ALT SERPL W P-5'-P-CCNC: 33 U/L (ref 1–41)
ANION GAP SERPL CALCULATED.3IONS-SCNC: 13.5 MMOL/L (ref 5–15)
AST SERPL-CCNC: 14 U/L (ref 1–40)
BASOPHILS # BLD AUTO: 0.02 10*3/MM3 (ref 0–0.2)
BASOPHILS NFR BLD AUTO: 0.2 % (ref 0–1.5)
BILIRUB SERPL-MCNC: 0.6 MG/DL (ref 0–1.2)
BLD GP AB SCN SERPL QL: NEGATIVE
BUN SERPL-MCNC: 33 MG/DL (ref 8–23)
BUN/CREAT SERPL: 23.2 (ref 7–25)
CALCIUM SPEC-SCNC: 10.1 MG/DL (ref 8.2–9.6)
CHLORIDE SERPL-SCNC: 102 MMOL/L (ref 98–107)
CO2 SERPL-SCNC: 24.5 MMOL/L (ref 22–29)
CREAT SERPL-MCNC: 1.42 MG/DL (ref 0.76–1.27)
DEPRECATED RDW RBC AUTO: 52.7 FL (ref 37–54)
EGFRCR SERPLBLD CKD-EPI 2021: 46.6 ML/MIN/1.73
EOSINOPHIL # BLD AUTO: 0.14 10*3/MM3 (ref 0–0.4)
EOSINOPHIL NFR BLD AUTO: 1.2 % (ref 0.3–6.2)
ERYTHROCYTE [DISTWIDTH] IN BLOOD BY AUTOMATED COUNT: 16 % (ref 12.3–15.4)
GLOBULIN UR ELPH-MCNC: 2.3 GM/DL
GLUCOSE SERPL-MCNC: 177 MG/DL (ref 65–99)
HCT VFR BLD AUTO: 25.5 % (ref 37.5–51)
HCT VFR BLD AUTO: 28.8 % (ref 37.5–51)
HGB BLD-MCNC: 8.1 G/DL (ref 13–17.7)
HGB BLD-MCNC: 9.3 G/DL (ref 13–17.7)
IMM GRANULOCYTES # BLD AUTO: 0.07 10*3/MM3 (ref 0–0.05)
IMM GRANULOCYTES NFR BLD AUTO: 0.6 % (ref 0–0.5)
LYMPHOCYTES # BLD AUTO: 1.69 10*3/MM3 (ref 0.7–3.1)
LYMPHOCYTES NFR BLD AUTO: 14.1 % (ref 19.6–45.3)
MCH RBC QN AUTO: 28.8 PG (ref 26.6–33)
MCHC RBC AUTO-ENTMCNC: 31.8 G/DL (ref 31.5–35.7)
MCV RBC AUTO: 90.7 FL (ref 79–97)
MONOCYTES # BLD AUTO: 0.7 10*3/MM3 (ref 0.1–0.9)
MONOCYTES NFR BLD AUTO: 5.8 % (ref 5–12)
NEUTROPHILS NFR BLD AUTO: 78.1 % (ref 42.7–76)
NEUTROPHILS NFR BLD AUTO: 9.38 10*3/MM3 (ref 1.7–7)
NRBC BLD AUTO-RTO: 0 /100 WBC (ref 0–0.2)
PLATELET # BLD AUTO: 319 10*3/MM3 (ref 140–450)
PMV BLD AUTO: 11.1 FL (ref 6–12)
POTASSIUM SERPL-SCNC: 4.8 MMOL/L (ref 3.5–5.2)
PROT SERPL-MCNC: 6 G/DL (ref 6–8.5)
RBC # BLD AUTO: 2.81 10*6/MM3 (ref 4.14–5.8)
RETICS # AUTO: <0.01 10*6/MM3 (ref 0.02–0.13)
RETICS/RBC NFR AUTO: 0.24 % (ref 0.7–1.9)
RH BLD: POSITIVE
SODIUM SERPL-SCNC: 140 MMOL/L (ref 136–145)
T&S EXPIRATION DATE: NORMAL
WBC NRBC COR # BLD AUTO: 12 10*3/MM3 (ref 3.4–10.8)

## 2024-08-09 PROCEDURE — 80053 COMPREHEN METABOLIC PANEL: CPT

## 2024-08-09 PROCEDURE — 85025 COMPLETE CBC W/AUTO DIFF WBC: CPT

## 2024-08-09 PROCEDURE — 85014 HEMATOCRIT: CPT

## 2024-08-09 PROCEDURE — 86900 BLOOD TYPING SEROLOGIC ABO: CPT

## 2024-08-09 PROCEDURE — 85045 AUTOMATED RETICULOCYTE COUNT: CPT

## 2024-08-09 PROCEDURE — 86850 RBC ANTIBODY SCREEN: CPT | Performed by: NURSE PRACTITIONER

## 2024-08-09 PROCEDURE — P9040 RBC LEUKOREDUCED IRRADIATED: HCPCS

## 2024-08-09 PROCEDURE — 86923 COMPATIBILITY TEST ELECTRIC: CPT

## 2024-08-09 PROCEDURE — 63710000001 DIPHENHYDRAMINE PER 50 MG: Performed by: NURSE PRACTITIONER

## 2024-08-09 PROCEDURE — 86901 BLOOD TYPING SEROLOGIC RH(D): CPT | Performed by: NURSE PRACTITIONER

## 2024-08-09 PROCEDURE — 86900 BLOOD TYPING SEROLOGIC ABO: CPT | Performed by: NURSE PRACTITIONER

## 2024-08-09 PROCEDURE — 36430 TRANSFUSION BLD/BLD COMPNT: CPT

## 2024-08-09 PROCEDURE — 85018 HEMOGLOBIN: CPT

## 2024-08-09 RX ORDER — SODIUM CHLORIDE 9 MG/ML
250 INJECTION, SOLUTION INTRAVENOUS AS NEEDED
Status: CANCELLED | OUTPATIENT
Start: 2024-08-09

## 2024-08-09 RX ORDER — ACETAMINOPHEN 325 MG/1
650 TABLET ORAL ONCE
Status: COMPLETED | OUTPATIENT
Start: 2024-08-09 | End: 2024-08-09

## 2024-08-09 RX ORDER — ACETAMINOPHEN 325 MG/1
650 TABLET ORAL ONCE
Status: CANCELLED | OUTPATIENT
Start: 2024-08-09 | End: 2024-08-09

## 2024-08-09 RX ORDER — SODIUM CHLORIDE 9 MG/ML
250 INJECTION, SOLUTION INTRAVENOUS AS NEEDED
Status: DISCONTINUED | OUTPATIENT
Start: 2024-08-09 | End: 2024-08-09 | Stop reason: HOSPADM

## 2024-08-09 RX ORDER — DIPHENHYDRAMINE HCL 25 MG
25 CAPSULE ORAL ONCE
Status: CANCELLED | OUTPATIENT
Start: 2024-08-09 | End: 2024-08-09

## 2024-08-09 RX ORDER — DIPHENHYDRAMINE HCL 25 MG
25 CAPSULE ORAL ONCE
Status: COMPLETED | OUTPATIENT
Start: 2024-08-09 | End: 2024-08-09

## 2024-08-09 RX ADMIN — DIPHENHYDRAMINE HYDROCHLORIDE 25 MG: 25 CAPSULE ORAL at 10:26

## 2024-08-09 RX ADMIN — ACETAMINOPHEN 650 MG: 325 TABLET ORAL at 10:26

## 2024-08-14 ENCOUNTER — LAB (OUTPATIENT)
Dept: ONCOLOGY | Facility: CLINIC | Age: 89
End: 2024-08-14
Payer: MEDICARE

## 2024-08-14 ENCOUNTER — OFFICE VISIT (OUTPATIENT)
Dept: ONCOLOGY | Facility: CLINIC | Age: 89
End: 2024-08-14
Payer: MEDICARE

## 2024-08-14 VITALS
HEART RATE: 95 BPM | TEMPERATURE: 97.7 F | RESPIRATION RATE: 18 BRPM | BODY MASS INDEX: 23.84 KG/M2 | SYSTOLIC BLOOD PRESSURE: 133 MMHG | WEIGHT: 152.2 LBS | DIASTOLIC BLOOD PRESSURE: 69 MMHG | OXYGEN SATURATION: 97 %

## 2024-08-14 DIAGNOSIS — D60.9 ACQUIRED RED CELL APLASIA: Primary | ICD-10-CM

## 2024-08-14 DIAGNOSIS — D61.9 APLASTIC ANEMIA: ICD-10-CM

## 2024-08-14 DIAGNOSIS — D60.9 ACQUIRED RED CELL APLASIA: ICD-10-CM

## 2024-08-14 LAB
ALBUMIN SERPL-MCNC: 3.6 G/DL (ref 3.5–5.2)
ALBUMIN/GLOB SERPL: 1.6 G/DL
ALP SERPL-CCNC: 49 U/L (ref 39–117)
ALT SERPL W P-5'-P-CCNC: 47 U/L (ref 1–41)
ANION GAP SERPL CALCULATED.3IONS-SCNC: 13.2 MMOL/L (ref 5–15)
AST SERPL-CCNC: 19 U/L (ref 1–40)
BASOPHILS # BLD AUTO: 0.01 10*3/MM3 (ref 0–0.2)
BASOPHILS NFR BLD AUTO: 0.1 % (ref 0–1.5)
BILIRUB SERPL-MCNC: 0.7 MG/DL (ref 0–1.2)
BUN SERPL-MCNC: 36 MG/DL (ref 8–23)
BUN/CREAT SERPL: 24.8 (ref 7–25)
CALCIUM SPEC-SCNC: 9.7 MG/DL (ref 8.2–9.6)
CHLORIDE SERPL-SCNC: 104 MMOL/L (ref 98–107)
CO2 SERPL-SCNC: 24.8 MMOL/L (ref 22–29)
CREAT SERPL-MCNC: 1.45 MG/DL (ref 0.76–1.27)
DEPRECATED RDW RBC AUTO: 51.5 FL (ref 37–54)
EGFRCR SERPLBLD CKD-EPI 2021: 45.5 ML/MIN/1.73
EOSINOPHIL # BLD AUTO: 0.08 10*3/MM3 (ref 0–0.4)
EOSINOPHIL NFR BLD AUTO: 1 % (ref 0.3–6.2)
ERYTHROCYTE [DISTWIDTH] IN BLOOD BY AUTOMATED COUNT: 16.6 % (ref 12.3–15.4)
GLOBULIN UR ELPH-MCNC: 2.3 GM/DL
GLUCOSE SERPL-MCNC: 135 MG/DL (ref 65–99)
HCT VFR BLD AUTO: 27.9 % (ref 37.5–51)
HGB BLD-MCNC: 9 G/DL (ref 13–17.7)
IMM GRANULOCYTES # BLD AUTO: 0.05 10*3/MM3 (ref 0–0.05)
IMM GRANULOCYTES NFR BLD AUTO: 0.6 % (ref 0–0.5)
LYMPHOCYTES # BLD AUTO: 1.59 10*3/MM3 (ref 0.7–3.1)
LYMPHOCYTES NFR BLD AUTO: 19.5 % (ref 19.6–45.3)
MCH RBC QN AUTO: 28.1 PG (ref 26.6–33)
MCHC RBC AUTO-ENTMCNC: 32.3 G/DL (ref 31.5–35.7)
MCV RBC AUTO: 87.2 FL (ref 79–97)
MONOCYTES # BLD AUTO: 0.51 10*3/MM3 (ref 0.1–0.9)
MONOCYTES NFR BLD AUTO: 6.3 % (ref 5–12)
NEUTROPHILS NFR BLD AUTO: 5.92 10*3/MM3 (ref 1.7–7)
NEUTROPHILS NFR BLD AUTO: 72.5 % (ref 42.7–76)
NRBC BLD AUTO-RTO: 0 /100 WBC (ref 0–0.2)
PLATELET # BLD AUTO: 319 10*3/MM3 (ref 140–450)
PMV BLD AUTO: 10.4 FL (ref 6–12)
POTASSIUM SERPL-SCNC: 4.2 MMOL/L (ref 3.5–5.2)
PROT SERPL-MCNC: 5.9 G/DL (ref 6–8.5)
RBC # BLD AUTO: 3.2 10*6/MM3 (ref 4.14–5.8)
RETICS # AUTO: 0.01 10*6/MM3 (ref 0.02–0.13)
RETICS/RBC NFR AUTO: 0.32 % (ref 0.7–1.9)
SODIUM SERPL-SCNC: 142 MMOL/L (ref 136–145)
WBC NRBC COR # BLD AUTO: 8.16 10*3/MM3 (ref 3.4–10.8)

## 2024-08-14 PROCEDURE — 85025 COMPLETE CBC W/AUTO DIFF WBC: CPT

## 2024-08-14 PROCEDURE — 80053 COMPREHEN METABOLIC PANEL: CPT

## 2024-08-14 PROCEDURE — 1126F AMNT PAIN NOTED NONE PRSNT: CPT | Performed by: NURSE PRACTITIONER

## 2024-08-14 PROCEDURE — 85045 AUTOMATED RETICULOCYTE COUNT: CPT

## 2024-08-14 PROCEDURE — 99214 OFFICE O/P EST MOD 30 MIN: CPT | Performed by: NURSE PRACTITIONER

## 2024-08-14 PROCEDURE — 1160F RVW MEDS BY RX/DR IN RCRD: CPT | Performed by: NURSE PRACTITIONER

## 2024-08-14 PROCEDURE — 1159F MED LIST DOCD IN RCRD: CPT | Performed by: NURSE PRACTITIONER

## 2024-08-14 NOTE — PROGRESS NOTES
Venipuncture Blood Specimen Collection  Venipuncture performed in right arm by Mimi Batista MA with good hemostasis. Patient tolerated the procedure well without complications.   08/14/24   Mimi Batista MA

## 2024-08-14 NOTE — PROGRESS NOTES
"  Name:  Chinedu Silva  :  1933  Date:  2024     REFERRING PHYSICIAN    PRIMARY CARE PROVIDER  Jr Segura PA    REASON FOR FOLLOWUP  1. Acquired red cell aplasia            CHIEF COMPLAINT  Fatigue, stable    Dear Mr. Segura,    HISTORY OF PRESENT ILLNESS:   I saw Mr. Silva in followup (from his recent hospitalization for severe anemia) today in our hematology/oncology clinic. As you are aware, he is a pleasant, 89 y.o., white male with a history of hypertension, CAD and GERD who presented to the [facility] ED on 2024 with progressive weakness and dyspnea on exertion. These symptoms had potentially first started ~two to three months prior; however, they significantly worsened over the course of the previous couple of weeks (by early 2024). Upon arrival to our ED, he was found to be severely anemic, with a Hg of 3.7 g/dL. His other cell lines (WBCs and platelets) were entirely WNL. Our service was consulted for further evaluation of this issue, and a bone marrow biopsy was performed on 2024. Meanwhile, as he was feeling substantially better after his HgB was transfused up to the ~9 g/dL range, he was able to be discharged to outpatient follow up in our clinic. He now returns to our clinic to go over the results of the bone marrow exam and for ongoing management.    INTERIM HISTORY:  Mr. Silva returns to clinic today for follow up accompanied by his wife. Since his last visit, he states that he has been doing well. He was seen by Dr. Sidney Salgado at [facility] on 2024 for a second opinion. He states that they were going to \"do some experimenting\" and recommended a \"foul drug\". He states that his fatigue has been stable. He received 1 unit of PRBC on 2024 due to symptomatic anemia. He confirms that he has still not noticed any bleeding from any source. He last underwent an endoscopy around ten years ago (with Dr. Richard) after suffering a GI bleed; but he " had not had any significant, recurring issues along these lines since then. He denies any bruising or bleeding. He denies a personal or family history of autoimmune disease or blood disorder. In hindsight, he does recall that he presented to his PCP in early  with a fever to 105 degrees; however, a workup at that time, including swabs for COVID and influenza, was unremarkable. His only complaint today is continued fatigue. He has still not experienced any recurrent fevers for the past month, since the 105 reading in early .  He has no other specific complaints.    Past Medical History:   Diagnosis Date    Carotid stenosis     Coronary artery disease     Diverticulitis     GERD (gastroesophageal reflux disease)     Gout     Hearing aid worn     Hypertension     Obese     Prostate cancer     Wears glasses        Past Surgical History:   Procedure Laterality Date    CAROTID ENDARTERECTOMY Left 2018    Procedure: CAROTID ENDARTERECTOMY WITH EEG LEFT;  Surgeon: Del Neal MD;  Location: [facility];  Service:     CATARACT EXTRACTION      CATARACT EXTRACTION WITH INTRAOCULAR LENS IMPLANT Right     CHOLECYSTECTOMY      COLONOSCOPY W/ POLYPECTOMY      HERNIA REPAIR Right     TOTAL KNEE ARTHROPLASTY Right     TURP / TRANSURETHRAL INCISION / DRAINAGE PROSTATE         Social History     Socioeconomic History    Marital status:     Number of children: 1   Tobacco Use    Smoking status: Former     Current packs/day: 0.00     Average packs/day: 1 pack/day for 12.0 years (12.0 ttl pk-yrs)     Types: Cigarettes     Start date:      Quit date:      Years since quittin.6    Smokeless tobacco: Current     Types: Chew   Vaping Use    Vaping status: Never Used   Substance and Sexual Activity    Alcohol use: No    Drug use: No    Sexual activity: Defer       Family History   Problem Relation Age of Onset    Hypertension Mother     Glaucoma Mother     Diverticulitis Mother     Gout Mother     ALS Father      Gout Sister     Broken bones Paternal Grandfather     Broken bones Son     Cancer Son     Diabetes Brother        Allergies   Allergen Reactions    Ciprofloxacin Other (See Comments)     Muscle Pains    Lisinopril Cough    Sulfa Antibiotics Itching and Rash       Current Outpatient Medications   Medication Sig Dispense Refill    allopurinol (ZYLOPRIM) 300 MG tablet Take 1 tablet by mouth Daily.      aspirin 81 MG EC tablet Take 1 tablet by mouth Daily.      dicyclomine (BENTYL) 20 MG tablet Take 1 tablet by mouth Every 6 (Six) Hours.      pantoprazole (PROTONIX) 40 MG EC tablet Take 1 tablet by mouth Daily. 30 tablet 1    predniSONE (DELTASONE) 20 MG tablet Take 4 tablets by mouth Daily for 30 days. (Patient taking differently: Take 3 tablets by mouth Daily.) 120 tablet 0    valsartan (DIOVAN) 80 MG tablet Take 1 tablet by mouth Daily.       No current facility-administered medications for this visit.     REVIEW OF SYSTEMS  CONSTITUTIONAL:  No fever, chills or night sweats.  EYES:  No blurry vision, diplopia or other vision changes.  ENT:  No hearing loss, nosebleeds or sore throat.  CARDIOVASCULAR:  No palpitations, arrhythmia, syncopal episodes or edema.  PULMONARY:  No hemoptysis, wheezing, chronic cough or shortness of breath.  GASTROINTESTINAL:  No nausea or vomiting. No constipation or diarrhea. No abdominal pain.  GENITOURINARY:  No hematuria, kidney stones or frequent urination.  MUSCULOSKELETAL:  No joint or back pains.  INTEGUMENTARY: No rashes or pruritus.  ENDOCRINE:  No excessive thirst or hot flashes.  HEMATOLOGIC:  No history of free bleeding, spontaneous bleeding or clotting.  IMMUNOLOGIC:  No allergies or frequent infections.  NEUROLOGIC: No numbness, tingling, seizures or weakness.  PSYCHIATRIC:  No anxiety or depression.    PHYSICAL EXAMINATION  /69   Pulse 95   Temp 97.7 °F (36.5 °C) (Temporal)   Resp 18   Wt 69 kg (152 lb 3.2 oz)   SpO2 97%   BMI 23.84 kg/m²     Pain Score:  Pain  Score    08/14/24 0802   PainSc: 0-No pain       PHQ-Score Total:  PHQ-9 Total Score:      GENERAL:  A well-developed, well-nourished, elderly, white male in no acute distress.  HEENT:  Pupils equally round and reactive to light.  Extraocular muscles intact.  CARDIOVASCULAR:  Regular rate and rhythm.  No murmurs, gallops or rubs.  LUNGS:  Clear to auscultation bilaterally.  No wheezing.  ABDOMEN:  Soft, nontender, nondistended with positive bowel sounds.  EXTREMITIES:  No clubbing, cyanosis or edema bilaterally.  SKIN:  No rashes or petechiae.  NEURO:  Cranial nerves grossly intact. No focal deficits.  PSYCH:  Alert and oriented x3.        LABORATORY  Lab Results   Component Value Date    WBC 8.16 08/14/2024    HGB 9.0 (L) 08/14/2024    HCT 27.9 (L) 08/14/2024    MCV 87.2 08/14/2024     08/14/2024    NEUTROABS 5.92 08/14/2024       Lab Results   Component Value Date     08/14/2024    K 4.2 08/14/2024     08/14/2024    CO2 24.8 08/14/2024    BUN 36 (H) 08/14/2024    CREATININE 1.45 (H) 08/14/2024    GLUCOSE 135 (H) 08/14/2024    CALCIUM 9.7 (H) 08/14/2024    AST 19 08/14/2024    ALT 47 (H) 08/14/2024    ALKPHOS 49 08/14/2024    BILITOT 0.7 08/14/2024    PROTEINTOT 5.9 (L) 08/14/2024    ALBUMIN 3.6 08/14/2024     Lab Results   Component Value Date    RETICCTPCT 0.32 (L) 08/14/2024     CBC (08/09/2024): WBCs: 12.00; HgB: 8.1; Hct: 25.5; platelets: 319; MCV: 90.7  CBC (08/02/2024): WBCs: 11.92; HgB: 8.1; Hct: 24.8; platelets: 204; MCV: 90.5  CBC (07/31/2024): WBCs: 17.04; HgB: 7.6; Hct: 23.3; platelets: 265; MCV: 95.5  CBC (07/26/2024): WBCs: 13.50; HgB: 8.1; Hct: 24.8; platelets: 329; MCV: 95.0  CBC (07/23/2024): WBCs: 16.43; HgB: 8.5; Hct: 25.8; platelets: 390; MCV: 96.3  CBC (07/15/2024): WBCs: 8.04; HgB: 8.3; Hct: 25.8; platelets: 340; MCV: 97.7  CBC (07/11/2024): WBCs: 7.25; HgB: 9.4; Hct: 29.0; platelets: 375; MCV: 97.0  CBC (07/01/2024): WBCs: 7.50; HgB: 9.4; Hct: 29.3; platelets: 272; MCV:  98.7  CBC (06/26/2024): WBCs: 6.67; HgB: 9.0; Hct: 27.5; platelets: 198; MCV: 98.6    Reticulocyte (08/09/2024): Percentage: 0.24; Absolute: <0.0100  Reticulocyte (08/02/2024): Percentage: 0.48; Absolute: <0.0132  Reticulocyte (07/31/2024): Percentage: 0.19; Absolute: <0.0100  Reticulocyte (07/26/2024): Percentage: 0.18; Absolute: <0.0100  Reticulocyte (07/23/2024): Percentage: 0.25; Absolute: <0.0100  Reticulocyte (07/15/2024): Percentage: 0.32; Absolute: < 0.0100      IMAGING    PATHOLOGY  Peripheral smear, bone marrow aspiration smear, bone marrow aspiration clot and bone marrow core biopsy (06/26/2024):  Normocellular bone marrow with increased granulopoiesis, essentially absent erythropoiesis, adequate megakaryopoiesis, increased stainable iron and slightly increased mixed chronic inflammation. No evidence of increased blasts or dysplasia. The most striking feature within the bone marrow is the essential absence of any erythropoiesis in the aspirate or core biopsy. There is mixed inflammation with some notable immunophenotypic findings by flow cytometry, but there is no evidence of bone marrow involvement by lymphoma. This is most suggestive of a mixed reactive lymphoid population.    IMPRESSION AND PLAN  Mr. Silva is a 89 y.o., white male with:    Pure red cell aplasia: Dr. King had a long discussion with the patient and his wife in clinic regarding the results of the bone marrow biopsy he underwent several weeks ago (which are summarized above). In short, this is a fairly uncommon diagnosis that can either be idiopathic or associated with a number of potential, underlying causes, including thymoma or parvovirus infection. Regardless, high dose steroids (prednisone 1-2 mg/kg daily) is the standard, first-line treatment; however, since the repeat CBC drawn earlier this month (on 07/01/2024) showed that his HgB (9.4 g/dL) had improved compared to what it was when he was discharged from our hospital following  the bone marrow biopsy the previous week (9.0 g/dL), it was hoped that he was satisfactorily recovering (from a viral trigger) on his own. However, repeat CBC on 07/15/2024 revealed that his HgB is dropping again (8.3 mg/dL); and his reticulocyte counts remain essentially zero, a Rx for prednisone 80 mg PO daily was provided. On 7/31, CBC with HgB 7.6, which had dropped and he was having significant weakness/fatigue. Therefore, continued Prednisone 60 mg daily. Transfused one unit PRBCs and repeat CBC, CMP and Reticulocyte count on 08/02/2024.  On 8/2 hemoglobin had improved to 8.1. He was seen on 08/09/24 with reports of increased weakness. Hg was noted to be 8.1. Reticulocyte count remains stable. He was transfused 1 unit of PRBC on 08/09/2024. He was seen by Dr. Sidney Salgado at [facility] on 08/13/24, recommendations are currently pending and records have been requested. CBC today with Hg 9.0. He reports that fatigue is stable.  For now, I have advised the patient to remain on Prednisone 60 mg daily. He will return for lab check on Friday and NP visit in one week with CBCD, CMP, and reticulocyte count at each visit. The patient and his wife were in agreement with these plans.      It is a pleasure to participate in Mr. Silva's care. Please do not hesitate to call with any questions or concerns that you may have.    A total of 30 minutes were spent coordinating this patient’s care in clinic today; more than 50% of this time was face-to-face with the patient and his wife, reviewing his interim medical history, discussing the results of today's repeat labwork and counseling on the current treatment and followup plans. All questions were answered to their satisfaction.    FOLLOW UP Continue Prednisone 60 mg daily. Continue Protonix 40 mg daily for GI prophylaxis. Continue to return to our clinic for repeat CBC, CMP and reticulocyte count twice a week (on Wednesdays and Fridays) with transfusional  support as needed. He will follow up with ZULEYKA weekly. He will follow-up with MD with CBC, CMP and reticulocyte count once recommendations from Dr. Salgado have been finalized.        This document was electronically signed by ZULEYKA Barakat August 14, 2024 11:28 EDT      CC: RIGO Mccall

## 2024-08-15 ENCOUNTER — OFFICE VISIT (OUTPATIENT)
Dept: CARDIOLOGY | Facility: CLINIC | Age: 89
End: 2024-08-15
Payer: MEDICARE

## 2024-08-15 VITALS
WEIGHT: 152.6 LBS | OXYGEN SATURATION: 97 % | HEART RATE: 109 BPM | SYSTOLIC BLOOD PRESSURE: 103 MMHG | HEIGHT: 67 IN | DIASTOLIC BLOOD PRESSURE: 61 MMHG | BODY MASS INDEX: 23.95 KG/M2

## 2024-08-15 DIAGNOSIS — I65.22 STENOSIS OF LEFT CAROTID ARTERY: ICD-10-CM

## 2024-08-15 DIAGNOSIS — R06.02 SHORTNESS OF BREATH: ICD-10-CM

## 2024-08-15 DIAGNOSIS — E78.5 DYSLIPIDEMIA: ICD-10-CM

## 2024-08-15 DIAGNOSIS — R94.39 ABNORMAL NUCLEAR STRESS TEST: Primary | ICD-10-CM

## 2024-08-15 DIAGNOSIS — R07.2 PRECORDIAL CHEST PAIN: ICD-10-CM

## 2024-08-15 DIAGNOSIS — I10 PRIMARY HYPERTENSION: ICD-10-CM

## 2024-08-15 PROBLEM — I65.23 BILATERAL CAROTID ARTERY STENOSIS: Status: RESOLVED | Noted: 2018-01-10 | Resolved: 2024-08-15

## 2024-08-15 PROCEDURE — 99214 OFFICE O/P EST MOD 30 MIN: CPT | Performed by: SPECIALIST

## 2024-08-15 RX ORDER — ROSUVASTATIN CALCIUM 10 MG/1
10 TABLET, COATED ORAL DAILY
Qty: 90 TABLET | Refills: 3 | Status: SHIPPED | OUTPATIENT
Start: 2024-08-15 | End: 2024-08-15 | Stop reason: SDUPTHER

## 2024-08-15 RX ORDER — ROSUVASTATIN CALCIUM 10 MG/1
10 TABLET, COATED ORAL DAILY
Qty: 90 TABLET | Refills: 3 | Status: SHIPPED | OUTPATIENT
Start: 2024-08-15

## 2024-08-15 NOTE — PROGRESS NOTES
Subjective   Follow up, abnormal stress test  Chinedu Silva is a 89 y.o. male who presents to day for Results (Stress, carotid).    CHIEF COMPLIANT  Chief Complaint   Patient presents with    Results     Stress, carotid       Active Problems:  Problem List Items Addressed This Visit          Cardiac and Vasculature    Carotid stenosis    Relevant Orders    CT Angiogram Carotids    Hypertension    Abnormal nuclear stress test - Primary    Precordial chest pain    Dyslipidemia    Relevant Medications    rosuvastatin (CRESTOR) 10 MG tablet    Other Relevant Orders    Lipid Panel    Comprehensive Metabolic Panel       Pulmonary and Pneumonias    Shortness of breath       HPI  HPI  Patient is coming for follow-up regarding stress test which showed inferior ischemia he has been worked up for severe anemia with a hemoglobin down to 3.7 he had bone marrow biopsy was seen at the [facility] by hematology as well as locally so far no definitive diagnosis done yet he received blood transfusion with no clinical evidence of GI blood loss, he feels extremely weak and tired since May has not been doing much he gets a bit short of breath going relatively minimal work like walking short distances with recently is after he was started on prednisone he gets a little bit of lower extremity edema earlier when his hemoglobin was about 3.7 he was having some chest pains but not recently, he was also seen in the past by Dr. Neal when he had carotid endarterectomy on the left side  PRIOR MEDS  Current Outpatient Medications on File Prior to Visit   Medication Sig Dispense Refill    allopurinol (ZYLOPRIM) 300 MG tablet Take 1 tablet by mouth Daily.      aspirin 81 MG EC tablet Take 1 tablet by mouth Daily.      dicyclomine (BENTYL) 20 MG tablet Take 1 tablet by mouth Every 6 (Six) Hours.      pantoprazole (PROTONIX) 40 MG EC tablet Take 1 tablet by mouth Daily. 30 tablet 1    predniSONE (DELTASONE) 20 MG tablet Take 4 tablets by mouth  "Daily for 30 days. (Patient taking differently: Take 3 tablets by mouth Daily.) 120 tablet 0    valsartan (DIOVAN) 80 MG tablet Take 1 tablet by mouth Daily.       No current facility-administered medications on file prior to visit.       ALLERGIES  Ciprofloxacin, Lisinopril, and Sulfa antibiotics    HISTORY  Past Medical History:   Diagnosis Date    Carotid stenosis     Coronary artery disease     Diverticulitis     GERD (gastroesophageal reflux disease)     Gout     Hearing aid worn     Hypertension     Obese     Prostate cancer     Wears glasses        Social History     Socioeconomic History    Marital status:     Number of children: 1   Tobacco Use    Smoking status: Former     Current packs/day: 0.00     Average packs/day: 1 pack/day for 12.0 years (12.0 ttl pk-yrs)     Types: Cigarettes     Start date:      Quit date:      Years since quittin.6    Smokeless tobacco: Current     Types: Chew   Vaping Use    Vaping status: Never Used   Substance and Sexual Activity    Alcohol use: No    Drug use: No    Sexual activity: Defer       Family History   Problem Relation Age of Onset    Hypertension Mother     Glaucoma Mother     Diverticulitis Mother     Gout Mother     ALS Father     Gout Sister     Broken bones Paternal Grandfather     Broken bones Son     Cancer Son     Diabetes Brother        Review of Systems   Respiratory:  Positive for chest tightness and shortness of breath. Negative for apnea, cough, choking, wheezing and stridor.    Cardiovascular:  Positive for leg swelling. Negative for chest pain and palpitations.       Objective     VITALS: /61   Pulse 109   Ht 170.2 cm (67\")   Wt 69.2 kg (152 lb 9.6 oz)   SpO2 97%   BMI 23.90 kg/m²     LABS:   Lab Results (most recent)       None            IMAGING:   US Carotid Bilateral    Result Date: 2024  1.  50-69% stenosis in the left internal carotid artery due to atherosclerotic plaque. 2.  Minimal bilateral plaque noted in " bilateral ICA.   This report was finalized on 7/24/2024 8:19 AM by Dr. Timur Rivers MD.      XR Chest 1 View    Result Date: 6/24/2024    Unremarkable exam. No acute cardiopulmonary findings identified.   This report was finalized on 6/24/2024 9:14 AM by Dr. Timur Rivers MD.        EXAM:  Physical Exam  Constitutional:       Appearance: He is well-developed.   HENT:      Head: Normocephalic and atraumatic.   Eyes:      Pupils: Pupils are equal, round, and reactive to light.   Neck:      Thyroid: No thyromegaly.      Vascular: Carotid bruit present. No JVD.      Comments: Left carotid bruit  Cardiovascular:      Rate and Rhythm: Normal rate and regular rhythm.      Chest Wall: PMI is not displaced.      Pulses: Normal pulses.      Heart sounds: Normal heart sounds, S1 normal and S2 normal. No murmur heard.     No friction rub. No gallop.   Pulmonary:      Effort: Pulmonary effort is normal. No respiratory distress.      Breath sounds: Normal breath sounds. No stridor. No wheezing or rales.   Chest:      Chest wall: No tenderness.   Abdominal:      General: Bowel sounds are normal. There is no distension.      Palpations: Abdomen is soft. There is no mass.      Tenderness: There is no abdominal tenderness. There is no guarding or rebound.   Musculoskeletal:      Cervical back: Neck supple. No edema.   Skin:     General: Skin is warm and dry.      Coloration: Skin is pale.      Findings: No erythema or rash.   Neurological:      Mental Status: He is alert and oriented to person, place, and time.      Cranial Nerves: No cranial nerve deficit.      Coordination: Coordination normal.   Psychiatric:         Behavior: Behavior normal.         Procedure   Procedures        Assessment & Plan     Diagnoses and all orders for this visit:    1. Abnormal nuclear stress test (Primary)    2. Precordial chest pain    3. Shortness of breath    4. Stenosis of left carotid artery  -     CT Angiogram Carotids; Future    5. Primary  hypertension    6. Dyslipidemia  -     rosuvastatin (CRESTOR) 10 MG tablet; Take 1 tablet by mouth Daily.  Dispense: 90 tablet; Refill: 3  -     Lipid Panel; Future  -     Comprehensive Metabolic Panel; Future    1.  Patient has stress test which showed inferior ischemia he is actually chest pain-free right now he still quite weak and tired he is anemic and the problem is his hemoglobin has been still dropping he was seen by hematology locally as well as in [place] he did have bone marrow biopsy but the workup is still in progression we talked about all of this and I am concerned if he will need percutaneous intervention he will need dual antiplatelet therapy and as he is currently chest pain-free I would recommend to wait till his workup for hematology is finished and it would not be safe to start him on dual antiplatelet therapy if it needs to be done, as he is chest pain-free right now he does not need any further antianginal medications  2.  The patient had left carotid bruit and?  Stenosis on the left side on the carotid Doppler so we will proceed with cardiac CT angiogram for assessment of the severity especially that he had severe stenosis or occlusion of the right side  3.  Patient is not on any statin!!!  I do not have any lipid profile he is known to have carotid stenosis and now has positive stress test I am going to get lipid profile and in the interim I am going to put him on rosuvastatin 10 mg pending the result and will adjust the dose as necessary  4.  His blood pressure is well-controlled we will continue the current management    Return in about 3 months (around 11/15/2024).                 MEDS ORDERED DURING VISIT:  New Medications Ordered This Visit   Medications    rosuvastatin (CRESTOR) 10 MG tablet     Sig: Take 1 tablet by mouth Daily.     Dispense:  90 tablet     Refill:  3       As always, Jr Segura, PA  I appreciate very much the opportunity to participate in the cardiovascular  care of your patients. Please do not hesitate to call me with any questions with regards to Chinedu Silva evaluation and management.         This document has been electronically signed by Farhat Marina MD  August 15, 2024 10:42 EDT    This note is dictated utilizing voice recognition software.

## 2024-08-16 ENCOUNTER — TELEPHONE (OUTPATIENT)
Dept: ONCOLOGY | Facility: CLINIC | Age: 89
End: 2024-08-16
Payer: MEDICARE

## 2024-08-16 NOTE — TELEPHONE ENCOUNTER
RN called [facility] to get an update on medication being sent for patient. They state that will call back as soon as they can with an update. RN contacted patient spouse to inform her that we are waiting to hear back from them and that I will keep her updated.

## 2024-08-16 NOTE — TELEPHONE ENCOUNTER
Doctor at [facility] was supposed to call in CyclosporineA for Chinedu to start 4 days ago. They told the patients wife that he needed to start it within 3 days but the pharmacy has not received the prescription yet. Patients wife would like to know if we can call it in or what they should do. Pt uses [facility] in the [facility].

## 2024-08-20 ENCOUNTER — LAB (OUTPATIENT)
Dept: ONCOLOGY | Facility: CLINIC | Age: 89
End: 2024-08-20
Payer: MEDICARE

## 2024-08-20 ENCOUNTER — OFFICE VISIT (OUTPATIENT)
Dept: ONCOLOGY | Facility: CLINIC | Age: 89
End: 2024-08-20
Payer: MEDICARE

## 2024-08-20 VITALS
OXYGEN SATURATION: 98 % | SYSTOLIC BLOOD PRESSURE: 157 MMHG | TEMPERATURE: 97.7 F | RESPIRATION RATE: 18 BRPM | HEART RATE: 93 BPM | DIASTOLIC BLOOD PRESSURE: 78 MMHG | HEIGHT: 67 IN | BODY MASS INDEX: 24.17 KG/M2 | WEIGHT: 154 LBS

## 2024-08-20 DIAGNOSIS — D60.9 ACQUIRED RED CELL APLASIA: ICD-10-CM

## 2024-08-20 DIAGNOSIS — D60.9 ACQUIRED RED CELL APLASIA: Primary | ICD-10-CM

## 2024-08-20 DIAGNOSIS — E78.5 DYSLIPIDEMIA: ICD-10-CM

## 2024-08-20 LAB
ALBUMIN SERPL-MCNC: 3.6 G/DL (ref 3.5–5.2)
ALBUMIN/GLOB SERPL: 1.5 G/DL
ALP SERPL-CCNC: 55 U/L (ref 39–117)
ALT SERPL W P-5'-P-CCNC: 37 U/L (ref 1–41)
ANION GAP SERPL CALCULATED.3IONS-SCNC: 9.4 MMOL/L (ref 5–15)
AST SERPL-CCNC: 15 U/L (ref 1–40)
BASOPHILS # BLD AUTO: 0.01 10*3/MM3 (ref 0–0.2)
BASOPHILS NFR BLD AUTO: 0.1 % (ref 0–1.5)
BILIRUB SERPL-MCNC: 0.8 MG/DL (ref 0–1.2)
BUN SERPL-MCNC: 25 MG/DL (ref 8–23)
BUN/CREAT SERPL: 19.1 (ref 7–25)
CALCIUM SPEC-SCNC: 9.8 MG/DL (ref 8.2–9.6)
CHLORIDE SERPL-SCNC: 104 MMOL/L (ref 98–107)
CHOLEST SERPL-MCNC: 118 MG/DL (ref 0–200)
CO2 SERPL-SCNC: 30.6 MMOL/L (ref 22–29)
CREAT SERPL-MCNC: 1.31 MG/DL (ref 0.76–1.27)
DEPRECATED RDW RBC AUTO: 49.6 FL (ref 37–54)
EGFRCR SERPLBLD CKD-EPI 2021: 51.4 ML/MIN/1.73
EOSINOPHIL # BLD AUTO: 0.05 10*3/MM3 (ref 0–0.4)
EOSINOPHIL NFR BLD AUTO: 0.6 % (ref 0.3–6.2)
ERYTHROCYTE [DISTWIDTH] IN BLOOD BY AUTOMATED COUNT: 15.9 % (ref 12.3–15.4)
GLOBULIN UR ELPH-MCNC: 2.4 GM/DL
GLUCOSE SERPL-MCNC: 99 MG/DL (ref 65–99)
HCT VFR BLD AUTO: 26.4 % (ref 37.5–51)
HDLC SERPL-MCNC: 64 MG/DL (ref 40–60)
HGB BLD-MCNC: 8.4 G/DL (ref 13–17.7)
IMM GRANULOCYTES # BLD AUTO: 0.09 10*3/MM3 (ref 0–0.05)
IMM GRANULOCYTES NFR BLD AUTO: 1 % (ref 0–0.5)
LDLC SERPL CALC-MCNC: 32 MG/DL (ref 0–100)
LDLC/HDLC SERPL: 0.46 {RATIO}
LYMPHOCYTES # BLD AUTO: 1.72 10*3/MM3 (ref 0.7–3.1)
LYMPHOCYTES NFR BLD AUTO: 20 % (ref 19.6–45.3)
MCH RBC QN AUTO: 27.6 PG (ref 26.6–33)
MCHC RBC AUTO-ENTMCNC: 31.8 G/DL (ref 31.5–35.7)
MCV RBC AUTO: 86.8 FL (ref 79–97)
MONOCYTES # BLD AUTO: 0.48 10*3/MM3 (ref 0.1–0.9)
MONOCYTES NFR BLD AUTO: 5.6 % (ref 5–12)
NEUTROPHILS NFR BLD AUTO: 6.24 10*3/MM3 (ref 1.7–7)
NEUTROPHILS NFR BLD AUTO: 72.7 % (ref 42.7–76)
NRBC BLD AUTO-RTO: 0 /100 WBC (ref 0–0.2)
PLATELET # BLD AUTO: 272 10*3/MM3 (ref 140–450)
PMV BLD AUTO: 10.4 FL (ref 6–12)
POTASSIUM SERPL-SCNC: 4.6 MMOL/L (ref 3.5–5.2)
PROT SERPL-MCNC: 6 G/DL (ref 6–8.5)
RBC # BLD AUTO: 3.04 10*6/MM3 (ref 4.14–5.8)
RETICS # AUTO: <0.01 10*6/MM3 (ref 0.02–0.13)
RETICS/RBC NFR AUTO: 0.26 % (ref 0.7–1.9)
SODIUM SERPL-SCNC: 144 MMOL/L (ref 136–145)
TRIGL SERPL-MCNC: 124 MG/DL (ref 0–150)
VLDLC SERPL-MCNC: 22 MG/DL (ref 5–40)
WBC NRBC COR # BLD AUTO: 8.59 10*3/MM3 (ref 3.4–10.8)

## 2024-08-20 PROCEDURE — 80053 COMPREHEN METABOLIC PANEL: CPT | Performed by: NURSE PRACTITIONER

## 2024-08-20 PROCEDURE — 85025 COMPLETE CBC W/AUTO DIFF WBC: CPT | Performed by: NURSE PRACTITIONER

## 2024-08-20 PROCEDURE — 1160F RVW MEDS BY RX/DR IN RCRD: CPT | Performed by: NURSE PRACTITIONER

## 2024-08-20 PROCEDURE — 80061 LIPID PANEL: CPT | Performed by: SPECIALIST

## 2024-08-20 PROCEDURE — 99214 OFFICE O/P EST MOD 30 MIN: CPT | Performed by: NURSE PRACTITIONER

## 2024-08-20 PROCEDURE — 36415 COLL VENOUS BLD VENIPUNCTURE: CPT | Performed by: NURSE PRACTITIONER

## 2024-08-20 PROCEDURE — 1159F MED LIST DOCD IN RCRD: CPT | Performed by: NURSE PRACTITIONER

## 2024-08-20 PROCEDURE — 1126F AMNT PAIN NOTED NONE PRSNT: CPT | Performed by: NURSE PRACTITIONER

## 2024-08-20 PROCEDURE — 85045 AUTOMATED RETICULOCYTE COUNT: CPT | Performed by: NURSE PRACTITIONER

## 2024-08-20 RX ORDER — PREDNISONE 20 MG/1
60 TABLET ORAL DAILY
Qty: 90 TABLET | Refills: 0 | Status: SHIPPED | OUTPATIENT
Start: 2024-08-20

## 2024-08-20 RX ORDER — CYCLOSPORINE 25 MG/1
50 CAPSULE, LIQUID FILLED ORAL 2 TIMES DAILY
Qty: 30 CAPSULE | Refills: 0 | Status: SHIPPED | OUTPATIENT
Start: 2024-08-20 | End: 2024-08-26 | Stop reason: DRUGHIGH

## 2024-08-20 RX ORDER — PREDNISONE 20 MG/1
60 TABLET ORAL DAILY
COMMUNITY
End: 2024-08-20 | Stop reason: SDUPTHER

## 2024-08-20 RX ORDER — ONDANSETRON HYDROCHLORIDE 8 MG/1
8 TABLET, FILM COATED ORAL EVERY 8 HOURS PRN
Qty: 30 TABLET | Refills: 1 | Status: SHIPPED | OUTPATIENT
Start: 2024-08-20

## 2024-08-20 NOTE — PROGRESS NOTES
Venipuncture Blood Specimen Collection  Venipuncture performed in Right arm by Sofi Dunham MA with good hemostasis. Patient tolerated the procedure well without complications.   08/20/24   Sofi Dunham MA

## 2024-08-20 NOTE — PROGRESS NOTES
Name:  Chinedu Silva  :  1933  Date:  2024     REFERRING PHYSICIAN    PRIMARY CARE PROVIDER  Jr Segura PA    REASON FOR FOLLOWUP  1. Acquired red cell aplasia          CHIEF COMPLAINT  Fatigue, stable    Dear Mr. Segura,    HISTORY OF PRESENT ILLNESS:   I saw Mr. Silva in followup (from his recent hospitalization for severe anemia) today in our hematology/oncology clinic. As you are aware, he is a pleasant, 89 y.o., white male with a history of hypertension, CAD and GERD who presented to the [facility] ED on 2024 with progressive weakness and dyspnea on exertion. These symptoms had potentially first started ~two to three months prior; however, they significantly worsened over the course of the previous couple of weeks (by early 2024). Upon arrival to our ED, he was found to be severely anemic, with a Hg of 3.7 g/dL. His other cell lines (WBCs and platelets) were entirely WNL. Our service was consulted for further evaluation of this issue, and a bone marrow biopsy was performed on 2024. Meanwhile, as he was feeling substantially better after his HgB was transfused up to the ~9 g/dL range, he was able to be discharged to outpatient follow up in our clinic. He now returns to our clinic to go over the results of the bone marrow exam and for ongoing management.    INTERIM HISTORY:  Mr. Silva returns to clinic today for follow up accompanied by his wife. Since his last visit, he states that he has been doing well. He was seen by Dr. Sidney Salgado at [facility] on 2024 for a second opinion. He continues to take Prednisone 60 mg daily and is tolerating that well. He last required one unit of PRBCs on 2024. He confirms that he has still not noticed any bleeding from any source. He last underwent an endoscopy around ten years ago (with Dr. Richard) after suffering a GI bleed; but he had not had any significant, recurring issues along these lines since then. He  denies any bruising or bleeding. He denies a personal or family history of autoimmune disease or blood disorder. In hindsight, he does recall that he presented to his PCP in early  with a fever to 105 degrees; however, a workup at that time, including swabs for COVID and influenza, was unremarkable. His only complaint today is continued fatigue but he denies any worsening. He has still not experienced any recurrent fevers for the past month, since the 105 reading in early .  He has no other specific complaints.    Past Medical History:   Diagnosis Date    Carotid stenosis     Coronary artery disease     Diverticulitis     GERD (gastroesophageal reflux disease)     Gout     Hearing aid worn     Hypertension     Obese     Prostate cancer     Wears glasses        Past Surgical History:   Procedure Laterality Date    CAROTID ENDARTERECTOMY Left 2018    Procedure: CAROTID ENDARTERECTOMY WITH EEG LEFT;  Surgeon: Del Neal MD;  Location: [facility];  Service:     CATARACT EXTRACTION      CATARACT EXTRACTION WITH INTRAOCULAR LENS IMPLANT Right     CHOLECYSTECTOMY      COLONOSCOPY W/ POLYPECTOMY      HERNIA REPAIR Right     TOTAL KNEE ARTHROPLASTY Right     TURP / TRANSURETHRAL INCISION / DRAINAGE PROSTATE         Social History     Socioeconomic History    Marital status:     Number of children: 1   Tobacco Use    Smoking status: Former     Current packs/day: 0.00     Average packs/day: 1 pack/day for 12.0 years (12.0 ttl pk-yrs)     Types: Cigarettes     Start date:      Quit date:      Years since quittin.6    Smokeless tobacco: Current     Types: Chew   Vaping Use    Vaping status: Never Used   Substance and Sexual Activity    Alcohol use: No    Drug use: No    Sexual activity: Defer       Family History   Problem Relation Age of Onset    Hypertension Mother     Glaucoma Mother     Diverticulitis Mother     Gout Mother     ALS Father     Gout Sister     Broken bones Paternal  Grandfather     Broken bones Son     Cancer Son     Diabetes Brother        Allergies   Allergen Reactions    Ciprofloxacin Other (See Comments)     Muscle Pains    Lisinopril Cough    Sulfa Antibiotics Itching and Rash       Current Outpatient Medications   Medication Sig Dispense Refill    allopurinol (ZYLOPRIM) 300 MG tablet Take 1 tablet by mouth Daily.      aspirin 81 MG EC tablet Take 1 tablet by mouth Daily.      dicyclomine (BENTYL) 20 MG tablet Take 1 tablet by mouth Every 6 (Six) Hours.      pantoprazole (PROTONIX) 40 MG EC tablet Take 1 tablet by mouth Daily. 30 tablet 1    predniSONE (DELTASONE) 20 MG tablet Take 3 tablets by mouth Daily. 90 tablet 0    rosuvastatin (CRESTOR) 10 MG tablet Take 1 tablet by mouth Daily. 90 tablet 3    valsartan (DIOVAN) 80 MG tablet Take 1 tablet by mouth Daily.      cycloSPORINE modified (NEORAL) 25 MG capsule Take 1 capsule by mouth 2 (Two) Times a Day for 1 day, then take 2 capsules twice a day for 1 week. 30 capsule 0    ondansetron (ZOFRAN) 8 MG tablet Take 1 tablet by mouth Every 8 (Eight) Hours As Needed for Nausea or Vomiting. 30 tablet 1     No current facility-administered medications for this visit.     REVIEW OF SYSTEMS  CONSTITUTIONAL:  No fever, chills or night sweats.  EYES:  No blurry vision, diplopia or other vision changes.  ENT:  No hearing loss, nosebleeds or sore throat.  CARDIOVASCULAR:  No palpitations, arrhythmia, syncopal episodes or edema.  PULMONARY:  No hemoptysis, wheezing, chronic cough or shortness of breath.  GASTROINTESTINAL:  No nausea or vomiting. No constipation or diarrhea. No abdominal pain.  GENITOURINARY:  No hematuria, kidney stones or frequent urination.  MUSCULOSKELETAL:  No joint or back pains.  INTEGUMENTARY: No rashes or pruritus.  ENDOCRINE:  No excessive thirst or hot flashes.  HEMATOLOGIC:  No history of free bleeding, spontaneous bleeding or clotting.  IMMUNOLOGIC:  No allergies or frequent infections.  NEUROLOGIC: No  "numbness, tingling, seizures or weakness.  PSYCHIATRIC:  No anxiety or depression.    PHYSICAL EXAMINATION  /78   Pulse 93   Temp 97.7 °F (36.5 °C) (Temporal)   Resp 18   Ht 170.2 cm (67.01\")   Wt 69.9 kg (154 lb)   SpO2 98%   BMI 24.11 kg/m²     Pain Score:  Pain Score    08/20/24 0803   PainSc: 0-No pain       PHQ-Score Total:  PHQ-9 Total Score:      GENERAL:  A well-developed, well-nourished, elderly, white male in no acute distress.  HEENT:  Pupils equally round and reactive to light.  Extraocular muscles intact.  CARDIOVASCULAR:  Regular rate and rhythm.  No murmurs, gallops or rubs.  LUNGS:  Clear to auscultation bilaterally.  No wheezing.  ABDOMEN:  Soft, nontender, nondistended with positive bowel sounds.  EXTREMITIES:  No clubbing, cyanosis or edema bilaterally.  SKIN:  No rashes or petechiae.  NEURO:  Cranial nerves grossly intact. No focal deficits.  PSYCH:  Alert and oriented x3.        LABORATORY  Lab Results   Component Value Date    WBC 8.59 08/20/2024    HGB 8.4 (L) 08/20/2024    HCT 26.4 (L) 08/20/2024    MCV 86.8 08/20/2024     08/20/2024    NEUTROABS 6.24 08/20/2024       Lab Results   Component Value Date     08/20/2024    K 4.6 08/20/2024     08/20/2024    CO2 30.6 (H) 08/20/2024    BUN 25 (H) 08/20/2024    CREATININE 1.31 (H) 08/20/2024    GLUCOSE 99 08/20/2024    CALCIUM 9.8 (H) 08/20/2024    AST 15 08/20/2024    ALT 37 08/20/2024    ALKPHOS 55 08/20/2024    BILITOT 0.8 08/20/2024    PROTEINTOT 6.0 08/20/2024    ALBUMIN 3.6 08/20/2024     Lab Results   Component Value Date    RETICCTPCT 0.26 (L) 08/20/2024     CBC (08/20/2024): WBCS: 8.59; HgB: 8.4; Hct: 26.4; platelets: 272; MCV: 86.8  CBC (08/14/2024): WBCs:  8.16; HgB: 9.0; Hct: 27.9; platelets: 319; MCV: 87.2  CBC (08/09/2024): WBCs: 12.00; HgB: 8.1; Hct: 25.5; platelets: 319; MCV: 90.7  CBC (08/02/2024): WBCs: 11.92; HgB: 8.1; Hct: 24.8; platelets: 204; MCV: 90.5  CBC (07/31/2024): WBCs: 17.04; HgB: 7.6; " Hct: 23.3; platelets: 265; MCV: 95.5  CBC (07/26/2024): WBCs: 13.50; HgB: 8.1; Hct: 24.8; platelets: 329; MCV: 95.0  CBC (07/23/2024): WBCs: 16.43; HgB: 8.5; Hct: 25.8; platelets: 390; MCV: 96.3  CBC (07/15/2024): WBCs: 8.04; HgB: 8.3; Hct: 25.8; platelets: 340; MCV: 97.7  CBC (07/11/2024): WBCs: 7.25; HgB: 9.4; Hct: 29.0; platelets: 375; MCV: 97.0  CBC (07/01/2024): WBCs: 7.50; HgB: 9.4; Hct: 29.3; platelets: 272; MCV: 98.7  CBC (06/26/2024): WBCs: 6.67; HgB: 9.0; Hct: 27.5; platelets: 198; MCV: 98.6    Reticulocyte (08/20/2024): Percentage: 0.26; Absolute: <0.0100  Reticulocyte (08/14/2024): Percentage: 0.32; Absolute:  0.0102  Reticulocyte (08/09/2024): Percentage: 0.24; Absolute: <0.0100  Reticulocyte (08/02/2024): Percentage: 0.48; Absolute: <0.0132  Reticulocyte (07/31/2024): Percentage: 0.19; Absolute: <0.0100  Reticulocyte (07/26/2024): Percentage: 0.18; Absolute: <0.0100  Reticulocyte (07/23/2024): Percentage: 0.25; Absolute: <0.0100  Reticulocyte (07/15/2024): Percentage: 0.32; Absolute: < 0.0100      IMAGING    PATHOLOGY  Peripheral smear, bone marrow aspiration smear, bone marrow aspiration clot and bone marrow core biopsy (06/26/2024):  Normocellular bone marrow with increased granulopoiesis, essentially absent erythropoiesis, adequate megakaryopoiesis, increased stainable iron and slightly increased mixed chronic inflammation. No evidence of increased blasts or dysplasia. The most striking feature within the bone marrow is the essential absence of any erythropoiesis in the aspirate or core biopsy. There is mixed inflammation with some notable immunophenotypic findings by flow cytometry, but there is no evidence of bone marrow involvement by lymphoma. This is most suggestive of a mixed reactive lymphoid population.    IMPRESSION AND PLAN  Mr. Silva is a 89 y.o., white male with:    Pure red cell aplasia: Dr. King had a long discussion with the patient and his wife in clinic regarding the results of  the bone marrow biopsy he underwent several weeks ago (which are summarized above). In short, this is a fairly uncommon diagnosis that can either be idiopathic or associated with a number of potential, underlying causes, including thymoma or parvovirus infection. Regardless, high dose steroids (prednisone 1-2 mg/kg daily) is the standard, first-line treatment; however, since the repeat CBC drawn earlier this month (on 07/01/2024) showed that his HgB (9.4 g/dL) had improved compared to what it was when he was discharged from our hospital following the bone marrow biopsy the previous week (9.0 g/dL), it was hoped that he was satisfactorily recovering (from a viral trigger) on his own. However, repeat CBC on 07/15/2024 revealed that his HgB is dropping again (8.3 mg/dL); and his reticulocyte counts remain essentially zero, a Rx for prednisone 80 mg PO daily was provided. On 7/31, CBC with HgB 7.6, which had dropped and he was having significant weakness/fatigue. Therefore, continued Prednisone 60 mg daily. Transfused one unit PRBCs and repeat CBC, CMP and Reticulocyte count on 08/02/2024.  On 8/2 hemoglobin had improved to 8.1. He was seen on 08/09/24 with reports of increased weakness. Hg was noted to be 8.1. Reticulocyte count remains stable. He was transfused 1 unit of PRBC on 08/09/2024. He was seen by Dr. Sidney Salgado at [facility] on 08/13/24, recommendations included the addition of cyclosporine to prednisone as a potential steroid sparing agent. Dr. Salgado recommended dose no higher than 100 mg or 125 mg BID given patient's current renal function. These recommendations were discussed with Dr. King today. Given patient's age and comorbidities will start conservative dose of Cyclosporine 50 mg BID (he was initially instructed to start with 25 mg in am and if he is able to tolerate he will proceed with 25 mg with the evening dose if medication remains tolerable he will proceed with 50 mg BID on day  two). Possible toxicities were discussed with patient and wife, they were provided a handout ub clinic today. CBC today with Hg 8.4. He reports that fatigue is stable. At this time, we will also continue Prednisone 60 mg daily (refill provided today). He will return for lab check on Friday and MD visit in one week with CBCD, CMP, and reticulocyte count at each visit. The patient and his wife were in agreement with these plans.      It is a pleasure to participate in Mr. Silva's care. Please do not hesitate to call with any questions or concerns that you may have.    A total of 30 minutes were spent coordinating this patient’s care in clinic today; more than 50% of this time was face-to-face with the patient and his wife, reviewing his interim medical history, discussing the results of today's repeat labwork and counseling on the current treatment and followup plans. All questions were answered to their satisfaction.    FOLLOW UP Will start Cyclosporine 50 mg BID (he was initially instructed to start with 25 mg in am and if he is able to tolerate he will proceed with 25 mg with the evening dose if medication remains tolerable he will proceed with 50 mg BID on day two). Continue Prednisone 60 mg daily. Continue Protonix 40 mg daily for GI prophylaxis. Continue to return to our clinic for repeat CBC, CMP and reticulocyte count ton Friday with transfusional support as needed. He will follow-up with MD with CBC, CMP and reticulocyte count in one week.        This document was electronically signed by ZULEYKA Colmenares August 20, 2024 09:29 EDT      CC: RIGO Mccall

## 2024-08-23 ENCOUNTER — CLINICAL SUPPORT (OUTPATIENT)
Dept: ONCOLOGY | Facility: CLINIC | Age: 89
End: 2024-08-23
Payer: MEDICARE

## 2024-08-23 DIAGNOSIS — D61.9 APLASTIC ANEMIA: ICD-10-CM

## 2024-08-23 DIAGNOSIS — D60.9 ACQUIRED RED CELL APLASIA: ICD-10-CM

## 2024-08-23 LAB
ALBUMIN SERPL-MCNC: 3.5 G/DL (ref 3.5–5.2)
ALBUMIN/GLOB SERPL: 1.4 G/DL
ALP SERPL-CCNC: 53 U/L (ref 39–117)
ALT SERPL W P-5'-P-CCNC: 44 U/L (ref 1–41)
ANION GAP SERPL CALCULATED.3IONS-SCNC: 11 MMOL/L (ref 5–15)
AST SERPL-CCNC: 18 U/L (ref 1–40)
BASOPHILS # BLD AUTO: 0.01 10*3/MM3 (ref 0–0.2)
BASOPHILS NFR BLD AUTO: 0.1 % (ref 0–1.5)
BILIRUB SERPL-MCNC: 0.8 MG/DL (ref 0–1.2)
BUN SERPL-MCNC: 34 MG/DL (ref 8–23)
BUN/CREAT SERPL: 24.6 (ref 7–25)
CALCIUM SPEC-SCNC: 10 MG/DL (ref 8.2–9.6)
CHLORIDE SERPL-SCNC: 102 MMOL/L (ref 98–107)
CO2 SERPL-SCNC: 28 MMOL/L (ref 22–29)
CREAT SERPL-MCNC: 1.38 MG/DL (ref 0.76–1.27)
DEPRECATED RDW RBC AUTO: 49.8 FL (ref 37–54)
EGFRCR SERPLBLD CKD-EPI 2021: 48.3 ML/MIN/1.73
EOSINOPHIL # BLD AUTO: 0.03 10*3/MM3 (ref 0–0.4)
EOSINOPHIL NFR BLD AUTO: 0.3 % (ref 0.3–6.2)
ERYTHROCYTE [DISTWIDTH] IN BLOOD BY AUTOMATED COUNT: 15.8 % (ref 12.3–15.4)
GLOBULIN UR ELPH-MCNC: 2.5 GM/DL
GLUCOSE SERPL-MCNC: 210 MG/DL (ref 65–99)
HCT VFR BLD AUTO: 25.3 % (ref 37.5–51)
HGB BLD-MCNC: 8 G/DL (ref 13–17.7)
IMM GRANULOCYTES # BLD AUTO: 0.14 10*3/MM3 (ref 0–0.05)
IMM GRANULOCYTES NFR BLD AUTO: 1.4 % (ref 0–0.5)
LYMPHOCYTES # BLD AUTO: 1.12 10*3/MM3 (ref 0.7–3.1)
LYMPHOCYTES NFR BLD AUTO: 11.2 % (ref 19.6–45.3)
MCH RBC QN AUTO: 27.6 PG (ref 26.6–33)
MCHC RBC AUTO-ENTMCNC: 31.6 G/DL (ref 31.5–35.7)
MCV RBC AUTO: 87.2 FL (ref 79–97)
MONOCYTES # BLD AUTO: 0.45 10*3/MM3 (ref 0.1–0.9)
MONOCYTES NFR BLD AUTO: 4.5 % (ref 5–12)
NEUTROPHILS NFR BLD AUTO: 8.26 10*3/MM3 (ref 1.7–7)
NEUTROPHILS NFR BLD AUTO: 82.5 % (ref 42.7–76)
NRBC BLD AUTO-RTO: 0 /100 WBC (ref 0–0.2)
PLATELET # BLD AUTO: 237 10*3/MM3 (ref 140–450)
PMV BLD AUTO: 11.1 FL (ref 6–12)
POTASSIUM SERPL-SCNC: 4.4 MMOL/L (ref 3.5–5.2)
PROT SERPL-MCNC: 6 G/DL (ref 6–8.5)
RBC # BLD AUTO: 2.9 10*6/MM3 (ref 4.14–5.8)
RETICS # AUTO: <0.01 10*6/MM3 (ref 0.02–0.13)
RETICS/RBC NFR AUTO: 0.26 % (ref 0.7–1.9)
SODIUM SERPL-SCNC: 141 MMOL/L (ref 136–145)
WBC NRBC COR # BLD AUTO: 10.01 10*3/MM3 (ref 3.4–10.8)

## 2024-08-23 PROCEDURE — 85025 COMPLETE CBC W/AUTO DIFF WBC: CPT

## 2024-08-23 PROCEDURE — 85045 AUTOMATED RETICULOCYTE COUNT: CPT

## 2024-08-23 PROCEDURE — 80053 COMPREHEN METABOLIC PANEL: CPT

## 2024-08-23 NOTE — PROGRESS NOTES
Venipuncture Blood Specimen Collection  Venipuncture performed in right arm by Carlos Fernandez MA with good hemostasis. Patient tolerated the procedure well without complications.   08/23/24   Carlos Fernandez MA

## 2024-08-26 ENCOUNTER — SPECIALTY PHARMACY (OUTPATIENT)
Dept: PHARMACY | Facility: HOSPITAL | Age: 89
End: 2024-08-26
Payer: MEDICARE

## 2024-08-26 ENCOUNTER — LAB (OUTPATIENT)
Dept: ONCOLOGY | Facility: CLINIC | Age: 89
End: 2024-08-26
Payer: MEDICARE

## 2024-08-26 ENCOUNTER — OFFICE VISIT (OUTPATIENT)
Dept: ONCOLOGY | Facility: CLINIC | Age: 89
End: 2024-08-26
Payer: MEDICARE

## 2024-08-26 VITALS
RESPIRATION RATE: 20 BRPM | TEMPERATURE: 98 F | OXYGEN SATURATION: 93 % | BODY MASS INDEX: 24.08 KG/M2 | HEIGHT: 67 IN | SYSTOLIC BLOOD PRESSURE: 118 MMHG | DIASTOLIC BLOOD PRESSURE: 59 MMHG | HEART RATE: 107 BPM | WEIGHT: 153.4 LBS

## 2024-08-26 DIAGNOSIS — D60.9 ACQUIRED RED CELL APLASIA: ICD-10-CM

## 2024-08-26 DIAGNOSIS — E78.5 DYSLIPIDEMIA: Primary | ICD-10-CM

## 2024-08-26 DIAGNOSIS — D61.9 APLASTIC ANEMIA: ICD-10-CM

## 2024-08-26 DIAGNOSIS — D64.9 SYMPTOMATIC ANEMIA: ICD-10-CM

## 2024-08-26 DIAGNOSIS — D60.9 ACQUIRED RED CELL APLASIA: Primary | ICD-10-CM

## 2024-08-26 LAB
ALBUMIN SERPL-MCNC: 3.5 G/DL (ref 3.5–5.2)
ALBUMIN/GLOB SERPL: 1.3 G/DL
ALP SERPL-CCNC: 56 U/L (ref 39–117)
ALT SERPL W P-5'-P-CCNC: 52 U/L (ref 1–41)
ANION GAP SERPL CALCULATED.3IONS-SCNC: 13.7 MMOL/L (ref 5–15)
AST SERPL-CCNC: 19 U/L (ref 1–40)
BASOPHILS # BLD AUTO: 0.01 10*3/MM3 (ref 0–0.2)
BASOPHILS NFR BLD AUTO: 0.1 % (ref 0–1.5)
BILIRUB SERPL-MCNC: 0.7 MG/DL (ref 0–1.2)
BUN SERPL-MCNC: 26 MG/DL (ref 8–23)
BUN/CREAT SERPL: 18.3 (ref 7–25)
CALCIUM SPEC-SCNC: 9.8 MG/DL (ref 8.2–9.6)
CHLORIDE SERPL-SCNC: 103 MMOL/L (ref 98–107)
CO2 SERPL-SCNC: 27.3 MMOL/L (ref 22–29)
CREAT SERPL-MCNC: 1.42 MG/DL (ref 0.76–1.27)
DEPRECATED RDW RBC AUTO: 49.2 FL (ref 37–54)
EGFRCR SERPLBLD CKD-EPI 2021: 46.6 ML/MIN/1.73
EOSINOPHIL # BLD AUTO: 0.03 10*3/MM3 (ref 0–0.4)
EOSINOPHIL NFR BLD AUTO: 0.3 % (ref 0.3–6.2)
ERYTHROCYTE [DISTWIDTH] IN BLOOD BY AUTOMATED COUNT: 15.8 % (ref 12.3–15.4)
GLOBULIN UR ELPH-MCNC: 2.7 GM/DL
GLUCOSE SERPL-MCNC: 162 MG/DL (ref 65–99)
HCT VFR BLD AUTO: 24.6 % (ref 37.5–51)
HGB BLD-MCNC: 7.7 G/DL (ref 13–17.7)
IMM GRANULOCYTES # BLD AUTO: 0.15 10*3/MM3 (ref 0–0.05)
IMM GRANULOCYTES NFR BLD AUTO: 1.3 % (ref 0–0.5)
LYMPHOCYTES # BLD AUTO: 1.95 10*3/MM3 (ref 0.7–3.1)
LYMPHOCYTES NFR BLD AUTO: 16.6 % (ref 19.6–45.3)
MCH RBC QN AUTO: 27.1 PG (ref 26.6–33)
MCHC RBC AUTO-ENTMCNC: 31.3 G/DL (ref 31.5–35.7)
MCV RBC AUTO: 86.6 FL (ref 79–97)
MONOCYTES # BLD AUTO: 0.57 10*3/MM3 (ref 0.1–0.9)
MONOCYTES NFR BLD AUTO: 4.9 % (ref 5–12)
NEUTROPHILS NFR BLD AUTO: 76.8 % (ref 42.7–76)
NEUTROPHILS NFR BLD AUTO: 9.01 10*3/MM3 (ref 1.7–7)
NRBC BLD AUTO-RTO: 0 /100 WBC (ref 0–0.2)
PLATELET # BLD AUTO: 250 10*3/MM3 (ref 140–450)
PMV BLD AUTO: 11 FL (ref 6–12)
POTASSIUM SERPL-SCNC: 4.4 MMOL/L (ref 3.5–5.2)
PROT SERPL-MCNC: 6.2 G/DL (ref 6–8.5)
RBC # BLD AUTO: 2.84 10*6/MM3 (ref 4.14–5.8)
RETICS # AUTO: <0.01 10*6/MM3 (ref 0.02–0.13)
RETICS/RBC NFR AUTO: 0.26 % (ref 0.7–1.9)
SODIUM SERPL-SCNC: 144 MMOL/L (ref 136–145)
WBC NRBC COR # BLD AUTO: 11.72 10*3/MM3 (ref 3.4–10.8)

## 2024-08-26 PROCEDURE — 85045 AUTOMATED RETICULOCYTE COUNT: CPT | Performed by: NURSE PRACTITIONER

## 2024-08-26 PROCEDURE — 80053 COMPREHEN METABOLIC PANEL: CPT | Performed by: NURSE PRACTITIONER

## 2024-08-26 PROCEDURE — 85025 COMPLETE CBC W/AUTO DIFF WBC: CPT | Performed by: NURSE PRACTITIONER

## 2024-08-26 PROCEDURE — 99214 OFFICE O/P EST MOD 30 MIN: CPT | Performed by: INTERNAL MEDICINE

## 2024-08-26 PROCEDURE — 1126F AMNT PAIN NOTED NONE PRSNT: CPT | Performed by: INTERNAL MEDICINE

## 2024-08-26 PROCEDURE — G2211 COMPLEX E/M VISIT ADD ON: HCPCS | Performed by: INTERNAL MEDICINE

## 2024-08-26 RX ORDER — CYCLOSPORINE 100 MG/1
100 CAPSULE, LIQUID FILLED ORAL 2 TIMES DAILY
Qty: 60 CAPSULE | Refills: 0 | Status: SHIPPED | OUTPATIENT
Start: 2024-08-26

## 2024-08-26 NOTE — PROGRESS NOTES
Name:  Chinedu Silva  :  1933  Date:  2024     REFERRING PHYSICIAN    PRIMARY CARE PROVIDER  Jr Segura PA    REASON FOR FOLLOWUP  1. Acquired red cell aplasia      CHIEF COMPLAINT  Chronic fatigue.    Dear Mr. Ramoston,    HISTORY OF PRESENT ILLNESS:   I saw Mr. Silva in followup (from his recent hospitalization for severe anemia) today in our hematology/oncology clinic. As you are aware, he is a pleasant, 89 y.o., white male with a history of hypertension, CAD and GERD who presented to the [facility] [place] ED on 2024 with progressive weakness and dyspnea on exertion. These symptoms had potentially first started ~two to three months prior; however, they significantly worsened over the course of the previous couple of weeks (by early 2024). Upon arrival to our ED, he was found to be severely anemic, with a Hg of 3.7 g/dL. His other cell lines (WBCs and platelets) were entirely WNL. Our service was consulted for further evaluation of this issue, and a bone marrow biopsy was performed on 2024. Meanwhile, as he was feeling substantially better after his HgB was transfused up to the ~9 g/dL range, he was able to be discharged to outpatient follow up in our clinic. He returned to our clinic the following week (on 2024) to go over the results of the bone marrow exam and for ongoing management.    INTERIM HISTORY:  Mr. Silva returns to clinic today for follow up again accompanied by his wife. He was seen by Dr. Sidney Salgado at [facility] [place] on 2024 for an additional opinion. He continues to take prednisone 60 mg daily, and he has been on steroids for a few weeks now.  and is tolerating that well. He last required one unit of PRBCs on 2024. He confirms that he has still not noticed any bleeding from any source. He last underwent an endoscopy around ten years ago (with Dr. Richard) after suffering a GI bleed; but he had not had any significant, recurring issues  along these lines since then. He again denies any bruising or bleeding. He denies a personal or family history of autoimmune disease or blood disorder. In hindsight, he does recall that he presented to his PCP in early  with a fever to 105 degrees; however, a workup at that time, including swabs for COVID and influenza, was unremarkable. He has been on PO cyclosporine (50 mg BID) for a few days now, and he reports that he is tolerating this medication fairly well also. He has developed some mouth sores since starting this new treatment. He has no other new or specific complaints.    Past Medical History:   Diagnosis Date    Carotid stenosis     Coronary artery disease     Diverticulitis     GERD (gastroesophageal reflux disease)     Gout     Hearing aid worn     Hypertension     Obese     Prostate cancer     Wears glasses        Past Surgical History:   Procedure Laterality Date    CAROTID ENDARTERECTOMY Left 2018    Procedure: CAROTID ENDARTERECTOMY WITH EEG LEFT;  Surgeon: Del Neal MD;  Location: [facility] [place];  Service:     CATARACT EXTRACTION      CATARACT EXTRACTION WITH INTRAOCULAR LENS IMPLANT Right     CHOLECYSTECTOMY      COLONOSCOPY W/ POLYPECTOMY      HERNIA REPAIR Right     TOTAL KNEE ARTHROPLASTY Right     TURP / TRANSURETHRAL INCISION / DRAINAGE PROSTATE         Social History     Socioeconomic History    Marital status:     Number of children: 1   Tobacco Use    Smoking status: Former     Current packs/day: 0.00     Average packs/day: 1 pack/day for 12.0 years (12.0 ttl pk-yrs)     Types: Cigarettes     Start date:      Quit date:      Years since quittin.6    Smokeless tobacco: Current     Types: Chew   Vaping Use    Vaping status: Never Used   Substance and Sexual Activity    Alcohol use: No    Drug use: No    Sexual activity: Defer       Family History   Problem Relation Age of Onset    Hypertension Mother     Glaucoma Mother     Diverticulitis Mother     Gout Mother      ALS Father     Gout Sister     Broken bones Paternal Grandfather     Broken bones Son     Cancer Son     Diabetes Brother        Allergies   Allergen Reactions    Ciprofloxacin Other (See Comments)     Muscle Pains    Lisinopril Cough    Sulfa Antibiotics Itching and Rash       Current Outpatient Medications   Medication Sig Dispense Refill    allopurinol (ZYLOPRIM) 300 MG tablet Take 1 tablet by mouth Daily.      aspirin 81 MG EC tablet Take 1 tablet by mouth Daily.      dicyclomine (BENTYL) 20 MG tablet Take 1 tablet by mouth Every 6 (Six) Hours.      ondansetron (ZOFRAN) 8 MG tablet Take 1 tablet by mouth Every 8 (Eight) Hours As Needed for Nausea or Vomiting. 30 tablet 1    pantoprazole (PROTONIX) 40 MG EC tablet Take 1 tablet by mouth Daily. 30 tablet 1    predniSONE (DELTASONE) 20 MG tablet Take 3 tablets by mouth Daily. 90 tablet 0    rosuvastatin (CRESTOR) 10 MG tablet Take 1 tablet by mouth Daily. 90 tablet 3    valsartan (DIOVAN) 80 MG tablet Take 1 tablet by mouth Daily.      cycloSPORINE modified (NEORAL) 100 MG capsule Take 1 capsule by mouth 2 (Two) Times a Day. 60 capsule 0     No current facility-administered medications for this visit.     REVIEW OF SYSTEMS  CONSTITUTIONAL:  No fever, chills or night sweats. Chronic fatigue, as per the HPI above.  EYES:  No blurry vision, diplopia or other vision changes. Intermittent mucositis since starting PO cyclosporine.  ENT:  No hearing loss, nosebleeds or sore throat.  CARDIOVASCULAR:  No palpitations, arrhythmia, syncopal episodes or edema.  PULMONARY:  No hemoptysis, wheezing, chronic cough or shortness of breath.  GASTROINTESTINAL:  No nausea or vomiting. No constipation or diarrhea. No abdominal pain.  GENITOURINARY:  No hematuria, kidney stones or frequent urination.  MUSCULOSKELETAL:  No joint or back pains.  INTEGUMENTARY: No rashes or pruritus.  ENDOCRINE:  No excessive thirst or hot flashes.  HEMATOLOGIC:  No history of free bleeding, spontaneous  "bleeding or clotting.  IMMUNOLOGIC:  No allergies or frequent infections.  NEUROLOGIC: No numbness, tingling, seizures or weakness.  PSYCHIATRIC:  No anxiety or depression.    PHYSICAL EXAMINATION  /59   Pulse 107   Temp 98 °F (36.7 °C) (Temporal)   Resp 20   Ht 170.2 cm (67\")   Wt 69.6 kg (153 lb 6.4 oz)   SpO2 93%   BMI 24.03 kg/m²     Pain Score:  Pain Score    24 1208   PainSc: 0-No pain     PHQ-Score Total:  PHQ-9 Total Score:      ECO  GENERAL:  A well-developed, well-nourished, elderly, white male in no acute distress. Mildly cushingoid in appearance.  HEENT:  Pupils equally round and reactive to light. Extraocular muscles intact.  CARDIOVASCULAR:  Regular rate and rhythm. No murmurs, gallops or rubs.  LUNGS:  Clear to auscultation bilaterally.  No wheezing.  ABDOMEN:  Soft, nontender, nondistended with positive bowel sounds.  EXTREMITIES:  No clubbing, cyanosis or edema bilaterally.  SKIN:  No rashes or petechiae.  NEURO:  Cranial nerves grossly intact. No focal deficits.  PSYCH:  Alert and oriented x3.    LABORATORY  Lab Results   Component Value Date    WBC 11.72 (H) 2024    HGB 7.7 (L) 2024    HCT 24.6 (L) 2024    MCV 86.6 2024     2024    NEUTROABS 9.01 (H) 2024       Lab Results   Component Value Date     2024    K 4.4 2024     2024    CO2 27.3 2024    BUN 26 (H) 2024    CREATININE 1.42 (H) 2024    GLUCOSE 162 (H) 2024    CALCIUM 9.8 (H) 2024    AST 19 2024    ALT 52 (H) 2024    ALKPHOS 56 2024    BILITOT 0.7 2024    PROTEINTOT 6.2 2024    ALBUMIN 3.5 2024     Lab Results   Component Value Date    RETICCTPCT 0.26 (L) 2024     CBC (2024): WBCs: 11.72; HgB: 7.7; Hct: 24.6; platelets: 250; MCV: 86.6  CBC (2024): WBCs: 10.01; HgB: 8.0; Hct: 25.3; platelets: 237; MCV: 87.2  CBC (2024): WBCS: 8.59; HgB: 8.4; Hct: 26.4; " platelets: 272; MCV: 86.8  CBC (08/14/2024): WBCs:  8.16; HgB: 9.0; Hct: 27.9; platelets: 319; MCV: 87.2  CBC (08/09/2024): WBCs: 12.00; HgB: 8.1; Hct: 25.5; platelets: 319; MCV: 90.7  CBC (08/02/2024): WBCs: 11.92; HgB: 8.1; Hct: 24.8; platelets: 204; MCV: 90.5  CBC (07/31/2024): WBCs: 17.04; HgB: 7.6; Hct: 23.3; platelets: 265; MCV: 95.5  CBC (07/26/2024): WBCs: 13.50; HgB: 8.1; Hct: 24.8; platelets: 329; MCV: 95.0  CBC (07/23/2024): WBCs: 16.43; HgB: 8.5; Hct: 25.8; platelets: 390; MCV: 96.3  CBC (07/15/2024): WBCs: 8.04; HgB: 8.3; Hct: 25.8; platelets: 340; MCV: 97.7  CBC (07/11/2024): WBCs: 7.25; HgB: 9.4; Hct: 29.0; platelets: 375; MCV: 97.0  CBC (07/01/2024): WBCs: 7.50; HgB: 9.4; Hct: 29.3; platelets: 272; MCV: 98.7  CBC (06/26/2024): WBCs: 6.67; HgB: 9.0; Hct: 27.5; platelets: 198; MCV: 98.6    Reticulocyte (08/20/2024): Percentage: 0.26; Absolute: <0.0100    IMAGING    PATHOLOGY  Peripheral smear, bone marrow aspiration smear, bone marrow aspiration clot and bone marrow core biopsy (06/26/2024):  Normocellular bone marrow with increased granulopoiesis, essentially absent erythropoiesis, adequate megakaryopoiesis, increased stainable iron and slightly increased mixed chronic inflammation. No evidence of increased blasts or dysplasia. The most striking feature within the bone marrow is the essential absence of any erythropoiesis in the aspirate or core biopsy. There is mixed inflammation with some notable immunophenotypic findings by flow cytometry, but there is no evidence of bone marrow involvement by lymphoma. This is most suggestive of a mixed reactive lymphoid population.    IMPRESSION AND PLAN  Mr. Silva is a 89 y.o., white male with:  Pure red cell aplasia: I have had multiple, long discussions with the patient and his wife regarding the results of the bone marrow biopsy performed in late June 2024 (which are summarized above). In short, this is a fairly uncommon diagnosis that can either be  idiopathic or associated with a number of potential, underlying causes, including thymoma or parvovirus infection. Regardless, high dose steroids (prednisone 1-2 mg/kg daily) is the standard, first-line treatment; however, since the repeat CBC drawn on 07/01/2024 showed that his HgB (9.4 g/dL) had improved compared to what it was when he was discharged from our hospital following the bone marrow biopsy the previous week (9.0 g/dL), it was hoped that he was satisfactorily recovering (from a viral trigger) on his own. However, a repeat CBC on 07/15/2024 revealed that his HgB had dropped again (8.3 mg/dL); and his reticulocyte counts remained essentially zero. A Rx for prednisone 80 mg PO daily was provided at that time; but, unfortunately, to date, high-dose steroids have not provided him with any meaningful effect. Consequently, by late August 2024, he was started on an additional Rx (cyclosporine 50 mg PO BID), per [facility]'s recommendations (with whom he had met for an additional opinion regarding his management earlier that month). As he is tolerating the starting dose (of 50 mg BID) well, we will increase it to 100 mg BID while we also begin weaning him off of the prednisone; he was instructed to decrease his daily dose of the latter from 60 mg to 40 mg. We will recheck a CBC this Thursday (8/29), and we will see him back in our clinic in one week with repeat labs for continued, close followup. The patient and his wife were in agreement with this plan.    It is a pleasure to participate in Mr. Silva's care. Please do not hesitate to call with any questions or concerns that you may have.    A total of 30 minutes were spent coordinating this patient’s care in clinic today; more than 50% of this time was face-to-face with the patient and his wife, reviewing his interim medical history, discussing the results of today's repeat labwork and counseling on the current treatment and followup plans. All questions were  answered to their satisfaction.    FOLLOW UP  Continue Cyclosporine (now 100 mg) PO BID (a new Rx was provided today). Continue Prednisone (now 40 mg rather than 60 mg) PO daily. Continue Protonix 40 mg daily for GI prophylaxis. Repeat a CBC and reticulocyte count this Thursday (8/29). Return to our clinic in 1 week with a CBC, CMP and retic count.            This document was electronically signed by RAMIRO King MD August 26, 2024 13:14 EDT      CC: RIGO Mccall

## 2024-08-26 NOTE — PROGRESS NOTES
Venipuncture Blood Specimen Collection  Venipuncture performed in right arm by Shena Sewell MA with good hemostasis. Patient tolerated the procedure well without complications.   08/26/24   Shena Sewell MA

## 2024-08-29 ENCOUNTER — CLINICAL SUPPORT (OUTPATIENT)
Dept: ONCOLOGY | Facility: CLINIC | Age: 89
End: 2024-08-29
Payer: MEDICARE

## 2024-08-29 VITALS
WEIGHT: 153 LBS | TEMPERATURE: 98 F | BODY MASS INDEX: 24.01 KG/M2 | DIASTOLIC BLOOD PRESSURE: 63 MMHG | HEART RATE: 109 BPM | SYSTOLIC BLOOD PRESSURE: 111 MMHG | HEIGHT: 67 IN | OXYGEN SATURATION: 98 % | RESPIRATION RATE: 20 BRPM

## 2024-08-29 DIAGNOSIS — D60.9 ACQUIRED RED CELL APLASIA: ICD-10-CM

## 2024-08-29 DIAGNOSIS — D64.9 SYMPTOMATIC ANEMIA: ICD-10-CM

## 2024-08-29 DIAGNOSIS — D61.9 APLASTIC ANEMIA: ICD-10-CM

## 2024-08-29 DIAGNOSIS — D60.9 ACQUIRED RED CELL APLASIA: Primary | ICD-10-CM

## 2024-08-29 LAB
ABO GROUP BLD: NORMAL
ALBUMIN SERPL-MCNC: 3.5 G/DL (ref 3.5–5.2)
ALBUMIN/GLOB SERPL: 1.3 G/DL
ALP SERPL-CCNC: 54 U/L (ref 39–117)
ALT SERPL W P-5'-P-CCNC: 45 U/L (ref 1–41)
ANION GAP SERPL CALCULATED.3IONS-SCNC: 13.9 MMOL/L (ref 5–15)
AST SERPL-CCNC: 15 U/L (ref 1–40)
BASOPHILS # BLD AUTO: 0.02 10*3/MM3 (ref 0–0.2)
BASOPHILS NFR BLD AUTO: 0.1 % (ref 0–1.5)
BILIRUB SERPL-MCNC: 0.8 MG/DL (ref 0–1.2)
BLD GP AB SCN SERPL QL: NEGATIVE
BUN SERPL-MCNC: 32 MG/DL (ref 8–23)
BUN/CREAT SERPL: 21.6 (ref 7–25)
CALCIUM SPEC-SCNC: 9.7 MG/DL (ref 8.2–9.6)
CHLORIDE SERPL-SCNC: 103 MMOL/L (ref 98–107)
CO2 SERPL-SCNC: 26.1 MMOL/L (ref 22–29)
CREAT SERPL-MCNC: 1.48 MG/DL (ref 0.76–1.27)
DEPRECATED RDW RBC AUTO: 47.8 FL (ref 37–54)
EGFRCR SERPLBLD CKD-EPI 2021: 44.4 ML/MIN/1.73
EOSINOPHIL # BLD AUTO: 0.01 10*3/MM3 (ref 0–0.4)
EOSINOPHIL NFR BLD AUTO: 0.1 % (ref 0.3–6.2)
ERYTHROCYTE [DISTWIDTH] IN BLOOD BY AUTOMATED COUNT: 15.7 % (ref 12.3–15.4)
GLOBULIN UR ELPH-MCNC: 2.6 GM/DL
GLUCOSE SERPL-MCNC: 250 MG/DL (ref 65–99)
HCT VFR BLD AUTO: 23.5 % (ref 37.5–51)
HGB BLD-MCNC: 7.4 G/DL (ref 13–17.7)
IMM GRANULOCYTES # BLD AUTO: 0.23 10*3/MM3 (ref 0–0.05)
IMM GRANULOCYTES NFR BLD AUTO: 1.5 % (ref 0–0.5)
LYMPHOCYTES # BLD AUTO: 1.49 10*3/MM3 (ref 0.7–3.1)
LYMPHOCYTES NFR BLD AUTO: 10 % (ref 19.6–45.3)
MCH RBC QN AUTO: 27.3 PG (ref 26.6–33)
MCHC RBC AUTO-ENTMCNC: 31.5 G/DL (ref 31.5–35.7)
MCV RBC AUTO: 86.7 FL (ref 79–97)
MONOCYTES # BLD AUTO: 0.53 10*3/MM3 (ref 0.1–0.9)
MONOCYTES NFR BLD AUTO: 3.6 % (ref 5–12)
NEUTROPHILS NFR BLD AUTO: 12.58 10*3/MM3 (ref 1.7–7)
NEUTROPHILS NFR BLD AUTO: 84.7 % (ref 42.7–76)
NRBC BLD AUTO-RTO: 0 /100 WBC (ref 0–0.2)
PLATELET # BLD AUTO: 275 10*3/MM3 (ref 140–450)
PMV BLD AUTO: 11.6 FL (ref 6–12)
POTASSIUM SERPL-SCNC: 4.8 MMOL/L (ref 3.5–5.2)
PROT SERPL-MCNC: 6.1 G/DL (ref 6–8.5)
RBC # BLD AUTO: 2.71 10*6/MM3 (ref 4.14–5.8)
RETICS # AUTO: <0.01 10*6/MM3 (ref 0.02–0.13)
RETICS/RBC NFR AUTO: 0.22 % (ref 0.7–1.9)
RH BLD: POSITIVE
SODIUM SERPL-SCNC: 143 MMOL/L (ref 136–145)
T&S EXPIRATION DATE: NORMAL
WBC NRBC COR # BLD AUTO: 14.86 10*3/MM3 (ref 3.4–10.8)

## 2024-08-29 PROCEDURE — 85045 AUTOMATED RETICULOCYTE COUNT: CPT | Performed by: NURSE PRACTITIONER

## 2024-08-29 PROCEDURE — 80158 DRUG ASSAY CYCLOSPORINE: CPT | Performed by: INTERNAL MEDICINE

## 2024-08-29 PROCEDURE — 80053 COMPREHEN METABOLIC PANEL: CPT | Performed by: INTERNAL MEDICINE

## 2024-08-29 PROCEDURE — 85025 COMPLETE CBC W/AUTO DIFF WBC: CPT | Performed by: INTERNAL MEDICINE

## 2024-08-29 PROCEDURE — 86923 COMPATIBILITY TEST ELECTRIC: CPT

## 2024-08-29 PROCEDURE — 86900 BLOOD TYPING SEROLOGIC ABO: CPT | Performed by: INTERNAL MEDICINE

## 2024-08-29 PROCEDURE — 86850 RBC ANTIBODY SCREEN: CPT | Performed by: INTERNAL MEDICINE

## 2024-08-29 PROCEDURE — 86901 BLOOD TYPING SEROLOGIC RH(D): CPT | Performed by: INTERNAL MEDICINE

## 2024-08-29 RX ORDER — ACETAMINOPHEN 325 MG/1
650 TABLET ORAL ONCE
Status: CANCELLED | OUTPATIENT
Start: 2024-08-29 | End: 2024-08-29

## 2024-08-29 RX ORDER — DIPHENHYDRAMINE HCL 25 MG
25 CAPSULE ORAL ONCE
Status: CANCELLED | OUTPATIENT
Start: 2024-08-29 | End: 2024-08-29

## 2024-08-29 RX ORDER — SODIUM CHLORIDE 9 MG/ML
250 INJECTION, SOLUTION INTRAVENOUS AS NEEDED
Status: CANCELLED | OUTPATIENT
Start: 2024-08-29

## 2024-08-29 NOTE — PROGRESS NOTES
Venipuncture Blood Specimen Collection  Venipuncture performed in left arm by Shena Sewell MA with good hemostasis. Patient tolerated the procedure well without complications.   08/29/24   Shena Sewell MA

## 2024-08-30 ENCOUNTER — INFUSION (OUTPATIENT)
Dept: ONCOLOGY | Facility: HOSPITAL | Age: 89
End: 2024-08-30
Payer: MEDICARE

## 2024-08-30 VITALS
DIASTOLIC BLOOD PRESSURE: 59 MMHG | HEART RATE: 61 BPM | RESPIRATION RATE: 16 BRPM | TEMPERATURE: 99.1 F | OXYGEN SATURATION: 97 % | SYSTOLIC BLOOD PRESSURE: 129 MMHG | BODY MASS INDEX: 24.12 KG/M2 | WEIGHT: 154 LBS

## 2024-08-30 DIAGNOSIS — D60.9 ACQUIRED RED CELL APLASIA: ICD-10-CM

## 2024-08-30 PROCEDURE — 63710000001 ACETAMINOPHEN 325 MG TABLET: Performed by: INTERNAL MEDICINE

## 2024-08-30 PROCEDURE — 36430 TRANSFUSION BLD/BLD COMPNT: CPT

## 2024-08-30 PROCEDURE — A9270 NON-COVERED ITEM OR SERVICE: HCPCS | Performed by: INTERNAL MEDICINE

## 2024-08-30 PROCEDURE — 63710000001 DIPHENHYDRAMINE PER 50 MG: Performed by: INTERNAL MEDICINE

## 2024-08-30 PROCEDURE — 86900 BLOOD TYPING SEROLOGIC ABO: CPT

## 2024-08-30 PROCEDURE — P9040 RBC LEUKOREDUCED IRRADIATED: HCPCS

## 2024-08-30 RX ORDER — DIPHENHYDRAMINE HCL 25 MG
25 CAPSULE ORAL ONCE
Status: COMPLETED | OUTPATIENT
Start: 2024-08-30 | End: 2024-08-30

## 2024-08-30 RX ORDER — ACETAMINOPHEN 325 MG/1
650 TABLET ORAL ONCE
Status: COMPLETED | OUTPATIENT
Start: 2024-08-30 | End: 2024-08-30

## 2024-08-30 RX ORDER — SODIUM CHLORIDE 9 MG/ML
250 INJECTION, SOLUTION INTRAVENOUS AS NEEDED
Status: DISCONTINUED | OUTPATIENT
Start: 2024-08-30 | End: 2024-08-30 | Stop reason: HOSPADM

## 2024-08-30 RX ADMIN — DIPHENHYDRAMINE HYDROCHLORIDE 25 MG: 25 CAPSULE ORAL at 08:32

## 2024-08-30 RX ADMIN — ACETAMINOPHEN 650 MG: 325 TABLET ORAL at 08:32

## 2024-08-31 LAB
BH BB BLOOD EXPIRATION DATE: NORMAL
BH BB BLOOD EXPIRATION DATE: NORMAL
BH BB BLOOD TYPE BARCODE: 6200
BH BB BLOOD TYPE BARCODE: 6200
BH BB DISPENSE STATUS: NORMAL
BH BB DISPENSE STATUS: NORMAL
BH BB PRODUCT CODE: NORMAL
BH BB PRODUCT CODE: NORMAL
BH BB UNIT NUMBER: NORMAL
BH BB UNIT NUMBER: NORMAL
CROSSMATCH INTERPRETATION: NORMAL
CROSSMATCH INTERPRETATION: NORMAL
UNIT  ABO: NORMAL
UNIT  ABO: NORMAL
UNIT  RH: NORMAL
UNIT  RH: NORMAL

## 2024-09-01 LAB — CYCLOSPORINE BLD LC/MS/MS-MCNC: 213 NG/ML (ref 100–400)

## 2024-09-03 ENCOUNTER — LAB (OUTPATIENT)
Dept: ONCOLOGY | Facility: CLINIC | Age: 89
End: 2024-09-03
Payer: MEDICARE

## 2024-09-03 ENCOUNTER — OFFICE VISIT (OUTPATIENT)
Dept: ONCOLOGY | Facility: CLINIC | Age: 89
End: 2024-09-03
Payer: MEDICARE

## 2024-09-03 VITALS
DIASTOLIC BLOOD PRESSURE: 80 MMHG | HEART RATE: 102 BPM | RESPIRATION RATE: 18 BRPM | OXYGEN SATURATION: 97 % | BODY MASS INDEX: 24.08 KG/M2 | HEIGHT: 67 IN | WEIGHT: 153.4 LBS | SYSTOLIC BLOOD PRESSURE: 145 MMHG | TEMPERATURE: 97.5 F

## 2024-09-03 DIAGNOSIS — D60.9 ACQUIRED RED CELL APLASIA: ICD-10-CM

## 2024-09-03 DIAGNOSIS — D64.9 SYMPTOMATIC ANEMIA: ICD-10-CM

## 2024-09-03 DIAGNOSIS — D60.9 ACQUIRED RED CELL APLASIA: Primary | ICD-10-CM

## 2024-09-03 DIAGNOSIS — D61.9 APLASTIC ANEMIA: ICD-10-CM

## 2024-09-03 LAB
ALBUMIN SERPL-MCNC: 3.6 G/DL (ref 3.5–5.2)
ALBUMIN/GLOB SERPL: 1.3 G/DL
ALP SERPL-CCNC: 55 U/L (ref 39–117)
ALT SERPL W P-5'-P-CCNC: 35 U/L (ref 1–41)
ANION GAP SERPL CALCULATED.3IONS-SCNC: 11.8 MMOL/L (ref 5–15)
AST SERPL-CCNC: 16 U/L (ref 1–40)
BASOPHILS # BLD AUTO: 0.01 10*3/MM3 (ref 0–0.2)
BASOPHILS NFR BLD AUTO: 0.1 % (ref 0–1.5)
BILIRUB SERPL-MCNC: 1.1 MG/DL (ref 0–1.2)
BUN SERPL-MCNC: 44 MG/DL (ref 8–23)
BUN/CREAT SERPL: 29.9 (ref 7–25)
CALCIUM SPEC-SCNC: 10.1 MG/DL (ref 8.2–9.6)
CHLORIDE SERPL-SCNC: 99 MMOL/L (ref 98–107)
CO2 SERPL-SCNC: 27.2 MMOL/L (ref 22–29)
CREAT SERPL-MCNC: 1.47 MG/DL (ref 0.76–1.27)
DEPRECATED RDW RBC AUTO: 45.7 FL (ref 37–54)
EGFRCR SERPLBLD CKD-EPI 2021: 44.8 ML/MIN/1.73
EOSINOPHIL # BLD AUTO: 0 10*3/MM3 (ref 0–0.4)
EOSINOPHIL NFR BLD AUTO: 0 % (ref 0.3–6.2)
ERYTHROCYTE [DISTWIDTH] IN BLOOD BY AUTOMATED COUNT: 14.6 % (ref 12.3–15.4)
GLOBULIN UR ELPH-MCNC: 2.7 GM/DL
GLUCOSE SERPL-MCNC: 333 MG/DL (ref 65–99)
HCT VFR BLD AUTO: 35.6 % (ref 37.5–51)
HGB BLD-MCNC: 11.5 G/DL (ref 13–17.7)
IMM GRANULOCYTES # BLD AUTO: 0.14 10*3/MM3 (ref 0–0.05)
IMM GRANULOCYTES NFR BLD AUTO: 1.3 % (ref 0–0.5)
LYMPHOCYTES # BLD AUTO: 0.91 10*3/MM3 (ref 0.7–3.1)
LYMPHOCYTES NFR BLD AUTO: 8.3 % (ref 19.6–45.3)
MCH RBC QN AUTO: 28 PG (ref 26.6–33)
MCHC RBC AUTO-ENTMCNC: 32.3 G/DL (ref 31.5–35.7)
MCV RBC AUTO: 86.6 FL (ref 79–97)
MONOCYTES # BLD AUTO: 0.19 10*3/MM3 (ref 0.1–0.9)
MONOCYTES NFR BLD AUTO: 1.7 % (ref 5–12)
NEUTROPHILS NFR BLD AUTO: 88.6 % (ref 42.7–76)
NEUTROPHILS NFR BLD AUTO: 9.7 10*3/MM3 (ref 1.7–7)
NRBC BLD AUTO-RTO: 0 /100 WBC (ref 0–0.2)
PLATELET # BLD AUTO: 203 10*3/MM3 (ref 140–450)
PMV BLD AUTO: 11.3 FL (ref 6–12)
POTASSIUM SERPL-SCNC: 5 MMOL/L (ref 3.5–5.2)
PROT SERPL-MCNC: 6.3 G/DL (ref 6–8.5)
RBC # BLD AUTO: 4.11 10*6/MM3 (ref 4.14–5.8)
SODIUM SERPL-SCNC: 138 MMOL/L (ref 136–145)
WBC NRBC COR # BLD AUTO: 10.95 10*3/MM3 (ref 3.4–10.8)

## 2024-09-03 PROCEDURE — 80053 COMPREHEN METABOLIC PANEL: CPT | Performed by: INTERNAL MEDICINE

## 2024-09-03 PROCEDURE — 99214 OFFICE O/P EST MOD 30 MIN: CPT | Performed by: INTERNAL MEDICINE

## 2024-09-03 PROCEDURE — G2211 COMPLEX E/M VISIT ADD ON: HCPCS | Performed by: INTERNAL MEDICINE

## 2024-09-03 PROCEDURE — 36415 COLL VENOUS BLD VENIPUNCTURE: CPT | Performed by: INTERNAL MEDICINE

## 2024-09-03 PROCEDURE — 85025 COMPLETE CBC W/AUTO DIFF WBC: CPT | Performed by: INTERNAL MEDICINE

## 2024-09-03 PROCEDURE — 80158 DRUG ASSAY CYCLOSPORINE: CPT | Performed by: INTERNAL MEDICINE

## 2024-09-03 PROCEDURE — 1125F AMNT PAIN NOTED PAIN PRSNT: CPT | Performed by: INTERNAL MEDICINE

## 2024-09-03 NOTE — PROGRESS NOTES
Venipuncture Blood Specimen Collection  Venipuncture performed in right arm by Carlos Fernandez MA with good hemostasis. Patient tolerated the procedure well without complications.   09/03/24   Carlos Fernandez MA

## 2024-09-03 NOTE — PROGRESS NOTES
Name:  Chinedu Silva  :  1933  Date:  9/3/2024     REFERRING PHYSICIAN    PRIMARY CARE PROVIDER  Jr Segura PA    REASON FOR FOLLOWUP  1. Acquired red cell aplasia      CHIEF COMPLAINT  New onset mucositis for the past ~week or two.    Dear Mr. Segura,    HISTORY OF PRESENT ILLNESS:   I saw Mr. Silva in followup (from his recent hospitalization for severe anemia) today in our hematology/oncology clinic. As you are aware, he is a pleasant, 89 y.o., white male with a history of hypertension, CAD and GERD who presented to the [facility] ED on 2024 with progressive weakness and dyspnea on exertion. These symptoms had potentially first started ~two to three months prior; however, they significantly worsened over the course of the previous couple of weeks (by early 2024). Upon arrival to our ED, he was found to be severely anemic, with a Hg of 3.7 g/dL. His other cell lines (WBCs and platelets) were entirely WNL. Our service was consulted for further evaluation of this issue, and a bone marrow biopsy was performed on 2024. Meanwhile, as he was feeling substantially better after his HgB was transfused up to the ~9 g/dL range, he was able to be discharged to outpatient follow up in our clinic. He returned to our clinic the following week (on 2024) to go over the results of the bone marrow exam and for ongoing management.    INTERIM HISTORY:  Mr. Silva returns to clinic today for follow up again accompanied by his wife. He was seen by at [facility], [place], operated by [facility] on 2024 for an additional opinion for his acquired pure red cell aplasia. He has been on PO cyclosporine for a total of ~two weeks now (50 mg BID for the first week and currently 100 mg BID). He has also decreased his daily dose of prednisone to 40 mg this past week, as previously instructed. With this ongoing treatment, he has now (finally) felt less fatigued in recent days; however, he now has a new complaint of  "mucositis. Once his dose of cyclosporine was increased from 50 mg PO BID to 100 mg BID ~one week ago, he has developed a couple of ulcers in his mouth. The routine use of magic mouthwash has been helping; but eating, particularly salty foods, has been \"a chore\". He has no other new or specific complaints.    Past Medical History:   Diagnosis Date    Carotid stenosis     Coronary artery disease     Diverticulitis     GERD (gastroesophageal reflux disease)     Gout     Hearing aid worn     Hypertension     Obese     Prostate cancer     Wears glasses        Past Surgical History:   Procedure Laterality Date    CAROTID ENDARTERECTOMY Left 2018    Procedure: CAROTID ENDARTERECTOMY WITH EEG LEFT;  Surgeon: Del Neal MD;  Location: [facility];  Service:     CATARACT EXTRACTION      CATARACT EXTRACTION WITH INTRAOCULAR LENS IMPLANT Right     CHOLECYSTECTOMY      COLONOSCOPY W/ POLYPECTOMY      HERNIA REPAIR Right     TOTAL KNEE ARTHROPLASTY Right     TURP / TRANSURETHRAL INCISION / DRAINAGE PROSTATE         Social History     Socioeconomic History    Marital status:     Number of children: 1   Tobacco Use    Smoking status: Former     Current packs/day: 0.00     Average packs/day: 1 pack/day for 12.0 years (12.0 ttl pk-yrs)     Types: Cigarettes     Start date:      Quit date:      Years since quittin.7    Smokeless tobacco: Current     Types: Chew   Vaping Use    Vaping status: Never Used   Substance and Sexual Activity    Alcohol use: No    Drug use: No    Sexual activity: Defer       Family History   Problem Relation Age of Onset    Hypertension Mother     Glaucoma Mother     Diverticulitis Mother     Gout Mother     ALS Father     Gout Sister     Broken bones Paternal Grandfather     Broken bones Son     Cancer Son     Diabetes Brother        Allergies   Allergen Reactions    Ciprofloxacin Other (See Comments)     Muscle Pains    Lisinopril Cough    Sulfa Antibiotics Itching and Rash "       Current Outpatient Medications   Medication Sig Dispense Refill    allopurinol (ZYLOPRIM) 300 MG tablet Take 1 tablet by mouth Daily.      aspirin 81 MG EC tablet Take 1 tablet by mouth Daily.      cycloSPORINE modified (NEORAL) 100 MG capsule Take 1 capsule by mouth 2 (Two) Times a Day. 60 capsule 0    dicyclomine (BENTYL) 20 MG tablet Take 1 tablet by mouth Every 6 (Six) Hours.      MAGIC MOUTHWASH W/NYSTATIN 1/1/1/1 (lidocaine - diphenhydrAMINE HCl - aluminum & magnesium hydroxide - nystatin) Swish and spit 5 mL Every 4 (Four) Hours As Needed for Mouth Pain. 300 mL 2    ondansetron (ZOFRAN) 8 MG tablet Take 1 tablet by mouth Every 8 (Eight) Hours As Needed for Nausea or Vomiting. 30 tablet 1    pantoprazole (PROTONIX) 40 MG EC tablet Take 1 tablet by mouth Daily. 30 tablet 1    predniSONE (DELTASONE) 20 MG tablet Take 3 tablets by mouth Daily. 90 tablet 0    rosuvastatin (CRESTOR) 10 MG tablet Take 1 tablet by mouth Daily. 90 tablet 3    valsartan (DIOVAN) 80 MG tablet Take 1 tablet by mouth Daily.       No current facility-administered medications for this visit.     REVIEW OF SYSTEMS  CONSTITUTIONAL:  No fever, chills or night sweats. Chronic fatigue, recently finally improved, as per the HPI above.  EYES:  No blurry vision, diplopia or other vision changes. Intermittent mucositis since starting PO cyclosporine.  ENT:  No hearing loss or nosebleeds. Intermittent mucositis since starting cyclosporine, as per the HPI above.  CARDIOVASCULAR:  No palpitations, arrhythmia, syncopal episodes or edema.  PULMONARY:  No hemoptysis, wheezing, chronic cough or shortness of breath.  GASTROINTESTINAL:  No nausea or vomiting. No constipation or diarrhea. No abdominal pain.  GENITOURINARY:  No hematuria, kidney stones or frequent urination.  MUSCULOSKELETAL:  No joint or back pains.  INTEGUMENTARY: No rashes or pruritus.  ENDOCRINE:  No excessive thirst or hot flashes.  HEMATOLOGIC:  No history of free bleeding,  "spontaneous bleeding or clotting.  IMMUNOLOGIC:  No allergies or frequent infections.  NEUROLOGIC: No numbness, tingling, seizures or weakness.  PSYCHIATRIC:  No anxiety or depression.    PHYSICAL EXAMINATION  /80   Pulse 102   Temp 97.5 °F (36.4 °C) (Temporal)   Resp 18   Ht 170.2 cm (67\")   Wt 69.6 kg (153 lb 6.4 oz)   SpO2 97%   BMI 24.03 kg/m²     Pain Score:  Pain Score    24 1353   PainSc: 10-Worst pain ever   PainLoc: Mouth     PHQ-Score Total:  PHQ-9 Total Score:      ECO  GENERAL:  A well-developed, well-nourished, elderly, white male in no acute distress. Still mildly cushingoid in appearance.  HEENT:  Pupils equally round and reactive to light. Extraocular muscles intact. ~Two, small ulcers in the upper gumline.  CARDIOVASCULAR:  Regular rate and rhythm. No murmurs, gallops or rubs.  LUNGS:  Clear to auscultation bilaterally.  No wheezing.  ABDOMEN:  Soft, nontender, nondistended with positive bowel sounds.  EXTREMITIES:  No clubbing, cyanosis or edema bilaterally.  SKIN:  No rashes or petechiae.  NEURO:  Cranial nerves grossly intact. No focal deficits.  PSYCH:  Alert and oriented x3.    LABORATORY  Lab Results   Component Value Date    WBC 10.95 (H) 2024    HGB 11.5 (L) 2024    HCT 35.6 (L) 2024    MCV 86.6 2024     2024    NEUTROABS 9.70 (H) 2024       Lab Results   Component Value Date     2024    K 4.8 2024     2024    CO2 26.1 2024    BUN 32 (H) 2024    CREATININE 1.48 (H) 2024    GLUCOSE 250 (H) 2024    CALCIUM 9.7 (H) 2024    AST 15 2024    ALT 45 (H) 2024    ALKPHOS 54 2024    BILITOT 0.8 2024    PROTEINTOT 6.1 2024    ALBUMIN 3.5 2024     Lab Results   Component Value Date    RETICCTPCT 0.22 (L) 2024     CBC (2024): WBCs: 10.95; HgB: 11.5; Hct: 35.6; platelets: 203; MCV: 86.6  CBC (2024): WBCs: 14.86; HgB: 7.4; Hct: " 23.5; platelets: 275; MCV: 86.7  CBC (08/26/2024): WBCs: 11.72; HgB: 7.7; Hct: 24.6; platelets: 250; MCV: 86.6  CBC (08/23/2024): WBCs: 10.01; HgB: 8.0; Hct: 25.3; platelets: 237; MCV: 87.2  CBC (08/20/2024): WBCS: 8.59; HgB: 8.4; Hct: 26.4; platelets: 272; MCV: 86.8  CBC (08/14/2024): WBCs:  8.16; HgB: 9.0; Hct: 27.9; platelets: 319; MCV: 87.2  CBC (08/09/2024): WBCs: 12.00; HgB: 8.1; Hct: 25.5; platelets: 319; MCV: 90.7  CBC (08/02/2024): WBCs: 11.92; HgB: 8.1; Hct: 24.8; platelets: 204; MCV: 90.5  CBC (07/31/2024): WBCs: 17.04; HgB: 7.6; Hct: 23.3; platelets: 265; MCV: 95.5  CBC (07/26/2024): WBCs: 13.50; HgB: 8.1; Hct: 24.8; platelets: 329; MCV: 95.0  CBC (07/23/2024): WBCs: 16.43; HgB: 8.5; Hct: 25.8; platelets: 390; MCV: 96.3  CBC (07/15/2024): WBCs: 8.04; HgB: 8.3; Hct: 25.8; platelets: 340; MCV: 97.7  CBC (07/11/2024): WBCs: 7.25; HgB: 9.4; Hct: 29.0; platelets: 375; MCV: 97.0  CBC (07/01/2024): WBCs: 7.50; HgB: 9.4; Hct: 29.3; platelets: 272; MCV: 98.7  CBC (06/26/2024): WBCs: 6.67; HgB: 9.0; Hct: 27.5; platelets: 198; MCV: 98.6    Reticulocyte (08/20/2024): Percentage: 0.26; Absolute: <0.0100    IMAGING    PATHOLOGY  Peripheral smear, bone marrow aspiration smear, bone marrow aspiration clot and bone marrow core biopsy (06/26/2024):  Normocellular bone marrow with increased granulopoiesis, essentially absent erythropoiesis, adequate megakaryopoiesis, increased stainable iron and slightly increased mixed chronic inflammation. No evidence of increased blasts or dysplasia. The most striking feature within the bone marrow is the essential absence of any erythropoiesis in the aspirate or core biopsy. There is mixed inflammation with some notable immunophenotypic findings by flow cytometry, but there is no evidence of bone marrow involvement by lymphoma. This is most suggestive of a mixed reactive lymphoid population.    IMPRESSION AND PLAN  Mr. Silva is a 89 y.o., white male with:  Pure red cell aplasia: I  have had multiple, long discussions with the patient and his wife regarding the results of the bone marrow biopsy performed in late June 2024 (which are summarized above). In short, this is a fairly uncommon diagnosis that can either be idiopathic or associated with a number of potential, underlying causes, including thymoma or parvovirus infection. Regardless, high dose steroids (prednisone 1-2 mg/kg daily) is the standard, first-line treatment; however, since the repeat CBC drawn on 07/01/2024 showed that his HgB (9.4 g/dL) had improved compared to what it was when he was discharged from our hospital following the bone marrow biopsy the previous week (9.0 g/dL), it was hoped that he was satisfactorily recovering (from a viral trigger) on his own. However, a repeat CBC on 07/15/2024 revealed that his HgB had dropped again (8.3 mg/dL); and his reticulocyte counts remained essentially zero. A Rx for prednisone 80 mg PO daily was provided at that time; but, unfortunately, high-dose steroids never provided him with any meaningful effect. Consequently, by late August 2024, he was started on an additional Rx (cyclosporine 50 mg PO BID), per [place]'s recommendations (with whom he had met for an additional opinion regarding his management earlier that month). Once he tolerated this starting dose for ~one week, we increased the cyclosporine to 100 mg PO BID (and further decreased the prednisone to 40 mg PO daily) at the time of his previous appointment (on 8/27). He has now been on this increased dose (of cyclosporine) for ~one week; and he reports today that he has recently (finally) felt less chronically fatigued. Correlating with this improvement in his symptoms, today's (9/3) repeat CBC (summarized above) shows a significant improvement in his HgB (to 11.5 g/dL). We will proceed with this current treatment plan, continuing the 100 mg BID dose of cyclosporine and decreasing the prednisone further (to 20 mg PO daily).  "We will see him back in our clinic in one week with repeat labs for continued, close followup.  Mucositis: An intermittent issue since he started taking PO cyclosporine a couple of weeks ago for issue #1. Currently still manageable with scheduled and prn magic mouthwash. He was also counseled on the use of OraGel or similar products, if needed. He refused a Rx for any pain medication (insisting that he \"might\" take a Tylenol if his symptoms get severe enough). Continue to monitor.  The patient and his wife were in agreement with this plan.    It is a pleasure to participate in Mr. Silva's care. Please do not hesitate to call with any questions or concerns that you may have.    A total of 30 minutes were spent coordinating this patient’s care in clinic today; more than 50% of this time was face-to-face with the patient and his wife, reviewing his interim medical history, discussing the results of today's repeat CBC and counseling on the current treatment and followup plans. All questions were answered to their satisfaction.    FOLLOW UP  Continue cyclosporine 100 mg PO BID. Continue prednisone (now 20 mg rather than 40 mg) PO daily. Continue Protonix 40 mg daily for GI prophylaxis. Return to our clinic in 1 week with a CBC, CMP, reticulocyte count and cyclosporine level.            This document was electronically signed by RAMIRO King MD September 3, 2024 14:23 EDT      CC: RIGO Mccall MD  "

## 2024-09-04 ENCOUNTER — HOSPITAL ENCOUNTER (OUTPATIENT)
Dept: CT IMAGING | Facility: HOSPITAL | Age: 89
Discharge: HOME | End: 2024-09-04
Admitting: SPECIALIST
Payer: MEDICARE

## 2024-09-04 DIAGNOSIS — I65.22 STENOSIS OF LEFT CAROTID ARTERY: ICD-10-CM

## 2024-09-04 PROCEDURE — 70498 CT ANGIOGRAPHY NECK: CPT

## 2024-09-04 PROCEDURE — 25510000001 IOPAMIDOL 61 % SOLUTION: Performed by: SPECIALIST

## 2024-09-04 RX ORDER — IOPAMIDOL 612 MG/ML
100 INJECTION, SOLUTION INTRAVASCULAR
Status: COMPLETED | OUTPATIENT
Start: 2024-09-04 | End: 2024-09-04

## 2024-09-04 RX ADMIN — IOPAMIDOL 50 ML: 612 INJECTION, SOLUTION INTRAVENOUS at 10:55

## 2024-09-05 LAB — CYCLOSPORINE BLD LC/MS/MS-MCNC: 343 NG/ML (ref 100–400)

## 2024-09-10 ENCOUNTER — LAB (OUTPATIENT)
Dept: ONCOLOGY | Facility: CLINIC | Age: 89
End: 2024-09-10
Payer: MEDICARE

## 2024-09-10 ENCOUNTER — INFUSION (OUTPATIENT)
Dept: ONCOLOGY | Facility: HOSPITAL | Age: 89
End: 2024-09-10
Payer: MEDICARE

## 2024-09-10 ENCOUNTER — OFFICE VISIT (OUTPATIENT)
Dept: ONCOLOGY | Facility: CLINIC | Age: 89
End: 2024-09-10
Payer: MEDICARE

## 2024-09-10 VITALS
OXYGEN SATURATION: 100 % | RESPIRATION RATE: 18 BRPM | TEMPERATURE: 97.8 F | SYSTOLIC BLOOD PRESSURE: 124 MMHG | DIASTOLIC BLOOD PRESSURE: 66 MMHG | HEART RATE: 114 BPM | BODY MASS INDEX: 23.73 KG/M2 | WEIGHT: 151.5 LBS

## 2024-09-10 VITALS
DIASTOLIC BLOOD PRESSURE: 73 MMHG | WEIGHT: 151.2 LBS | SYSTOLIC BLOOD PRESSURE: 120 MMHG | HEIGHT: 67 IN | TEMPERATURE: 98 F | OXYGEN SATURATION: 98 % | BODY MASS INDEX: 23.73 KG/M2 | RESPIRATION RATE: 18 BRPM | HEART RATE: 111 BPM

## 2024-09-10 DIAGNOSIS — D60.9 ACQUIRED RED CELL APLASIA: Primary | ICD-10-CM

## 2024-09-10 DIAGNOSIS — D61.9 APLASTIC ANEMIA: Primary | ICD-10-CM

## 2024-09-10 DIAGNOSIS — E86.0 DEHYDRATION: Primary | ICD-10-CM

## 2024-09-10 DIAGNOSIS — D60.9 ACQUIRED RED CELL APLASIA: ICD-10-CM

## 2024-09-10 LAB
ALBUMIN SERPL-MCNC: 3.6 G/DL (ref 3.5–5.2)
ALBUMIN/GLOB SERPL: 1.3 G/DL
ALP SERPL-CCNC: 59 U/L (ref 39–117)
ALT SERPL W P-5'-P-CCNC: 29 U/L (ref 1–41)
ANION GAP SERPL CALCULATED.3IONS-SCNC: 11.5 MMOL/L (ref 5–15)
AST SERPL-CCNC: 20 U/L (ref 1–40)
BASOPHILS # BLD AUTO: 0.01 10*3/MM3 (ref 0–0.2)
BASOPHILS NFR BLD AUTO: 0.1 % (ref 0–1.5)
BILIRUB SERPL-MCNC: 1.1 MG/DL (ref 0–1.2)
BUN SERPL-MCNC: 39 MG/DL (ref 8–23)
BUN/CREAT SERPL: 22.8 (ref 7–25)
CALCIUM SPEC-SCNC: 10.1 MG/DL (ref 8.2–9.6)
CHLORIDE SERPL-SCNC: 102 MMOL/L (ref 98–107)
CO2 SERPL-SCNC: 26.5 MMOL/L (ref 22–29)
CREAT SERPL-MCNC: 1.71 MG/DL (ref 0.76–1.27)
DEPRECATED RDW RBC AUTO: 45.1 FL (ref 37–54)
EGFRCR SERPLBLD CKD-EPI 2021: 37.3 ML/MIN/1.73
EOSINOPHIL # BLD AUTO: 0.03 10*3/MM3 (ref 0–0.4)
EOSINOPHIL NFR BLD AUTO: 0.3 % (ref 0.3–6.2)
ERYTHROCYTE [DISTWIDTH] IN BLOOD BY AUTOMATED COUNT: 14.5 % (ref 12.3–15.4)
GLOBULIN UR ELPH-MCNC: 2.8 GM/DL
GLUCOSE SERPL-MCNC: 283 MG/DL (ref 65–99)
HCT VFR BLD AUTO: 31.3 % (ref 37.5–51)
HGB BLD-MCNC: 10.4 G/DL (ref 13–17.7)
IMM GRANULOCYTES # BLD AUTO: 0.13 10*3/MM3 (ref 0–0.05)
IMM GRANULOCYTES NFR BLD AUTO: 1.3 % (ref 0–0.5)
LYMPHOCYTES # BLD AUTO: 1.34 10*3/MM3 (ref 0.7–3.1)
LYMPHOCYTES NFR BLD AUTO: 13.1 % (ref 19.6–45.3)
MCH RBC QN AUTO: 28.7 PG (ref 26.6–33)
MCHC RBC AUTO-ENTMCNC: 33.2 G/DL (ref 31.5–35.7)
MCV RBC AUTO: 86.5 FL (ref 79–97)
MONOCYTES # BLD AUTO: 0.41 10*3/MM3 (ref 0.1–0.9)
MONOCYTES NFR BLD AUTO: 4 % (ref 5–12)
NEUTROPHILS NFR BLD AUTO: 8.28 10*3/MM3 (ref 1.7–7)
NEUTROPHILS NFR BLD AUTO: 81.2 % (ref 42.7–76)
NRBC BLD AUTO-RTO: 0 /100 WBC (ref 0–0.2)
PLATELET # BLD AUTO: 222 10*3/MM3 (ref 140–450)
PMV BLD AUTO: 11.2 FL (ref 6–12)
POTASSIUM SERPL-SCNC: 6 MMOL/L (ref 3.5–5.2)
PROT SERPL-MCNC: 6.4 G/DL (ref 6–8.5)
RBC # BLD AUTO: 3.62 10*6/MM3 (ref 4.14–5.8)
RETICS # AUTO: 0.02 10*6/MM3 (ref 0.02–0.13)
RETICS/RBC NFR AUTO: 0.47 % (ref 0.7–1.9)
SODIUM SERPL-SCNC: 140 MMOL/L (ref 136–145)
WBC NRBC COR # BLD AUTO: 10.2 10*3/MM3 (ref 3.4–10.8)

## 2024-09-10 PROCEDURE — 36415 COLL VENOUS BLD VENIPUNCTURE: CPT | Performed by: INTERNAL MEDICINE

## 2024-09-10 PROCEDURE — 80053 COMPREHEN METABOLIC PANEL: CPT | Performed by: INTERNAL MEDICINE

## 2024-09-10 PROCEDURE — 1126F AMNT PAIN NOTED NONE PRSNT: CPT | Performed by: INTERNAL MEDICINE

## 2024-09-10 PROCEDURE — G2211 COMPLEX E/M VISIT ADD ON: HCPCS | Performed by: INTERNAL MEDICINE

## 2024-09-10 PROCEDURE — 85025 COMPLETE CBC W/AUTO DIFF WBC: CPT | Performed by: INTERNAL MEDICINE

## 2024-09-10 PROCEDURE — 96360 HYDRATION IV INFUSION INIT: CPT

## 2024-09-10 PROCEDURE — 25810000003 SODIUM CHLORIDE 0.9 % SOLUTION: Performed by: INTERNAL MEDICINE

## 2024-09-10 PROCEDURE — 80158 DRUG ASSAY CYCLOSPORINE: CPT | Performed by: INTERNAL MEDICINE

## 2024-09-10 PROCEDURE — 85045 AUTOMATED RETICULOCYTE COUNT: CPT | Performed by: INTERNAL MEDICINE

## 2024-09-10 PROCEDURE — 99214 OFFICE O/P EST MOD 30 MIN: CPT | Performed by: INTERNAL MEDICINE

## 2024-09-10 RX ORDER — SODIUM CHLORIDE 9 MG/ML
1000 INJECTION, SOLUTION INTRAVENOUS ONCE
Status: COMPLETED | OUTPATIENT
Start: 2024-09-10 | End: 2024-09-10

## 2024-09-10 RX ORDER — PREDNISONE 5 MG/1
TABLET ORAL
Qty: 21 TABLET | Refills: 0 | Status: SHIPPED | OUTPATIENT
Start: 2024-09-10

## 2024-09-10 RX ADMIN — SODIUM CHLORIDE 1000 ML: 9 INJECTION, SOLUTION INTRAVENOUS at 16:09

## 2024-09-10 NOTE — PROGRESS NOTES
Venipuncture Blood Specimen Collection  Venipuncture performed in right arm by Carlos Fernandez MA with good hemostasis. Patient tolerated the procedure well without complications.   09/10/24   Carlos Fernandez MA

## 2024-09-10 NOTE — PROGRESS NOTES
Name:  Chinedu Silva  :  1933  Date:  9/10/2024     REFERRING PHYSICIAN    PRIMARY CARE PROVIDER  Jr Segura PA    REASON FOR FOLLOWUP  1. Acquired red cell aplasia      CHIEF COMPLAINT  Progressive, refractory mucositis for the past ~couple of weeks.    Dear Mr. Ramoston,    HISTORY OF PRESENT ILLNESS:   I saw Mr. Silva in followup (from his recent hospitalization for severe anemia) today in our hematology/oncology clinic. As you are aware, he is a pleasant, 89 y.o., white male with a history of hypertension, CAD and GERD who presented to the [facility] ED on 2024 with progressive weakness and dyspnea on exertion. These symptoms had potentially first started ~two to three months prior; however, they significantly worsened over the course of the previous couple of weeks (by early 2024). Upon arrival to our ED, he was found to be severely anemic, with a Hg of 3.7 g/dL. His other cell lines (WBCs and platelets) were entirely WNL. Our service was consulted for further evaluation of this issue, and a bone marrow biopsy was performed on 2024. Meanwhile, as he was feeling substantially better after his HgB was transfused up to the ~9 g/dL range, he was able to be discharged to outpatient follow up in our clinic. He returned to our clinic the following week (on 2024) to go over the results of the bone marrow exam and for ongoing management.    INTERIM HISTORY:  Mr. Silva returns to clinic today for follow up again accompanied by his wife. He was seen at [facility], [place], operated by [facility] on 2024 for an additional opinion for his acquired pure red cell aplasia. He has been on PO cyclosporine for a total of ~three weeks now (50 mg BID for the first week and, most recently, 100 mg BID for the past two weeks). He has also decreased his daily dose of prednisone to 20 mg this past week, as previously instructed. With this ongoing treatment, his HgB has (finally) responded; however, he is  progressively struggling with worsening and refractory mucositis. Although he has remained complaint with his cyclosporine and prednisone (and other medications), he has not been able to eat much at all this past week; because his mouth ulcers are hurting so badly. He has no other new or specific complaints.    Past Medical History:   Diagnosis Date    Carotid stenosis     Coronary artery disease     Diverticulitis     GERD (gastroesophageal reflux disease)     Gout     Hearing aid worn     Hypertension     Obese     Prostate cancer     Wears glasses        Past Surgical History:   Procedure Laterality Date    CAROTID ENDARTERECTOMY Left 2018    Procedure: CAROTID ENDARTERECTOMY WITH EEG LEFT;  Surgeon: Del Neal MD;  [facility];  Service:     CATARACT EXTRACTION      CATARACT EXTRACTION WITH INTRAOCULAR LENS IMPLANT Right     CHOLECYSTECTOMY      COLONOSCOPY W/ POLYPECTOMY      HERNIA REPAIR Right     TOTAL KNEE ARTHROPLASTY Right     TURP / TRANSURETHRAL INCISION / DRAINAGE PROSTATE         Social History     Socioeconomic History    Marital status:     Number of children: 1   Tobacco Use    Smoking status: Former     Current packs/day: 0.00     Average packs/day: 1 pack/day for 12.0 years (12.0 ttl pk-yrs)     Types: Cigarettes     Start date:      Quit date:      Years since quittin.7    Smokeless tobacco: Current     Types: Chew   Vaping Use    Vaping status: Never Used   Substance and Sexual Activity    Alcohol use: No    Drug use: No    Sexual activity: Defer       Family History   Problem Relation Age of Onset    Hypertension Mother     Glaucoma Mother     Diverticulitis Mother     Gout Mother     ALS Father     Gout Sister     Broken bones Paternal Grandfather     Broken bones Son     Cancer Son     Diabetes Brother        Allergies   Allergen Reactions    Ciprofloxacin Other (See Comments)     Muscle Pains    Lisinopril Cough    Sulfa Antibiotics Itching and Rash        Current Outpatient Medications   Medication Sig Dispense Refill    allopurinol (ZYLOPRIM) 300 MG tablet Take 1 tablet by mouth Daily.      aspirin 81 MG EC tablet Take 1 tablet by mouth Daily.      cycloSPORINE modified (NEORAL) 100 MG capsule Take 1 capsule by mouth 2 (Two) Times a Day. 60 capsule 0    dicyclomine (BENTYL) 20 MG tablet Take 1 tablet by mouth Every 6 (Six) Hours.      MAGIC MOUTHWASH W/NYSTATIN 1/1/1/1 (lidocaine - diphenhydrAMINE HCl - aluminum & magnesium hydroxide - nystatin) Swish and spit 5 mL Every 4 (Four) Hours As Needed for Mouth Pain. 300 mL 2    ondansetron (ZOFRAN) 8 MG tablet Take 1 tablet by mouth Every 8 (Eight) Hours As Needed for Nausea or Vomiting. 30 tablet 1    pantoprazole (PROTONIX) 40 MG EC tablet Take 1 tablet by mouth Daily. 30 tablet 1    predniSONE (DELTASONE) 20 MG tablet Take 3 tablets by mouth Daily. 90 tablet 0    rosuvastatin (CRESTOR) 10 MG tablet Take 1 tablet by mouth Daily. 90 tablet 3    valsartan (DIOVAN) 80 MG tablet Take 1 tablet by mouth Daily.      predniSONE (DELTASONE) 5 MG tablet Take 2 pills by mouth daily for one week, then 1 pill by mouth daily for one week. 21 tablet 0     No current facility-administered medications for this visit.     REVIEW OF SYSTEMS  CONSTITUTIONAL:  No fever, chills or night sweats. Chronic fatigue, recently finally improved, as per the HPI above.  EYES:  No blurry vision, diplopia or other vision changes.  ENT:  No hearing loss or nosebleeds. Worsening mucositis, as per the HPI above.  CARDIOVASCULAR:  No palpitations, arrhythmia, syncopal episodes or edema.  PULMONARY:  No hemoptysis, wheezing, chronic cough or shortness of breath.  GASTROINTESTINAL:  No nausea or vomiting. No constipation or diarrhea. No abdominal pain.  GENITOURINARY:  No hematuria, kidney stones or frequent urination.  MUSCULOSKELETAL:  No joint or back pains.  INTEGUMENTARY: No rashes or pruritus.  ENDOCRINE:  No excessive thirst or hot  "flashes.  HEMATOLOGIC:  No history of free bleeding, spontaneous bleeding or clotting.  IMMUNOLOGIC:  No allergies or frequent infections.  NEUROLOGIC: No numbness, tingling, seizures or weakness.  PSYCHIATRIC:  No anxiety or depression.    PHYSICAL EXAMINATION  /73   Pulse 111   Temp 98 °F (36.7 °C) (Temporal)   Resp 18   Ht 170.2 cm (67\")   Wt 68.6 kg (151 lb 3.2 oz)   SpO2 98%   BMI 23.68 kg/m²     Pain Score:  Pain Score    09/10/24 1338   PainSc: 0-No pain     PHQ-Score Total:  PHQ-9 Total Score:      ECO  GENERAL:  A well-developed, well-nourished, elderly, white male in no acute distress. Still mildly cushingoid in appearance.  HEENT:  Pupils equally round and reactive to light. Extraocular muscles intact. Overall worsening mucositis compared to last week.  CARDIOVASCULAR:  Regular rate and rhythm. No murmurs, gallops or rubs.  LUNGS:  Clear to auscultation bilaterally.  No wheezing.  ABDOMEN:  Soft, nontender, nondistended with positive bowel sounds.  EXTREMITIES:  No clubbing, cyanosis or edema bilaterally.  SKIN:  No rashes or petechiae.  NEURO:  Cranial nerves grossly intact. No focal deficits.  PSYCH:  Alert and oriented x3.    LABORATORY  Lab Results   Component Value Date    WBC 10.20 09/10/2024    HGB 10.4 (L) 09/10/2024    HCT 31.3 (L) 09/10/2024    MCV 86.5 09/10/2024     09/10/2024    NEUTROABS 8.28 (H) 09/10/2024       Lab Results   Component Value Date     09/10/2024    K 6.0 (H) 09/10/2024     09/10/2024    CO2 26.5 09/10/2024    BUN 39 (H) 09/10/2024    CREATININE 1.71 (H) 09/10/2024    GLUCOSE 283 (H) 09/10/2024    CALCIUM 10.1 (H) 09/10/2024    AST 20 09/10/2024    ALT 29 09/10/2024    ALKPHOS 59 09/10/2024    BILITOT 1.1 09/10/2024    PROTEINTOT 6.4 09/10/2024    ALBUMIN 3.6 09/10/2024     Lab Results   Component Value Date    RETICCTPCT 0.47 (L) 09/10/2024     CBC (09/10/2024): WBCs: 10.20; HgB: 10.4; Hct: 31.3; platelets: 222; MCV: 86.5  CBC " (09/03/2024): WBCs: 10.95; HgB: 11.5; Hct: 35.6; platelets: 203; MCV: 86.6  CBC (08/29/2024): WBCs: 14.86; HgB: 7.4; Hct: 23.5; platelets: 275; MCV: 86.7  CBC (08/26/2024): WBCs: 11.72; HgB: 7.7; Hct: 24.6; platelets: 250; MCV: 86.6  CBC (08/23/2024): WBCs: 10.01; HgB: 8.0; Hct: 25.3; platelets: 237; MCV: 87.2  CBC (08/20/2024): WBCS: 8.59; HgB: 8.4; Hct: 26.4; platelets: 272; MCV: 86.8  CBC (08/14/2024): WBCs:  8.16; HgB: 9.0; Hct: 27.9; platelets: 319; MCV: 87.2  CBC (08/09/2024): WBCs: 12.00; HgB: 8.1; Hct: 25.5; platelets: 319; MCV: 90.7  CBC (08/02/2024): WBCs: 11.92; HgB: 8.1; Hct: 24.8; platelets: 204; MCV: 90.5  CBC (07/31/2024): WBCs: 17.04; HgB: 7.6; Hct: 23.3; platelets: 265; MCV: 95.5  CBC (07/26/2024): WBCs: 13.50; HgB: 8.1; Hct: 24.8; platelets: 329; MCV: 95.0  CBC (07/23/2024): WBCs: 16.43; HgB: 8.5; Hct: 25.8; platelets: 390; MCV: 96.3  CBC (07/15/2024): WBCs: 8.04; HgB: 8.3; Hct: 25.8; platelets: 340; MCV: 97.7  CBC (07/11/2024): WBCs: 7.25; HgB: 9.4; Hct: 29.0; platelets: 375; MCV: 97.0  CBC (07/01/2024): WBCs: 7.50; HgB: 9.4; Hct: 29.3; platelets: 272; MCV: 98.7  CBC (06/26/2024): WBCs: 6.67; HgB: 9.0; Hct: 27.5; platelets: 198; MCV: 98.6    Reticulocyte (08/20/2024): Percentage: 0.26; Absolute: <0.0100    IMAGING    PATHOLOGY  Peripheral smear, bone marrow aspiration smear, bone marrow aspiration clot and bone marrow core biopsy (06/26/2024):  Normocellular bone marrow with increased granulopoiesis, essentially absent erythropoiesis, adequate megakaryopoiesis, increased stainable iron and slightly increased mixed chronic inflammation. No evidence of increased blasts or dysplasia. The most striking feature within the bone marrow is the essential absence of any erythropoiesis in the aspirate or core biopsy. There is mixed inflammation with some notable immunophenotypic findings by flow cytometry, but there is no evidence of bone marrow involvement by lymphoma. This is most suggestive of a mixed  reactive lymphoid population.    IMPRESSION AND PLAN  Mr. Silva is a 89 y.o., white male with:  Pure red cell aplasia: I have had multiple, long discussions with the patient and his wife regarding the results of the bone marrow biopsy performed in late June 2024 (which are summarized above). In short, this is a fairly uncommon diagnosis that can either be idiopathic or associated with a number of potential, underlying causes, including thymoma or parvovirus infection. Regardless, high dose steroids (prednisone 1-2 mg/kg daily) is the standard, first-line treatment; however, since the repeat CBC drawn on 07/01/2024 showed that his HgB (9.4 g/dL) had improved compared to what it was when he was discharged from our hospital following the bone marrow biopsy the previous week (9.0 g/dL), it was hoped that he was satisfactorily recovering (from a viral trigger) on his own. However, a repeat CBC on 07/15/2024 revealed that his HgB had dropped again (8.3 mg/dL); and his reticulocyte counts remained essentially zero. A Rx for prednisone 80 mg PO daily was provided at that time; but, unfortunately, high-dose steroids never provided him with any meaningful effect. Consequently, by late August 2024, he was started on an additional Rx (cyclosporine 50 mg PO BID), per [place]'s recommendations (with whom he had met for an additional opinion regarding his management earlier that month). Once he tolerated this starting dose for ~one week, in late August (08/27/2024), we increased the cyclosporine to 100 mg PO BID and further decreased the prednisone to 40 mg PO daily. At his previous appointment (09/03/2024), as his anemia (finally) improved (rather well, to a HgB of 11.5 g/dL), we continued the cyclosporine and further decreased the prednisone to 20 mg PO daily. Unfortunately, today's (09/10/2024) repeat CBC does not show any further improvement in his HgB (it is now 10.4 g/dL) and issue #2 (see below) is also now  dramatically worse. Furthermore, with today's CMP showing a mild, acute bump in his creatinine (~1.7 mg/dL, up from 1.4 mg/dL last week), today's pending cyclosporine level will likely be too high (when it results tomorrow). For now, he was instructed to further decrease the daily prednisone dose (from 20 mg to 10 mg) in order to continue the slow wean while, in the shortrun, we hold the cyclosporine completely. We will see him back in our clinic in another week with a repeat CBC and CMP for a repeat symptom/toxicity check.  Mucositis: A progressive and now refractory issue that began in late August 2024, shortly after he started PO cyclosporine for the treatment of issue #1 (especially after he was escalated to the 100 mg BID dose). As discussed above, this has now drastically reduced his PO intake, leading to dehydration and issue #3. We will hold the cyclosporine for (at least) the next week. He was also previously counseled on the use of OraGel or similar products, if needed. He again refused a Rx for any pain medication. Continue to monitor.  Acute on chronic renal insufficiency: As discussed above, the cyclosporine is being (at least briefly) held. We will give him a liter of IV NS in our infusion center today.  The patient and his wife were in agreement with this plan.    It is a pleasure to participate in Mr. iSlva's care. Please do not hesitate to call with any questions or concerns that you may have.    A total of 30 minutes were spent coordinating this patient’s care in clinic today; more than 50% of this time was face-to-face with the patient and his wife, reviewing his interim medical history, discussing the results of today's repeat labwork and counseling on the current treatment and followup plans. All questions were answered to their satisfaction.    FOLLOW UP  Discontinue cyclosporine for (at least) the next week. Infuse 1 liter of NS this afternoon. Continue the ongoing, prednisone taper (now 10  mg rather than 20 mg) PO daily. Continue Protonix 40 mg daily for GI prophylaxis. Return to our clinic in 1 week with a CBC, CMP, reticulocyte count and cyclosporine level.            This document was electronically signed by RAMIRO King MD September 10, 2024 15:45 EDT      CC: RIGO Mccall MD

## 2024-09-13 LAB — CYCLOSPORINE BLD LC/MS/MS-MCNC: 468 NG/ML (ref 100–400)

## 2024-09-18 ENCOUNTER — OFFICE VISIT (OUTPATIENT)
Dept: ONCOLOGY | Facility: CLINIC | Age: 89
End: 2024-09-18
Payer: MEDICARE

## 2024-09-18 ENCOUNTER — SPECIALTY PHARMACY (OUTPATIENT)
Dept: PHARMACY | Facility: HOSPITAL | Age: 89
End: 2024-09-18
Payer: MEDICARE

## 2024-09-18 ENCOUNTER — TELEPHONE (OUTPATIENT)
Dept: ONCOLOGY | Facility: CLINIC | Age: 89
End: 2024-09-18

## 2024-09-18 ENCOUNTER — LAB (OUTPATIENT)
Dept: ONCOLOGY | Facility: CLINIC | Age: 89
End: 2024-09-18
Payer: MEDICARE

## 2024-09-18 VITALS
RESPIRATION RATE: 18 BRPM | BODY MASS INDEX: 23.39 KG/M2 | OXYGEN SATURATION: 98 % | SYSTOLIC BLOOD PRESSURE: 97 MMHG | DIASTOLIC BLOOD PRESSURE: 57 MMHG | HEIGHT: 67 IN | TEMPERATURE: 97.5 F | HEART RATE: 118 BPM | WEIGHT: 149 LBS

## 2024-09-18 DIAGNOSIS — D60.9 ACQUIRED RED CELL APLASIA: ICD-10-CM

## 2024-09-18 DIAGNOSIS — D64.9 SYMPTOMATIC ANEMIA: ICD-10-CM

## 2024-09-18 DIAGNOSIS — D61.9 APLASTIC ANEMIA: Primary | ICD-10-CM

## 2024-09-18 DIAGNOSIS — D61.9 APLASTIC ANEMIA: ICD-10-CM

## 2024-09-18 DIAGNOSIS — D60.9 ACQUIRED RED CELL APLASIA: Primary | ICD-10-CM

## 2024-09-18 LAB
ALBUMIN SERPL-MCNC: 3.3 G/DL (ref 3.5–5.2)
ALBUMIN/GLOB SERPL: 1.1 G/DL
ALP SERPL-CCNC: 63 U/L (ref 39–117)
ALT SERPL W P-5'-P-CCNC: 62 U/L (ref 1–41)
ANION GAP SERPL CALCULATED.3IONS-SCNC: 11.9 MMOL/L (ref 5–15)
AST SERPL-CCNC: 42 U/L (ref 1–40)
BASOPHILS # BLD AUTO: 0.01 10*3/MM3 (ref 0–0.2)
BASOPHILS NFR BLD AUTO: 0.1 % (ref 0–1.5)
BILIRUB SERPL-MCNC: 0.8 MG/DL (ref 0–1.2)
BUN SERPL-MCNC: 22 MG/DL (ref 8–23)
BUN/CREAT SERPL: 14.5 (ref 7–25)
CALCIUM SPEC-SCNC: 9.9 MG/DL (ref 8.2–9.6)
CHLORIDE SERPL-SCNC: 101 MMOL/L (ref 98–107)
CO2 SERPL-SCNC: 27.1 MMOL/L (ref 22–29)
CREAT SERPL-MCNC: 1.52 MG/DL (ref 0.76–1.27)
DEPRECATED RDW RBC AUTO: 45.1 FL (ref 37–54)
EGFRCR SERPLBLD CKD-EPI 2021: 43 ML/MIN/1.73
EOSINOPHIL # BLD AUTO: 0.03 10*3/MM3 (ref 0–0.4)
EOSINOPHIL NFR BLD AUTO: 0.3 % (ref 0.3–6.2)
ERYTHROCYTE [DISTWIDTH] IN BLOOD BY AUTOMATED COUNT: 14.6 % (ref 12.3–15.4)
GLOBULIN UR ELPH-MCNC: 3 GM/DL
GLUCOSE SERPL-MCNC: 190 MG/DL (ref 65–99)
HCT VFR BLD AUTO: 27.7 % (ref 37.5–51)
HGB BLD-MCNC: 9 G/DL (ref 13–17.7)
IMM GRANULOCYTES # BLD AUTO: 0.16 10*3/MM3 (ref 0–0.05)
IMM GRANULOCYTES NFR BLD AUTO: 1.7 % (ref 0–0.5)
LYMPHOCYTES # BLD AUTO: 1.59 10*3/MM3 (ref 0.7–3.1)
LYMPHOCYTES NFR BLD AUTO: 17 % (ref 19.6–45.3)
MCH RBC QN AUTO: 28.4 PG (ref 26.6–33)
MCHC RBC AUTO-ENTMCNC: 32.5 G/DL (ref 31.5–35.7)
MCV RBC AUTO: 87.4 FL (ref 79–97)
MONOCYTES # BLD AUTO: 0.55 10*3/MM3 (ref 0.1–0.9)
MONOCYTES NFR BLD AUTO: 5.9 % (ref 5–12)
NEUTROPHILS NFR BLD AUTO: 6.99 10*3/MM3 (ref 1.7–7)
NEUTROPHILS NFR BLD AUTO: 75 % (ref 42.7–76)
NRBC BLD AUTO-RTO: 0.3 /100 WBC (ref 0–0.2)
PLATELET # BLD AUTO: 312 10*3/MM3 (ref 140–450)
PMV BLD AUTO: 11.1 FL (ref 6–12)
POTASSIUM SERPL-SCNC: 4.8 MMOL/L (ref 3.5–5.2)
PROT SERPL-MCNC: 6.3 G/DL (ref 6–8.5)
RBC # BLD AUTO: 3.17 10*6/MM3 (ref 4.14–5.8)
RETICS # AUTO: 0.04 10*6/MM3 (ref 0.02–0.13)
RETICS/RBC NFR AUTO: 1.3 % (ref 0.7–1.9)
SODIUM SERPL-SCNC: 140 MMOL/L (ref 136–145)
WBC NRBC COR # BLD AUTO: 9.33 10*3/MM3 (ref 3.4–10.8)

## 2024-09-18 PROCEDURE — 85045 AUTOMATED RETICULOCYTE COUNT: CPT | Performed by: INTERNAL MEDICINE

## 2024-09-18 PROCEDURE — 99214 OFFICE O/P EST MOD 30 MIN: CPT | Performed by: INTERNAL MEDICINE

## 2024-09-18 PROCEDURE — 36415 COLL VENOUS BLD VENIPUNCTURE: CPT | Performed by: INTERNAL MEDICINE

## 2024-09-18 PROCEDURE — 85025 COMPLETE CBC W/AUTO DIFF WBC: CPT | Performed by: INTERNAL MEDICINE

## 2024-09-18 PROCEDURE — 80158 DRUG ASSAY CYCLOSPORINE: CPT | Performed by: INTERNAL MEDICINE

## 2024-09-18 PROCEDURE — 80053 COMPREHEN METABOLIC PANEL: CPT | Performed by: INTERNAL MEDICINE

## 2024-09-18 PROCEDURE — 1126F AMNT PAIN NOTED NONE PRSNT: CPT | Performed by: INTERNAL MEDICINE

## 2024-09-18 RX ORDER — CYCLOSPORINE 25 MG/1
50 CAPSULE, LIQUID FILLED ORAL 2 TIMES DAILY
Qty: 30 CAPSULE | Refills: 0 | Status: SHIPPED | OUTPATIENT
Start: 2024-09-18

## 2024-09-21 LAB — CYCLOSPORINE BLD LC/MS/MS-MCNC: 24 NG/ML (ref 100–400)

## 2024-09-25 ENCOUNTER — OFFICE VISIT (OUTPATIENT)
Dept: ONCOLOGY | Facility: CLINIC | Age: 89
End: 2024-09-25
Payer: MEDICARE

## 2024-09-25 ENCOUNTER — LAB (OUTPATIENT)
Dept: ONCOLOGY | Facility: CLINIC | Age: 89
End: 2024-09-25
Payer: MEDICARE

## 2024-09-25 VITALS
OXYGEN SATURATION: 92 % | TEMPERATURE: 97.5 F | DIASTOLIC BLOOD PRESSURE: 59 MMHG | BODY MASS INDEX: 24.08 KG/M2 | WEIGHT: 153.4 LBS | HEART RATE: 116 BPM | SYSTOLIC BLOOD PRESSURE: 98 MMHG | HEIGHT: 67 IN | RESPIRATION RATE: 18 BRPM

## 2024-09-25 DIAGNOSIS — D60.9 ACQUIRED RED CELL APLASIA: ICD-10-CM

## 2024-09-25 DIAGNOSIS — D61.9 APLASTIC ANEMIA: ICD-10-CM

## 2024-09-25 DIAGNOSIS — D60.9 ACQUIRED RED CELL APLASIA: Primary | ICD-10-CM

## 2024-09-25 DIAGNOSIS — D64.9 SYMPTOMATIC ANEMIA: ICD-10-CM

## 2024-09-25 DIAGNOSIS — D61.9 APLASTIC ANEMIA: Primary | ICD-10-CM

## 2024-09-25 LAB
ABO GROUP BLD: NORMAL
ALBUMIN SERPL-MCNC: 3.4 G/DL (ref 3.5–5.2)
ALBUMIN/GLOB SERPL: 1.3 G/DL
ALP SERPL-CCNC: 67 U/L (ref 39–117)
ALT SERPL W P-5'-P-CCNC: 53 U/L (ref 1–41)
ANION GAP SERPL CALCULATED.3IONS-SCNC: 14.3 MMOL/L (ref 5–15)
AST SERPL-CCNC: 31 U/L (ref 1–40)
BASOPHILS # BLD AUTO: 0.02 10*3/MM3 (ref 0–0.2)
BASOPHILS NFR BLD AUTO: 0.2 % (ref 0–1.5)
BILIRUB SERPL-MCNC: 0.6 MG/DL (ref 0–1.2)
BLD GP AB SCN SERPL QL: NEGATIVE
BUN SERPL-MCNC: 20 MG/DL (ref 8–23)
BUN/CREAT SERPL: 14.4 (ref 7–25)
CALCIUM SPEC-SCNC: 9.5 MG/DL (ref 8.2–9.6)
CHLORIDE SERPL-SCNC: 103 MMOL/L (ref 98–107)
CO2 SERPL-SCNC: 24.7 MMOL/L (ref 22–29)
CREAT SERPL-MCNC: 1.39 MG/DL (ref 0.76–1.27)
DEPRECATED RDW RBC AUTO: 46.5 FL (ref 37–54)
EGFRCR SERPLBLD CKD-EPI 2021: 47.9 ML/MIN/1.73
EOSINOPHIL # BLD AUTO: 0.07 10*3/MM3 (ref 0–0.4)
EOSINOPHIL NFR BLD AUTO: 0.8 % (ref 0.3–6.2)
ERYTHROCYTE [DISTWIDTH] IN BLOOD BY AUTOMATED COUNT: 16.4 % (ref 12.3–15.4)
GLOBULIN UR ELPH-MCNC: 2.6 GM/DL
GLUCOSE SERPL-MCNC: 190 MG/DL (ref 65–99)
HCT VFR BLD AUTO: 23.3 % (ref 37.5–51)
HGB BLD-MCNC: 7.3 G/DL (ref 13–17.7)
IMM GRANULOCYTES # BLD AUTO: 0.12 10*3/MM3 (ref 0–0.05)
IMM GRANULOCYTES NFR BLD AUTO: 1.4 % (ref 0–0.5)
LYMPHOCYTES # BLD AUTO: 2.38 10*3/MM3 (ref 0.7–3.1)
LYMPHOCYTES NFR BLD AUTO: 27.3 % (ref 19.6–45.3)
MCH RBC QN AUTO: 28.3 PG (ref 26.6–33)
MCHC RBC AUTO-ENTMCNC: 31.3 G/DL (ref 31.5–35.7)
MCV RBC AUTO: 90.3 FL (ref 79–97)
MONOCYTES # BLD AUTO: 0.63 10*3/MM3 (ref 0.1–0.9)
MONOCYTES NFR BLD AUTO: 7.2 % (ref 5–12)
NEUTROPHILS NFR BLD AUTO: 5.49 10*3/MM3 (ref 1.7–7)
NEUTROPHILS NFR BLD AUTO: 63.1 % (ref 42.7–76)
NRBC BLD AUTO-RTO: 0.3 /100 WBC (ref 0–0.2)
PLATELET # BLD AUTO: 349 10*3/MM3 (ref 140–450)
PMV BLD AUTO: 10.6 FL (ref 6–12)
POTASSIUM SERPL-SCNC: 4.5 MMOL/L (ref 3.5–5.2)
PROT SERPL-MCNC: 6 G/DL (ref 6–8.5)
RBC # BLD AUTO: 2.58 10*6/MM3 (ref 4.14–5.8)
RH BLD: POSITIVE
SODIUM SERPL-SCNC: 142 MMOL/L (ref 136–145)
T&S EXPIRATION DATE: NORMAL
WBC NRBC COR # BLD AUTO: 8.71 10*3/MM3 (ref 3.4–10.8)

## 2024-09-25 PROCEDURE — 99214 OFFICE O/P EST MOD 30 MIN: CPT | Performed by: INTERNAL MEDICINE

## 2024-09-25 PROCEDURE — 85025 COMPLETE CBC W/AUTO DIFF WBC: CPT | Performed by: INTERNAL MEDICINE

## 2024-09-25 PROCEDURE — 36415 COLL VENOUS BLD VENIPUNCTURE: CPT | Performed by: INTERNAL MEDICINE

## 2024-09-25 PROCEDURE — 1126F AMNT PAIN NOTED NONE PRSNT: CPT | Performed by: INTERNAL MEDICINE

## 2024-09-25 PROCEDURE — 86850 RBC ANTIBODY SCREEN: CPT | Performed by: INTERNAL MEDICINE

## 2024-09-25 PROCEDURE — 80053 COMPREHEN METABOLIC PANEL: CPT | Performed by: INTERNAL MEDICINE

## 2024-09-25 PROCEDURE — 80158 DRUG ASSAY CYCLOSPORINE: CPT | Performed by: INTERNAL MEDICINE

## 2024-09-25 PROCEDURE — 86900 BLOOD TYPING SEROLOGIC ABO: CPT | Performed by: INTERNAL MEDICINE

## 2024-09-25 PROCEDURE — 86901 BLOOD TYPING SEROLOGIC RH(D): CPT | Performed by: INTERNAL MEDICINE

## 2024-09-25 PROCEDURE — 86923 COMPATIBILITY TEST ELECTRIC: CPT

## 2024-09-25 RX ORDER — ACETAMINOPHEN 325 MG/1
650 TABLET ORAL ONCE
Status: CANCELLED | OUTPATIENT
Start: 2024-09-25 | End: 2024-09-25

## 2024-09-25 RX ORDER — DIPHENHYDRAMINE HCL 25 MG
25 CAPSULE ORAL ONCE
Status: CANCELLED | OUTPATIENT
Start: 2024-09-25 | End: 2024-09-25

## 2024-09-25 RX ORDER — SODIUM CHLORIDE 9 MG/ML
250 INJECTION, SOLUTION INTRAVENOUS AS NEEDED
Status: CANCELLED | OUTPATIENT
Start: 2024-09-25

## 2024-09-26 ENCOUNTER — INFUSION (OUTPATIENT)
Dept: ONCOLOGY | Facility: HOSPITAL | Age: 89
End: 2024-09-26
Payer: MEDICARE

## 2024-09-26 VITALS
SYSTOLIC BLOOD PRESSURE: 99 MMHG | RESPIRATION RATE: 18 BRPM | TEMPERATURE: 98.2 F | HEART RATE: 65 BPM | OXYGEN SATURATION: 96 % | DIASTOLIC BLOOD PRESSURE: 52 MMHG

## 2024-09-26 DIAGNOSIS — D60.9 ACQUIRED RED CELL APLASIA: ICD-10-CM

## 2024-09-26 PROCEDURE — 63710000001 ACETAMINOPHEN 325 MG TABLET: Performed by: INTERNAL MEDICINE

## 2024-09-26 PROCEDURE — A9270 NON-COVERED ITEM OR SERVICE: HCPCS | Performed by: INTERNAL MEDICINE

## 2024-09-26 PROCEDURE — 36430 TRANSFUSION BLD/BLD COMPNT: CPT

## 2024-09-26 PROCEDURE — 63710000001 DIPHENHYDRAMINE PER 50 MG: Performed by: INTERNAL MEDICINE

## 2024-09-26 PROCEDURE — 86900 BLOOD TYPING SEROLOGIC ABO: CPT

## 2024-09-26 PROCEDURE — P9040 RBC LEUKOREDUCED IRRADIATED: HCPCS

## 2024-09-26 RX ORDER — DIPHENHYDRAMINE HCL 25 MG
25 CAPSULE ORAL ONCE
Status: COMPLETED | OUTPATIENT
Start: 2024-09-26 | End: 2024-09-26

## 2024-09-26 RX ORDER — ACETAMINOPHEN 325 MG/1
650 TABLET ORAL ONCE
Status: COMPLETED | OUTPATIENT
Start: 2024-09-26 | End: 2024-09-26

## 2024-09-26 RX ORDER — SODIUM CHLORIDE 9 MG/ML
250 INJECTION, SOLUTION INTRAVENOUS AS NEEDED
Status: DISCONTINUED | OUTPATIENT
Start: 2024-09-26 | End: 2024-09-26 | Stop reason: HOSPADM

## 2024-09-26 RX ADMIN — ACETAMINOPHEN 650 MG: 325 TABLET ORAL at 08:27

## 2024-09-26 RX ADMIN — DIPHENHYDRAMINE HYDROCHLORIDE 25 MG: 25 CAPSULE ORAL at 08:27

## 2024-09-27 RX ORDER — CYCLOSPORINE 25 MG/1
50 CAPSULE, LIQUID FILLED ORAL 2 TIMES DAILY
Qty: 30 CAPSULE | Refills: 0 | Status: SHIPPED | OUTPATIENT
Start: 2024-09-27

## 2024-09-30 LAB — CYCLOSPORINE BLD LC/MS/MS-MCNC: 107 NG/ML (ref 100–400)

## 2024-10-02 ENCOUNTER — LAB (OUTPATIENT)
Dept: ONCOLOGY | Facility: CLINIC | Age: 89
End: 2024-10-02
Payer: MEDICARE

## 2024-10-02 ENCOUNTER — OFFICE VISIT (OUTPATIENT)
Dept: ONCOLOGY | Facility: CLINIC | Age: 89
End: 2024-10-02
Payer: MEDICARE

## 2024-10-02 VITALS
OXYGEN SATURATION: 97 % | DIASTOLIC BLOOD PRESSURE: 66 MMHG | TEMPERATURE: 98 F | BODY MASS INDEX: 24.36 KG/M2 | WEIGHT: 155.6 LBS | SYSTOLIC BLOOD PRESSURE: 123 MMHG | HEART RATE: 92 BPM | RESPIRATION RATE: 18 BRPM

## 2024-10-02 DIAGNOSIS — D61.9 APLASTIC ANEMIA: ICD-10-CM

## 2024-10-02 DIAGNOSIS — D61.9 APLASTIC ANEMIA: Primary | ICD-10-CM

## 2024-10-02 DIAGNOSIS — D64.9 SYMPTOMATIC ANEMIA: ICD-10-CM

## 2024-10-02 DIAGNOSIS — D60.9 ACQUIRED RED CELL APLASIA: ICD-10-CM

## 2024-10-02 DIAGNOSIS — D60.9 ACQUIRED RED CELL APLASIA: Primary | ICD-10-CM

## 2024-10-02 LAB
ALBUMIN SERPL-MCNC: 2.9 G/DL (ref 3.5–5.2)
ALBUMIN/GLOB SERPL: 1 G/DL
ALP SERPL-CCNC: 70 U/L (ref 39–117)
ALT SERPL W P-5'-P-CCNC: 27 U/L (ref 1–41)
ANION GAP SERPL CALCULATED.3IONS-SCNC: 8.8 MMOL/L (ref 5–15)
AST SERPL-CCNC: 24 U/L (ref 1–40)
BASOPHILS # BLD AUTO: 0.03 10*3/MM3 (ref 0–0.2)
BASOPHILS NFR BLD AUTO: 0.4 % (ref 0–1.5)
BILIRUB SERPL-MCNC: 0.7 MG/DL (ref 0–1.2)
BUN SERPL-MCNC: 15 MG/DL (ref 8–23)
BUN/CREAT SERPL: 11.1 (ref 7–25)
CALCIUM SPEC-SCNC: 9.3 MG/DL (ref 8.2–9.6)
CHLORIDE SERPL-SCNC: 106 MMOL/L (ref 98–107)
CO2 SERPL-SCNC: 26.2 MMOL/L (ref 22–29)
CREAT SERPL-MCNC: 1.35 MG/DL (ref 0.76–1.27)
DEPRECATED RDW RBC AUTO: 56.3 FL (ref 37–54)
EGFRCR SERPLBLD CKD-EPI 2021: 49.6 ML/MIN/1.73
EOSINOPHIL # BLD AUTO: 0.08 10*3/MM3 (ref 0–0.4)
EOSINOPHIL NFR BLD AUTO: 1.2 % (ref 0.3–6.2)
ERYTHROCYTE [DISTWIDTH] IN BLOOD BY AUTOMATED COUNT: 19.3 % (ref 12.3–15.4)
GLOBULIN UR ELPH-MCNC: 2.9 GM/DL
GLUCOSE SERPL-MCNC: 114 MG/DL (ref 65–99)
HCT VFR BLD AUTO: 29.6 % (ref 37.5–51)
HGB BLD-MCNC: 9.2 G/DL (ref 13–17.7)
IMM GRANULOCYTES # BLD AUTO: 0.12 10*3/MM3 (ref 0–0.05)
IMM GRANULOCYTES NFR BLD AUTO: 1.7 % (ref 0–0.5)
LYMPHOCYTES # BLD AUTO: 2.07 10*3/MM3 (ref 0.7–3.1)
LYMPHOCYTES NFR BLD AUTO: 29.8 % (ref 19.6–45.3)
MCH RBC QN AUTO: 29.6 PG (ref 26.6–33)
MCHC RBC AUTO-ENTMCNC: 31.1 G/DL (ref 31.5–35.7)
MCV RBC AUTO: 95.2 FL (ref 79–97)
MONOCYTES # BLD AUTO: 0.71 10*3/MM3 (ref 0.1–0.9)
MONOCYTES NFR BLD AUTO: 10.2 % (ref 5–12)
NEUTROPHILS NFR BLD AUTO: 3.93 10*3/MM3 (ref 1.7–7)
NEUTROPHILS NFR BLD AUTO: 56.7 % (ref 42.7–76)
NRBC BLD AUTO-RTO: 0.7 /100 WBC (ref 0–0.2)
PLATELET # BLD AUTO: 324 10*3/MM3 (ref 140–450)
PMV BLD AUTO: 10.7 FL (ref 6–12)
POTASSIUM SERPL-SCNC: 4.6 MMOL/L (ref 3.5–5.2)
PROT SERPL-MCNC: 5.8 G/DL (ref 6–8.5)
RBC # BLD AUTO: 3.11 10*6/MM3 (ref 4.14–5.8)
SODIUM SERPL-SCNC: 141 MMOL/L (ref 136–145)
WBC NRBC COR # BLD AUTO: 6.94 10*3/MM3 (ref 3.4–10.8)

## 2024-10-02 PROCEDURE — 99214 OFFICE O/P EST MOD 30 MIN: CPT | Performed by: INTERNAL MEDICINE

## 2024-10-02 PROCEDURE — 1126F AMNT PAIN NOTED NONE PRSNT: CPT | Performed by: INTERNAL MEDICINE

## 2024-10-02 PROCEDURE — 80158 DRUG ASSAY CYCLOSPORINE: CPT | Performed by: INTERNAL MEDICINE

## 2024-10-02 PROCEDURE — 85025 COMPLETE CBC W/AUTO DIFF WBC: CPT | Performed by: INTERNAL MEDICINE

## 2024-10-02 PROCEDURE — 80053 COMPREHEN METABOLIC PANEL: CPT | Performed by: INTERNAL MEDICINE

## 2024-10-02 NOTE — PROGRESS NOTES
Name:  Chinedu Silva  :  1933  Date:  10/2/2024     REFERRING PHYSICIAN    PRIMARY CARE PROVIDER  Jr Segura PA    REASON FOR FOLLOWUP  1. Acquired red cell aplasia      CHIEF COMPLAINT  Stable fatigue and resolved mucositis.    Dear  Colton,    HISTORY OF PRESENT ILLNESS:   I saw Mr. Silva in followup (from his recent hospitalization for severe anemia) today in our hematology/oncology clinic. As you are aware, he is a pleasant, 89 y.o., white male with a history of hypertension, CAD and GERD who presented to the [facility] [place] ED on 2024 with progressive weakness and dyspnea on exertion. These symptoms had potentially first started ~two to three months prior; however, they significantly worsened over the course of the previous couple of weeks (by early 2024). Upon arrival to our ED, he was found to be severely anemic, with a Hg of 3.7 g/dL. His other cell lines (WBCs and platelets) were entirely WNL. Our service was consulted for further evaluation of this issue, and a bone marrow biopsy was performed on 2024. Meanwhile, as he was feeling substantially better after his HgB was transfused up to the ~9 g/dL range, he was able to be discharged to outpatient follow up in our clinic. He returned to our clinic the following week (on 2024) to go over the results of the bone marrow exam and for ongoing management.    INTERIM HISTORY:  Mr. Silva returns to clinic today for follow up yet again accompanied by his wife. He was seen at [facility] [place], operated by [facility] on 2024 for an additional opinion for his acquired pure red cell aplasia. He was on PO cyclosporine for a total of ~three weeks (50 mg BID for the first week and 100 mg BID for the next two weeks) in late August/early September. Due to progressive, severe and refractory mucositis (as well as a toxic cyclosporine level), he was instructed to discontinue the cyclosporine earlier this month. After holding the cyclosporine for  the next ~one and one half weeks, his mucositis finally improved. He states today that this issue is now actually all but resolved, despite his now having been back on the cyclosporine (at a dose of 50 mg BID) for the past one and one half weeks, as instructed. He did have to receive two units of PRBCs last week; and his fatigue remains improved today, ~one week later. Meanwhile, he has also been completely off of prednisone this past week, as instructed. He has no new or other specific complaints today.    Past Medical History:   Diagnosis Date    Carotid stenosis     Coronary artery disease     Diverticulitis     GERD (gastroesophageal reflux disease)     Gout     Hearing aid worn     Hypertension     Obese     Prostate cancer     Wears glasses        Past Surgical History:   Procedure Laterality Date    CAROTID ENDARTERECTOMY Left 2018    Procedure: CAROTID ENDARTERECTOMY WITH EEG LEFT;  Surgeon: Del Neal MD;  Location: [facility];  Service:     CATARACT EXTRACTION      CATARACT EXTRACTION WITH INTRAOCULAR LENS IMPLANT Right     CHOLECYSTECTOMY      COLONOSCOPY W/ POLYPECTOMY      HERNIA REPAIR Right     TOTAL KNEE ARTHROPLASTY Right     TURP / TRANSURETHRAL INCISION / DRAINAGE PROSTATE         Social History     Socioeconomic History    Marital status:     Number of children: 1   Tobacco Use    Smoking status: Former     Current packs/day: 0.00     Average packs/day: 1 pack/day for 12.0 years (12.0 ttl pk-yrs)     Types: Cigarettes     Start date:      Quit date:      Years since quittin.7    Smokeless tobacco: Current     Types: Chew   Vaping Use    Vaping status: Never Used   Substance and Sexual Activity    Alcohol use: No    Drug use: No    Sexual activity: Defer       Family History   Problem Relation Age of Onset    Hypertension Mother     Glaucoma Mother     Diverticulitis Mother     Gout Mother     ALS Father     Gout Sister     Broken bones Paternal Grandfather     Broken  bones Son     Cancer Son     Diabetes Brother        Allergies   Allergen Reactions    Ciprofloxacin Other (See Comments)     Muscle Pains    Lisinopril Cough    Tetracycline Provider Review Needed     Other Reaction(s) from [facility]: UNK    Sulfa Antibiotics Itching and Rash       Current Outpatient Medications   Medication Sig Dispense Refill    allopurinol (ZYLOPRIM) 300 MG tablet Take 1 tablet by mouth Daily.      aspirin 81 MG EC tablet Take 1 tablet by mouth Daily.      cycloSPORINE modified (NEORAL) 25 MG capsule Take 2 capsules by mouth 2 (Two) Times a Day for 7 days. 30 capsule 0    dicyclomine (BENTYL) 20 MG tablet Take 1 tablet by mouth Every 6 (Six) Hours.      MAGIC MOUTHWASH W/NYSTATIN 1/1/1/1 (lidocaine - diphenhydrAMINE HCl - aluminum & magnesium hydroxide - nystatin) Swish and spit 5 mL Every 4 (Four) Hours As Needed for Mouth Pain. 300 mL 2    ondansetron (ZOFRAN) 8 MG tablet Take 1 tablet by mouth Every 8 (Eight) Hours As Needed for Nausea or Vomiting. 30 tablet 1    rosuvastatin (CRESTOR) 10 MG tablet Take 1 tablet by mouth Daily. 90 tablet 3    valsartan (DIOVAN) 80 MG tablet Take 0.5 tablets by mouth Daily.      pantoprazole (PROTONIX) 40 MG EC tablet Take 1 tablet by mouth Daily. 30 tablet 1    predniSONE (DELTASONE) 20 MG tablet Take 3 tablets by mouth Daily. 90 tablet 0    predniSONE (DELTASONE) 5 MG tablet Take 2 pills by mouth daily for one week, then 1 pill by mouth daily for one week. 21 tablet 0     No current facility-administered medications for this visit.     REVIEW OF SYSTEMS  CONSTITUTIONAL:  No fever, chills or night sweats. Chronic fatigue, recently stable, as per the HPI above.  EYES:  No blurry vision, diplopia or other vision changes.  ENT:  No hearing loss or nosebleeds. Much improved/all but resolved mucositis, as per the HPI above.  CARDIOVASCULAR:  No palpitations, arrhythmia, syncopal episodes or edema.  PULMONARY:  No hemoptysis, wheezing, chronic cough or  shortness of breath.  GASTROINTESTINAL:  No nausea or vomiting. No constipation or diarrhea. No abdominal pain.  GENITOURINARY:  No hematuria, kidney stones or frequent urination.  MUSCULOSKELETAL:  No joint or back pains.  INTEGUMENTARY: No rashes or pruritus.  ENDOCRINE:  No excessive thirst or hot flashes.  HEMATOLOGIC:  No history of free bleeding, spontaneous bleeding or clotting.  IMMUNOLOGIC:  No allergies or frequent infections.  NEUROLOGIC: No numbness, tingling, seizures or weakness.  PSYCHIATRIC:  No anxiety or depression.    PHYSICAL EXAMINATION  /66   Pulse 92   Temp 98 °F (36.7 °C) (Temporal)   Resp 18   Wt 70.6 kg (155 lb 9.6 oz)   SpO2 97%   BMI 24.36 kg/m²     Pain Score:  Pain Score    10/02/24 1257   PainSc: 0-No pain     PHQ-Score Total:  PHQ-9 Total Score:      ECO  GENERAL:  A well-developed, well-nourished, elderly, white male in no acute distress. Still mildly cushingoid in appearance, no longer sitting in a wheelchair (as he was last week).  HEENT:  Pupils equally round and reactive to light. Extraocular muscles intact. Much improved/all but resolved mucositis.  CARDIOVASCULAR:  Regular rate and rhythm. No murmurs, gallops or rubs.  LUNGS:  Clear to auscultation bilaterally.  No wheezing.  ABDOMEN:  Soft, nontender, nondistended with positive bowel sounds.  EXTREMITIES:  No clubbing, cyanosis or edema bilaterally.  SKIN:  No rashes or petechiae.  NEURO:  Cranial nerves grossly intact. No focal deficits.  PSYCH:  Alert and oriented x3.    LABORATORY  Lab Results   Component Value Date    WBC 6.94 10/02/2024    HGB 9.2 (L) 10/02/2024    HCT 29.6 (L) 10/02/2024    MCV 95.2 10/02/2024     10/02/2024    NEUTROABS 3.93 10/02/2024       Lab Results   Component Value Date     2024    K 4.5 2024     2024    CO2 24.7 2024    BUN 20 2024    CREATININE 1.39 (H) 2024    GLUCOSE 190 (H) 2024    CALCIUM 9.5 2024    AST 31  09/25/2024    ALT 53 (H) 09/25/2024    ALKPHOS 67 09/25/2024    BILITOT 0.6 09/25/2024    PROTEINTOT 6.0 09/25/2024    ALBUMIN 3.4 (L) 09/25/2024     Lab Results   Component Value Date    RETICCTPCT 1.30 09/18/2024     CBC (10/02/2024): WBCs: 6.94; HgB: 9.2; Hct: 29.6; platelets: 324; MCV: 95.2  CBC (09/25/2024): WBCs: 8.71; HgB: 7.3; Hct: 23.3; platelets: 349; MCV: 90.3  CBC (09/18/2024): WBCs: 9.33; HgB: 9.0; Hct: 27.7; platelets: 312; MCV: 87.4  CBC (09/10/2024): WBCs: 10.20; HgB: 10.4; Hct: 31.3; platelets: 222; MCV: 86.5  CBC (09/03/2024): WBCs: 10.95; HgB: 11.5; Hct: 35.6; platelets: 203; MCV: 86.6  CBC (08/29/2024): WBCs: 14.86; HgB: 7.4; Hct: 23.5; platelets: 275; MCV: 86.7  CBC (08/26/2024): WBCs: 11.72; HgB: 7.7; Hct: 24.6; platelets: 250; MCV: 86.6  CBC (08/23/2024): WBCs: 10.01; HgB: 8.0; Hct: 25.3; platelets: 237; MCV: 87.2  CBC (08/20/2024): WBCS: 8.59; HgB: 8.4; Hct: 26.4; platelets: 272; MCV: 86.8  CBC (08/14/2024): WBCs:  8.16; HgB: 9.0; Hct: 27.9; platelets: 319; MCV: 87.2  CBC (08/09/2024): WBCs: 12.00; HgB: 8.1; Hct: 25.5; platelets: 319; MCV: 90.7  CBC (08/02/2024): WBCs: 11.92; HgB: 8.1; Hct: 24.8; platelets: 204; MCV: 90.5  CBC (07/31/2024): WBCs: 17.04; HgB: 7.6; Hct: 23.3; platelets: 265; MCV: 95.5  CBC (07/26/2024): WBCs: 13.50; HgB: 8.1; Hct: 24.8; platelets: 329; MCV: 95.0  CBC (07/23/2024): WBCs: 16.43; HgB: 8.5; Hct: 25.8; platelets: 390; MCV: 96.3  CBC (07/15/2024): WBCs: 8.04; HgB: 8.3; Hct: 25.8; platelets: 340; MCV: 97.7  CBC (07/11/2024): WBCs: 7.25; HgB: 9.4; Hct: 29.0; platelets: 375; MCV: 97.0  CBC (07/01/2024): WBCs: 7.50; HgB: 9.4; Hct: 29.3; platelets: 272; MCV: 98.7  CBC (06/26/2024): WBCs: 6.67; HgB: 9.0; Hct: 27.5; platelets: 198; MCV: 98.6    Reticulocyte (09/10/2024): Percentage: 0.47; Absolute: 0.0170  Reticulocyte (08/20/2024): Percentage: 0.26; Absolute: <0.0100    Cyclosporine level (09/25/2024): 107 ng/mL    IMAGING    PATHOLOGY  Peripheral smear, bone marrow  aspiration smear, bone marrow aspiration clot and bone marrow core biopsy (06/26/2024):  Normocellular bone marrow with increased granulopoiesis, essentially absent erythropoiesis, adequate megakaryopoiesis, increased stainable iron and slightly increased mixed chronic inflammation. No evidence of increased blasts or dysplasia. The most striking feature within the bone marrow is the essential absence of any erythropoiesis in the aspirate or core biopsy. There is mixed inflammation with some notable immunophenotypic findings by flow cytometry, but there is no evidence of bone marrow involvement by lymphoma. This is most suggestive of a mixed reactive lymphoid population.    IMPRESSION AND PLAN  Mr. Silva is a 89 y.o., white male with:  Pure red cell aplasia: I have had multiple, long discussions with the patient and his wife regarding the results of the bone marrow biopsy performed in late June 2024 (which are summarized above). In short, this is a fairly uncommon diagnosis that can either be idiopathic or associated with a number of potential, underlying causes, including thymoma or parvovirus infection. Regardless, high dose steroids (prednisone 1-2 mg/kg daily) is the standard, first-line treatment; however, since the repeat CBC drawn on 07/01/2024 showed that his HgB (9.4 g/dL) had improved compared to what it was when he was discharged from our hospital following the bone marrow biopsy the previous week (9.0 g/dL), it was hoped that he was satisfactorily recovering (from a viral trigger) on his own. However, a repeat CBC on 07/15/2024 revealed that his HgB had dropped again (8.3 mg/dL); and his reticulocyte counts remained essentially zero. A Rx for prednisone 80 mg PO daily was provided at that time; but, unfortunately, high-dose steroids never provided him with any meaningful effect. Consequently, by late August 2024, he was started on an additional Rx (cyclosporine 50 mg PO BID), per [facility]'s  recommendations (with whom he had met for an additional opinion regarding his management earlier that month). Once he tolerated this starting dose for ~one week, in late August (08/27/2024), we increased the cyclosporine to 100 mg PO BID and further decreased the prednisone to 40 mg PO daily. At his appointment on 09/03/2024, as his anemia (finally) improved (rather well, to a HgB of 11.5 g/dL), we continued the cyclosporine and further decreased the prednisone to 20 mg PO daily. Unfortunately, at the time of his follow up appointment on 09/10/2024, a repeat CBC did not show any further improvement in his HgB (it was 10.4 g/dL), issue #2 (see below) was dramatically worse; and his repeat serum cyclosporine was > 400 ng/mL (above the upper limit of the desirable range). Given all of this, the cyclosporine was held completely for the next one and one half weeks. Meanwhile, he further decreased his daily prednisone dose (from 10 mg daily to 5 mg daily to, as of late September, off completely), as instructed. After remaining off of cyclosporine for one and one half weeks, his mouth sores (finally) improved; and his PO intake renormalized. Unfortunately, throughout the latter part of September, he was getting steadily fatigued again, correlating with the fact that, not surprisingly, his HgB continued to redecline off of the cyclosporine (and it is was back down to 7.3 g/dL on last week's recheck, at which time he required the transfusion of an additional two units of PRBCs). Meanwhile, he has now been back on cyclosporine (the 50 mg BID dose) for the past ~one and one half (1.5) weeks, as instructed; and, so far at least, issue #2 has yet to significantly reworsen (this issue has actually all but resolved over the course of this past week). He was instructed to continue the cyclosporine at the current dose; and we will see him back in our clinic in another week with a repeat CBC, CMP and cyclosporine level for another  symptom/toxicity check. Hopefully, the current dose of cyclosporine (50 mg BID) will continue to not significantly reworsen issue #2 while it steadily builds up to therapeutic, but not toxic, levels. Continue to monitor closely.  Mucositis: A progressive and refractory issue that began in late August 2024, shortly after he started PO cyclosporine for the treatment of issue #1 (particularly after he was escalated to the 100 mg BID dose). As discussed above, by early September 2024, this was dramatically reducing his PO intake, leading to dehydration and acute renal insufficiency. Also as discussed above, these symptoms are now much better/all but resolved; and the (lower) dose of PO cyclosporine (50 mg BID) has been reintroduced. He and his wife have been counseled on the routine use of magic mouthwash, OraGel and/or other, similar products. Continue to monitor.  The patient and his wife were in agreement with this plan.    It is a pleasure to participate in Mr. Silva's care. Please do not hesitate to call with any questions or concerns that you may have.    A total of 30 minutes were spent coordinating this patient’s care in clinic today; more than 50% of this time was face-to-face with the patient and his wife, reviewing his interim medical history, discussing the results of today's repeat labs and counseling on the current treatment and followup plans. All questions were answered to their satisfaction.    FOLLOW UP  Continue cyclosporine 50 mg PO BID. Return to our clinic in 1 week with a CBC, CMP and cyclosporine level.            This document was electronically signed by RAMIRO King MD October 2, 2024 13:28 EDT      CC: RIGO Mccall MD

## 2024-10-02 NOTE — PROGRESS NOTES
Venipuncture Blood Specimen Collection  Venipuncture performed in right arm by Daniel Honeycutt MA with good hemostasis. Patient tolerated the procedure well without complications.   10/02/24   Daniel Honeycutt MA

## 2024-10-05 LAB — CYCLOSPORINE BLD LC/MS/MS-MCNC: 121 NG/ML (ref 100–400)

## 2024-10-09 ENCOUNTER — OFFICE VISIT (OUTPATIENT)
Dept: ONCOLOGY | Facility: CLINIC | Age: 89
End: 2024-10-09
Payer: MEDICARE

## 2024-10-09 ENCOUNTER — LAB (OUTPATIENT)
Dept: ONCOLOGY | Facility: CLINIC | Age: 89
End: 2024-10-09
Payer: MEDICARE

## 2024-10-09 VITALS
DIASTOLIC BLOOD PRESSURE: 58 MMHG | WEIGHT: 156.8 LBS | SYSTOLIC BLOOD PRESSURE: 101 MMHG | HEART RATE: 96 BPM | RESPIRATION RATE: 18 BRPM | BODY MASS INDEX: 24.55 KG/M2 | TEMPERATURE: 97.1 F

## 2024-10-09 DIAGNOSIS — D61.9 APLASTIC ANEMIA: Primary | ICD-10-CM

## 2024-10-09 DIAGNOSIS — D64.9 SYMPTOMATIC ANEMIA: ICD-10-CM

## 2024-10-09 DIAGNOSIS — D61.9 APLASTIC ANEMIA: ICD-10-CM

## 2024-10-09 DIAGNOSIS — D60.9 ACQUIRED RED CELL APLASIA: Primary | ICD-10-CM

## 2024-10-09 DIAGNOSIS — D60.9 ACQUIRED RED CELL APLASIA: ICD-10-CM

## 2024-10-09 LAB
ALBUMIN SERPL-MCNC: 3.2 G/DL (ref 3.5–5.2)
ALBUMIN/GLOB SERPL: 1 G/DL
ALP SERPL-CCNC: 74 U/L (ref 39–117)
ALT SERPL W P-5'-P-CCNC: 21 U/L (ref 1–41)
ANION GAP SERPL CALCULATED.3IONS-SCNC: 8.7 MMOL/L (ref 5–15)
ANISOCYTOSIS BLD QL: NORMAL
AST SERPL-CCNC: 19 U/L (ref 1–40)
BASOPHILS # BLD AUTO: 0.04 10*3/MM3 (ref 0–0.2)
BASOPHILS NFR BLD AUTO: 0.5 % (ref 0–1.5)
BILIRUB SERPL-MCNC: 0.7 MG/DL (ref 0–1.2)
BUN SERPL-MCNC: 17 MG/DL (ref 8–23)
BUN/CREAT SERPL: 12.9 (ref 7–25)
CALCIUM SPEC-SCNC: 9.9 MG/DL (ref 8.2–9.6)
CHLORIDE SERPL-SCNC: 104 MMOL/L (ref 98–107)
CO2 SERPL-SCNC: 26.3 MMOL/L (ref 22–29)
CREAT SERPL-MCNC: 1.32 MG/DL (ref 0.76–1.27)
DEPRECATED RDW RBC AUTO: 54.5 FL (ref 37–54)
EGFRCR SERPLBLD CKD-EPI 2021: 50.9 ML/MIN/1.73
EOSINOPHIL # BLD AUTO: 0.09 10*3/MM3 (ref 0–0.4)
EOSINOPHIL NFR BLD AUTO: 1.2 % (ref 0.3–6.2)
ERYTHROCYTE [DISTWIDTH] IN BLOOD BY AUTOMATED COUNT: 22.2 % (ref 12.3–15.4)
GLOBULIN UR ELPH-MCNC: 3.1 GM/DL
GLUCOSE SERPL-MCNC: 111 MG/DL (ref 65–99)
HCT VFR BLD AUTO: 28.5 % (ref 37.5–51)
HGB BLD-MCNC: 8.8 G/DL (ref 13–17.7)
HYPOCHROMIA BLD QL: NORMAL
IMM GRANULOCYTES # BLD AUTO: 0.1 10*3/MM3 (ref 0–0.05)
IMM GRANULOCYTES NFR BLD AUTO: 1.3 % (ref 0–0.5)
LYMPHOCYTES # BLD AUTO: 2.23 10*3/MM3 (ref 0.7–3.1)
LYMPHOCYTES NFR BLD AUTO: 30.1 % (ref 19.6–45.3)
MACROCYTES BLD QL SMEAR: NORMAL
MCH RBC QN AUTO: 30.2 PG (ref 26.6–33)
MCHC RBC AUTO-ENTMCNC: 30.9 G/DL (ref 31.5–35.7)
MCV RBC AUTO: 97.9 FL (ref 79–97)
MONOCYTES # BLD AUTO: 0.81 10*3/MM3 (ref 0.1–0.9)
MONOCYTES NFR BLD AUTO: 10.9 % (ref 5–12)
NEUTROPHILS NFR BLD AUTO: 4.15 10*3/MM3 (ref 1.7–7)
NEUTROPHILS NFR BLD AUTO: 56 % (ref 42.7–76)
NRBC BLD AUTO-RTO: 0.7 /100 WBC (ref 0–0.2)
PLATELET # BLD AUTO: 373 10*3/MM3 (ref 140–450)
PMV BLD AUTO: 10.5 FL (ref 6–12)
POTASSIUM SERPL-SCNC: 5 MMOL/L (ref 3.5–5.2)
PROT SERPL-MCNC: 6.3 G/DL (ref 6–8.5)
RBC # BLD AUTO: 2.91 10*6/MM3 (ref 4.14–5.8)
RETICS # AUTO: 0.1 10*6/MM3 (ref 0.02–0.13)
RETICS/RBC NFR AUTO: 3.4 % (ref 0.7–1.9)
SMALL PLATELETS BLD QL SMEAR: ADEQUATE
SODIUM SERPL-SCNC: 139 MMOL/L (ref 136–145)
WBC NRBC COR # BLD AUTO: 7.42 10*3/MM3 (ref 3.4–10.8)

## 2024-10-09 PROCEDURE — 99214 OFFICE O/P EST MOD 30 MIN: CPT | Performed by: INTERNAL MEDICINE

## 2024-10-09 PROCEDURE — 85045 AUTOMATED RETICULOCYTE COUNT: CPT

## 2024-10-09 PROCEDURE — 85007 BL SMEAR W/DIFF WBC COUNT: CPT

## 2024-10-09 PROCEDURE — 1126F AMNT PAIN NOTED NONE PRSNT: CPT | Performed by: INTERNAL MEDICINE

## 2024-10-09 PROCEDURE — 80158 DRUG ASSAY CYCLOSPORINE: CPT | Performed by: INTERNAL MEDICINE

## 2024-10-09 PROCEDURE — 85025 COMPLETE CBC W/AUTO DIFF WBC: CPT

## 2024-10-09 PROCEDURE — 80053 COMPREHEN METABOLIC PANEL: CPT

## 2024-10-09 RX ORDER — CYCLOSPORINE 25 MG/1
50 CAPSULE, LIQUID FILLED ORAL 2 TIMES DAILY
Qty: 120 CAPSULE | Refills: 0 | Status: SHIPPED | OUTPATIENT
Start: 2024-10-09 | End: 2024-11-08

## 2024-10-09 NOTE — PROGRESS NOTES
Venipuncture Blood Specimen Collection  Venipuncture performed in right arm by Daniel Honeycutt MA with good hemostasis. Patient tolerated the procedure well without complications.   10/09/24   Daniel Honeycutt MA

## 2024-10-09 NOTE — PROGRESS NOTES
Name:  Chinedu Silva  :  1933  Date:  10/9/2024     REFERRING PHYSICIAN    PRIMARY CARE PROVIDER  Jr Segura PA    REASON FOR FOLLOWUP  1. Acquired red cell aplasia      CHIEF COMPLAINT  Still stable fatigue and resolved mucositis.    Dear Mr. Ramoston,    HISTORY OF PRESENT ILLNESS:   I saw Mr. Silva in followup (from his recent hospitalization for severe anemia) today in our hematology/oncology clinic. As you are aware, he is a pleasant, 89 y.o., white male with a history of hypertension, CAD and GERD who presented to the [facility] ED on 2024 with progressive weakness and dyspnea on exertion. These symptoms had potentially first started ~two to three months prior; however, they significantly worsened over the course of the previous couple of weeks (by early 2024). Upon arrival to our ED, he was found to be severely anemic, with a Hg of 3.7 g/dL. His other cell lines (WBCs and platelets) were entirely WNL. Our service was consulted for further evaluation of this issue, and a bone marrow biopsy was performed on 2024. Meanwhile, as he was feeling substantially better after his HgB was transfused up to the ~9 g/dL range, he was able to be discharged to outpatient follow up in our clinic. He returned to our clinic the following week (on 2024) to go over the results of the bone marrow exam and for ongoing management.    INTERIM HISTORY:  Mr. Silva returns to clinic today for follow up yet again accompanied by his wife. He was seen at [facility] on 2024 for an additional opinion for his acquired pure red cell aplasia. He was on PO cyclosporine for a total of ~three weeks (50 mg BID for the first week and 100 mg BID for the next two weeks) in late August/early September. Due to progressive, severe and refractory mucositis (as well as a toxic cyclosporine level), he was instructed to discontinue the cyclosporine earlier this month. After holding the cyclosporine  for the next ~one and one half weeks, his mucositis finally improved. He reiterates today that this issue now remains resolved, despite his now having been back on the cyclosporine (at a dose of 50 mg BID) for the past ~two and one half (2.5) weeks, as instructed. He has not had to receive any more units of PRBCs since the two he received in late September, and his fatigue remains improved. He has no new or other specific complaints.    Past Medical History:   Diagnosis Date    Carotid stenosis     Coronary artery disease     Diverticulitis     GERD (gastroesophageal reflux disease)     Gout     Hearing aid worn     Hypertension     Obese     Prostate cancer     Wears glasses        Past Surgical History:   Procedure Laterality Date    CAROTID ENDARTERECTOMY Left 2018    Procedure: CAROTID ENDARTERECTOMY WITH EEG LEFT;  Surgeon: Del Neal MD;  Location: [facility];  Service:     CATARACT EXTRACTION      CATARACT EXTRACTION WITH INTRAOCULAR LENS IMPLANT Right     CHOLECYSTECTOMY      COLONOSCOPY W/ POLYPECTOMY      HERNIA REPAIR Right     TOTAL KNEE ARTHROPLASTY Right     TURP / TRANSURETHRAL INCISION / DRAINAGE PROSTATE         Social History     Socioeconomic History    Marital status:     Number of children: 1   Tobacco Use    Smoking status: Former     Current packs/day: 0.00     Average packs/day: 1 pack/day for 12.0 years (12.0 ttl pk-yrs)     Types: Cigarettes     Start date:      Quit date:      Years since quittin.8    Smokeless tobacco: Current     Types: Chew   Vaping Use    Vaping status: Never Used   Substance and Sexual Activity    Alcohol use: No    Drug use: No    Sexual activity: Defer       Family History   Problem Relation Age of Onset    Hypertension Mother     Glaucoma Mother     Diverticulitis Mother     Gout Mother     ALS Father     Gout Sister     Broken bones Paternal Grandfather     Broken bones Son     Cancer Son     Diabetes Brother        Allergies   Allergen  Reactions    Ciprofloxacin Other (See Comments)     Muscle Pains    Lisinopril Cough    Tetracycline Provider Review Needed     Other Reaction(s) from [facility]: UNK    Sulfa Antibiotics Itching and Rash       Current Outpatient Medications   Medication Sig Dispense Refill    allopurinol (ZYLOPRIM) 300 MG tablet Take 1 tablet by mouth Daily.      aspirin 81 MG EC tablet Take 1 tablet by mouth Daily.      dicyclomine (BENTYL) 20 MG tablet Take 1 tablet by mouth Every 6 (Six) Hours.      MAGIC MOUTHWASH W/NYSTATIN 1/1/1/1 (lidocaine - diphenhydrAMINE HCl - aluminum & magnesium hydroxide - nystatin) Swish and spit 5 mL Every 4 (Four) Hours As Needed for Mouth Pain. 300 mL 2    ondansetron (ZOFRAN) 8 MG tablet Take 1 tablet by mouth Every 8 (Eight) Hours As Needed for Nausea or Vomiting. 30 tablet 1    rosuvastatin (CRESTOR) 10 MG tablet Take 1 tablet by mouth Daily. 90 tablet 3    valsartan (DIOVAN) 80 MG tablet Take 0.5 tablets by mouth Daily.      cycloSPORINE modified (NEORAL) 25 MG capsule Take 2 capsules by mouth 2 (Two) Times a Day for 30 days. 120 capsule 0    pantoprazole (PROTONIX) 40 MG EC tablet Take 1 tablet by mouth Daily. 30 tablet 1    predniSONE (DELTASONE) 20 MG tablet Take 3 tablets by mouth Daily. 90 tablet 0    predniSONE (DELTASONE) 5 MG tablet Take 2 pills by mouth daily for one week, then 1 pill by mouth daily for one week. 21 tablet 0     No current facility-administered medications for this visit.     REVIEW OF SYSTEMS  CONSTITUTIONAL:  No fever, chills or night sweats. Chronic fatigue, recently stable, as per the HPI above.  EYES:  No blurry vision, diplopia or other vision changes.  ENT:  No hearing loss or nosebleeds. Much improved/all but resolved mucositis, as per the HPI above.  CARDIOVASCULAR:  No palpitations, arrhythmia, syncopal episodes or edema.  PULMONARY:  No hemoptysis, wheezing, chronic cough or shortness of breath.  GASTROINTESTINAL:  No nausea or vomiting. No constipation  or diarrhea. No abdominal pain.  GENITOURINARY:  No hematuria, kidney stones or frequent urination.  MUSCULOSKELETAL:  No joint or back pains.  INTEGUMENTARY: No rashes or pruritus.  ENDOCRINE:  No excessive thirst or hot flashes.  HEMATOLOGIC:  No history of free bleeding, spontaneous bleeding or clotting.  IMMUNOLOGIC:  No allergies or frequent infections.  NEUROLOGIC: No numbness, tingling, seizures or weakness.  PSYCHIATRIC:  No anxiety or depression.    PHYSICAL EXAMINATION  /58   Pulse 96   Temp 97.1 °F (36.2 °C) (Temporal)   Resp 18   Wt 71.1 kg (156 lb 12.8 oz)   BMI 24.55 kg/m²     Pain Score:  Pain Score    10/09/24 1221   PainSc: 0-No pain     PHQ-Score Total:  PHQ-9 Total Score:      ECO  GENERAL:  A well-developed, well-nourished, elderly, white male in no acute distress.  HEENT:  Pupils equally round and reactive to light. Extraocular muscles intact. Much improved/all but resolved mucositis.  CARDIOVASCULAR:  Regular rate and rhythm. No murmurs, gallops or rubs.  LUNGS:  Clear to auscultation bilaterally.  No wheezing.  ABDOMEN:  Soft, nontender, nondistended with positive bowel sounds.  EXTREMITIES:  No clubbing, cyanosis or edema bilaterally.  SKIN:  No rashes or petechiae.  NEURO:  Cranial nerves grossly intact. No focal deficits.  PSYCH:  Alert and oriented x3.    LABORATORY  Lab Results   Component Value Date    WBC 7.42 10/09/2024    HGB 8.8 (L) 10/09/2024    HCT 28.5 (L) 10/09/2024    MCV 97.9 (H) 10/09/2024     10/09/2024    NEUTROABS 4.15 10/09/2024       Lab Results   Component Value Date     10/09/2024    K 5.0 10/09/2024     10/09/2024    CO2 26.3 10/09/2024    BUN 17 10/09/2024    CREATININE 1.32 (H) 10/09/2024    GLUCOSE 111 (H) 10/09/2024    CALCIUM 9.9 (H) 10/09/2024    AST 19 10/09/2024    ALT 21 10/09/2024    ALKPHOS 74 10/09/2024    BILITOT 0.7 10/09/2024    PROTEINTOT 6.3 10/09/2024    ALBUMIN 3.2 (L) 10/09/2024     Lab Results   Component Value  Date    RETICCTPCT 3.40 (H) 10/09/2024     CBC (10/09/2024): WBCs: 7.42; HgB: 8.8; Hct: 28.5; platelets: 373; MCV: 97.9  CBC (10/02/2024): WBCs: 6.94; HgB: 9.2; Hct: 29.6; platelets: 324; MCV: 95.2  CBC (09/25/2024): WBCs: 8.71; HgB: 7.3; Hct: 23.3; platelets: 349; MCV: 90.3  CBC (09/18/2024): WBCs: 9.33; HgB: 9.0; Hct: 27.7; platelets: 312; MCV: 87.4  CBC (09/10/2024): WBCs: 10.20; HgB: 10.4; Hct: 31.3; platelets: 222; MCV: 86.5  CBC (09/03/2024): WBCs: 10.95; HgB: 11.5; Hct: 35.6; platelets: 203; MCV: 86.6  CBC (08/29/2024): WBCs: 14.86; HgB: 7.4; Hct: 23.5; platelets: 275; MCV: 86.7  CBC (08/26/2024): WBCs: 11.72; HgB: 7.7; Hct: 24.6; platelets: 250; MCV: 86.6  CBC (08/23/2024): WBCs: 10.01; HgB: 8.0; Hct: 25.3; platelets: 237; MCV: 87.2  CBC (08/20/2024): WBCS: 8.59; HgB: 8.4; Hct: 26.4; platelets: 272; MCV: 86.8  CBC (08/14/2024): WBCs:  8.16; HgB: 9.0; Hct: 27.9; platelets: 319; MCV: 87.2  CBC (08/09/2024): WBCs: 12.00; HgB: 8.1; Hct: 25.5; platelets: 319; MCV: 90.7  CBC (08/02/2024): WBCs: 11.92; HgB: 8.1; Hct: 24.8; platelets: 204; MCV: 90.5  CBC (07/31/2024): WBCs: 17.04; HgB: 7.6; Hct: 23.3; platelets: 265; MCV: 95.5  CBC (07/26/2024): WBCs: 13.50; HgB: 8.1; Hct: 24.8; platelets: 329; MCV: 95.0  CBC (07/23/2024): WBCs: 16.43; HgB: 8.5; Hct: 25.8; platelets: 390; MCV: 96.3  CBC (07/15/2024): WBCs: 8.04; HgB: 8.3; Hct: 25.8; platelets: 340; MCV: 97.7  CBC (07/11/2024): WBCs: 7.25; HgB: 9.4; Hct: 29.0; platelets: 375; MCV: 97.0  CBC (07/01/2024): WBCs: 7.50; HgB: 9.4; Hct: 29.3; platelets: 272; MCV: 98.7  CBC (06/26/2024): WBCs: 6.67; HgB: 9.0; Hct: 27.5; platelets: 198; MCV: 98.6    Reticulocyte (09/10/2024): Percentage: 0.47; Absolute: 0.0170  Reticulocyte (08/20/2024): Percentage: 0.26; Absolute: <0.0100    Cyclosporine level (09/25/2024): 107 ng/mL    IMAGING    PATHOLOGY  Peripheral smear, bone marrow aspiration smear, bone marrow aspiration clot and bone marrow core biopsy (06/26/2024):  Normocellular  bone marrow with increased granulopoiesis, essentially absent erythropoiesis, adequate megakaryopoiesis, increased stainable iron and slightly increased mixed chronic inflammation. No evidence of increased blasts or dysplasia. The most striking feature within the bone marrow is the essential absence of any erythropoiesis in the aspirate or core biopsy. There is mixed inflammation with some notable immunophenotypic findings by flow cytometry, but there is no evidence of bone marrow involvement by lymphoma. This is most suggestive of a mixed reactive lymphoid population.    IMPRESSION AND PLAN  Mr. Silva is a 89 y.o., white male with:  Pure red cell aplasia: I have had multiple, long discussions with the patient and his wife regarding the results of the bone marrow biopsy performed in late June 2024 (which are summarized above). In short, this is a fairly uncommon diagnosis that can either be idiopathic or associated with a number of potential, underlying causes, including thymoma or parvovirus infection. Regardless, high dose steroids (prednisone 1-2 mg/kg daily) is the standard, first-line treatment; however, since the repeat CBC drawn on 07/01/2024 showed that his HgB (9.4 g/dL) had improved compared to what it was when he was discharged from our hospital following the bone marrow biopsy the previous week (9.0 g/dL), it was hoped that he was satisfactorily recovering (from a viral trigger) on his own. However, a repeat CBC on 07/15/2024 revealed that his HgB had dropped again (8.3 mg/dL); and his reticulocyte counts remained essentially zero. A Rx for prednisone 80 mg PO daily was provided at that time; but, unfortunately, high-dose steroids never provided him with any meaningful effect. Consequently, by late August 2024, he was started on an additional Rx (cyclosporine 50 mg PO BID), per Charlotte's recommendations (with whom he had met for an additional opinion regarding his management earlier that month).  Once he tolerated this starting dose for ~one week, in late August (08/27/2024), we increased the cyclosporine to 100 mg PO BID and further decreased the prednisone to 40 mg PO daily. At his appointment on 09/03/2024, as his anemia (finally) improved (rather well, to a HgB of 11.5 g/dL), we continued the cyclosporine and further decreased the prednisone to 20 mg PO daily. Unfortunately, at the time of his follow up appointment on 09/10/2024, a repeat CBC did not show any further improvement in his HgB (it was 10.4 g/dL), issue #2 (see below) was dramatically worse; and his repeat serum cyclosporine was > 400 ng/mL (above the upper limit of the desirable range). Given all of this, the cyclosporine was held completely for the next one and one half weeks. Meanwhile, he further decreased his daily prednisone dose (from 10 mg daily to 5 mg daily to, as of late September, off completely), as instructed. After remaining off of cyclosporine for one and one half weeks, his mouth sores (finally) improved; and his PO intake renormalized. Unfortunately, throughout the latter part of September 2024, he was getting steadily fatigued again, correlating with the fact that, not surprisingly, his HgB continued to redecline off of the cyclosporine (and it is was back down to 7.3 g/dL by 09/25/2024, at which time he required the transfusion of an additional two units of PRBCs). Meanwhile, he has now been back on cyclosporine (the 50 mg BID dose) for the past ~two and one half (2.5) weeks, as instructed; and, so far at least, issue #2 has still yet to significantly reworsen. Today's repeat labwork shows that his HgB is currently stable (8.8 g/dL), and his reticulocyte count is improving. He was instructed to continue the cyclosporine at the current dose; and we will see him back in our clinic in another week with a repeat CBC, CMP and cyclosporine level for another symptom/toxicity check. Hopefully, this lower dose of cyclosporine (50  mg BID) will continue to not significantly reworsen issue #2 while it steadily builds up to therapeutic, but not toxic, levels. Continue to monitor closely.  Mucositis: A progressive and refractory issue that began in late August 2024, shortly after he started PO cyclosporine for the treatment of issue #1 (particularly after he was escalated to the 100 mg BID dose). As discussed above, by early September 2024, this was dramatically reducing his PO intake, leading to dehydration and acute renal insufficiency. Also as discussed above, these symptoms are now still much better/all but resolved; and the (lower) dose of PO cyclosporine (50 mg BID) has been reintroduced. He and his wife have been counseled on the routine use of magic mouthwash, OraGel and/or other, similar products. Continue to monitor.  The patient and his wife were in agreement with this plan.    It is a pleasure to participate in Mr. Silva's care. Please do not hesitate to call with any questions or concerns that you may have.    A total of 30 minutes were spent coordinating this patient’s care in clinic today; more than 50% of this time was face-to-face with the patient and his wife, reviewing his interim medical history, discussing the results of today's repeat labwork and counseling on the current treatment and followup plans. All questions were answered to their satisfaction.    FOLLOW UP  Continue cyclosporine 50 mg PO BID (refills provided today). Return to our clinic in 1 week with a CBC, CMP, reticulocyte count and cyclosporine level.            This document was electronically signed by RAMIRO King MD October 9, 2024 13:12 EDT      CC: RIGO Mccall MD

## 2024-10-12 LAB — CYCLOSPORINE BLD LC/MS/MS-MCNC: 48 NG/ML (ref 100–400)

## 2024-10-15 ENCOUNTER — LAB (OUTPATIENT)
Dept: ONCOLOGY | Facility: CLINIC | Age: 89
End: 2024-10-15
Payer: MEDICARE

## 2024-10-15 ENCOUNTER — OFFICE VISIT (OUTPATIENT)
Dept: ONCOLOGY | Facility: CLINIC | Age: 89
End: 2024-10-15
Payer: MEDICARE

## 2024-10-15 VITALS
DIASTOLIC BLOOD PRESSURE: 76 MMHG | BODY MASS INDEX: 24.67 KG/M2 | RESPIRATION RATE: 18 BRPM | HEART RATE: 91 BPM | WEIGHT: 157.2 LBS | HEIGHT: 67 IN | OXYGEN SATURATION: 95 % | SYSTOLIC BLOOD PRESSURE: 135 MMHG | TEMPERATURE: 98.2 F

## 2024-10-15 DIAGNOSIS — D61.9 APLASTIC ANEMIA: ICD-10-CM

## 2024-10-15 DIAGNOSIS — D64.9 SYMPTOMATIC ANEMIA: ICD-10-CM

## 2024-10-15 DIAGNOSIS — D60.9 ACQUIRED RED CELL APLASIA: ICD-10-CM

## 2024-10-15 DIAGNOSIS — D61.9 APLASTIC ANEMIA: Primary | ICD-10-CM

## 2024-10-15 DIAGNOSIS — D60.9 ACQUIRED RED CELL APLASIA: Primary | ICD-10-CM

## 2024-10-15 LAB
ALBUMIN SERPL-MCNC: 3.4 G/DL (ref 3.5–5.2)
ALBUMIN/GLOB SERPL: 1.2 G/DL
ALP SERPL-CCNC: 81 U/L (ref 39–117)
ALT SERPL W P-5'-P-CCNC: 20 U/L (ref 1–41)
ANION GAP SERPL CALCULATED.3IONS-SCNC: 9.1 MMOL/L (ref 5–15)
ANISOCYTOSIS BLD QL: NORMAL
AST SERPL-CCNC: 23 U/L (ref 1–40)
BASOPHILS # BLD AUTO: 0.05 10*3/MM3 (ref 0–0.2)
BASOPHILS NFR BLD AUTO: 0.6 % (ref 0–1.5)
BILIRUB SERPL-MCNC: 1.1 MG/DL (ref 0–1.2)
BUN SERPL-MCNC: 18 MG/DL (ref 8–23)
BUN/CREAT SERPL: 13.7 (ref 7–25)
CALCIUM SPEC-SCNC: 9.8 MG/DL (ref 8.2–9.6)
CHLORIDE SERPL-SCNC: 106 MMOL/L (ref 98–107)
CO2 SERPL-SCNC: 25.9 MMOL/L (ref 22–29)
CREAT SERPL-MCNC: 1.31 MG/DL (ref 0.76–1.27)
DEPRECATED RDW RBC AUTO: 96.1 FL (ref 37–54)
EGFRCR SERPLBLD CKD-EPI 2021: 51.4 ML/MIN/1.73
EOSINOPHIL # BLD AUTO: 0.1 10*3/MM3 (ref 0–0.4)
EOSINOPHIL NFR BLD AUTO: 1.3 % (ref 0.3–6.2)
ERYTHROCYTE [DISTWIDTH] IN BLOOD BY AUTOMATED COUNT: 27 % (ref 12.3–15.4)
GLOBULIN UR ELPH-MCNC: 2.9 GM/DL
GLUCOSE SERPL-MCNC: 102 MG/DL (ref 65–99)
HCT VFR BLD AUTO: 30.8 % (ref 37.5–51)
HGB BLD-MCNC: 9.6 G/DL (ref 13–17.7)
IMM GRANULOCYTES # BLD AUTO: 0.1 10*3/MM3 (ref 0–0.05)
IMM GRANULOCYTES NFR BLD AUTO: 1.3 % (ref 0–0.5)
LYMPHOCYTES # BLD AUTO: 2.43 10*3/MM3 (ref 0.7–3.1)
LYMPHOCYTES NFR BLD AUTO: 30.9 % (ref 19.6–45.3)
MCH RBC QN AUTO: 31.6 PG (ref 26.6–33)
MCHC RBC AUTO-ENTMCNC: 31.2 G/DL (ref 31.5–35.7)
MCV RBC AUTO: 101.3 FL (ref 79–97)
MONOCYTES # BLD AUTO: 0.84 10*3/MM3 (ref 0.1–0.9)
MONOCYTES NFR BLD AUTO: 10.7 % (ref 5–12)
NEUTROPHILS NFR BLD AUTO: 4.34 10*3/MM3 (ref 1.7–7)
NEUTROPHILS NFR BLD AUTO: 55.2 % (ref 42.7–76)
NRBC BLD AUTO-RTO: 0.4 /100 WBC (ref 0–0.2)
PLAT MORPH BLD: NORMAL
PLATELET # BLD AUTO: 387 10*3/MM3 (ref 140–450)
PMV BLD AUTO: 10.5 FL (ref 6–12)
POLYCHROMASIA BLD QL SMEAR: NORMAL
POTASSIUM SERPL-SCNC: 4.7 MMOL/L (ref 3.5–5.2)
PROT SERPL-MCNC: 6.3 G/DL (ref 6–8.5)
RBC # BLD AUTO: 3.04 10*6/MM3 (ref 4.14–5.8)
RETICS # AUTO: 0.16 10*6/MM3 (ref 0.02–0.13)
RETICS/RBC NFR AUTO: 5.24 % (ref 0.7–1.9)
SODIUM SERPL-SCNC: 141 MMOL/L (ref 136–145)
WBC NRBC COR # BLD AUTO: 7.86 10*3/MM3 (ref 3.4–10.8)

## 2024-10-15 PROCEDURE — 99214 OFFICE O/P EST MOD 30 MIN: CPT | Performed by: INTERNAL MEDICINE

## 2024-10-15 PROCEDURE — 85025 COMPLETE CBC W/AUTO DIFF WBC: CPT | Performed by: INTERNAL MEDICINE

## 2024-10-15 PROCEDURE — 85045 AUTOMATED RETICULOCYTE COUNT: CPT | Performed by: INTERNAL MEDICINE

## 2024-10-15 PROCEDURE — 36415 COLL VENOUS BLD VENIPUNCTURE: CPT | Performed by: INTERNAL MEDICINE

## 2024-10-15 PROCEDURE — 80158 DRUG ASSAY CYCLOSPORINE: CPT | Performed by: INTERNAL MEDICINE

## 2024-10-15 PROCEDURE — 85007 BL SMEAR W/DIFF WBC COUNT: CPT | Performed by: INTERNAL MEDICINE

## 2024-10-15 PROCEDURE — 80053 COMPREHEN METABOLIC PANEL: CPT | Performed by: INTERNAL MEDICINE

## 2024-10-15 PROCEDURE — 1126F AMNT PAIN NOTED NONE PRSNT: CPT | Performed by: INTERNAL MEDICINE

## 2024-10-15 NOTE — PROGRESS NOTES
Name:  Chinedu Silva  :  1933  Date:  10/15/2024     REFERRING PHYSICIAN    PRIMARY CARE PROVIDER  Jr Segura PA    REASON FOR FOLLOWUP  1. Acquired red cell aplasia      CHIEF COMPLAINT  Mild, recurrent mucositis but overall improved fatigue.    Dear  Colton,    HISTORY OF PRESENT ILLNESS:   I saw Mr. Silva in followup (from his recent hospitalization for severe anemia) today in our hematology/oncology clinic. As you are aware, he is a pleasant, 89 y.o., white male with a history of hypertension, CAD and GERD who presented to the [facility] ED on 2024 with progressive weakness and dyspnea on exertion. These symptoms had potentially first started ~two to three months prior; however, they significantly worsened over the course of the previous couple of weeks (by early 2024). Upon arrival to our ED, he was found to be severely anemic, with a Hg of 3.7 g/dL. His other cell lines (WBCs and platelets) were entirely WNL. Our service was consulted for further evaluation of this issue, and a bone marrow biopsy was performed on 2024. Meanwhile, as he was feeling substantially better after his HgB was transfused up to the ~9 g/dL range, he was able to be discharged to outpatient follow up in our clinic. He returned to our clinic the following week (on 2024) to go over the results of the bone marrow exam and for ongoing management.    INTERIM HISTORY:  Mr. Silva returns to clinic today for follow up yet again accompanied by his wife. He was seen at [facility] on 2024 for an additional opinion for his acquired pure red cell aplasia. He was on PO cyclosporine for a total of ~three weeks (50 mg BID for the first week and 100 mg BID for the next two weeks) in late August/early September. Due to progressive, severe and refractory mucositis (as well as a toxic cyclosporine level), he was instructed to discontinue the cyclosporine in early September. After holding the  cyclosporine for the next ~one and one half weeks, his mucositis finally improved. He has now been back on the cyclosporine (at a dose of 50 mg BID) for the past ~three and one half (3.5) weeks, as instructed. While her reports today that he has redeveloped some milder, and currently still manageable, mucositis, he is overall still well, because his fatigue is now better. He has not had to receive any more units of PRBCs since the two he received in late September. He otherwise has no new or specific complaints.    Past Medical History:   Diagnosis Date    Carotid stenosis     Coronary artery disease     Diverticulitis     GERD (gastroesophageal reflux disease)     Gout     Hearing aid worn     Hypertension     Obese     Prostate cancer     Wears glasses        Past Surgical History:   Procedure Laterality Date    CAROTID ENDARTERECTOMY Left 2018    Procedure: CAROTID ENDARTERECTOMY WITH EEG LEFT;  Surgeon: Del Neal MD;  [facility]  Service:     CATARACT EXTRACTION      CATARACT EXTRACTION WITH INTRAOCULAR LENS IMPLANT Right     CHOLECYSTECTOMY      COLONOSCOPY W/ POLYPECTOMY      HERNIA REPAIR Right     TOTAL KNEE ARTHROPLASTY Right     TURP / TRANSURETHRAL INCISION / DRAINAGE PROSTATE         Social History     Socioeconomic History    Marital status:     Number of children: 1   Tobacco Use    Smoking status: Former     Current packs/day: 0.00     Average packs/day: 1 pack/day for 12.0 years (12.0 ttl pk-yrs)     Types: Cigarettes     Start date:      Quit date:      Years since quittin.8    Smokeless tobacco: Current     Types: Chew   Vaping Use    Vaping status: Never Used   Substance and Sexual Activity    Alcohol use: No    Drug use: No    Sexual activity: Defer       Family History   Problem Relation Age of Onset    Hypertension Mother     Glaucoma Mother     Diverticulitis Mother     Gout Mother     ALS Father     Gout Sister     Broken bones Paternal Grandfather      Broken bones Son     Cancer Son     Diabetes Brother        Allergies   Allergen Reactions    Ciprofloxacin Other (See Comments)     Muscle Pains    Lisinopril Cough    Tetracycline Provider Review Needed     Other Reaction(s) from [facility]: UNK    Sulfa Antibiotics Itching and Rash       Current Outpatient Medications   Medication Sig Dispense Refill    allopurinol (ZYLOPRIM) 300 MG tablet Take 1 tablet by mouth Daily.      aspirin 81 MG EC tablet Take 1 tablet by mouth Daily.      cycloSPORINE modified (NEORAL) 25 MG capsule Take 2 capsules by mouth 2 (Two) Times a Day for 30 days. 120 capsule 0    dicyclomine (BENTYL) 20 MG tablet Take 1 tablet by mouth Every 6 (Six) Hours.      MAGIC MOUTHWASH W/NYSTATIN 1/1/1/1 (lidocaine - diphenhydrAMINE HCl - aluminum & magnesium hydroxide - nystatin) Swish and spit 5 mL Every 4 (Four) Hours As Needed for Mouth Pain. 300 mL 2    ondansetron (ZOFRAN) 8 MG tablet Take 1 tablet by mouth Every 8 (Eight) Hours As Needed for Nausea or Vomiting. 30 tablet 1    rosuvastatin (CRESTOR) 10 MG tablet Take 1 tablet by mouth Daily. 90 tablet 3    valsartan (DIOVAN) 80 MG tablet Take 0.5 tablets by mouth Daily.      pantoprazole (PROTONIX) 40 MG EC tablet Take 1 tablet by mouth Daily. 30 tablet 1    predniSONE (DELTASONE) 20 MG tablet Take 3 tablets by mouth Daily. 90 tablet 0    predniSONE (DELTASONE) 5 MG tablet Take 2 pills by mouth daily for one week, then 1 pill by mouth daily for one week. 21 tablet 0     No current facility-administered medications for this visit.     REVIEW OF SYSTEMS  CONSTITUTIONAL:  No fever, chills or night sweats. Chronic fatigue, recently stable, as per the HPI above.  EYES:  No blurry vision, diplopia or other vision changes.  ENT:  No hearing loss or nosebleeds. Mild, recurrent mucositis, as per the HPI above.  CARDIOVASCULAR:  No palpitations, arrhythmia, syncopal episodes or edema.  PULMONARY:  No hemoptysis, wheezing, chronic cough or shortness of  "breath.  GASTROINTESTINAL:  No nausea or vomiting. No constipation or diarrhea. No abdominal pain.  GENITOURINARY:  No hematuria, kidney stones or frequent urination.  MUSCULOSKELETAL:  No joint or back pains.  INTEGUMENTARY: No rashes or pruritus.  ENDOCRINE:  No excessive thirst or hot flashes.  HEMATOLOGIC:  No history of free bleeding, spontaneous bleeding or clotting.  IMMUNOLOGIC:  No allergies or frequent infections.  NEUROLOGIC: No numbness, tingling, seizures or weakness.  PSYCHIATRIC:  No anxiety or depression.    PHYSICAL EXAMINATION  /76   Pulse 91   Temp 98.2 °F (36.8 °C) (Temporal)   Resp 18   Ht 170.2 cm (67\")   Wt 71.3 kg (157 lb 3.2 oz)   SpO2 95%   BMI 24.62 kg/m²     Pain Score:  Pain Score    10/15/24 1327   PainSc: 0-No pain     PHQ-Score Total:  PHQ-9 Total Score:      ECO  GENERAL:  A well-developed, well-nourished, elderly, white male in no acute distress.  HEENT:  Pupils equally round and reactive to light. Extraocular muscles intact. Mild, recurrent mucositis.  CARDIOVASCULAR:  Regular rate and rhythm. No murmurs, gallops or rubs.  LUNGS:  Clear to auscultation bilaterally.  No wheezing.  ABDOMEN:  Soft, nontender, nondistended with positive bowel sounds.  EXTREMITIES:  No clubbing, cyanosis or edema bilaterally.  SKIN:  No rashes or petechiae.  NEURO:  Cranial nerves grossly intact. No focal deficits.  PSYCH:  Alert and oriented x3.    LABORATORY  Lab Results   Component Value Date    WBC 7.86 10/15/2024    HGB 9.6 (L) 10/15/2024    HCT 30.8 (L) 10/15/2024    .3 (H) 10/15/2024     10/15/2024    NEUTROABS 4.34 10/15/2024       Lab Results   Component Value Date     10/15/2024    K 4.7 10/15/2024     10/15/2024    CO2 25.9 10/15/2024    BUN 18 10/15/2024    CREATININE 1.31 (H) 10/15/2024    GLUCOSE 102 (H) 10/15/2024    CALCIUM 9.8 (H) 10/15/2024    AST 23 10/15/2024    ALT 20 10/15/2024    ALKPHOS 81 10/15/2024    BILITOT 1.1 10/15/2024    " PROTEINTOT 6.3 10/15/2024    ALBUMIN 3.4 (L) 10/15/2024     Lab Results   Component Value Date    RETICCTPCT 5.24 (H) 10/15/2024     CBC (10/15/2024): WBCs: 7.86; HgB: 9.6; Hct: 30.8; platelets: 387; MCV: 101.3  CBC (10/09/2024): WBCs: 7.42; HgB: 8.8; Hct: 28.5; platelets: 373; MCV: 97.9  CBC (10/02/2024): WBCs: 6.94; HgB: 9.2; Hct: 29.6; platelets: 324; MCV: 95.2  CBC (09/25/2024): WBCs: 8.71; HgB: 7.3; Hct: 23.3; platelets: 349; MCV: 90.3  CBC (09/18/2024): WBCs: 9.33; HgB: 9.0; Hct: 27.7; platelets: 312; MCV: 87.4  CBC (09/10/2024): WBCs: 10.20; HgB: 10.4; Hct: 31.3; platelets: 222; MCV: 86.5  CBC (09/03/2024): WBCs: 10.95; HgB: 11.5; Hct: 35.6; platelets: 203; MCV: 86.6  CBC (08/29/2024): WBCs: 14.86; HgB: 7.4; Hct: 23.5; platelets: 275; MCV: 86.7  CBC (08/26/2024): WBCs: 11.72; HgB: 7.7; Hct: 24.6; platelets: 250; MCV: 86.6  CBC (08/23/2024): WBCs: 10.01; HgB: 8.0; Hct: 25.3; platelets: 237; MCV: 87.2  CBC (08/20/2024): WBCS: 8.59; HgB: 8.4; Hct: 26.4; platelets: 272; MCV: 86.8  CBC (08/14/2024): WBCs:  8.16; HgB: 9.0; Hct: 27.9; platelets: 319; MCV: 87.2  CBC (08/09/2024): WBCs: 12.00; HgB: 8.1; Hct: 25.5; platelets: 319; MCV: 90.7  CBC (08/02/2024): WBCs: 11.92; HgB: 8.1; Hct: 24.8; platelets: 204; MCV: 90.5  CBC (07/31/2024): WBCs: 17.04; HgB: 7.6; Hct: 23.3; platelets: 265; MCV: 95.5  CBC (07/26/2024): WBCs: 13.50; HgB: 8.1; Hct: 24.8; platelets: 329; MCV: 95.0  CBC (07/23/2024): WBCs: 16.43; HgB: 8.5; Hct: 25.8; platelets: 390; MCV: 96.3  CBC (07/15/2024): WBCs: 8.04; HgB: 8.3; Hct: 25.8; platelets: 340; MCV: 97.7  CBC (07/11/2024): WBCs: 7.25; HgB: 9.4; Hct: 29.0; platelets: 375; MCV: 97.0  CBC (07/01/2024): WBCs: 7.50; HgB: 9.4; Hct: 29.3; platelets: 272; MCV: 98.7  CBC (06/26/2024): WBCs: 6.67; HgB: 9.0; Hct: 27.5; platelets: 198; MCV: 98.6    Reticulocytes (10/15/2024): Percentage: 5.24; Absolute: 0.1603  Reticulocytes (09/10/2024): Percentage: 0.47; Absolute: 0.0170  Reticulocytes (08/20/2024):  Percentage: 0.26; Absolute: <0.0100    Cyclosporine level (10/15/2024): pending  Cyclosporine level (10/09/2024): 48 ng/mL  Cyclosporine level (10/02/2024): 121 ng/mL  Cyclosporine level (09/25/2024): 107 ng/mL    IMAGING    PATHOLOGY  Peripheral smear, bone marrow aspiration smear, bone marrow aspiration clot and bone marrow core biopsy (06/26/2024):  Normocellular bone marrow with increased granulopoiesis, essentially absent erythropoiesis, adequate megakaryopoiesis, increased stainable iron and slightly increased mixed chronic inflammation. No evidence of increased blasts or dysplasia. The most striking feature within the bone marrow is the essential absence of any erythropoiesis in the aspirate or core biopsy. There is mixed inflammation with some notable immunophenotypic findings by flow cytometry, but there is no evidence of bone marrow involvement by lymphoma. This is most suggestive of a mixed reactive lymphoid population.    IMPRESSION AND PLAN  Mr. Silva is a 89 y.o., white male with:  Pure red cell aplasia: I have had multiple, long discussions with the patient and his wife regarding the results of the bone marrow biopsy performed in late June 2024 (which are summarized above). In short, this is a fairly uncommon diagnosis that can either be idiopathic or associated with a number of potential, underlying causes, including thymoma or parvovirus infection. Regardless, high dose steroids (prednisone 1-2 mg/kg daily) is the standard, first-line treatment; however, since the repeat CBC drawn on 07/01/2024 showed that his HgB (9.4 g/dL) had improved compared to what it was when he was discharged from our hospital following the bone marrow biopsy the previous week (9.0 g/dL), it was hoped that he was satisfactorily recovering (from a viral trigger) on his own. However, a repeat CBC on 07/15/2024 revealed that his HgB had dropped again (8.3 mg/dL); and his reticulocyte counts remained essentially zero. A Rx  for prednisone 80 mg PO daily was provided at that time; but, unfortunately, high-dose steroids never provided him with any meaningful effect. Consequently, by late August 2024, he was started on an additional Rx (cyclosporine 50 mg PO BID), per [place]'s recommendations (with whom he had met for an additional opinion regarding his management earlier that month). Once he tolerated this starting dose for ~one week, in late August (08/27/2024), we increased the cyclosporine to 100 mg PO BID and further decreased the prednisone to 40 mg PO daily. At his appointment on 09/03/2024, as his anemia (finally) improved (rather well, to a HgB of 11.5 g/dL), we continued the cyclosporine and further decreased the prednisone to 20 mg PO daily. Unfortunately, at the time of his follow up appointment on 09/10/2024, a repeat CBC did not show any further improvement in his HgB (it was 10.4 g/dL), issue #2 (see below) was dramatically worse; and his repeat serum cyclosporine was > 400 ng/mL (above the upper limit of the desirable range). Given all of this, the cyclosporine was held completely for the next one and one half weeks. Meanwhile, he further decreased his daily prednisone dose (from 10 mg daily to 5 mg daily to, as of late September, off completely), as instructed. After remaining off of cyclosporine for one and one half weeks, his mouth sores (finally) improved; and his PO intake renormalized. Unfortunately, throughout the latter part of September 2024, he was getting steadily fatigued again, correlating with the fact that, not surprisingly, his HgB continued to redecline off of the cyclosporine (and it is was back down to 7.3 g/dL by 09/25/2024, at which time he required the transfusion of an additional two units of PRBCs). Meanwhile, he has now been back on cyclosporine (the 50 mg BID dose) for the past ~three and one half (3.5) weeks, as instructed; and, so far at least, issue #2 has still yet to significantly  reworsen (it has mildly recurred this past week, but it remains very tolerable with localized therapies such as Orajel). Meanwhile, today's repeat labwork shows that his HgB is now (finally) reincreasing (9.6 g/dL), and his reticulocyte count is further improved from last week (10/9). He was again instructed to continue the cyclosporine at the current dose; and we will see him back in our clinic again in both one and two weeks with repeat CBCs, reticulocyte counts and cyclosporine levels for additional symptom/toxicity checks. Hopefully, this lower dose of cyclosporine (50 mg BID) will continue to not significantly reworsen issue #2 while it remains in therapeutic, but not toxic, levels. Continue to monitor closely.  Mucositis: A progressive and refractory issue that began in late August 2024, shortly after he started PO cyclosporine for the treatment of issue #1 (particularly after he was escalated to the 100 mg BID dose). As discussed above, by early September 2024, this was dramatically reducing his PO intake, leading to dehydration and acute renal insufficiency. Also as discussed above, these symptoms are now still much better; and the (lower) dose of PO cyclosporine (50 mg BID) has been reintroduced. He and his wife have been counseled on the routine use of magic mouthwash, OraGel and/or other, similar products. Continue to monitor.  The patient and his wife were in agreement with this plan.    It is a pleasure to participate in Mr. Silva's care. Please do not hesitate to call with any questions or concerns that you may have.    A total of 30 minutes were spent coordinating this patient’s care in clinic today; more than 50% of this time was face-to-face with the patient and his wife, reviewing his interim medical history, discussing the results of today's repeat labs and counseling on the current treatment and followup plans. All questions were answered to their satisfaction.    FOLLOW UP  Continue  cyclosporine 50 mg PO BID. Return to our clinic in both 1 and 2 weeks with CBCs, reticulocyte counts and cyclosporine levels (drawn the days of the appointments).            This document was electronically signed by RAMIRO King MD October 15, 2024 14:49 EDT      CC: RIGO Mccall MD

## 2024-10-15 NOTE — PROGRESS NOTES
Venipuncture Blood Specimen Collection  Venipuncture performed in right arm by Carlos Fernandez MA with good hemostasis. Patient tolerated the procedure well without complications.   10/15/24   Carlos Fernandez MA

## 2024-10-17 ENCOUNTER — TELEPHONE (OUTPATIENT)
Dept: ONCOLOGY | Facility: CLINIC | Age: 89
End: 2024-10-17
Payer: MEDICARE

## 2024-10-17 LAB — CYCLOSPORINE BLD LC/MS/MS-MCNC: 315 NG/ML (ref 100–400)

## 2024-10-17 NOTE — TELEPHONE ENCOUNTER
RN called and spoke with patient's spouse, Virginia, at the request of MD, to relay that Cyclosporine levels were increased, so Chinedu was instructed to hold tonight's dose (10/17) and tomorrow night's dose (10/18).    Virginia verbalized understanding and had no further questions/concerns at this time.

## 2024-10-21 ENCOUNTER — OFFICE VISIT (OUTPATIENT)
Dept: ONCOLOGY | Facility: CLINIC | Age: 89
End: 2024-10-21
Payer: MEDICARE

## 2024-10-21 ENCOUNTER — LAB (OUTPATIENT)
Dept: ONCOLOGY | Facility: CLINIC | Age: 89
End: 2024-10-21
Payer: MEDICARE

## 2024-10-21 VITALS
RESPIRATION RATE: 20 BRPM | TEMPERATURE: 97 F | BODY MASS INDEX: 24.89 KG/M2 | HEART RATE: 85 BPM | SYSTOLIC BLOOD PRESSURE: 127 MMHG | OXYGEN SATURATION: 96 % | HEIGHT: 67 IN | WEIGHT: 158.6 LBS | DIASTOLIC BLOOD PRESSURE: 79 MMHG

## 2024-10-21 DIAGNOSIS — D61.9 APLASTIC ANEMIA: Primary | ICD-10-CM

## 2024-10-21 DIAGNOSIS — D60.9 ACQUIRED RED CELL APLASIA: Primary | ICD-10-CM

## 2024-10-21 DIAGNOSIS — D64.9 SYMPTOMATIC ANEMIA: ICD-10-CM

## 2024-10-21 DIAGNOSIS — D60.9 ACQUIRED RED CELL APLASIA: ICD-10-CM

## 2024-10-21 DIAGNOSIS — D61.9 APLASTIC ANEMIA: ICD-10-CM

## 2024-10-21 LAB
ALBUMIN SERPL-MCNC: 3.4 G/DL (ref 3.5–5.2)
ALBUMIN/GLOB SERPL: 1.2 G/DL
ALP SERPL-CCNC: 85 U/L (ref 39–117)
ALT SERPL W P-5'-P-CCNC: 27 U/L (ref 1–41)
ANION GAP SERPL CALCULATED.3IONS-SCNC: 11.7 MMOL/L (ref 5–15)
ANISOCYTOSIS BLD QL: NORMAL
AST SERPL-CCNC: 29 U/L (ref 1–40)
BASOPHILS # BLD AUTO: 0.03 10*3/MM3 (ref 0–0.2)
BASOPHILS NFR BLD AUTO: 0.4 % (ref 0–1.5)
BILIRUB SERPL-MCNC: 1.1 MG/DL (ref 0–1.2)
BUN SERPL-MCNC: 13 MG/DL (ref 8–23)
BUN/CREAT SERPL: 9.6 (ref 7–25)
CALCIUM SPEC-SCNC: 9.6 MG/DL (ref 8.2–9.6)
CHLORIDE SERPL-SCNC: 101 MMOL/L (ref 98–107)
CO2 SERPL-SCNC: 24.3 MMOL/L (ref 22–29)
CREAT SERPL-MCNC: 1.35 MG/DL (ref 0.76–1.27)
DEPRECATED RDW RBC AUTO: ABNORMAL FL
EGFRCR SERPLBLD CKD-EPI 2021: 49.6 ML/MIN/1.73
EOSINOPHIL # BLD AUTO: 0.1 10*3/MM3 (ref 0–0.4)
EOSINOPHIL NFR BLD AUTO: 1.3 % (ref 0.3–6.2)
ERYTHROCYTE [DISTWIDTH] IN BLOOD BY AUTOMATED COUNT: ABNORMAL %
GLOBULIN UR ELPH-MCNC: 2.8 GM/DL
GLUCOSE SERPL-MCNC: 112 MG/DL (ref 65–99)
HCT VFR BLD AUTO: 30.5 % (ref 37.5–51)
HGB BLD-MCNC: 9.6 G/DL (ref 13–17.7)
HYPOCHROMIA BLD QL: NORMAL
IMM GRANULOCYTES # BLD AUTO: 0.03 10*3/MM3 (ref 0–0.05)
IMM GRANULOCYTES NFR BLD AUTO: 0.4 % (ref 0–0.5)
LARGE PLATELETS: NORMAL
LYMPHOCYTES # BLD AUTO: 2.51 10*3/MM3 (ref 0.7–3.1)
LYMPHOCYTES NFR BLD AUTO: 33.6 % (ref 19.6–45.3)
MACROCYTES BLD QL SMEAR: NORMAL
MCH RBC QN AUTO: 32.5 PG (ref 26.6–33)
MCHC RBC AUTO-ENTMCNC: 31.5 G/DL (ref 31.5–35.7)
MCV RBC AUTO: 103.4 FL (ref 79–97)
MONOCYTES # BLD AUTO: 0.85 10*3/MM3 (ref 0.1–0.9)
MONOCYTES NFR BLD AUTO: 11.4 % (ref 5–12)
NEUTROPHILS NFR BLD AUTO: 3.94 10*3/MM3 (ref 1.7–7)
NEUTROPHILS NFR BLD AUTO: 52.9 % (ref 42.7–76)
NRBC BLD AUTO-RTO: 0 /100 WBC (ref 0–0.2)
PLATELET # BLD AUTO: 349 10*3/MM3 (ref 140–450)
PMV BLD AUTO: 10.6 FL (ref 6–12)
POTASSIUM SERPL-SCNC: 4.4 MMOL/L (ref 3.5–5.2)
PROT SERPL-MCNC: 6.2 G/DL (ref 6–8.5)
RBC # BLD AUTO: 2.95 10*6/MM3 (ref 4.14–5.8)
RETICS # AUTO: 0.12 10*6/MM3 (ref 0.02–0.13)
RETICS/RBC NFR AUTO: 4.21 % (ref 0.7–1.9)
SODIUM SERPL-SCNC: 137 MMOL/L (ref 136–145)
WBC NRBC COR # BLD AUTO: 7.46 10*3/MM3 (ref 3.4–10.8)

## 2024-10-21 PROCEDURE — 99214 OFFICE O/P EST MOD 30 MIN: CPT | Performed by: INTERNAL MEDICINE

## 2024-10-21 PROCEDURE — 85045 AUTOMATED RETICULOCYTE COUNT: CPT | Performed by: INTERNAL MEDICINE

## 2024-10-21 PROCEDURE — 80158 DRUG ASSAY CYCLOSPORINE: CPT | Performed by: INTERNAL MEDICINE

## 2024-10-21 PROCEDURE — 80053 COMPREHEN METABOLIC PANEL: CPT | Performed by: INTERNAL MEDICINE

## 2024-10-21 PROCEDURE — 1126F AMNT PAIN NOTED NONE PRSNT: CPT | Performed by: INTERNAL MEDICINE

## 2024-10-21 PROCEDURE — 85007 BL SMEAR W/DIFF WBC COUNT: CPT | Performed by: INTERNAL MEDICINE

## 2024-10-21 PROCEDURE — 85025 COMPLETE CBC W/AUTO DIFF WBC: CPT | Performed by: INTERNAL MEDICINE

## 2024-10-21 NOTE — PROGRESS NOTES
Name:  Chinedu Silva  :  1933  Date:  10/21/2024     REFERRING PHYSICIAN    PRIMARY CARE PROVIDER  Jr Segura PA    REASON FOR FOLLOWUP  1. Acquired red cell aplasia      CHIEF COMPLAINT  Mild, recurrent mucositis but overall improved fatigue, recently still very manageable.    Dear Shruthi Colton,    HISTORY OF PRESENT ILLNESS:   I saw Mr. Silva in followup (from his recent hospitalization for severe anemia) today in our hematology/oncology clinic. As you are aware, he is a pleasant, 89 y.o., white male with a history of hypertension, CAD and GERD who presented to the [facility] ED on 2024 with progressive weakness and dyspnea on exertion. These symptoms had potentially first started ~two to three months prior; however, they significantly worsened over the course of the previous couple of weeks (by early 2024). Upon arrival to our ED, he was found to be severely anemic, with a Hg of 3.7 g/dL. His other cell lines (WBCs and platelets) were entirely WNL. Our service was consulted for further evaluation of this issue, and a bone marrow biopsy was performed on 2024. Meanwhile, as he was feeling substantially better after his HgB was transfused up to the ~9 g/dL range, he was able to be discharged to outpatient follow up in our clinic. He returned to our clinic the following week (on 2024) to go over the results of the bone marrow exam and for ongoing management.    INTERIM HISTORY:  Mr. Silva returns to clinic today for follow up yet again accompanied by his wife. He was seen at [facility] on 2024 for an additional opinion for his acquired pure red cell aplasia. He was on PO cyclosporine for a total of ~three weeks (50 mg BID for the first week and 100 mg BID for the next two weeks) in late August/early 2024. Due to progressive, severe and refractory mucositis (as well as a toxic cyclosporine level), he was instructed to discontinue the cyclosporine in  early September. After holding the cyclosporine for the next ~one and one half weeks, his mucositis finally improved. He has now been back on the cyclosporine (at a dose of 50 mg BID) for a little over one (1) full month now, as instructed. While he has redeveloped some milder, but currently still manageable, mucositis, he is overall still doing much better than he was a month or two ago, as his fatigue has now (finally) remained improved for a prolonged period of time. He actually walked from the parking lot into our clinic today, which is something he had previously been unable to do. He has still not had to receive any more PRBC transfusions since the two units he received in late September. He otherwise again has no new or specific complaints.    Past Medical History:   Diagnosis Date    Carotid stenosis     Coronary artery disease     Diverticulitis     GERD (gastroesophageal reflux disease)     Gout     Hearing aid worn     Hypertension     Obese     Prostate cancer     Wears glasses        Past Surgical History:   Procedure Laterality Date    CAROTID ENDARTERECTOMY Left 2018    Procedure: CAROTID ENDARTERECTOMY WITH EEG LEFT;  Surgeon: Del Neal MD;  Location: [facility];  Service:     CATARACT EXTRACTION      CATARACT EXTRACTION WITH INTRAOCULAR LENS IMPLANT Right     CHOLECYSTECTOMY      COLONOSCOPY W/ POLYPECTOMY      HERNIA REPAIR Right     TOTAL KNEE ARTHROPLASTY Right     TURP / TRANSURETHRAL INCISION / DRAINAGE PROSTATE         Social History     Socioeconomic History    Marital status:     Number of children: 1   Tobacco Use    Smoking status: Former     Current packs/day: 0.00     Average packs/day: 1 pack/day for 12.0 years (12.0 ttl pk-yrs)     Types: Cigarettes     Start date:      Quit date:      Years since quittin.8    Smokeless tobacco: Current     Types: Chew   Vaping Use    Vaping status: Never Used   Substance and Sexual Activity    Alcohol use: No    Drug use:  No    Sexual activity: Defer       Family History   Problem Relation Age of Onset    Hypertension Mother     Glaucoma Mother     Diverticulitis Mother     Gout Mother     ALS Father     Gout Sister     Broken bones Paternal Grandfather     Broken bones Son     Cancer Son     Diabetes Brother        Allergies   Allergen Reactions    Ciprofloxacin Other (See Comments)     Muscle Pains    Lisinopril Cough    Tetracycline Provider Review Needed     Other Reaction(s) from [facility]: UNK    Sulfa Antibiotics Itching and Rash       Current Outpatient Medications   Medication Sig Dispense Refill    allopurinol (ZYLOPRIM) 300 MG tablet Take 1 tablet by mouth Daily.      aspirin 81 MG EC tablet Take 1 tablet by mouth Daily.      cycloSPORINE modified (NEORAL) 25 MG capsule Take 2 capsules by mouth 2 (Two) Times a Day for 30 days. 120 capsule 0    dicyclomine (BENTYL) 20 MG tablet Take 1 tablet by mouth Every 6 (Six) Hours.      MAGIC MOUTHWASH W/NYSTATIN 1/1/1/1 (lidocaine - diphenhydrAMINE HCl - aluminum & magnesium hydroxide - nystatin) Swish and spit 5 mL Every 4 (Four) Hours As Needed for Mouth Pain. 300 mL 2    ondansetron (ZOFRAN) 8 MG tablet Take 1 tablet by mouth Every 8 (Eight) Hours As Needed for Nausea or Vomiting. 30 tablet 1    rosuvastatin (CRESTOR) 10 MG tablet Take 1 tablet by mouth Daily. 90 tablet 3    valsartan (DIOVAN) 80 MG tablet Take 0.5 tablets by mouth Daily.      pantoprazole (PROTONIX) 40 MG EC tablet Take 1 tablet by mouth Daily. 30 tablet 1    predniSONE (DELTASONE) 20 MG tablet Take 3 tablets by mouth Daily. 90 tablet 0    predniSONE (DELTASONE) 5 MG tablet Take 2 pills by mouth daily for one week, then 1 pill by mouth daily for one week. 21 tablet 0     No current facility-administered medications for this visit.     REVIEW OF SYSTEMS  CONSTITUTIONAL:  No fever, chills or night sweats. Chronic fatigue, recently improved, as per the HPI above.  EYES:  No blurry vision, diplopia or other  "vision changes.  ENT:  No hearing loss or nosebleeds. Mild, recurrent mucositis, still very manageable, as per the HPI above.  CARDIOVASCULAR:  No palpitations, arrhythmia, syncopal episodes or edema.  PULMONARY:  No hemoptysis, wheezing, chronic cough or shortness of breath.  GASTROINTESTINAL:  No nausea or vomiting. No constipation or diarrhea. No abdominal pain.  GENITOURINARY:  No hematuria, kidney stones or frequent urination.  MUSCULOSKELETAL:  No joint or back pains.  INTEGUMENTARY: No rashes or pruritus.  ENDOCRINE:  No excessive thirst or hot flashes.  HEMATOLOGIC:  No history of free bleeding, spontaneous bleeding or clotting.  IMMUNOLOGIC:  No allergies or frequent infections.  NEUROLOGIC: No numbness, tingling, seizures or weakness.  PSYCHIATRIC:  No anxiety or depression.    PHYSICAL EXAMINATION  /79   Pulse 85   Temp 97 °F (36.1 °C) (Temporal)   Resp 20   Ht 170.2 cm (67\")   Wt 71.9 kg (158 lb 9.6 oz)   SpO2 96%   BMI 24.84 kg/m²     Pain Score:  Pain Score    10/21/24 1335   PainSc: 0-No pain     PHQ-Score Total:  PHQ-9 Total Score:      ECO  GENERAL:  A well-developed, well-nourished, elderly, white male in no acute distress.  HEENT:  Pupils equally round and reactive to light. Extraocular muscles intact. Mild, recurrent mucositis, recently, at worst, stable.  CARDIOVASCULAR:  Regular rate and rhythm. No murmurs, gallops or rubs.  LUNGS:  Clear to auscultation bilaterally.  No wheezing.  ABDOMEN:  Soft, nontender, nondistended with positive bowel sounds.  EXTREMITIES:  No clubbing, cyanosis or edema bilaterally.  SKIN:  No rashes or petechiae.  NEURO:  Cranial nerves grossly intact. No focal deficits.  PSYCH:  Alert and oriented x3.    LABORATORY  Lab Results   Component Value Date    WBC 7.46 10/21/2024    HGB 9.6 (L) 10/21/2024    HCT 30.5 (L) 10/21/2024    .4 (H) 10/21/2024     10/21/2024    NEUTROABS 3.94 10/21/2024       Lab Results   Component Value Date     " 10/15/2024    K 4.7 10/15/2024     10/15/2024    CO2 25.9 10/15/2024    BUN 18 10/15/2024    CREATININE 1.31 (H) 10/15/2024    GLUCOSE 102 (H) 10/15/2024    CALCIUM 9.8 (H) 10/15/2024    AST 23 10/15/2024    ALT 20 10/15/2024    ALKPHOS 81 10/15/2024    BILITOT 1.1 10/15/2024    PROTEINTOT 6.3 10/15/2024    ALBUMIN 3.4 (L) 10/15/2024     Lab Results   Component Value Date    RETICCTPCT 4.21 (H) 10/21/2024     CBC (10/21/2024): WBCs: 7.46; HgB: 9.6; Hct: 30.5; platelets: 349; MCV: 103.4  CBC (10/15/2024): WBCs: 7.86; HgB: 9.6; Hct: 30.8; platelets: 387; MCV: 101.3  CBC (10/09/2024): WBCs: 7.42; HgB: 8.8; Hct: 28.5; platelets: 373; MCV: 97.9  CBC (10/02/2024): WBCs: 6.94; HgB: 9.2; Hct: 29.6; platelets: 324; MCV: 95.2  CBC (09/25/2024): WBCs: 8.71; HgB: 7.3; Hct: 23.3; platelets: 349; MCV: 90.3  CBC (09/18/2024): WBCs: 9.33; HgB: 9.0; Hct: 27.7; platelets: 312; MCV: 87.4  CBC (09/10/2024): WBCs: 10.20; HgB: 10.4; Hct: 31.3; platelets: 222; MCV: 86.5  CBC (09/03/2024): WBCs: 10.95; HgB: 11.5; Hct: 35.6; platelets: 203; MCV: 86.6  CBC (08/29/2024): WBCs: 14.86; HgB: 7.4; Hct: 23.5; platelets: 275; MCV: 86.7  CBC (08/26/2024): WBCs: 11.72; HgB: 7.7; Hct: 24.6; platelets: 250; MCV: 86.6  CBC (08/23/2024): WBCs: 10.01; HgB: 8.0; Hct: 25.3; platelets: 237; MCV: 87.2  CBC (08/20/2024): WBCS: 8.59; HgB: 8.4; Hct: 26.4; platelets: 272; MCV: 86.8  CBC (08/14/2024): WBCs:  8.16; HgB: 9.0; Hct: 27.9; platelets: 319; MCV: 87.2  CBC (08/09/2024): WBCs: 12.00; HgB: 8.1; Hct: 25.5; platelets: 319; MCV: 90.7  CBC (08/02/2024): WBCs: 11.92; HgB: 8.1; Hct: 24.8; platelets: 204; MCV: 90.5  CBC (07/31/2024): WBCs: 17.04; HgB: 7.6; Hct: 23.3; platelets: 265; MCV: 95.5  CBC (07/26/2024): WBCs: 13.50; HgB: 8.1; Hct: 24.8; platelets: 329; MCV: 95.0  CBC (07/23/2024): WBCs: 16.43; HgB: 8.5; Hct: 25.8; platelets: 390; MCV: 96.3  CBC (07/15/2024): WBCs: 8.04; HgB: 8.3; Hct: 25.8; platelets: 340; MCV: 97.7  CBC (07/11/2024): WBCs: 7.25;  HgB: 9.4; Hct: 29.0; platelets: 375; MCV: 97.0  CBC (07/01/2024): WBCs: 7.50; HgB: 9.4; Hct: 29.3; platelets: 272; MCV: 98.7  CBC (06/26/2024): WBCs: 6.67; HgB: 9.0; Hct: 27.5; platelets: 198; MCV: 98.6    Reticulocytes (10/21/2024): Percentage: 4.21; Absolute: 0.1250  Reticulocytes (10/15/2024): Percentage: 5.24; Absolute: 0.1603  Reticulocytes (09/10/2024): Percentage: 0.47; Absolute: 0.0170  Reticulocytes (08/20/2024): Percentage: 0.26; Absolute: <0.0100    Cyclosporine level (10/21/2024): pending  Cyclosporine level (10/15/2024): 315 ng/mL  Cyclosporine level (10/09/2024): 48 ng/mL  Cyclosporine level (10/02/2024): 121 ng/mL  Cyclosporine level (09/25/2024): 107 ng/mL    IMAGING    PATHOLOGY  Peripheral smear, bone marrow aspiration smear, bone marrow aspiration clot and bone marrow core biopsy (06/26/2024):  Normocellular bone marrow with increased granulopoiesis, essentially absent erythropoiesis, adequate megakaryopoiesis, increased stainable iron and slightly increased mixed chronic inflammation. No evidence of increased blasts or dysplasia. The most striking feature within the bone marrow is the essential absence of any erythropoiesis in the aspirate or core biopsy. There is mixed inflammation with some notable immunophenotypic findings by flow cytometry, but there is no evidence of bone marrow involvement by lymphoma. This is most suggestive of a mixed reactive lymphoid population.    IMPRESSION AND PLAN  Mr. Silva is a 89 y.o., white male with:  Pure red cell aplasia: I have had multiple, long discussions with the patient and his wife regarding the results of the bone marrow biopsy performed in late June 2024 (which are summarized above). In short, this is a fairly uncommon diagnosis that can either be idiopathic or associated with a number of potential, underlying causes, including thymoma or parvovirus infection. Regardless, high dose steroids (prednisone 1-2 mg/kg daily) is the standard, first-line  treatment; however, since the repeat CBC drawn on 07/01/2024 showed that his HgB (9.4 g/dL) had improved compared to what it was when he was discharged from our hospital following the bone marrow biopsy the previous week (9.0 g/dL), it was hoped that he was satisfactorily recovering (from a viral trigger) on his own. However, a repeat CBC on 07/15/2024 revealed that his HgB had dropped again (8.3 mg/dL); and his reticulocyte counts remained essentially zero. A Rx for prednisone 80 mg PO daily was provided at that time; but, unfortunately, high-dose steroids never provided him with any meaningful effect. Consequently, by late August 2024, he was started on an additional Rx (cyclosporine 50 mg PO BID), per [facility]'s recommendations (with whom he had met for an additional opinion regarding his management earlier that month). Once he tolerated this starting dose for ~one week, in late August (08/27/2024), we increased the cyclosporine to 100 mg PO BID and further decreased the prednisone to 40 mg PO daily. At his appointment on 09/03/2024, as his anemia (finally) improved (rather well, to a HgB of 11.5 g/dL), we continued the cyclosporine and further decreased the prednisone to 20 mg PO daily. Unfortunately, at the time of his follow up appointment on 09/10/2024, a repeat CBC did not show any further improvement in his HgB (it was 10.4 g/dL), issue #2 (see below) was dramatically worse; and his repeat serum cyclosporine was > 400 ng/mL (above the upper limit of the desirable range). Given all of this, the cyclosporine was held completely for the next one and one half weeks. Meanwhile, he further decreased his daily prednisone dose (from 10 mg daily to 5 mg daily to, as of late September, off completely), as instructed. After remaining off of cyclosporine for one and one half weeks, his mouth sores (finally) improved; and his PO intake renormalized. Unfortunately, throughout the latter part of September 2024, he was  getting steadily fatigued again, correlating with the fact that, not surprisingly, his HgB continued to redecline off of the cyclosporine (and it is was back down to 7.3 g/dL by 09/25/2024, at which time he required the transfusion of an additional two units of PRBCs). Meanwhile, he has now been back on cyclosporine (the 50 mg BID dose) for a total of a little over one (1) full month now, as instructed; and, so far, issue #2 has still yet to significantly reworsen (it has mildly recurred, but it remains very tolerable with localized therapies such as Orajel). Meanwhile, today's (10/21/2024) repeat labwork shows that his HgB is now (finally) still improved (9.6 g/dL), and his reticulocyte count remains elevated. He was again instructed to continue the cyclosporine at the current dose; and we will see him back in our clinic again in another week with a CBC, CMP, reticulocyte count and cyclosporine level for another symptom/toxicity check. Hopefully, this lower dose of cyclosporine (50 mg BID) will continue to not significantly reworsen issue #2 while it remains in therapeutic, but not toxic, levels. Due to a cyclosporine level of >300 ng/mL on his recheck last week (on 10/15/2024), he held a couple of his pm doses later in the week, as instructed. Continue to monitor closely.  Mucositis: A progressive and refractory issue that began in late August 2024, shortly after he started PO cyclosporine for the treatment of issue #1 (particularly after he was escalated to the 100 mg BID dose). As discussed above, by early September 2024, this was dramatically reducing his PO intake, leading to dehydration and acute renal insufficiency. Also as discussed above, these symptoms are now still much better; and the (lower) dose of PO cyclosporine (50 mg BID) has been reintroduced. He and his wife have been counseled on the routine use of magic mouthwash, OraGel and/or other, similar products. Continue to monitor.  The patient and his  wife were in agreement with this plan.    It is a pleasure to participate in Mr. Silva's care. Please do not hesitate to call with any questions or concerns that you may have.    A total of 30 minutes were spent coordinating this patient’s care in clinic today; more than 50% of this time was face-to-face with the patient and his wife, reviewing his interim medical history, discussing the results of the most recent repeat labwork, including today's, and counseling on the current treatment and followup plans. All questions were answered to their satisfaction.    FOLLOW UP  Continue cyclosporine 50 mg PO BID. Return to our clinic in 1 week with a repeat CBC, CMP, reticulocyte count and cyclosporine level.            This document was electronically signed by RAMIRO King MD October 21, 2024 14:11 EDT      CC: RIGO Mccall MD

## 2024-10-21 NOTE — PROGRESS NOTES
Venipuncture Blood Specimen Collection  Venipuncture performed in right arm by Shena Sewell MA with good hemostasis. Patient tolerated the procedure well without complications.   10/21/24   Shena Sewell MA

## 2024-10-23 LAB — CYCLOSPORINE BLD LC/MS/MS-MCNC: 216 NG/ML (ref 100–400)

## 2024-10-30 ENCOUNTER — LAB (OUTPATIENT)
Dept: ONCOLOGY | Facility: CLINIC | Age: 89
End: 2024-10-30
Payer: MEDICARE

## 2024-10-30 ENCOUNTER — OFFICE VISIT (OUTPATIENT)
Dept: ONCOLOGY | Facility: CLINIC | Age: 89
End: 2024-10-30
Payer: MEDICARE

## 2024-10-30 VITALS
OXYGEN SATURATION: 95 % | HEART RATE: 96 BPM | DIASTOLIC BLOOD PRESSURE: 66 MMHG | RESPIRATION RATE: 18 BRPM | TEMPERATURE: 98.4 F | WEIGHT: 158.2 LBS | BODY MASS INDEX: 24.78 KG/M2 | SYSTOLIC BLOOD PRESSURE: 129 MMHG

## 2024-10-30 DIAGNOSIS — D61.9 APLASTIC ANEMIA: ICD-10-CM

## 2024-10-30 DIAGNOSIS — D60.9 ACQUIRED RED CELL APLASIA: Primary | ICD-10-CM

## 2024-10-30 DIAGNOSIS — R60.0 BILATERAL LOWER EXTREMITY EDEMA: ICD-10-CM

## 2024-10-30 DIAGNOSIS — D60.9 ACQUIRED RED CELL APLASIA: ICD-10-CM

## 2024-10-30 DIAGNOSIS — D64.9 SYMPTOMATIC ANEMIA: ICD-10-CM

## 2024-10-30 DIAGNOSIS — R19.5 ABNORMAL FINDINGS IN STOOL: ICD-10-CM

## 2024-10-30 LAB
ALBUMIN SERPL-MCNC: 3.3 G/DL (ref 3.5–5.2)
ALBUMIN/GLOB SERPL: 1.1 G/DL
ALP SERPL-CCNC: 80 U/L (ref 39–117)
ALT SERPL W P-5'-P-CCNC: 42 U/L (ref 1–41)
ANION GAP SERPL CALCULATED.3IONS-SCNC: 10.8 MMOL/L (ref 5–15)
ANISOCYTOSIS BLD QL: NORMAL
AST SERPL-CCNC: 46 U/L (ref 1–40)
BASOPHILS # BLD AUTO: 0.04 10*3/MM3 (ref 0–0.2)
BASOPHILS NFR BLD AUTO: 0.6 % (ref 0–1.5)
BILIRUB SERPL-MCNC: 0.9 MG/DL (ref 0–1.2)
BUN SERPL-MCNC: 19 MG/DL (ref 8–23)
BUN/CREAT SERPL: 14.2 (ref 7–25)
CALCIUM SPEC-SCNC: 9.7 MG/DL (ref 8.2–9.6)
CHLORIDE SERPL-SCNC: 107 MMOL/L (ref 98–107)
CO2 SERPL-SCNC: 22.2 MMOL/L (ref 22–29)
CREAT SERPL-MCNC: 1.34 MG/DL (ref 0.76–1.27)
DACRYOCYTES BLD QL SMEAR: NORMAL
DEPRECATED RDW RBC AUTO: ABNORMAL FL
EGFRCR SERPLBLD CKD-EPI 2021: 50 ML/MIN/1.73
EOSINOPHIL # BLD AUTO: 0.09 10*3/MM3 (ref 0–0.4)
EOSINOPHIL NFR BLD AUTO: 1.4 % (ref 0.3–6.2)
ERYTHROCYTE [DISTWIDTH] IN BLOOD BY AUTOMATED COUNT: ABNORMAL %
GLOBULIN UR ELPH-MCNC: 3 GM/DL
GLUCOSE SERPL-MCNC: 135 MG/DL (ref 65–99)
HCT VFR BLD AUTO: 30.1 % (ref 37.5–51)
HGB BLD-MCNC: 9.7 G/DL (ref 13–17.7)
HYPOCHROMIA BLD QL: NORMAL
IMM GRANULOCYTES # BLD AUTO: 0.04 10*3/MM3 (ref 0–0.05)
IMM GRANULOCYTES NFR BLD AUTO: 0.6 % (ref 0–0.5)
LARGE PLATELETS: NORMAL
LYMPHOCYTES # BLD AUTO: 2.71 10*3/MM3 (ref 0.7–3.1)
LYMPHOCYTES NFR BLD AUTO: 40.8 % (ref 19.6–45.3)
MACROCYTES BLD QL SMEAR: NORMAL
MCH RBC QN AUTO: 34.2 PG (ref 26.6–33)
MCHC RBC AUTO-ENTMCNC: 32.2 G/DL (ref 31.5–35.7)
MCV RBC AUTO: 106 FL (ref 79–97)
MONOCYTES # BLD AUTO: 0.69 10*3/MM3 (ref 0.1–0.9)
MONOCYTES NFR BLD AUTO: 10.4 % (ref 5–12)
NEUTROPHILS NFR BLD AUTO: 3.08 10*3/MM3 (ref 1.7–7)
NEUTROPHILS NFR BLD AUTO: 46.2 % (ref 42.7–76)
NRBC BLD AUTO-RTO: 0 /100 WBC (ref 0–0.2)
PLATELET # BLD AUTO: 281 10*3/MM3 (ref 140–450)
PMV BLD AUTO: 11 FL (ref 6–12)
POTASSIUM SERPL-SCNC: 4.4 MMOL/L (ref 3.5–5.2)
PROT SERPL-MCNC: 6.3 G/DL (ref 6–8.5)
RBC # BLD AUTO: 2.84 10*6/MM3 (ref 4.14–5.8)
RETICS # AUTO: 0.11 10*6/MM3 (ref 0.02–0.13)
RETICS/RBC NFR AUTO: 3.67 % (ref 0.7–1.9)
SODIUM SERPL-SCNC: 140 MMOL/L (ref 136–145)
WBC NRBC COR # BLD AUTO: 6.65 10*3/MM3 (ref 3.4–10.8)

## 2024-10-30 PROCEDURE — 80053 COMPREHEN METABOLIC PANEL: CPT | Performed by: INTERNAL MEDICINE

## 2024-10-30 PROCEDURE — 80158 DRUG ASSAY CYCLOSPORINE: CPT | Performed by: INTERNAL MEDICINE

## 2024-10-30 PROCEDURE — 85045 AUTOMATED RETICULOCYTE COUNT: CPT | Performed by: INTERNAL MEDICINE

## 2024-10-30 PROCEDURE — 85007 BL SMEAR W/DIFF WBC COUNT: CPT | Performed by: INTERNAL MEDICINE

## 2024-10-30 PROCEDURE — 1126F AMNT PAIN NOTED NONE PRSNT: CPT | Performed by: INTERNAL MEDICINE

## 2024-10-30 PROCEDURE — 85025 COMPLETE CBC W/AUTO DIFF WBC: CPT | Performed by: INTERNAL MEDICINE

## 2024-10-30 PROCEDURE — 99214 OFFICE O/P EST MOD 30 MIN: CPT | Performed by: INTERNAL MEDICINE

## 2024-10-30 NOTE — PROGRESS NOTES
Name:  Chinedu Silva  :  1933  Date:  10/30/2024     REFERRING PHYSICIAN    PRIMARY CARE PROVIDER  Jr Segura PA    REASON FOR FOLLOWUP  1. Acquired red cell aplasia      CHIEF COMPLAINT  Recent, intermittently chalky stools.    Dear Mr. Ramoston,    HISTORY OF PRESENT ILLNESS:   I saw Mr. Silva in followup (from his recent hospitalization for severe anemia) today in our hematology/oncology clinic. As you are aware, he is a pleasant, 89 y.o., white male with a history of hypertension, CAD and GERD who presented to the [facility] ED on 2024 with progressive weakness and dyspnea on exertion. These symptoms had potentially first started ~two to three months prior; however, they significantly worsened over the course of the previous couple of weeks (by early 2024). Upon arrival to our ED, he was found to be severely anemic, with a Hg of 3.7 g/dL. His other cell lines (WBCs and platelets) were entirely WNL. Our service was consulted for further evaluation of this issue, and a bone marrow biopsy was performed on 2024. Meanwhile, as he was feeling substantially better after his HgB was transfused up to the ~9 g/dL range, he was able to be discharged to outpatient follow up in our clinic. He returned to our clinic the following week (on 2024) to go over the results of the bone marrow exam and for ongoing management.    INTERIM HISTORY:  Mr. Silva returns to clinic today for follow up yet again accompanied by his wife. He was seen at [facility] on 2024 for an additional opinion for his acquired pure red cell aplasia. He was on PO cyclosporine for a total of ~three weeks (50 mg BID for the first week and 100 mg BID for the next two weeks) in late August/early 2024. Due to progressive, severe and refractory mucositis (as well as a toxic cyclosporine level), he was instructed to discontinue the cyclosporine in early September. After holding the cyclosporine  "for the next ~one and one half weeks, his mucositis finally improved. He has now been back on the cyclosporine (at a dose of 50 mg BID) for ~six (6) weeks now, as instructed. While he has redeveloped some intermittent, but currently still manageable, mucositis (which is actually currently resolved), he is overall still doing much better than he was early this Fall, as his fatigue has now (finally) recently remained improved for a prolonged period of time (and he has not required any PRBC transfusions for well over the past month). He actually walked from the parking lot into our clinic again víctor, which is something he had previously been unable to do. His only new complaints today are that both of his lower legs have recently been a little more swollen; and, in recent days, he has noticed intermittently \"chalky\" stools again. He and his wife clarify that this latter problem actually occurred previously, approximately one year ago when he ultimately had to be referred to [facility] gastroenterology, who performed an ERCP for (what sounds like benign) stricturing. He otherwise has no specific complaints.    Past Medical History:   Diagnosis Date    Carotid stenosis     Coronary artery disease     Diverticulitis     GERD (gastroesophageal reflux disease)     Gout     Hearing aid worn     Hypertension     Obese     Prostate cancer     Wears glasses        Past Surgical History:   Procedure Laterality Date    CAROTID ENDARTERECTOMY Left 2/5/2018    Procedure: CAROTID ENDARTERECTOMY WITH EEG LEFT;  Surgeon: Del Neal MD;  Location: [facility];  Service:     CATARACT EXTRACTION      CATARACT EXTRACTION WITH INTRAOCULAR LENS IMPLANT Right     CHOLECYSTECTOMY      COLONOSCOPY W/ POLYPECTOMY      HERNIA REPAIR Right     TOTAL KNEE ARTHROPLASTY Right     TURP / TRANSURETHRAL INCISION / DRAINAGE PROSTATE         Social History     Socioeconomic History    Marital status:     Number of children: 1   Tobacco Use    " Smoking status: Former     Current packs/day: 0.00     Average packs/day: 1 pack/day for 12.0 years (12.0 ttl pk-yrs)     Types: Cigarettes     Start date:      Quit date:      Years since quittin.8    Smokeless tobacco: Current     Types: Chew   Vaping Use    Vaping status: Never Used   Substance and Sexual Activity    Alcohol use: No    Drug use: No    Sexual activity: Defer       Family History   Problem Relation Age of Onset    Hypertension Mother     Glaucoma Mother     Diverticulitis Mother     Gout Mother     ALS Father     Gout Sister     Broken bones Paternal Grandfather     Broken bones Son     Cancer Son     Diabetes Brother        Allergies   Allergen Reactions    Ciprofloxacin Other (See Comments)     Muscle Pains    Lisinopril Cough    Tetracycline Provider Review Needed     Other Reaction(s) from [employer]: UNK    Sulfa Antibiotics Itching and Rash       Current Outpatient Medications   Medication Sig Dispense Refill    allopurinol (ZYLOPRIM) 300 MG tablet Take 1 tablet by mouth Daily.      aspirin 81 MG EC tablet Take 1 tablet by mouth Daily.      cycloSPORINE modified (NEORAL) 25 MG capsule Take 2 capsules by mouth 2 (Two) Times a Day for 30 days. 120 capsule 0    dicyclomine (BENTYL) 20 MG tablet Take 1 tablet by mouth Every 6 (Six) Hours.      MAGIC MOUTHWASH W/NYSTATIN / (lidocaine - diphenhydrAMINE HCl - aluminum & magnesium hydroxide - nystatin) Swish and spit 5 mL Every 4 (Four) Hours As Needed for Mouth Pain. 300 mL 2    ondansetron (ZOFRAN) 8 MG tablet Take 1 tablet by mouth Every 8 (Eight) Hours As Needed for Nausea or Vomiting. 30 tablet 1    rosuvastatin (CRESTOR) 10 MG tablet Take 1 tablet by mouth Daily. 90 tablet 3    valsartan (DIOVAN) 80 MG tablet Take 0.5 tablets by mouth Daily.      pantoprazole (PROTONIX) 40 MG EC tablet Take 1 tablet by mouth Daily. 30 tablet 1    predniSONE (DELTASONE) 20 MG tablet Take 3 tablets by mouth Daily. 90 tablet 0    predniSONE  "(DELTASONE) 5 MG tablet Take 2 pills by mouth daily for one week, then 1 pill by mouth daily for one week. 21 tablet 0     No current facility-administered medications for this visit.     REVIEW OF SYSTEMS  CONSTITUTIONAL:  No fever, chills or night sweats. Chronic fatigue, recently improved, as per the HPI above.  EYES:  No blurry vision, diplopia or other vision changes.  ENT:  No hearing loss or nosebleeds. Mild, recurrent mucositis, still very manageable, as per the HPI above.  CARDIOVASCULAR:  No palpitations, arrhythmia, syncopal episodes or edema.  PULMONARY:  No hemoptysis, wheezing, chronic cough or shortness of breath.  GASTROINTESTINAL:  No nausea or vomiting. No constipation or diarrhea. No abdominal pain. Recent, intermittently \"chalky\" stool, as per the HPI above.  GENITOURINARY:  No hematuria, kidney stones or frequent urination.  MUSCULOSKELETAL:  No joint or back pains.  INTEGUMENTARY: No rashes or pruritus.  ENDOCRINE:  No excessive thirst or hot flashes.  HEMATOLOGIC:  No history of free bleeding, spontaneous bleeding or clotting.  IMMUNOLOGIC:  No allergies or frequent infections.  NEUROLOGIC: No numbness, tingling, seizures or weakness.  PSYCHIATRIC:  No anxiety or depression.    PHYSICAL EXAMINATION  /66   Pulse 96   Temp 98.4 °F (36.9 °C) (Temporal)   Resp 18   Wt 71.8 kg (158 lb 3.2 oz)   SpO2 95%   BMI 24.78 kg/m²     Pain Score:  Pain Score    10/30/24 1425   PainSc: 0-No pain     PHQ-Score Total:  PHQ-9 Total Score:      ECO  GENERAL:  A well-developed, well-nourished, elderly, white male in no acute distress.  HEENT:  Pupils equally round and reactive to light. Extraocular muscles intact. Currently resolved mucositis.  CARDIOVASCULAR:  Regular rate and rhythm. No murmurs, gallops or rubs.  LUNGS:  Clear to auscultation bilaterally.  No wheezing.  ABDOMEN:  Soft, nontender, nondistended with positive bowel sounds.  EXTREMITIES:  No clubbing, cyanosis or edema " bilaterally.  SKIN:  No rashes or petechiae.  NEURO:  Cranial nerves grossly intact. No focal deficits.  PSYCH:  Alert and oriented x3.    LABORATORY  Lab Results   Component Value Date    WBC 6.65 10/30/2024    HGB 9.7 (L) 10/30/2024    HCT 30.1 (L) 10/30/2024    .0 (H) 10/30/2024     10/30/2024    NEUTROABS 3.08 10/30/2024       Lab Results   Component Value Date     10/30/2024    K 4.4 10/30/2024     10/30/2024    CO2 22.2 10/30/2024    BUN 19 10/30/2024    CREATININE 1.34 (H) 10/30/2024    GLUCOSE 135 (H) 10/30/2024    CALCIUM 9.7 (H) 10/30/2024    AST 46 (H) 10/30/2024    ALT 42 (H) 10/30/2024    ALKPHOS 80 10/30/2024    BILITOT 0.9 10/30/2024    PROTEINTOT 6.3 10/30/2024    ALBUMIN 3.3 (L) 10/30/2024     Lab Results   Component Value Date    RETICCTPCT 3.67 (H) 10/30/2024     CBC (10/30/2024): WBCs: 6,65; HgB: 9.7; Hct: 30.1; platelets: 281; MCV: 106.0  CBC (10/21/2024): WBCs: 7.46; HgB: 9.6; Hct: 30.5; platelets: 349; MCV: 103.4  CBC (10/15/2024): WBCs: 7.86; HgB: 9.6; Hct: 30.8; platelets: 387; MCV: 101.3  CBC (10/09/2024): WBCs: 7.42; HgB: 8.8; Hct: 28.5; platelets: 373; MCV: 97.9  CBC (10/02/2024): WBCs: 6.94; HgB: 9.2; Hct: 29.6; platelets: 324; MCV: 95.2  CBC (09/25/2024): WBCs: 8.71; HgB: 7.3; Hct: 23.3; platelets: 349; MCV: 90.3  CBC (09/18/2024): WBCs: 9.33; HgB: 9.0; Hct: 27.7; platelets: 312; MCV: 87.4  CBC (09/10/2024): WBCs: 10.20; HgB: 10.4; Hct: 31.3; platelets: 222; MCV: 86.5  CBC (09/03/2024): WBCs: 10.95; HgB: 11.5; Hct: 35.6; platelets: 203; MCV: 86.6  CBC (08/29/2024): WBCs: 14.86; HgB: 7.4; Hct: 23.5; platelets: 275; MCV: 86.7  CBC (08/26/2024): WBCs: 11.72; HgB: 7.7; Hct: 24.6; platelets: 250; MCV: 86.6  CBC (08/23/2024): WBCs: 10.01; HgB: 8.0; Hct: 25.3; platelets: 237; MCV: 87.2  CBC (08/20/2024): WBCS: 8.59; HgB: 8.4; Hct: 26.4; platelets: 272; MCV: 86.8  CBC (08/14/2024): WBCs:  8.16; HgB: 9.0; Hct: 27.9; platelets: 319; MCV: 87.2  CBC (08/09/2024): WBCs:  12.00; HgB: 8.1; Hct: 25.5; platelets: 319; MCV: 90.7  CBC (08/02/2024): WBCs: 11.92; HgB: 8.1; Hct: 24.8; platelets: 204; MCV: 90.5  CBC (07/31/2024): WBCs: 17.04; HgB: 7.6; Hct: 23.3; platelets: 265; MCV: 95.5  CBC (07/26/2024): WBCs: 13.50; HgB: 8.1; Hct: 24.8; platelets: 329; MCV: 95.0  CBC (07/23/2024): WBCs: 16.43; HgB: 8.5; Hct: 25.8; platelets: 390; MCV: 96.3  CBC (07/15/2024): WBCs: 8.04; HgB: 8.3; Hct: 25.8; platelets: 340; MCV: 97.7  CBC (07/11/2024): WBCs: 7.25; HgB: 9.4; Hct: 29.0; platelets: 375; MCV: 97.0  CBC (07/01/2024): WBCs: 7.50; HgB: 9.4; Hct: 29.3; platelets: 272; MCV: 98.7  CBC (06/26/2024): WBCs: 6.67; HgB: 9.0; Hct: 27.5; platelets: 198; MCV: 98.6    Reticulocytes (10/30/2024): Percentage: 3.67; Absolute: 0.1061  Reticulocytes (10/21/2024): Percentage: 4.21; Absolute: 0.1250  Reticulocytes (10/15/2024): Percentage: 5.24; Absolute: 0.1603  Reticulocytes (09/10/2024): Percentage: 0.47; Absolute: 0.0170  Reticulocytes (08/20/2024): Percentage: 0.26; Absolute: <0.0100    Cyclosporine level (10/30/2024): pending  Cyclosporine level (10/21/2024): 216 ng/mL  Cyclosporine level (10/15/2024): 315 ng/mL  Cyclosporine level (10/09/2024): 48 ng/mL  Cyclosporine level (10/02/2024): 121 ng/mL  Cyclosporine level (09/25/2024): 107 ng/mL    IMAGING    PATHOLOGY  Peripheral smear, bone marrow aspiration smear, bone marrow aspiration clot and bone marrow core biopsy (06/26/2024):  Normocellular bone marrow with increased granulopoiesis, essentially absent erythropoiesis, adequate megakaryopoiesis, increased stainable iron and slightly increased mixed chronic inflammation. No evidence of increased blasts or dysplasia. The most striking feature within the bone marrow is the essential absence of any erythropoiesis in the aspirate or core biopsy. There is mixed inflammation with some notable immunophenotypic findings by flow cytometry, but there is no evidence of bone marrow involvement by lymphoma. This is most  suggestive of a mixed reactive lymphoid population.    IMPRESSION AND PLAN  Mr. Silva is a 89 y.o., white male with:  Pure red cell aplasia: I have had multiple, long discussions with the patient and his wife regarding the results of the bone marrow biopsy performed in late June 2024 (which are summarized above). In short, this is a fairly uncommon diagnosis that can either be idiopathic or associated with a number of potential, underlying causes, including thymoma or parvovirus infection. Regardless, high dose steroids (prednisone 1-2 mg/kg daily) is the standard, first-line treatment; however, since the repeat CBC drawn on 07/01/2024 showed that his HgB (9.4 g/dL) had improved compared to what it was when he was discharged from our hospital following the bone marrow biopsy the previous week (9.0 g/dL), it was hoped that he was satisfactorily recovering (from a viral trigger) on his own. However, a repeat CBC on 07/15/2024 revealed that his HgB had dropped again (8.3 mg/dL); and his reticulocyte counts remained essentially zero. A Rx for prednisone 80 mg PO daily was provided at that time; but, unfortunately, high-dose steroids never provided him with any meaningful effect. Consequently, by late August 2024, he was started on an additional Rx (cyclosporine 50 mg PO BID), per [facility]'s recommendations (with whom he had met for an additional opinion regarding his management earlier that month). Once he tolerated this starting dose for ~one week, in late August (08/27/2024), we increased the cyclosporine to 100 mg PO BID and further decreased the prednisone to 40 mg PO daily. At his appointment on 09/03/2024, as his anemia (finally) improved (rather well, to a HgB of 11.5 g/dL), we continued the cyclosporine and further decreased the prednisone to 20 mg PO daily. Unfortunately, at the time of his follow up appointment on 09/10/2024, a repeat CBC did not show any further improvement in his HgB (it was 10.4 g/dL),  issue #2 (see below) was dramatically worse; and his repeat serum cyclosporine was > 400 ng/mL (above the upper limit of the desirable range). Given all of this, the cyclosporine was held completely for the next one and one half weeks. Meanwhile, he further decreased his daily prednisone dose (from 10 mg daily to 5 mg daily to, as of late September, off completely), as instructed. After remaining off of cyclosporine for one and one half weeks, his mouth sores (finally) improved; and his PO intake renormalized. Unfortunately, throughout the latter part of September 2024, he was getting steadily fatigued again, correlating with the fact that, not surprisingly, his HgB continued to redecline off of the cyclosporine (and it is was back down to 7.3 g/dL by 09/25/2024, at which time he required the transfusion of an additional two units of PRBCs). Meanwhile, he has now been back on cyclosporine (the 50 mg BID dose) for a total of ~six (6) weeks now, as instructed; and, so far, issue #2 has still yet to significantly reworsen (it has intermittently mildly recurred, but it remains, at worst, very tolerable with localized therapies such as Orajel). Meanwhile, today's (10/30/2024) repeat labwork shows that his HgB is now (finally) still reimproved and recently stable (9.7 g/dL), and his reticulocyte count remains more appropriately elevated. He was again instructed to continue the cyclosporine at the current dose; and we will see him back in our clinic again in another week with a CBC, CMP, reticulocyte count and cyclosporine level for another symptom/toxicity check. Hopefully, this lower dose of cyclosporine (50 mg BID) will continue to not significantly reworsen issue #2 while it remains in therapeutic, but not toxic, levels. Continue to monitor closely.  Mucositis: A progressive and refractory issue that began in late August 2024, shortly after he started PO cyclosporine for the treatment of issue #1 (particularly after he  "was escalated to the 100 mg BID dose). As discussed above, by early September 2024, this was dramatically reducing his PO intake, leading to dehydration and acute renal insufficiency. Also as discussed above, these symptoms are now still much better and manageable; and the (lower) dose of PO cyclosporine (50 mg BID) has been reintroduced. He and his wife have been counseled on the routine use of magic mouthwash, OraGel and/or other, similar products. Continue to monitor.  \"Chalky stool\": He and his wife clarify that this recently new onset issue actually occurred previously, approximately one year ago when he ultimately had to be referred to [facility] gastroenterology, who performed an ERCP for (what sounds like benign) stricturing. It is certainly possible that this problem has redeveloped. We will further evaluate with a CT of the abdomen and pelvis between now and this follow up appointment next week.  Lower extremity edema: Likely multifactorial, with dependent edema definitely contributing. We will further evaluate for additional causes (such as CHF) with a chest CT at the same time the CT abdomen/pelvis is performed to further evaluate issue #3 (see above).  The patient and his wife were in agreement with this plan.    It is a pleasure to participate in Mr. Silva's care. Please do not hesitate to call with any questions or concerns that you may have.    A total of 30 minutes were spent coordinating this patient’s care in clinic today; more than 50% of this time was face-to-face with the patient and his wife, reviewing his interim medical history, discussing the results of the most recent repeat labwork and counseling on the current treatment and followup plans. All questions were answered to their satisfaction.    FOLLOW UP  Continue cyclosporine 50 mg PO BID. Return to our clinic in 1 week with a repeat CBC, CMP, reticulocyte count, cyclosporine level and CTs of the chest, abdomen and pelvis without " contrast.            This document was electronically signed by RAMIRO King MD October 30, 2024 15:47 EDT      CC: RIGO Mccall MD

## 2024-10-30 NOTE — PROGRESS NOTES
Venipuncture Blood Specimen Collection  Venipuncture performed in right arm by Daniel Honeycutt MA with good hemostasis. Patient tolerated the procedure well without complications.   10/30/24   Daniel Honeycutt MA

## 2024-11-02 LAB — CYCLOSPORINE BLD LC/MS/MS-MCNC: 132 NG/ML (ref 100–400)

## 2024-11-04 ENCOUNTER — HOSPITAL ENCOUNTER (OUTPATIENT)
Facility: HOSPITAL | Age: 89
Discharge: HOME | End: 2024-11-04
Payer: MEDICARE

## 2024-11-04 DIAGNOSIS — R60.0 BILATERAL LOWER EXTREMITY EDEMA: ICD-10-CM

## 2024-11-04 DIAGNOSIS — D60.9 ACQUIRED RED CELL APLASIA: ICD-10-CM

## 2024-11-04 DIAGNOSIS — R19.5 ABNORMAL FINDINGS IN STOOL: ICD-10-CM

## 2024-11-04 DIAGNOSIS — D61.9 APLASTIC ANEMIA: ICD-10-CM

## 2024-11-04 PROCEDURE — 71250 CT THORAX DX C-: CPT | Performed by: RADIOLOGY

## 2024-11-04 PROCEDURE — 74176 CT ABD & PELVIS W/O CONTRAST: CPT | Performed by: RADIOLOGY

## 2024-11-04 PROCEDURE — 71250 CT THORAX DX C-: CPT

## 2024-11-04 PROCEDURE — 74176 CT ABD & PELVIS W/O CONTRAST: CPT

## 2024-11-05 ENCOUNTER — OFFICE VISIT (OUTPATIENT)
Dept: ONCOLOGY | Facility: CLINIC | Age: 89
End: 2024-11-05
Payer: MEDICARE

## 2024-11-05 ENCOUNTER — LAB (OUTPATIENT)
Dept: ONCOLOGY | Facility: CLINIC | Age: 89
End: 2024-11-05
Payer: MEDICARE

## 2024-11-05 VITALS
OXYGEN SATURATION: 98 % | WEIGHT: 159.6 LBS | BODY MASS INDEX: 25.05 KG/M2 | DIASTOLIC BLOOD PRESSURE: 61 MMHG | TEMPERATURE: 98.2 F | HEIGHT: 67 IN | SYSTOLIC BLOOD PRESSURE: 119 MMHG | HEART RATE: 81 BPM | RESPIRATION RATE: 18 BRPM

## 2024-11-05 DIAGNOSIS — D60.9 ACQUIRED RED CELL APLASIA: ICD-10-CM

## 2024-11-05 DIAGNOSIS — D61.9 APLASTIC ANEMIA: Primary | ICD-10-CM

## 2024-11-05 DIAGNOSIS — D64.9 SYMPTOMATIC ANEMIA: ICD-10-CM

## 2024-11-05 DIAGNOSIS — D60.9 ACQUIRED RED CELL APLASIA: Primary | ICD-10-CM

## 2024-11-05 DIAGNOSIS — D61.9 APLASTIC ANEMIA: ICD-10-CM

## 2024-11-05 LAB
ALBUMIN SERPL-MCNC: 3.3 G/DL (ref 3.5–5.2)
ALBUMIN/GLOB SERPL: 1.2 G/DL
ALP SERPL-CCNC: 82 U/L (ref 39–117)
ALT SERPL W P-5'-P-CCNC: 34 U/L (ref 1–41)
ANION GAP SERPL CALCULATED.3IONS-SCNC: 9.1 MMOL/L (ref 5–15)
ANISOCYTOSIS BLD QL: NORMAL
AST SERPL-CCNC: 32 U/L (ref 1–40)
BASOPHILS # BLD AUTO: 0.03 10*3/MM3 (ref 0–0.2)
BASOPHILS NFR BLD AUTO: 0.5 % (ref 0–1.5)
BILIRUB SERPL-MCNC: 1 MG/DL (ref 0–1.2)
BUN SERPL-MCNC: 22 MG/DL (ref 8–23)
BUN/CREAT SERPL: 14.7 (ref 7–25)
CALCIUM SPEC-SCNC: 9.8 MG/DL (ref 8.2–9.6)
CHLORIDE SERPL-SCNC: 107 MMOL/L (ref 98–107)
CO2 SERPL-SCNC: 25.9 MMOL/L (ref 22–29)
CREAT SERPL-MCNC: 1.5 MG/DL (ref 0.76–1.27)
DACRYOCYTES BLD QL SMEAR: NORMAL
DEPRECATED RDW RBC AUTO: ABNORMAL FL
EGFRCR SERPLBLD CKD-EPI 2021: 43.7 ML/MIN/1.73
EOSINOPHIL # BLD AUTO: 0.12 10*3/MM3 (ref 0–0.4)
EOSINOPHIL NFR BLD AUTO: 2.1 % (ref 0.3–6.2)
ERYTHROCYTE [DISTWIDTH] IN BLOOD BY AUTOMATED COUNT: ABNORMAL %
GLOBULIN UR ELPH-MCNC: 2.7 GM/DL
GLUCOSE SERPL-MCNC: 135 MG/DL (ref 65–99)
HCT VFR BLD AUTO: 30.3 % (ref 37.5–51)
HGB BLD-MCNC: 9.5 G/DL (ref 13–17.7)
HYPOCHROMIA BLD QL: NORMAL
IMM GRANULOCYTES # BLD AUTO: 0.02 10*3/MM3 (ref 0–0.05)
IMM GRANULOCYTES NFR BLD AUTO: 0.4 % (ref 0–0.5)
LARGE PLATELETS: NORMAL
LYMPHOCYTES # BLD AUTO: 2.2 10*3/MM3 (ref 0.7–3.1)
LYMPHOCYTES NFR BLD AUTO: 38.9 % (ref 19.6–45.3)
MACROCYTES BLD QL SMEAR: NORMAL
MCH RBC QN AUTO: 33.6 PG (ref 26.6–33)
MCHC RBC AUTO-ENTMCNC: 31.4 G/DL (ref 31.5–35.7)
MCV RBC AUTO: 107.1 FL (ref 79–97)
MONOCYTES # BLD AUTO: 0.6 10*3/MM3 (ref 0.1–0.9)
MONOCYTES NFR BLD AUTO: 10.6 % (ref 5–12)
NEUTROPHILS NFR BLD AUTO: 2.68 10*3/MM3 (ref 1.7–7)
NEUTROPHILS NFR BLD AUTO: 47.5 % (ref 42.7–76)
NRBC BLD AUTO-RTO: 0 /100 WBC (ref 0–0.2)
PLATELET # BLD AUTO: 277 10*3/MM3 (ref 140–450)
PMV BLD AUTO: 11 FL (ref 6–12)
POTASSIUM SERPL-SCNC: 4.5 MMOL/L (ref 3.5–5.2)
PROT SERPL-MCNC: 6 G/DL (ref 6–8.5)
RBC # BLD AUTO: 2.83 10*6/MM3 (ref 4.14–5.8)
RETICS # AUTO: 0.08 10*6/MM3 (ref 0.02–0.13)
RETICS/RBC NFR AUTO: 2.91 % (ref 0.7–1.9)
SODIUM SERPL-SCNC: 142 MMOL/L (ref 136–145)
WBC NRBC COR # BLD AUTO: 5.65 10*3/MM3 (ref 3.4–10.8)

## 2024-11-05 PROCEDURE — 85025 COMPLETE CBC W/AUTO DIFF WBC: CPT | Performed by: INTERNAL MEDICINE

## 2024-11-05 PROCEDURE — 1126F AMNT PAIN NOTED NONE PRSNT: CPT | Performed by: INTERNAL MEDICINE

## 2024-11-05 PROCEDURE — 80158 DRUG ASSAY CYCLOSPORINE: CPT | Performed by: INTERNAL MEDICINE

## 2024-11-05 PROCEDURE — 80053 COMPREHEN METABOLIC PANEL: CPT | Performed by: INTERNAL MEDICINE

## 2024-11-05 PROCEDURE — 99214 OFFICE O/P EST MOD 30 MIN: CPT | Performed by: INTERNAL MEDICINE

## 2024-11-05 PROCEDURE — 85045 AUTOMATED RETICULOCYTE COUNT: CPT | Performed by: INTERNAL MEDICINE

## 2024-11-05 PROCEDURE — 85007 BL SMEAR W/DIFF WBC COUNT: CPT | Performed by: INTERNAL MEDICINE

## 2024-11-05 RX ORDER — CYCLOSPORINE 25 MG/1
50 CAPSULE, LIQUID FILLED ORAL 2 TIMES DAILY
Qty: 120 CAPSULE | Refills: 0 | Status: SHIPPED | OUTPATIENT
Start: 2024-11-05 | End: 2024-12-05

## 2024-11-05 NOTE — PROGRESS NOTES
Name:  Chinedu Silva  :  1933  Date:  2024     REFERRING PHYSICIAN    PRIMARY CARE PROVIDER  Jr Segura PA    REASON FOR FOLLOWUP  1. Acquired red cell aplasia      CHIEF COMPLAINT  None.    Dear  Colton,    HISTORY OF PRESENT ILLNESS:   I saw Mr. Silva in followup (from his recent hospitalization for severe anemia) today in our hematology/oncology clinic. As you are aware, he is a pleasant, 89 y.o., white male with a history of hypertension, CAD and GERD who presented to the [facility] ED on 2024 with progressive weakness and dyspnea on exertion. These symptoms had potentially first started ~two to three months prior; however, they significantly worsened over the course of the previous couple of weeks (by early 2024). Upon arrival to our ED, he was found to be severely anemic, with a Hg of 3.7 g/dL. His other cell lines (WBCs and platelets) were entirely WNL. Our service was consulted for further evaluation of this issue, and a bone marrow biopsy was performed on 2024. Meanwhile, as he was feeling substantially better after his HgB was transfused up to the ~9 g/dL range, he was able to be discharged to outpatient follow up in our clinic. He returned to our clinic the following week (on 2024) to go over the results of the bone marrow exam and for ongoing management.    INTERIM HISTORY:  Mr. Silva returns to clinic today for follow up yet again accompanied by his wife. He was seen at [facility] on 2024 for an additional opinion for his acquired pure red cell aplasia. He was on PO cyclosporine for a total of ~three weeks (50 mg BID for the first week and 100 mg BID for the next two weeks) in late August/early 2024. Due to progressive, severe and refractory mucositis (as well as a toxic cyclosporine level), he was instructed to discontinue the cyclosporine in early September. After holding the cyclosporine for the next ~one and one half  "weeks, his mucositis finally improved. He has now been back on the cyclosporine (at a dose of 50 mg BID) for ~seven (7) weeks now, as instructed. While he has redeveloped some intermittent, but currently still manageable, mucositis (which is actually currently still resolved), he is overall still doing much better than he was early this Fall, as his fatigue has now (finally) recently remained improved for a prolonged period of time (and he has still not required any PRBC transfusions since late Summer 2024). He actually walked from the parking lot into our clinic once again today, which is something he had previously been unable to do. The intermittently \"chalky\" stools he was complaining about at his office visit last week have resolved. He has no new or other specific complaints.    Past Medical History:   Diagnosis Date    Carotid stenosis     Coronary artery disease     Diverticulitis     GERD (gastroesophageal reflux disease)     Gout     Hearing aid worn     Hypertension     Obese     Prostate cancer     Wears glasses        Past Surgical History:   Procedure Laterality Date    CAROTID ENDARTERECTOMY Left 2018    Procedure: CAROTID ENDARTERECTOMY WITH EEG LEFT;  Surgeon: Del Neal MD;  Location: [facility];  Service:     CATARACT EXTRACTION      CATARACT EXTRACTION WITH INTRAOCULAR LENS IMPLANT Right     CHOLECYSTECTOMY      COLONOSCOPY W/ POLYPECTOMY      HERNIA REPAIR Right     TOTAL KNEE ARTHROPLASTY Right     TURP / TRANSURETHRAL INCISION / DRAINAGE PROSTATE         Social History     Socioeconomic History    Marital status:     Number of children: 1   Tobacco Use    Smoking status: Former     Current packs/day: 0.00     Average packs/day: 1 pack/day for 12.0 years (12.0 ttl pk-yrs)     Types: Cigarettes     Start date:      Quit date:      Years since quittin.8    Smokeless tobacco: Current     Types: Chew   Vaping Use    Vaping status: Never Used   Substance and Sexual " Activity    Alcohol use: No    Drug use: No    Sexual activity: Defer       Family History   Problem Relation Age of Onset    Hypertension Mother     Glaucoma Mother     Diverticulitis Mother     Gout Mother     ALS Father     Gout Sister     Broken bones Paternal Grandfather     Broken bones Son     Cancer Son     Diabetes Brother        Allergies   Allergen Reactions    Ciprofloxacin Other (See Comments)     Muscle Pains    Lisinopril Cough    Tetracycline Provider Review Needed     Other Reaction(s) from [facility]: UNK    Sulfa Antibiotics Itching and Rash       Current Outpatient Medications   Medication Sig Dispense Refill    allopurinol (ZYLOPRIM) 300 MG tablet Take 1 tablet by mouth Daily.      aspirin 81 MG EC tablet Take 1 tablet by mouth Daily.      dicyclomine (BENTYL) 20 MG tablet Take 1 tablet by mouth Every 6 (Six) Hours.      MAGIC MOUTHWASH W/NYSTATIN 1/1/1/1 (lidocaine - diphenhydrAMINE HCl - aluminum & magnesium hydroxide - nystatin) Swish and spit 5 mL Every 4 (Four) Hours As Needed for Mouth Pain. 300 mL 2    ondansetron (ZOFRAN) 8 MG tablet Take 1 tablet by mouth Every 8 (Eight) Hours As Needed for Nausea or Vomiting. 30 tablet 1    rosuvastatin (CRESTOR) 10 MG tablet Take 1 tablet by mouth Daily. 90 tablet 3    valsartan (DIOVAN) 80 MG tablet Take 0.5 tablets by mouth Daily.      cycloSPORINE modified (NEORAL) 25 MG capsule Take 2 capsules by mouth 2 (Two) Times a Day for 30 days. 120 capsule 0    pantoprazole (PROTONIX) 40 MG EC tablet Take 1 tablet by mouth Daily. 30 tablet 1    predniSONE (DELTASONE) 20 MG tablet Take 3 tablets by mouth Daily. 90 tablet 0    predniSONE (DELTASONE) 5 MG tablet Take 2 pills by mouth daily for one week, then 1 pill by mouth daily for one week. 21 tablet 0     No current facility-administered medications for this visit.     REVIEW OF SYSTEMS  CONSTITUTIONAL:  No fever, chills or night sweats. Chronic fatigue, recently still improved.  EYES:  No blurry  "vision, diplopia or other vision changes.  ENT:  No hearing loss or nosebleeds. Mild, recurrent mucositis, still very manageable, as per the HPI above.  CARDIOVASCULAR:  No palpitations, arrhythmia, syncopal episodes or edema.  PULMONARY:  No hemoptysis, wheezing, chronic cough or shortness of breath.  GASTROINTESTINAL:  No nausea or vomiting. No constipation or diarrhea. No abdominal pain. Intermittently \"chalky\" stool, currently resolved.  GENITOURINARY:  No hematuria, kidney stones or frequent urination.  MUSCULOSKELETAL:  No joint or back pains.  INTEGUMENTARY: No rashes or pruritus.  ENDOCRINE:  No excessive thirst or hot flashes.  HEMATOLOGIC:  No history of free bleeding, spontaneous bleeding or clotting.  IMMUNOLOGIC:  No allergies or frequent infections.  NEUROLOGIC: No numbness, tingling, seizures or weakness.  PSYCHIATRIC:  No anxiety or depression.    PHYSICAL EXAMINATION  /61   Pulse 81   Temp 98.2 °F (36.8 °C) (Temporal)   Resp 18   Ht 170.2 cm (67\")   Wt 72.4 kg (159 lb 9.6 oz)   SpO2 98%   BMI 25.00 kg/m²     Pain Score:  Pain Score    24 0836   PainSc: 0-No pain     PHQ-Score Total:  PHQ-9 Total Score:      ECO  GENERAL:  A well-developed, well-nourished, elderly, white male in no acute distress.  HEENT:  Pupils equally round and reactive to light. Extraocular muscles intact. Currently still resolved mucositis.  CARDIOVASCULAR:  Regular rate and rhythm. No murmurs, gallops or rubs.  LUNGS:  Clear to auscultation bilaterally.  No wheezing.  ABDOMEN:  Soft, nontender, nondistended with positive bowel sounds.  EXTREMITIES:  No clubbing, cyanosis or edema bilaterally.  SKIN:  No rashes or petechiae.  NEURO:  Cranial nerves grossly intact. No focal deficits.  PSYCH:  Alert and oriented x3.    LABORATORY  Lab Results   Component Value Date    WBC 5.65 2024    HGB 9.5 (L) 2024    HCT 30.3 (L) 2024    .1 (H) 2024     2024    NEUTROABS 2.68 " 11/05/2024       Lab Results   Component Value Date     11/05/2024    K 4.5 11/05/2024     11/05/2024    CO2 25.9 11/05/2024    BUN 22 11/05/2024    CREATININE 1.50 (H) 11/05/2024    GLUCOSE 135 (H) 11/05/2024    CALCIUM 9.8 (H) 11/05/2024    AST 32 11/05/2024    ALT 34 11/05/2024    ALKPHOS 82 11/05/2024    BILITOT 1.0 11/05/2024    PROTEINTOT 6.0 11/05/2024    ALBUMIN 3.3 (L) 11/05/2024     Lab Results   Component Value Date    RETICCTPCT 2.91 (H) 11/05/2024     CBC (11/05/2024): WBCs: 5.65; HgB: 9.5; Hct: 30.3; platelets: 277; MCV: 107.1  CBC (10/30/2024): WBCs: 6,65; HgB: 9.7; Hct: 30.1; platelets: 281; MCV: 106.0  CBC (10/21/2024): WBCs: 7.46; HgB: 9.6; Hct: 30.5; platelets: 349; MCV: 103.4  CBC (10/15/2024): WBCs: 7.86; HgB: 9.6; Hct: 30.8; platelets: 387; MCV: 101.3  CBC (10/09/2024): WBCs: 7.42; HgB: 8.8; Hct: 28.5; platelets: 373; MCV: 97.9  CBC (10/02/2024): WBCs: 6.94; HgB: 9.2; Hct: 29.6; platelets: 324; MCV: 95.2  CBC (09/25/2024): WBCs: 8.71; HgB: 7.3; Hct: 23.3; platelets: 349; MCV: 90.3  CBC (09/18/2024): WBCs: 9.33; HgB: 9.0; Hct: 27.7; platelets: 312; MCV: 87.4  CBC (09/10/2024): WBCs: 10.20; HgB: 10.4; Hct: 31.3; platelets: 222; MCV: 86.5  CBC (09/03/2024): WBCs: 10.95; HgB: 11.5; Hct: 35.6; platelets: 203; MCV: 86.6  CBC (08/29/2024): WBCs: 14.86; HgB: 7.4; Hct: 23.5; platelets: 275; MCV: 86.7  CBC (08/26/2024): WBCs: 11.72; HgB: 7.7; Hct: 24.6; platelets: 250; MCV: 86.6  CBC (08/23/2024): WBCs: 10.01; HgB: 8.0; Hct: 25.3; platelets: 237; MCV: 87.2  CBC (08/20/2024): WBCS: 8.59; HgB: 8.4; Hct: 26.4; platelets: 272; MCV: 86.8  CBC (08/14/2024): WBCs:  8.16; HgB: 9.0; Hct: 27.9; platelets: 319; MCV: 87.2  CBC (08/09/2024): WBCs: 12.00; HgB: 8.1; Hct: 25.5; platelets: 319; MCV: 90.7  CBC (08/02/2024): WBCs: 11.92; HgB: 8.1; Hct: 24.8; platelets: 204; MCV: 90.5  CBC (07/31/2024): WBCs: 17.04; HgB: 7.6; Hct: 23.3; platelets: 265; MCV: 95.5  CBC (07/26/2024): WBCs: 13.50; HgB: 8.1; Hct:  24.8; platelets: 329; MCV: 95.0  CBC (07/23/2024): WBCs: 16.43; HgB: 8.5; Hct: 25.8; platelets: 390; MCV: 96.3  CBC (07/15/2024): WBCs: 8.04; HgB: 8.3; Hct: 25.8; platelets: 340; MCV: 97.7  CBC (07/11/2024): WBCs: 7.25; HgB: 9.4; Hct: 29.0; platelets: 375; MCV: 97.0  CBC (07/01/2024): WBCs: 7.50; HgB: 9.4; Hct: 29.3; platelets: 272; MCV: 98.7  CBC (06/26/2024): WBCs: 6.67; HgB: 9.0; Hct: 27.5; platelets: 198; MCV: 98.6    Reticulocytes (11/05/2024): pending  Reticulocytes (10/30/2024): Percentage: 3.67; Absolute: 0.1061  Reticulocytes (10/21/2024): Percentage: 4.21; Absolute: 0.1250  Reticulocytes (10/15/2024): Percentage: 5.24; Absolute: 0.1603  Reticulocytes (09/10/2024): Percentage: 0.47; Absolute: 0.0170  Reticulocytes (08/20/2024): Percentage: 0.26; Absolute: <0.0100    Cyclosporine level (11/05/2024): pending  Cyclosporine level (10/30/2024): 132 ng/mL  Cyclosporine level (10/21/2024): 216 ng/mL  Cyclosporine level (10/15/2024): 315 ng/mL  Cyclosporine level (10/09/2024): 48 ng/mL  Cyclosporine level (10/02/2024): 121 ng/mL  Cyclosporine level (09/25/2024): 107 ng/mL    IMAGING  CT chest without contrast (11/04/2024):  Impression: No parenchymal nodules, adenopathy or effusions.    CT abdomen and pelvis without contrast (11/04/2024):  Impression:  1) Minimal sigmoid diverticulitis.  2) Other findings [are nonacute].    PATHOLOGY  Peripheral smear, bone marrow aspiration smear, bone marrow aspiration clot and bone marrow core biopsy (06/26/2024):  Normocellular bone marrow with increased granulopoiesis, essentially absent erythropoiesis, adequate megakaryopoiesis, increased stainable iron and slightly increased mixed chronic inflammation. No evidence of increased blasts or dysplasia. The most striking feature within the bone marrow is the essential absence of any erythropoiesis in the aspirate or core biopsy. There is mixed inflammation with some notable immunophenotypic findings by flow cytometry, but there is  no evidence of bone marrow involvement by lymphoma. This is most suggestive of a mixed reactive lymphoid population.    IMPRESSION AND PLAN  Mr. Silva is a 89 y.o., white male with:  Pure red cell aplasia: I have had multiple, long discussions with the patient and his wife regarding the results of the bone marrow biopsy performed in late June 2024 (which are summarized above). In short, this is a fairly uncommon diagnosis that can either be idiopathic or associated with a number of potential, underlying causes, including thymoma or parvovirus infection. Regardless, high dose steroids (prednisone 1-2 mg/kg daily) is the standard, first-line treatment; however, since the repeat CBC drawn on 07/01/2024 showed that his HgB (9.4 g/dL) had improved compared to what it was when he was discharged from our hospital following the bone marrow biopsy the previous week (9.0 g/dL), it was hoped that he was satisfactorily recovering (from a viral trigger) on his own. However, a repeat CBC on 07/15/2024 revealed that his HgB had dropped again (8.3 mg/dL); and his reticulocyte counts remained essentially zero. A Rx for prednisone 80 mg PO daily was provided at that time; but, unfortunately, high-dose steroids never provided him with any meaningful effect. Consequently, by late August 2024, he was started on an additional Rx (cyclosporine 50 mg PO BID), per [facility]'s recommendations (with whom he had met for an additional opinion regarding his management earlier that month). Once he tolerated this starting dose for ~one week, in late August (08/27/2024), we increased the cyclosporine to 100 mg PO BID and further decreased the prednisone to 40 mg PO daily. At his appointment on 09/03/2024, as his anemia (finally) improved (rather well, to a HgB of 11.5 g/dL), we continued the cyclosporine and further decreased the prednisone to 20 mg PO daily. Unfortunately, at the time of his follow up appointment on 09/10/2024, a repeat CBC  did not show any further improvement in his HgB (it was 10.4 g/dL), issue #2 (see below) was dramatically worse; and his repeat serum cyclosporine was > 400 ng/mL (above the upper limit of the desirable range). Given all of this, the cyclosporine was held completely for the next one and one half weeks. Meanwhile, he further decreased his daily prednisone dose (from 10 mg daily to 5 mg daily to, as of late September, off completely), as instructed. After remaining off of cyclosporine for one and one half weeks, his mouth sores (finally) improved; and his PO intake renormalized. Unfortunately, throughout the latter part of September 2024, he was getting steadily fatigued again, correlating with the fact that, not surprisingly, his HgB continued to redecline off of the cyclosporine (and it is was back down to 7.3 g/dL by 09/25/2024, at which time he required the transfusion of an additional two units of PRBCs). Meanwhile, he has now been back on cyclosporine (the 50 mg BID dose) for a total of ~seven (7) weeks, as instructed; and, so far, issue #2 has still yet to significantly reworsen (it has intermittently mildly recurred, but it remains, at worst, very tolerable with localized therapies such as Orajel). Meanwhile, today's (11/05/2024) repeat labwork shows that his HgB is currently still reimproved and recently still stable (9.5 g/dL), and his reticulocyte count remains more appropriately elevated. He was again instructed to continue the cyclosporine at the current dose; and we will see him back in our clinic again in two weeks with a CBC, CMP, reticulocyte count and cyclosporine level for another symptom/toxicity check. Hopefully, this lower dose of cyclosporine (50 mg BID) will continue to not significantly reworsen issue #2 while it remains in therapeutic, but not toxic, levels. Continue to monitor closely.  Mucositis: A progressive and refractory issue that began in late August 2024, shortly after he started PO  "cyclosporine for the treatment of issue #1 (particularly after he was escalated to the 100 mg BID dose). As discussed above, by early September 2024, this was dramatically reducing his PO intake, leading to dehydration and acute renal insufficiency. Also as discussed above, these symptoms are now still much better and manageable; and the (lower) dose of PO cyclosporine (50 mg BID) has been reintroduced. He and his wife have been counseled on the routine use of magic mouthwash, OraGel and/or other, similar products. Continue to monitor.  \"Chalky stool\": He and his wife previously clarified that this issue has actually occurred previously. In approximately Summer 2023, he had to be referred to [facility] gastroenterology, who performed an ERCP for (what sounds like benign) stricturing. Regardless, CT scans of the chest, abdomen and pelvis without contrast performed on 11/05/2024 (and summarized above) for further evaluation were overall unremarkable; and this symptom is currently re-resolved. Continue to monitor.  Lower extremity edema: Likely multifactorial, with dependent edema definitely contributing. Ongoing management per primary care.  The patient and his wife were in agreement with this plan.    It is a pleasure to participate in Mr. Silva's care. Please do not hesitate to call with any questions or concerns that you may have.    A total of 30 minutes were spent coordinating this patient’s care in clinic today; more than 50% of this time was face-to-face with the patient and his wife, reviewing his interim medical history, discussing the results of the most recent repeat labwork, as well as yesterday's CT scans, and counseling on the current treatment and followup plans. All questions were answered to their satisfaction.    FOLLOW UP  Continue cyclosporine 50 mg PO BID. Return to our clinic in 2 weeks with a repeat CBC, CMP, reticulocyte count and cyclosporine level.            This document was electronically " signed by RMAIRO King MD November 5, 2024 09:11 EST      CC: RIGO Mccall MD

## 2024-11-05 NOTE — PROGRESS NOTES
Venipuncture Blood Specimen Collection  Venipuncture performed in right arm by Carlos Fernandez MA with good hemostasis. Patient tolerated the procedure well without complications.   11/05/24   Carlos Fernandez MA

## 2024-11-07 LAB — CYCLOSPORINE BLD LC/MS/MS-MCNC: 320 NG/ML (ref 100–400)

## 2024-11-11 ENCOUNTER — OFFICE VISIT (OUTPATIENT)
Dept: CARDIOLOGY | Facility: CLINIC | Age: 89
End: 2024-11-11
Payer: MEDICARE

## 2024-11-11 VITALS
HEIGHT: 67 IN | HEART RATE: 89 BPM | WEIGHT: 160 LBS | BODY MASS INDEX: 25.11 KG/M2 | DIASTOLIC BLOOD PRESSURE: 60 MMHG | OXYGEN SATURATION: 97 % | SYSTOLIC BLOOD PRESSURE: 105 MMHG

## 2024-11-11 DIAGNOSIS — E78.5 DYSLIPIDEMIA: ICD-10-CM

## 2024-11-11 DIAGNOSIS — I10 PRIMARY HYPERTENSION: ICD-10-CM

## 2024-11-11 DIAGNOSIS — I65.21 STENOSIS OF RIGHT CAROTID ARTERY: Primary | ICD-10-CM

## 2024-11-11 DIAGNOSIS — D60.9 ACQUIRED RED CELL APLASIA: ICD-10-CM

## 2024-11-11 DIAGNOSIS — R94.39 ABNORMAL NUCLEAR STRESS TEST: ICD-10-CM

## 2024-11-11 PROCEDURE — 99214 OFFICE O/P EST MOD 30 MIN: CPT | Performed by: NURSE PRACTITIONER

## 2024-11-11 PROCEDURE — 1159F MED LIST DOCD IN RCRD: CPT | Performed by: NURSE PRACTITIONER

## 2024-11-11 PROCEDURE — 1160F RVW MEDS BY RX/DR IN RCRD: CPT | Performed by: NURSE PRACTITIONER

## 2024-11-11 NOTE — PROGRESS NOTES
[facility]             NaomiZULEYKA Tran Joseph T, PA Howard T Robinson  2/14/1933 11/11/2024    Patient Active Problem List   Diagnosis    Carotid stenosis    GERD (gastroesophageal reflux disease)    Hypertension    Coronary artery disease    Wears glasses    Acquired red cell aplasia    Abnormal nuclear stress test    Precordial chest pain    Shortness of breath    Dyslipidemia    Dehydration       Dear Jr Segura, PA:    Subjective     Chief Complaint   Patient presents with    Follow-up       HPI:     This is a 89 y.o. male with known past medical history of carotid artery disease, essential pretension, coronary artery disease, pure red cell aplasia and dyslipidemia    Chinedu Silva presents today for routine cardiology follow up.  Patient states he has been doing overall well since his last visit.  Denies any chest pain or shortness of breath.  Had CT of the carotids which showed totally occluded right ICA.  Followed up with hematology who felt that patient was overall stable to continue current management.  Recent lab work showed mild acute kidney injury with a creatinine of 1.5 for which baseline creatinine 1.3.  Blood pressure controlled.    Diagnostic Testing  Echocardiogram 6/2024: EF 66 to 70%  Carotid ultrasound 7/2024: 50 to 69% stenosis in the left internal carotid artery  Nuclear stress test 7/2024: Moderate size moderate ischemia located in the inferior wall       All other systems were reviewed and were negative.    Patient Active Problem List   Diagnosis    Carotid stenosis    GERD (gastroesophageal reflux disease)    Hypertension    Coronary artery disease    Wears glasses    Acquired red cell aplasia    Abnormal nuclear stress test    Precordial chest pain    Shortness of breath    Dyslipidemia    Dehydration       family history includes ALS in his father; Broken bones in his paternal grandfather and son; Cancer in his son;  Diabetes in his brother; Diverticulitis in his mother; Glaucoma in his mother; Gout in his mother and sister; Hypertension in his mother.     reports that he quit smoking about 62 years ago. His smoking use included cigarettes. He started smoking about 74 years ago. He has a 12 pack-year smoking history. His smokeless tobacco use includes chew. He reports that he does not drink alcohol and does not use drugs.    Allergies   Allergen Reactions    Ciprofloxacin Other (See Comments)     Muscle Pains    Lisinopril Cough    Tetracycline Provider Review Needed     Other Reaction(s) from [facility]: UNK    Sulfa Antibiotics Itching and Rash         Current Outpatient Medications:     allopurinol (ZYLOPRIM) 300 MG tablet, Take 1 tablet by mouth Daily., Disp: , Rfl:     aspirin 81 MG EC tablet, Take 1 tablet by mouth Daily., Disp: , Rfl:     cycloSPORINE modified (NEORAL) 25 MG capsule, Take 2 capsules by mouth 2 (Two) Times a Day for 30 days., Disp: 120 capsule, Rfl: 0    rosuvastatin (CRESTOR) 10 MG tablet, Take 1 tablet by mouth Daily., Disp: 90 tablet, Rfl: 3    valsartan (DIOVAN) 80 MG tablet, Take 0.5 tablets by mouth Daily., Disp: , Rfl:     dicyclomine (BENTYL) 20 MG tablet, Take 1 tablet by mouth Every 6 (Six) Hours. (Patient not taking: Reported on 11/11/2024), Disp: , Rfl:     MAGIC MOUTHWASH W/NYSTATIN 1/1/1/1 (lidocaine - diphenhydrAMINE HCl - aluminum & magnesium hydroxide - nystatin), Swish and spit 5 mL Every 4 (Four) Hours As Needed for Mouth Pain. (Patient not taking: Reported on 11/11/2024), Disp: 300 mL, Rfl: 2    ondansetron (ZOFRAN) 8 MG tablet, Take 1 tablet by mouth Every 8 (Eight) Hours As Needed for Nausea or Vomiting. (Patient not taking: Reported on 11/11/2024), Disp: 30 tablet, Rfl: 1    pantoprazole (PROTONIX) 40 MG EC tablet, Take 1 tablet by mouth Daily. (Patient not taking: Reported on 11/11/2024), Disp: 30 tablet, Rfl: 1    predniSONE (DELTASONE) 20 MG tablet, Take 3 tablets by mouth  Daily. (Patient not taking: Reported on 11/11/2024), Disp: 90 tablet, Rfl: 0    predniSONE (DELTASONE) 5 MG tablet, Take 2 pills by mouth daily for one week, then 1 pill by mouth daily for one week. (Patient not taking: Reported on 11/11/2024), Disp: 21 tablet, Rfl: 0      Physical Exam:  I have reviewed the patient's current vital signs as documented in the patient's EMR.   Vitals:    11/11/24 0915   BP: 105/60   Pulse: 89   SpO2: 97%     Body mass index is 25.05 kg/m².       11/11/24 0915   Weight: 72.6 kg (160 lb)      Physical Exam  Constitutional:       General: He is not in acute distress.     Appearance: Normal appearance. He is well-developed.   HENT:      Head: Normocephalic and atraumatic.      Mouth/Throat:      Mouth: Mucous membranes are moist.   Eyes:      Extraocular Movements: Extraocular movements intact.      Pupils: Pupils are equal, round, and reactive to light.   Neck:      Vascular: No JVD.   Cardiovascular:      Rate and Rhythm: Normal rate and regular rhythm.      Heart sounds: Normal heart sounds. No murmur heard.     No S3 or S4 sounds.   Pulmonary:      Effort: Pulmonary effort is normal. No respiratory distress.      Breath sounds: Normal breath sounds. No wheezing.   Abdominal:      General: Bowel sounds are normal. There is no distension.      Palpations: Abdomen is soft. There is no hepatomegaly.      Tenderness: There is no abdominal tenderness.   Musculoskeletal:         General: Normal range of motion.      Cervical back: Normal range of motion and neck supple.   Skin:     General: Skin is warm and dry.      Coloration: Skin is not jaundiced or pale.   Neurological:      General: No focal deficit present.      Mental Status: He is alert and oriented to person, place, and time. Mental status is at baseline.   Psychiatric:         Mood and Affect: Mood normal.         Behavior: Behavior normal.         Thought Content: Thought content normal.         Judgment: Judgment normal.             DATA REVIEWED:     TTE/TANIYA:  Results for orders placed during the hospital encounter of 06/24/24    Adult Transthoracic Echo Complete W/ Cont if Necessary Per Protocol    Interpretation Summary    Left ventricular ejection fraction appears to be 66 - 70%.    Left ventricular wall thickness is consistent with moderate concentric hypertrophy.    Left ventricular diastolic function is consistent with (grade I) impaired relaxation.    The right ventricular cavity is moderately dilated.    Estimated right ventricular systolic pressure from tricuspid regurgitation is normal (<35 mmHg).    Alll valves are grossly normal      Laboratory evaluations:    Lab Results   Component Value Date    GLUCOSE 135 (H) 11/05/2024    BUN 22 11/05/2024    CREATININE 1.50 (H) 11/05/2024    EGFRIFNONA 45 (L) 08/30/2018    BCR 14.7 11/05/2024    K 4.5 11/05/2024    CO2 25.9 11/05/2024    CALCIUM 9.8 (H) 11/05/2024    PROTENTOTREF 6.1 06/24/2024    ALBUMIN 3.3 (L) 11/05/2024    LABIL2 1.2 06/24/2024    AST 32 11/05/2024    ALT 34 11/05/2024     Lab Results   Component Value Date    WBC 5.65 11/05/2024    HGB 9.5 (L) 11/05/2024    HCT 30.3 (L) 11/05/2024    .1 (H) 11/05/2024     11/05/2024     Lab Results   Component Value Date    CHOL 118 08/20/2024    TRIG 124 08/20/2024    HDL 64 (H) 08/20/2024    LDL 32 08/20/2024     Lab Results   Component Value Date    TSH 1.280 06/24/2024     Lab Results   Component Value Date    HGBA1C 6.00 (H) 02/04/2018     Lab Results   Component Value Date    ALT 34 11/05/2024     Lab Results   Component Value Date    HGBA1C 6.00 (H) 02/04/2018     Lab Results   Component Value Date    CREATININE 1.50 (H) 11/05/2024     Lab Results   Component Value Date    IRON 254 (H) 06/24/2024    TIBC 319 06/24/2024    FERRITIN 1,420.00 (H) 06/24/2024     Lab Results   Component Value Date    INR 0.94 08/17/2022    INR 0.97 02/04/2018    INR 0.91 03/15/2016    PROTIME 12.8 08/17/2022    PROTIME 10.6  "02/04/2018    PROTIME 10.3 03/15/2016      No components found for: \"ABSOLUTELUNG\"    --------------------------------------------------------------------------------------------------------------------------    ASSESSMENT/PLAN:      Diagnosis Plan   1. Stenosis of right carotid artery        2. Abnormal nuclear stress test        3. Dyslipidemia        4. Primary hypertension        5. Acquired red cell aplasia            Abnormal nuclear stress test  Acquired red cell aplasia  Patient with abnormal nuclear stress test showing moderate size moderate of ischemia located in the inferior wall.  Patient is asymptomatic.  Echocardiogram showed normal LV function.  Given his red cell aplasia he would be high risk for dual antiplatelet therapy therefore we will continue with medical management for now.  Continue aspirin.    Hypertension  Well-controlled.  Recent lab work showed mild acute kidney injury with a creatinine 1.5.  Advised to increase water intake.    4.  Dyslipidemia  5.  Carotid artery disease  CT carotid showed totally occluded right ICA.  Recent lipid panel showed LDL in the 30s.  Continue statin and aspirin        This document has been @Electronically signed by ZULEYKA Venegas, 11/11/24, 8:24 AM EST.       Dictated Utilizing Dragon Dictation: Part of this note may be an electronic transcription/translation of spoken language to printed text using the Dragon Dictation System.    Follow-up appointment and medication changes provided in hand delivered After Visit Summary as well as reviewed in the room.     "

## 2024-11-19 ENCOUNTER — OFFICE VISIT (OUTPATIENT)
Dept: ONCOLOGY | Facility: CLINIC | Age: 89
End: 2024-11-19
Payer: MEDICARE

## 2024-11-19 ENCOUNTER — LAB (OUTPATIENT)
Dept: ONCOLOGY | Facility: CLINIC | Age: 89
End: 2024-11-19
Payer: MEDICARE

## 2024-11-19 VITALS
RESPIRATION RATE: 18 BRPM | HEIGHT: 67 IN | OXYGEN SATURATION: 97 % | SYSTOLIC BLOOD PRESSURE: 107 MMHG | TEMPERATURE: 98.2 F | WEIGHT: 158.6 LBS | BODY MASS INDEX: 24.89 KG/M2 | HEART RATE: 84 BPM | DIASTOLIC BLOOD PRESSURE: 60 MMHG

## 2024-11-19 DIAGNOSIS — D60.9 ACQUIRED RED CELL APLASIA: ICD-10-CM

## 2024-11-19 DIAGNOSIS — R60.0 BILATERAL LOWER EXTREMITY EDEMA: ICD-10-CM

## 2024-11-19 DIAGNOSIS — R19.5 ABNORMAL FINDINGS IN STOOL: ICD-10-CM

## 2024-11-19 DIAGNOSIS — D60.9 ACQUIRED RED CELL APLASIA: Primary | ICD-10-CM

## 2024-11-19 DIAGNOSIS — D61.9 APLASTIC ANEMIA: ICD-10-CM

## 2024-11-19 DIAGNOSIS — D64.9 SYMPTOMATIC ANEMIA: ICD-10-CM

## 2024-11-19 LAB
ALBUMIN SERPL-MCNC: 3.3 G/DL (ref 3.5–5.2)
ALBUMIN/GLOB SERPL: 1.2 G/DL
ALP SERPL-CCNC: 86 U/L (ref 39–117)
ALT SERPL W P-5'-P-CCNC: 23 U/L (ref 1–41)
ANION GAP SERPL CALCULATED.3IONS-SCNC: 9.1 MMOL/L (ref 5–15)
ANISOCYTOSIS BLD QL: NORMAL
AST SERPL-CCNC: 30 U/L (ref 1–40)
BASOPHILS # BLD AUTO: 0.02 10*3/MM3 (ref 0–0.2)
BASOPHILS NFR BLD AUTO: 0.3 % (ref 0–1.5)
BILIRUB SERPL-MCNC: 0.9 MG/DL (ref 0–1.2)
BUN SERPL-MCNC: 23 MG/DL (ref 8–23)
BUN/CREAT SERPL: 15.4 (ref 7–25)
CALCIUM SPEC-SCNC: 9.8 MG/DL (ref 8.2–9.6)
CHLORIDE SERPL-SCNC: 103 MMOL/L (ref 98–107)
CO2 SERPL-SCNC: 22.9 MMOL/L (ref 22–29)
CREAT SERPL-MCNC: 1.49 MG/DL (ref 0.76–1.27)
DACRYOCYTES BLD QL SMEAR: NORMAL
DEPRECATED RDW RBC AUTO: 88.1 FL (ref 37–54)
EGFRCR SERPLBLD CKD-EPI 2021: 44 ML/MIN/1.73
EOSINOPHIL # BLD AUTO: 0.17 10*3/MM3 (ref 0–0.4)
EOSINOPHIL NFR BLD AUTO: 2.7 % (ref 0.3–6.2)
ERYTHROCYTE [DISTWIDTH] IN BLOOD BY AUTOMATED COUNT: 22.1 % (ref 12.3–15.4)
GLOBULIN UR ELPH-MCNC: 2.7 GM/DL
GLUCOSE SERPL-MCNC: 132 MG/DL (ref 65–99)
HCT VFR BLD AUTO: 32.1 % (ref 37.5–51)
HGB BLD-MCNC: 10.2 G/DL (ref 13–17.7)
HYPOCHROMIA BLD QL: NORMAL
IMM GRANULOCYTES # BLD AUTO: 0.02 10*3/MM3 (ref 0–0.05)
IMM GRANULOCYTES NFR BLD AUTO: 0.3 % (ref 0–0.5)
LYMPHOCYTES # BLD AUTO: 2.54 10*3/MM3 (ref 0.7–3.1)
LYMPHOCYTES NFR BLD AUTO: 40.3 % (ref 19.6–45.3)
MACROCYTES BLD QL SMEAR: NORMAL
MCH RBC QN AUTO: 35.2 PG (ref 26.6–33)
MCHC RBC AUTO-ENTMCNC: 31.8 G/DL (ref 31.5–35.7)
MCV RBC AUTO: 110.7 FL (ref 79–97)
MONOCYTES # BLD AUTO: 0.68 10*3/MM3 (ref 0.1–0.9)
MONOCYTES NFR BLD AUTO: 10.8 % (ref 5–12)
NEUTROPHILS NFR BLD AUTO: 2.87 10*3/MM3 (ref 1.7–7)
NEUTROPHILS NFR BLD AUTO: 45.6 % (ref 42.7–76)
NRBC BLD AUTO-RTO: 0 /100 WBC (ref 0–0.2)
OVALOCYTES BLD QL SMEAR: NORMAL
PLAT MORPH BLD: NORMAL
PLATELET # BLD AUTO: 272 10*3/MM3 (ref 140–450)
PMV BLD AUTO: 11.7 FL (ref 6–12)
POTASSIUM SERPL-SCNC: 4.3 MMOL/L (ref 3.5–5.2)
PROT SERPL-MCNC: 6 G/DL (ref 6–8.5)
RBC # BLD AUTO: 2.9 10*6/MM3 (ref 4.14–5.8)
RETICS # AUTO: 0.09 10*6/MM3 (ref 0.02–0.13)
RETICS/RBC NFR AUTO: 3.04 % (ref 0.7–1.9)
SODIUM SERPL-SCNC: 135 MMOL/L (ref 136–145)
WBC NRBC COR # BLD AUTO: 6.3 10*3/MM3 (ref 3.4–10.8)

## 2024-11-19 PROCEDURE — 1126F AMNT PAIN NOTED NONE PRSNT: CPT | Performed by: INTERNAL MEDICINE

## 2024-11-19 PROCEDURE — 85025 COMPLETE CBC W/AUTO DIFF WBC: CPT | Performed by: INTERNAL MEDICINE

## 2024-11-19 PROCEDURE — 85007 BL SMEAR W/DIFF WBC COUNT: CPT | Performed by: INTERNAL MEDICINE

## 2024-11-19 PROCEDURE — 85045 AUTOMATED RETICULOCYTE COUNT: CPT | Performed by: INTERNAL MEDICINE

## 2024-11-19 PROCEDURE — 99214 OFFICE O/P EST MOD 30 MIN: CPT | Performed by: INTERNAL MEDICINE

## 2024-11-19 PROCEDURE — G2211 COMPLEX E/M VISIT ADD ON: HCPCS | Performed by: INTERNAL MEDICINE

## 2024-11-19 PROCEDURE — 80158 DRUG ASSAY CYCLOSPORINE: CPT | Performed by: INTERNAL MEDICINE

## 2024-11-19 PROCEDURE — 36415 COLL VENOUS BLD VENIPUNCTURE: CPT | Performed by: INTERNAL MEDICINE

## 2024-11-19 PROCEDURE — 80053 COMPREHEN METABOLIC PANEL: CPT | Performed by: INTERNAL MEDICINE

## 2024-11-19 NOTE — PROGRESS NOTES
Name:  Chinedu Silva  :  1933  Date:  2024     REFERRING PHYSICIAN    PRIMARY CARE PROVIDER  Jr Segura PA    REASON FOR FOLLOWUP  1. Acquired red cell aplasia      CHIEF COMPLAINT  None.    Dear  Colton,    HISTORY OF PRESENT ILLNESS:   I saw Mr. Silva in followup (from his recent hospitalization for severe anemia) today in our hematology/oncology clinic. As you are aware, he is a pleasant, 89 y.o., white male with a history of hypertension, CAD and GERD who presented to the [facility] ED on 2024 with progressive weakness and dyspnea on exertion. These symptoms had potentially first started ~two to three months prior; however, they significantly worsened over the course of the previous couple of weeks (by early 2024). Upon arrival to our ED, he was found to be severely anemic, with a Hg of 3.7 g/dL. His other cell lines (WBCs and platelets) were entirely WNL. Our service was consulted for further evaluation of this issue, and a bone marrow biopsy was performed on 2024. Meanwhile, as he was feeling substantially better after his HgB was transfused up to the ~9 g/dL range, he was able to be discharged to outpatient follow up in our clinic. He returned to our clinic the following week (on 2024) to go over the results of the bone marrow exam and for ongoing management.    INTERIM HISTORY:  Mr. Silva returns to clinic today for follow up yet again accompanied by his wife. He was seen at [facility] on 2024 for an additional opinion for his acquired pure red cell aplasia. He was on PO cyclosporine for a total of ~three weeks (50 mg BID for the first week and 100 mg BID for the next two weeks) in late August/early 2024. Due to progressive, severe and refractory mucositis (as well as a toxic cyclosporine level), he was instructed to discontinue the cyclosporine in early September. After holding the cyclosporine for the next ~one and one half  weeks, his mucositis finally improved. He has now been back on the cyclosporine (at a dose of 50 mg BID) for a little over two (2) months now, as instructed. While he has redeveloped some intermittent, but currently still manageable, mucositis (which is actually currently still resolved), he is overall still doing much better than he was early this Fall, as his fatigue has recently remained improved for a prolonged period of time (and he has still not required any PRBC transfusions since late Summer 2024). He again walked from the parking lot into our clinic today, which is something he was completely unable to do not much more than a couple of months ago. He again has no new or other specific complaints.    Past Medical History:   Diagnosis Date    Carotid stenosis     Coronary artery disease     Diverticulitis     GERD (gastroesophageal reflux disease)     Gout     Hearing aid worn     Hypertension     Obese     Prostate cancer     Wears glasses        Past Surgical History:   Procedure Laterality Date    CAROTID ENDARTERECTOMY Left 2018    Procedure: CAROTID ENDARTERECTOMY WITH EEG LEFT;  Surgeon: Del Neal MD;  Location: [facility];  Service:     CATARACT EXTRACTION      CATARACT EXTRACTION WITH INTRAOCULAR LENS IMPLANT Right     CHOLECYSTECTOMY      COLONOSCOPY W/ POLYPECTOMY      HERNIA REPAIR Right     TOTAL KNEE ARTHROPLASTY Right     TURP / TRANSURETHRAL INCISION / DRAINAGE PROSTATE         Social History     Socioeconomic History    Marital status:     Number of children: 1   Tobacco Use    Smoking status: Former     Current packs/day: 0.00     Average packs/day: 1 pack/day for 12.0 years (12.0 ttl pk-yrs)     Types: Cigarettes     Start date:      Quit date:      Years since quittin.9    Smokeless tobacco: Current     Types: Chew   Vaping Use    Vaping status: Never Used   Substance and Sexual Activity    Alcohol use: No    Drug use: No    Sexual activity: Defer       Family  History   Problem Relation Age of Onset    Hypertension Mother     Glaucoma Mother     Diverticulitis Mother     Gout Mother     ALS Father     Gout Sister     Broken bones Paternal Grandfather     Broken bones Son     Cancer Son     Diabetes Brother        Allergies   Allergen Reactions    Ciprofloxacin Other (See Comments)     Muscle Pains    Lisinopril Cough    Tetracycline Provider Review Needed     Other Reaction(s) from [facility]: UNK    Sulfa Antibiotics Itching and Rash       Current Outpatient Medications   Medication Sig Dispense Refill    allopurinol (ZYLOPRIM) 300 MG tablet Take 1 tablet by mouth Daily.      aspirin 81 MG EC tablet Take 1 tablet by mouth Daily.      cycloSPORINE modified (NEORAL) 25 MG capsule Take 2 capsules by mouth 2 (Two) Times a Day for 30 days. 120 capsule 0    rosuvastatin (CRESTOR) 10 MG tablet Take 1 tablet by mouth Daily. 90 tablet 3    valsartan (DIOVAN) 80 MG tablet Take 0.5 tablets by mouth Daily.      dicyclomine (BENTYL) 20 MG tablet Take 1 tablet by mouth Every 6 (Six) Hours. (Patient not taking: Reported on 11/19/2024)      MAGIC MOUTHWASH W/NYSTATIN 1/1/1/1 (lidocaine - diphenhydrAMINE HCl - aluminum & magnesium hydroxide - nystatin) Swish and spit 5 mL Every 4 (Four) Hours As Needed for Mouth Pain. (Patient not taking: Reported on 11/19/2024) 300 mL 2    ondansetron (ZOFRAN) 8 MG tablet Take 1 tablet by mouth Every 8 (Eight) Hours As Needed for Nausea or Vomiting. (Patient not taking: Reported on 11/19/2024) 30 tablet 1    pantoprazole (PROTONIX) 40 MG EC tablet Take 1 tablet by mouth Daily. (Patient not taking: Reported on 11/19/2024) 30 tablet 1    predniSONE (DELTASONE) 20 MG tablet Take 3 tablets by mouth Daily. (Patient not taking: Reported on 11/19/2024) 90 tablet 0    predniSONE (DELTASONE) 5 MG tablet Take 2 pills by mouth daily for one week, then 1 pill by mouth daily for one week. (Patient not taking: Reported on 11/19/2024) 21 tablet 0     No current  "facility-administered medications for this visit.     REVIEW OF SYSTEMS  CONSTITUTIONAL:  No fever, chills or night sweats. Chronic fatigue, recently still improved.  EYES:  No blurry vision, diplopia or other vision changes.  ENT:  No hearing loss or nosebleeds. Mild, recurrent mucositis, still very manageable, as per the HPI above.  CARDIOVASCULAR:  No palpitations, arrhythmia, syncopal episodes or edema.  PULMONARY:  No hemoptysis, wheezing, chronic cough or shortness of breath.  GASTROINTESTINAL:  No nausea or vomiting. No constipation or diarrhea. No abdominal pain. Intermittently \"chalky\" stool, currently resolved.  GENITOURINARY:  No hematuria, kidney stones or frequent urination.  MUSCULOSKELETAL:  No joint or back pains.  INTEGUMENTARY: No rashes or pruritus.  ENDOCRINE:  No excessive thirst or hot flashes.  HEMATOLOGIC:  No history of free bleeding, spontaneous bleeding or clotting.  IMMUNOLOGIC:  No allergies or frequent infections.  NEUROLOGIC: No numbness, tingling, seizures or weakness.  PSYCHIATRIC:  No anxiety or depression.    PHYSICAL EXAMINATION  /60   Pulse 84   Temp 98.2 °F (36.8 °C) (Temporal)   Resp 18   Ht 170.2 cm (67\")   Wt 71.9 kg (158 lb 9.6 oz)   SpO2 97%   BMI 24.84 kg/m²     Pain Score:  Pain Score    24 1032   PainSc: 0-No pain     PHQ-Score Total:  PHQ-9 Total Score:      ECO  GENERAL:  A well-developed, well-nourished, elderly, white male in no acute distress.  HEENT:  Pupils equally round and reactive to light. Extraocular muscles intact. Currently no significant mucositis.  CARDIOVASCULAR:  Regular rate and rhythm. No murmurs, gallops or rubs.  LUNGS:  Clear to auscultation bilaterally.  No wheezing.  ABDOMEN:  Soft, nontender, nondistended with positive bowel sounds.  EXTREMITIES:  No clubbing, cyanosis or edema bilaterally.  SKIN:  No rashes or petechiae.  NEURO:  Cranial nerves grossly intact. No focal deficits.  PSYCH:  Alert and oriented " x3.    LABORATORY  Lab Results   Component Value Date    WBC 6.30 11/19/2024    HGB 10.2 (L) 11/19/2024    HCT 32.1 (L) 11/19/2024    .7 (H) 11/19/2024     11/19/2024    NEUTROABS 2.87 11/19/2024       Lab Results   Component Value Date     (L) 11/19/2024    K 4.3 11/19/2024     11/19/2024    CO2 22.9 11/19/2024    BUN 23 11/19/2024    CREATININE 1.49 (H) 11/19/2024    GLUCOSE 132 (H) 11/19/2024    CALCIUM 9.8 (H) 11/19/2024    AST 30 11/19/2024    ALT 23 11/19/2024    ALKPHOS 86 11/19/2024    BILITOT 0.9 11/19/2024    PROTEINTOT 6.0 11/19/2024    ALBUMIN 3.3 (L) 11/19/2024     Lab Results   Component Value Date    RETICCTPCT 3.04 (H) 11/19/2024     CBC (11/19/2024): WBCs: 6.30; HgB: 10.2; Hct: 32.1; platelets: 272; MCV: 110.7  CBC (11/05/2024): WBCs: 5.65; HgB: 9.5; Hct: 30.3; platelets: 277; MCV: 107.1  CBC (10/30/2024): WBCs: 6,65; HgB: 9.7; Hct: 30.1; platelets: 281; MCV: 106.0  CBC (10/21/2024): WBCs: 7.46; HgB: 9.6; Hct: 30.5; platelets: 349; MCV: 103.4  CBC (10/15/2024): WBCs: 7.86; HgB: 9.6; Hct: 30.8; platelets: 387; MCV: 101.3  CBC (10/09/2024): WBCs: 7.42; HgB: 8.8; Hct: 28.5; platelets: 373; MCV: 97.9  CBC (10/02/2024): WBCs: 6.94; HgB: 9.2; Hct: 29.6; platelets: 324; MCV: 95.2  CBC (09/25/2024): WBCs: 8.71; HgB: 7.3; Hct: 23.3; platelets: 349; MCV: 90.3  CBC (09/18/2024): WBCs: 9.33; HgB: 9.0; Hct: 27.7; platelets: 312; MCV: 87.4  CBC (09/10/2024): WBCs: 10.20; HgB: 10.4; Hct: 31.3; platelets: 222; MCV: 86.5  CBC (09/03/2024): WBCs: 10.95; HgB: 11.5; Hct: 35.6; platelets: 203; MCV: 86.6  CBC (08/29/2024): WBCs: 14.86; HgB: 7.4; Hct: 23.5; platelets: 275; MCV: 86.7  CBC (08/26/2024): WBCs: 11.72; HgB: 7.7; Hct: 24.6; platelets: 250; MCV: 86.6  CBC (08/23/2024): WBCs: 10.01; HgB: 8.0; Hct: 25.3; platelets: 237; MCV: 87.2  CBC (08/20/2024): WBCS: 8.59; HgB: 8.4; Hct: 26.4; platelets: 272; MCV: 86.8  CBC (08/14/2024): WBCs:  8.16; HgB: 9.0; Hct: 27.9; platelets: 319; MCV: 87.2  CBC  (08/09/2024): WBCs: 12.00; HgB: 8.1; Hct: 25.5; platelets: 319; MCV: 90.7  CBC (08/02/2024): WBCs: 11.92; HgB: 8.1; Hct: 24.8; platelets: 204; MCV: 90.5  CBC (07/31/2024): WBCs: 17.04; HgB: 7.6; Hct: 23.3; platelets: 265; MCV: 95.5  CBC (07/26/2024): WBCs: 13.50; HgB: 8.1; Hct: 24.8; platelets: 329; MCV: 95.0  CBC (07/23/2024): WBCs: 16.43; HgB: 8.5; Hct: 25.8; platelets: 390; MCV: 96.3  CBC (07/15/2024): WBCs: 8.04; HgB: 8.3; Hct: 25.8; platelets: 340; MCV: 97.7  CBC (07/11/2024): WBCs: 7.25; HgB: 9.4; Hct: 29.0; platelets: 375; MCV: 97.0  CBC (07/01/2024): WBCs: 7.50; HgB: 9.4; Hct: 29.3; platelets: 272; MCV: 98.7  CBC (06/26/2024): WBCs: 6.67; HgB: 9.0; Hct: 27.5; platelets: 198; MCV: 98.6    Reticulocytes (11/19/2024): Percentage: 3.04; Absolute: 0.0882  Reticulocytes (11/05/2024): Percentage 2.91; Absolute: 0.0824  Reticulocytes (10/30/2024): Percentage: 3.67; Absolute: 0.1061  Reticulocytes (10/21/2024): Percentage: 4.21; Absolute: 0.1250  Reticulocytes (10/15/2024): Percentage: 5.24; Absolute: 0.1603  Reticulocytes (09/10/2024): Percentage: 0.47; Absolute: 0.0170  Reticulocytes (08/20/2024): Percentage: 0.26; Absolute: <0.0100    Cyclosporine level (11/19/2024): pending  Cyclosporine level (11/05/2024): 320 ng/mL  Cyclosporine level (10/30/2024): 132 ng/mL  Cyclosporine level (10/21/2024): 216 ng/mL  Cyclosporine level (10/15/2024): 315 ng/mL  Cyclosporine level (10/09/2024): 48 ng/mL  Cyclosporine level (10/02/2024): 121 ng/mL  Cyclosporine level (09/25/2024): 107 ng/mL    IMAGING  CT chest without contrast (11/04/2024):  Impression: No parenchymal nodules, adenopathy or effusions.    CT abdomen and pelvis without contrast (11/04/2024):  Impression:  1) Minimal sigmoid diverticulitis.  2) Other findings [are nonacute].    PATHOLOGY  Peripheral smear, bone marrow aspiration smear, bone marrow aspiration clot and bone marrow core biopsy (06/26/2024):  Normocellular bone marrow with increased granulopoiesis,  essentially absent erythropoiesis, adequate megakaryopoiesis, increased stainable iron and slightly increased mixed chronic inflammation. No evidence of increased blasts or dysplasia. The most striking feature within the bone marrow is the essential absence of any erythropoiesis in the aspirate or core biopsy. There is mixed inflammation with some notable immunophenotypic findings by flow cytometry, but there is no evidence of bone marrow involvement by lymphoma. This is most suggestive of a mixed reactive lymphoid population.    IMPRESSION AND PLAN  Mr. Silva is a 89 y.o., white male with:  Pure red cell aplasia: I have had multiple, long discussions with the patient and his wife regarding the results of the bone marrow biopsy performed in late June 2024 (which are summarized above). In short, this is a fairly uncommon diagnosis that can either be idiopathic or associated with a number of potential, underlying causes, including thymoma or parvovirus infection. Regardless, high dose steroids (prednisone 1-2 mg/kg daily) is the standard, first-line treatment; however, since the repeat CBC drawn on 07/01/2024 showed that his HgB (9.4 g/dL) had improved compared to what it was when he was discharged from our hospital following the bone marrow biopsy the previous week (9.0 g/dL), it was hoped that he was satisfactorily recovering (from a viral trigger) on his own. However, a repeat CBC on 07/15/2024 revealed that his HgB had dropped again (8.3 mg/dL); and his reticulocyte counts remained essentially zero. A Rx for prednisone 80 mg PO daily was provided at that time; but, unfortunately, high-dose steroids never provided him with any meaningful effect. Consequently, by late August 2024, he was started on an additional Rx (cyclosporine 50 mg PO BID), per [facility]'s recommendations (with whom he had met for an additional opinion regarding his management earlier that month). Once he tolerated this starting dose for ~one  week, in late August (08/27/2024), we increased the cyclosporine to 100 mg PO BID and further decreased the prednisone to 40 mg PO daily. At his appointment on 09/03/2024, as his anemia (finally) improved (rather well, to a HgB of 11.5 g/dL), we continued the cyclosporine and further decreased the prednisone to 20 mg PO daily. Unfortunately, at the time of his follow up appointment on 09/10/2024, a repeat CBC did not show any further improvement in his HgB (it was 10.4 g/dL), issue #2 (see below) was dramatically worse; and his repeat serum cyclosporine was > 400 ng/mL (above the upper limit of the desirable range). Given all of this, the cyclosporine was held completely for the next one and one half weeks. Meanwhile, he further decreased his daily prednisone dose (from 10 mg daily to 5 mg daily to, as of late September, off completely), as instructed. After remaining off of cyclosporine for one and one half weeks, his mouth sores (finally) improved; and his PO intake renormalized. Unfortunately, throughout the latter part of September 2024, he was getting steadily fatigued again, correlating with the fact that, not surprisingly, his HgB continued to redecline off of the cyclosporine (and it is was back down to 7.3 g/dL by 09/25/2024, at which time he required the transfusion of an additional two units of PRBCs). Meanwhile, he has now been back on cyclosporine (the 50 mg BID dose) for a little more than two (2) months now, as instructed; and, so far, issue #2 has still yet to significantly reworsen (it has intermittently mildly recurred, but it remains, at worst, very tolerable with localized therapies such as Orajel). Meanwhile, today's (11/19/2024) repeat labwork shows that his HgB is currently still much improved (compared to this past summer) and recently still stable (10.2 g/dL), and his reticulocyte count remains more appropriately elevated. He was again instructed to continue the cyclosporine at the current  "dose; and we will see him back in our clinic again in another two weeks with a CBC, CMP, reticulocyte count and cyclosporine level for another symptom/toxicity check. Hopefully, this lower dose of cyclosporine (50 mg BID) will continue to not significantly reworsen issue #2 while it remains in therapeutic, but not toxic, levels. Continue to monitor closely.  Mucositis: A progressive and refractory issue that began in late August 2024, shortly after he started PO cyclosporine for the treatment of issue #1 (particularly after he was escalated to the 100 mg BID dose). As discussed above, by early September 2024, this was dramatically reducing his PO intake, leading to dehydration and acute renal insufficiency. Also as discussed above, these symptoms have recently still been much better and manageable; and the (lower) dose of PO cyclosporine (50 mg BID) has been reintroduced. He and his wife have been counseled on the routine use of magic mouthwash, OraGel and/or other, similar products. Continue to monitor.  \"Chalky stool\": He and his wife previously clarified that this issue has actually occurred previously. In approximately Summer 2023, he had to be referred to [facility] gastroenterology, who performed an ERCP for (what sounds like benign) stricturing. Regardless, CT scans of the chest, abdomen and pelvis without contrast performed on 11/05/2024 (and summarized above) for further evaluation were overall unremarkable; and this symptom is currently still re-resolved. Continue to monitor.  Lower extremity edema: Multifactorial, with age-related venous insufficiency and the resulting, dependent edema definitely contributing. Ongoing management per primary care.  The patient and his wife were in agreement with this plan.    It is a pleasure to participate in Mr. Silva's care. Please do not hesitate to call with any questions or concerns that you may have.    A total of 30 minutes were spent coordinating this patient’s " care in clinic today; more than 50% of this time was face-to-face with the patient and his wife, reviewing his interim medical history, discussing the results of today's repeat labwork, and counseling on the current treatment and followup plans. All questions were answered to their satisfaction.    FOLLOW UP  Continue cyclosporine 50 mg PO BID. Return to our clinic in 2 weeks (early December) with a repeat CBC, CMP, reticulocyte count and cyclosporine level.            This document was electronically signed by RAMIRO King MD November 19, 2024 11:21 EST      CC: RIGO Mccall MD

## 2024-11-19 NOTE — PROGRESS NOTES
Venipuncture Blood Specimen Collection  Venipuncture performed in right arm by Carlos Fernandez MA with good hemostasis. Patient tolerated the procedure well without complications.   11/19/24   Carlos Fernandez MA

## 2024-11-21 LAB — CYCLOSPORINE BLD LC/MS/MS-MCNC: 335 NG/ML (ref 100–400)

## 2024-12-02 ENCOUNTER — LAB (OUTPATIENT)
Dept: ONCOLOGY | Facility: CLINIC | Age: 89
End: 2024-12-02
Payer: MEDICARE

## 2024-12-02 ENCOUNTER — OFFICE VISIT (OUTPATIENT)
Dept: ONCOLOGY | Facility: CLINIC | Age: 89
End: 2024-12-02
Payer: MEDICARE

## 2024-12-02 VITALS
SYSTOLIC BLOOD PRESSURE: 127 MMHG | DIASTOLIC BLOOD PRESSURE: 70 MMHG | BODY MASS INDEX: 24.68 KG/M2 | OXYGEN SATURATION: 98 % | RESPIRATION RATE: 18 BRPM | WEIGHT: 157.6 LBS | HEART RATE: 89 BPM | TEMPERATURE: 97.3 F

## 2024-12-02 DIAGNOSIS — D60.9 ACQUIRED RED CELL APLASIA: Primary | ICD-10-CM

## 2024-12-02 DIAGNOSIS — D61.9 APLASTIC ANEMIA: ICD-10-CM

## 2024-12-02 DIAGNOSIS — D64.9 SYMPTOMATIC ANEMIA: ICD-10-CM

## 2024-12-02 DIAGNOSIS — D60.9 ACQUIRED RED CELL APLASIA: ICD-10-CM

## 2024-12-02 LAB
ALBUMIN SERPL-MCNC: 3.8 G/DL (ref 3.5–5.2)
ALBUMIN/GLOB SERPL: 1.5 G/DL
ALP SERPL-CCNC: 99 U/L (ref 39–117)
ALT SERPL W P-5'-P-CCNC: 22 U/L (ref 1–41)
ANION GAP SERPL CALCULATED.3IONS-SCNC: 9.9 MMOL/L (ref 5–15)
ANISOCYTOSIS BLD QL: NORMAL
AST SERPL-CCNC: 25 U/L (ref 1–40)
BASOPHILS # BLD AUTO: 0.04 10*3/MM3 (ref 0–0.2)
BASOPHILS NFR BLD AUTO: 0.6 % (ref 0–1.5)
BILIRUB SERPL-MCNC: 0.8 MG/DL (ref 0–1.2)
BUN SERPL-MCNC: 22 MG/DL (ref 8–23)
BUN/CREAT SERPL: 15.4 (ref 7–25)
CALCIUM SPEC-SCNC: 10.1 MG/DL (ref 8.2–9.6)
CHLORIDE SERPL-SCNC: 102 MMOL/L (ref 98–107)
CO2 SERPL-SCNC: 26.1 MMOL/L (ref 22–29)
CREAT SERPL-MCNC: 1.43 MG/DL (ref 0.76–1.27)
DACRYOCYTES BLD QL SMEAR: NORMAL
DEPRECATED RDW RBC AUTO: 74.6 FL (ref 37–54)
EGFRCR SERPLBLD CKD-EPI 2021: 46.3 ML/MIN/1.73
EOSINOPHIL # BLD AUTO: 0.22 10*3/MM3 (ref 0–0.4)
EOSINOPHIL NFR BLD AUTO: 3 % (ref 0.3–6.2)
ERYTHROCYTE [DISTWIDTH] IN BLOOD BY AUTOMATED COUNT: 17.8 % (ref 12.3–15.4)
GLOBULIN UR ELPH-MCNC: 2.6 GM/DL
GLUCOSE SERPL-MCNC: 137 MG/DL (ref 65–99)
HCT VFR BLD AUTO: 32.8 % (ref 37.5–51)
HGB BLD-MCNC: 10.3 G/DL (ref 13–17.7)
HYPOCHROMIA BLD QL: NORMAL
IMM GRANULOCYTES # BLD AUTO: 0.01 10*3/MM3 (ref 0–0.05)
IMM GRANULOCYTES NFR BLD AUTO: 0.1 % (ref 0–0.5)
LYMPHOCYTES # BLD AUTO: 3.07 10*3/MM3 (ref 0.7–3.1)
LYMPHOCYTES NFR BLD AUTO: 42.5 % (ref 19.6–45.3)
MACROCYTES BLD QL SMEAR: NORMAL
MCH RBC QN AUTO: 35.4 PG (ref 26.6–33)
MCHC RBC AUTO-ENTMCNC: 31.4 G/DL (ref 31.5–35.7)
MCV RBC AUTO: 112.7 FL (ref 79–97)
MONOCYTES # BLD AUTO: 0.75 10*3/MM3 (ref 0.1–0.9)
MONOCYTES NFR BLD AUTO: 10.4 % (ref 5–12)
NEUTROPHILS NFR BLD AUTO: 3.13 10*3/MM3 (ref 1.7–7)
NEUTROPHILS NFR BLD AUTO: 43.4 % (ref 42.7–76)
NRBC BLD AUTO-RTO: 0 /100 WBC (ref 0–0.2)
PLAT MORPH BLD: NORMAL
PLATELET # BLD AUTO: 218 10*3/MM3 (ref 140–450)
PMV BLD AUTO: 12.3 FL (ref 6–12)
POTASSIUM SERPL-SCNC: 4.5 MMOL/L (ref 3.5–5.2)
PROT SERPL-MCNC: 6.4 G/DL (ref 6–8.5)
RBC # BLD AUTO: 2.91 10*6/MM3 (ref 4.14–5.8)
RETICS # AUTO: 0.06 10*6/MM3 (ref 0.02–0.13)
RETICS/RBC NFR AUTO: 1.89 % (ref 0.7–1.9)
SODIUM SERPL-SCNC: 138 MMOL/L (ref 136–145)
WBC NRBC COR # BLD AUTO: 7.22 10*3/MM3 (ref 3.4–10.8)

## 2024-12-02 PROCEDURE — 85045 AUTOMATED RETICULOCYTE COUNT: CPT | Performed by: INTERNAL MEDICINE

## 2024-12-02 PROCEDURE — 80158 DRUG ASSAY CYCLOSPORINE: CPT | Performed by: INTERNAL MEDICINE

## 2024-12-02 PROCEDURE — 85007 BL SMEAR W/DIFF WBC COUNT: CPT | Performed by: INTERNAL MEDICINE

## 2024-12-02 PROCEDURE — 80053 COMPREHEN METABOLIC PANEL: CPT | Performed by: INTERNAL MEDICINE

## 2024-12-02 PROCEDURE — 85025 COMPLETE CBC W/AUTO DIFF WBC: CPT | Performed by: INTERNAL MEDICINE

## 2024-12-02 PROCEDURE — 1126F AMNT PAIN NOTED NONE PRSNT: CPT | Performed by: INTERNAL MEDICINE

## 2024-12-02 PROCEDURE — 99214 OFFICE O/P EST MOD 30 MIN: CPT | Performed by: INTERNAL MEDICINE

## 2024-12-02 NOTE — PROGRESS NOTES
Name:  Chinedu Silva  :  1933  Date:  2024     REFERRING PHYSICIAN    PRIMARY CARE PROVIDER  Jr Segura PA    REASON FOR FOLLOWUP  1. Acquired red cell aplasia      CHIEF COMPLAINT  None.    Dear  [place],    HISTORY OF PRESENT ILLNESS:   I saw Mr. Silva in followup (from his recent hospitalization for severe anemia) today in our hematology/oncology clinic. As you are aware, he is a pleasant, 89 y.o., white male with a history of hypertension, CAD and GERD who presented to the [facility] ED on 2024 with progressive weakness and dyspnea on exertion. These symptoms had potentially first started ~two to three months prior; however, they significantly worsened over the course of the previous couple of weeks (by early 2024). Upon arrival to our ED, he was found to be severely anemic, with a Hg of 3.7 g/dL. His other cell lines (WBCs and platelets) were entirely WNL. Our service was consulted for further evaluation of this issue, and a bone marrow biopsy was performed on 2024. Meanwhile, as he was feeling substantially better after his HgB was transfused up to the ~9 g/dL range, he was able to be discharged to outpatient follow up in our clinic. He returned to our clinic the following week (on 2024) to go over the results of the bone marrow exam and for ongoing management.    INTERIM HISTORY:  Mr. Silva returns to clinic today for follow up yet again accompanied by his wife. He was seen at [facility] on 2024 for an additional opinion for his acquired pure red cell aplasia. He was on PO cyclosporine for a total of ~three weeks (50 mg BID for the first week and 100 mg BID for the next two weeks) in late August/early 2024. Due to progressive, severe and refractory mucositis (as well as a toxic cyclosporine level), he was instructed to discontinue the cyclosporine in early September. After holding the cyclosporine for the next ~one and one half  weeks, his mucositis finally improved. He has now been back on the cyclosporine (at a dose of 50 mg BID) for a little over two and one half (2.5) months now, as instructed. While he has redeveloped some intermittent, but currently still manageable, mucositis (which is actually currently still resolved), he is overall still doing much much better than he was early this Fall, as his fatigue has recently remained improved for a prolonged period of time (and he has still not required any PRBC transfusions since late Summer 2024). He again walked from the parking lot into our clinic today, which is something he was completely unable to do not much more than a couple of months ago. He again has no new or other specific complaints.    Past Medical History:   Diagnosis Date    Carotid stenosis     Coronary artery disease     Diverticulitis     GERD (gastroesophageal reflux disease)     Gout     Hearing aid worn     Hypertension     Obese     Prostate cancer     Wears glasses        Past Surgical History:   Procedure Laterality Date    CAROTID ENDARTERECTOMY Left 2018    Procedure: CAROTID ENDARTERECTOMY WITH EEG LEFT;  Surgeon: Del Neal MD;  Location: [facility];  Service:     CATARACT EXTRACTION      CATARACT EXTRACTION WITH INTRAOCULAR LENS IMPLANT Right     CHOLECYSTECTOMY      COLONOSCOPY W/ POLYPECTOMY      HERNIA REPAIR Right     TOTAL KNEE ARTHROPLASTY Right     TURP / TRANSURETHRAL INCISION / DRAINAGE PROSTATE         Social History     Socioeconomic History    Marital status:     Number of children: 1   Tobacco Use    Smoking status: Former     Current packs/day: 0.00     Average packs/day: 1 pack/day for 12.0 years (12.0 ttl pk-yrs)     Types: Cigarettes     Start date:      Quit date:      Years since quittin.9    Smokeless tobacco: Current     Types: Chew   Vaping Use    Vaping status: Never Used   Substance and Sexual Activity    Alcohol use: No    Drug use: No    Sexual activity:  Defer       Family History   Problem Relation Age of Onset    Hypertension Mother     Glaucoma Mother     Diverticulitis Mother     Gout Mother     ALS Father     Gout Sister     Broken bones Paternal Grandfather     Broken bones Son     Cancer Son     Diabetes Brother        Allergies   Allergen Reactions    Ciprofloxacin Other (See Comments)     Muscle Pains    Lisinopril Cough    Tetracycline Provider Review Needed     Other Reaction(s) from [facility]: UNK    Sulfa Antibiotics Itching and Rash       Current Outpatient Medications   Medication Sig Dispense Refill    allopurinol (ZYLOPRIM) 300 MG tablet Take 1 tablet by mouth Daily.      aspirin 81 MG EC tablet Take 1 tablet by mouth Daily.      cycloSPORINE modified (NEORAL) 25 MG capsule Take 2 capsules by mouth 2 (Two) Times a Day for 30 days. 120 capsule 0    rosuvastatin (CRESTOR) 10 MG tablet Take 1 tablet by mouth Daily. 90 tablet 3    valsartan (DIOVAN) 80 MG tablet Take 0.5 tablets by mouth Daily.      dicyclomine (BENTYL) 20 MG tablet Take 1 tablet by mouth Every 6 (Six) Hours. (Patient not taking: Reported on 11/11/2024)      MAGIC MOUTHWASH W/NYSTATIN 1/1/1/1 (lidocaine - diphenhydrAMINE HCl - aluminum & magnesium hydroxide - nystatin) Swish and spit 5 mL Every 4 (Four) Hours As Needed for Mouth Pain. (Patient not taking: Reported on 11/11/2024) 300 mL 2    ondansetron (ZOFRAN) 8 MG tablet Take 1 tablet by mouth Every 8 (Eight) Hours As Needed for Nausea or Vomiting. (Patient not taking: Reported on 11/11/2024) 30 tablet 1    pantoprazole (PROTONIX) 40 MG EC tablet Take 1 tablet by mouth Daily. (Patient not taking: Reported on 11/11/2024) 30 tablet 1    predniSONE (DELTASONE) 20 MG tablet Take 3 tablets by mouth Daily. (Patient not taking: Reported on 11/11/2024) 90 tablet 0    predniSONE (DELTASONE) 5 MG tablet Take 2 pills by mouth daily for one week, then 1 pill by mouth daily for one week. (Patient not taking: Reported on 11/11/2024) 21 tablet 0  "    No current facility-administered medications for this visit.     REVIEW OF SYSTEMS  CONSTITUTIONAL:  No fever, chills or night sweats. Chronic fatigue, recently still improved.  EYES:  No blurry vision, diplopia or other vision changes.  ENT:  No hearing loss or nosebleeds. Mild, recurrent mucositis, still very manageable, as per the HPI above.  CARDIOVASCULAR:  No palpitations, arrhythmia, syncopal episodes or edema.  PULMONARY:  No hemoptysis, wheezing, chronic cough or shortness of breath.  GASTROINTESTINAL:  No nausea or vomiting. No constipation or diarrhea. No abdominal pain. Intermittently \"chalky\" stool, currently resolved.  GENITOURINARY:  No hematuria, kidney stones or frequent urination.  MUSCULOSKELETAL:  No joint or back pains.  INTEGUMENTARY: No rashes or pruritus.  ENDOCRINE:  No excessive thirst or hot flashes.  HEMATOLOGIC:  No history of free bleeding, spontaneous bleeding or clotting.  IMMUNOLOGIC:  No allergies or frequent infections.  NEUROLOGIC: No numbness, tingling, seizures or weakness.  PSYCHIATRIC:  No anxiety or depression.    PHYSICAL EXAMINATION  /70   Pulse 89   Temp 97.3 °F (36.3 °C) (Temporal)   Resp 18   Wt 71.5 kg (157 lb 9.6 oz)   SpO2 98%   BMI 24.68 kg/m²     Pain Score:  Pain Score    24 1110   PainSc: 0-No pain     PHQ-Score Total:  PHQ-9 Total Score:      ECO  GENERAL:  A well-developed, well-nourished, elderly, white male in no acute distress.  HEENT:  Pupils equally round and reactive to light. Extraocular muscles intact. Currently still no significant mucositis.  CARDIOVASCULAR:  Regular rate and rhythm. No murmurs, gallops or rubs.  LUNGS:  Clear to auscultation bilaterally.  No wheezing.  ABDOMEN:  Soft, nontender, nondistended with positive bowel sounds.  EXTREMITIES:  No clubbing, cyanosis or edema bilaterally.  SKIN:  No rashes or petechiae.  NEURO:  Cranial nerves grossly intact. No focal deficits.  PSYCH:  Alert and oriented " x3.    LABORATORY  Lab Results   Component Value Date    WBC 7.22 12/02/2024    HGB 10.3 (L) 12/02/2024    HCT 32.8 (L) 12/02/2024    .7 (H) 12/02/2024     12/02/2024    NEUTROABS 3.13 12/02/2024       Lab Results   Component Value Date     (L) 11/19/2024    K 4.3 11/19/2024     11/19/2024    CO2 22.9 11/19/2024    BUN 23 11/19/2024    CREATININE 1.49 (H) 11/19/2024    GLUCOSE 132 (H) 11/19/2024    CALCIUM 9.8 (H) 11/19/2024    AST 30 11/19/2024    ALT 23 11/19/2024    ALKPHOS 86 11/19/2024    BILITOT 0.9 11/19/2024    PROTEINTOT 6.0 11/19/2024    ALBUMIN 3.3 (L) 11/19/2024     Lab Results   Component Value Date    RETICCTPCT 1.89 12/02/2024     CBC (12/02/2024): WBCs: 7.22; HgB: 10.3; Hct: 32.8; platelets: 218; MCV: 112.7  CBC (11/19/2024): WBCs: 6.30; HgB: 10.2; Hct: 32.1; platelets: 272; MCV: 110.7  CBC (11/05/2024): WBCs: 5.65; HgB: 9.5; Hct: 30.3; platelets: 277; MCV: 107.1  CBC (10/30/2024): WBCs: 6,65; HgB: 9.7; Hct: 30.1; platelets: 281; MCV: 106.0  CBC (10/21/2024): WBCs: 7.46; HgB: 9.6; Hct: 30.5; platelets: 349; MCV: 103.4  CBC (10/15/2024): WBCs: 7.86; HgB: 9.6; Hct: 30.8; platelets: 387; MCV: 101.3  CBC (10/09/2024): WBCs: 7.42; HgB: 8.8; Hct: 28.5; platelets: 373; MCV: 97.9  CBC (10/02/2024): WBCs: 6.94; HgB: 9.2; Hct: 29.6; platelets: 324; MCV: 95.2  CBC (09/25/2024): WBCs: 8.71; HgB: 7.3; Hct: 23.3; platelets: 349; MCV: 90.3  CBC (09/18/2024): WBCs: 9.33; HgB: 9.0; Hct: 27.7; platelets: 312; MCV: 87.4  CBC (09/10/2024): WBCs: 10.20; HgB: 10.4; Hct: 31.3; platelets: 222; MCV: 86.5  CBC (09/03/2024): WBCs: 10.95; HgB: 11.5; Hct: 35.6; platelets: 203; MCV: 86.6  CBC (08/29/2024): WBCs: 14.86; HgB: 7.4; Hct: 23.5; platelets: 275; MCV: 86.7  CBC (08/26/2024): WBCs: 11.72; HgB: 7.7; Hct: 24.6; platelets: 250; MCV: 86.6  CBC (08/23/2024): WBCs: 10.01; HgB: 8.0; Hct: 25.3; platelets: 237; MCV: 87.2  CBC (08/20/2024): WBCS: 8.59; HgB: 8.4; Hct: 26.4; platelets: 272; MCV: 86.8  CBC  (08/14/2024): WBCs:  8.16; HgB: 9.0; Hct: 27.9; platelets: 319; MCV: 87.2  CBC (08/09/2024): WBCs: 12.00; HgB: 8.1; Hct: 25.5; platelets: 319; MCV: 90.7  CBC (08/02/2024): WBCs: 11.92; HgB: 8.1; Hct: 24.8; platelets: 204; MCV: 90.5  CBC (07/31/2024): WBCs: 17.04; HgB: 7.6; Hct: 23.3; platelets: 265; MCV: 95.5  CBC (07/26/2024): WBCs: 13.50; HgB: 8.1; Hct: 24.8; platelets: 329; MCV: 95.0  CBC (07/23/2024): WBCs: 16.43; HgB: 8.5; Hct: 25.8; platelets: 390; MCV: 96.3  CBC (07/15/2024): WBCs: 8.04; HgB: 8.3; Hct: 25.8; platelets: 340; MCV: 97.7  CBC (07/11/2024): WBCs: 7.25; HgB: 9.4; Hct: 29.0; platelets: 375; MCV: 97.0  CBC (07/01/2024): WBCs: 7.50; HgB: 9.4; Hct: 29.3; platelets: 272; MCV: 98.7  CBC (06/26/2024): WBCs: 6.67; HgB: 9.0; Hct: 27.5; platelets: 198; MCV: 98.6    Reticulocytes (12/02/2024): Percentage: 1.89; Absolute: 0.0550  Reticulocytes (11/19/2024): Percentage: 3.04; Absolute: 0.0882  Reticulocytes (11/05/2024): Percentage 2.91; Absolute: 0.0824  Reticulocytes (10/30/2024): Percentage: 3.67; Absolute: 0.1061  Reticulocytes (10/21/2024): Percentage: 4.21; Absolute: 0.1250  Reticulocytes (10/15/2024): Percentage: 5.24; Absolute: 0.1603  Reticulocytes (09/10/2024): Percentage: 0.47; Absolute: 0.0170  Reticulocytes (08/20/2024): Percentage: 0.26; Absolute: <0.0100    Cyclosporine level (12/02/2024): pending  Cyclosporine level (11/19/2024): 335 ng/mL  Cyclosporine level (11/05/2024): 320 ng/mL  Cyclosporine level (10/30/2024): 132 ng/mL  Cyclosporine level (10/21/2024): 216 ng/mL  Cyclosporine level (10/15/2024): 315 ng/mL  Cyclosporine level (10/09/2024): 48 ng/mL  Cyclosporine level (10/02/2024): 121 ng/mL  Cyclosporine level (09/25/2024): 107 ng/mL    IMAGING  CT chest without contrast (11/04/2024):  Impression: No parenchymal nodules, adenopathy or effusions.    CT abdomen and pelvis without contrast (11/04/2024):  Impression:  1) Minimal sigmoid diverticulitis.  2) Other findings [are  nonacute].    PATHOLOGY  Peripheral smear, bone marrow aspiration smear, bone marrow aspiration clot and bone marrow core biopsy (06/26/2024):  Normocellular bone marrow with increased granulopoiesis, essentially absent erythropoiesis, adequate megakaryopoiesis, increased stainable iron and slightly increased mixed chronic inflammation. No evidence of increased blasts or dysplasia. The most striking feature within the bone marrow is the essential absence of any erythropoiesis in the aspirate or core biopsy. There is mixed inflammation with some notable immunophenotypic findings by flow cytometry, but there is no evidence of bone marrow involvement by lymphoma. This is most suggestive of a mixed reactive lymphoid population.    IMPRESSION AND PLAN  Mr. Silva is a 89 y.o., white male with:  Pure red cell aplasia: I have had multiple, long discussions with the patient and his wife regarding the results of the bone marrow biopsy performed in late June 2024 (which are summarized above). In short, this is a fairly uncommon diagnosis that can either be idiopathic or associated with a number of potential, underlying causes, including thymoma or parvovirus infection. Regardless, high dose steroids (prednisone 1-2 mg/kg daily) is the standard, first-line treatment; however, since the repeat CBC drawn on 07/01/2024 showed that his HgB (9.4 g/dL) had improved compared to what it was when he was discharged from our hospital following the bone marrow biopsy the previous week (9.0 g/dL), it was hoped that he was satisfactorily recovering (from a viral trigger) on his own. However, a repeat CBC on 07/15/2024 revealed that his HgB had dropped again (8.3 mg/dL); and his reticulocyte counts remained essentially zero. A Rx for prednisone 80 mg PO daily was provided at that time; but, unfortunately, high-dose steroids never provided him with any meaningful effect. Consequently, by late August 2024, he was started on an additional Rx  (cyclosporine 50 mg PO BID), per [facility]'s recommendations (with whom he had met for an additional opinion regarding his management earlier that month). Once he tolerated this starting dose for ~one week, in late August (08/27/2024), we increased the cyclosporine to 100 mg PO BID and further decreased the prednisone to 40 mg PO daily. At his appointment on 09/03/2024, as his anemia (finally) improved (rather well, to a HgB of 11.5 g/dL), we continued the cyclosporine and further decreased the prednisone to 20 mg PO daily. Unfortunately, at the time of his follow up appointment on 09/10/2024, a repeat CBC did not show any further improvement in his HgB (it was 10.4 g/dL), issue #2 (see below) was dramatically worse; and his repeat serum cyclosporine was > 400 ng/mL (above the upper limit of the desirable range). Given all of this, the cyclosporine was held completely for the next one and one half weeks. Meanwhile, he further decreased his daily prednisone dose (from 10 mg daily to 5 mg daily to, as of late September, off completely), as instructed. After remaining off of cyclosporine for one and one half weeks, his mouth sores (finally) improved; and his PO intake renormalized. Unfortunately, throughout the latter part of September 2024, he was getting steadily fatigued again, correlating with the fact that, not surprisingly, his HgB continued to redecline off of the cyclosporine (and it is was back down to 7.3 g/dL by 09/25/2024, at which time he required the transfusion of an additional two units of PRBCs). Meanwhile, he has now been back on cyclosporine (the 50 mg BID dose) for a little more than two and one half (2.5) months now, as instructed; and, so far, issue #2 has still yet to significantly reworsen (it has intermittently mildly recurred, but it remains, at worst, very tolerable with localized therapies such as Orajel). Meanwhile, today's (12/02/2024) repeat labwork shows that his HgB is currently still  "much improved (compared to this past summer) and recently still very stable (10.3 g/dL), and his reticulocyte count remains more appropriately elevated. He was again instructed to continue the cyclosporine at the current dose; and we will see him back in our clinic again in another two weeks with a CBC, CMP and cyclosporine level for another symptom/toxicity check. Hopefully, this lower dose of cyclosporine (50 mg BID) will continue to not significantly reworsen issue #2 while it remains in therapeutic, but not toxic, levels. Continue to monitor.  Mucositis: A progressive and refractory issue that began in late August 2024, shortly after he started PO cyclosporine for the treatment of issue #1 (particularly after he was escalated to the 100 mg BID dose). As discussed above, by early September 2024, this was dramatically reducing his PO intake, leading to dehydration and acute renal insufficiency. Also as discussed above, these symptoms have recently still been much better and manageable; and the (lower) dose of PO cyclosporine (50 mg BID) has been reintroduced. He and his wife have been counseled on the routine use of magic mouthwash, OraGel and/or other, similar products. They also know to skip the occasional dose of the cyclosporine, if needed. Continue to monitor.  \"Chalky stool\": He and his wife previously clarified that this issue has actually occurred previously. In approximately Summer 2023, he had to be referred to [facility] gastroenterology, who performed an ERCP for (what sounds like benign) stricturing. Regardless, CT scans of the chest, abdomen and pelvis without contrast performed on 11/05/2024 (and summarized above) for further evaluation were overall unremarkable; and this symptom is currently still re-resolved. Continue to monitor.  Lower extremity edema: Multifactorial, with age-related venous insufficiency and the resulting, dependent edema definitely contributing. Ongoing management per primary " care.  The patient and his wife were in agreement with this plan.    It is a pleasure to participate in Mr. Silva's care. Please do not hesitate to call with any questions or concerns that you may have.    A total of 30 minutes were spent coordinating this patient’s care in clinic today; more than 50% of this time was face-to-face with the patient and his wife, reviewing his interim medical history, discussing the results of today's labwork, and counseling on the current treatment and followup plans. All questions were answered to their satisfaction.    FOLLOW UP  Continue cyclosporine 50 mg PO BID. Return to our clinic in 2 weeks (mid-December) with a repeat CBC, CMP and cyclosporine level.            This document was electronically signed by RAMIRO King MD December 2, 2024 11:43 EST      CC: RGIO Mccall MD

## 2024-12-02 NOTE — PROGRESS NOTES
Venipuncture Blood Specimen Collection  Venipuncture performed in right arm by Mimi Batista MA with good hemostasis. Patient tolerated the procedure well without complications.   12/02/24   Mimi Batista MA

## 2024-12-04 LAB — CYCLOSPORINE BLD LC/MS/MS-MCNC: 183 NG/ML (ref 100–400)

## 2024-12-09 RX ORDER — CYCLOSPORINE 25 MG/1
50 CAPSULE, LIQUID FILLED ORAL 2 TIMES DAILY
Qty: 120 CAPSULE | Refills: 0 | Status: SHIPPED | OUTPATIENT
Start: 2024-12-09 | End: 2025-01-08

## 2024-12-09 NOTE — TELEPHONE ENCOUNTER
[place] called and stated that the she had called the hospital pharmacy and she got an automated recording stating that his medication will not be ready till Wednesday and he will be out of cyclosporine.

## 2024-12-16 ENCOUNTER — OFFICE VISIT (OUTPATIENT)
Dept: ONCOLOGY | Facility: CLINIC | Age: 89
End: 2024-12-16
Payer: MEDICARE

## 2024-12-16 ENCOUNTER — LAB (OUTPATIENT)
Dept: ONCOLOGY | Facility: CLINIC | Age: 89
End: 2024-12-16
Payer: MEDICARE

## 2024-12-16 VITALS
BODY MASS INDEX: 24.65 KG/M2 | RESPIRATION RATE: 18 BRPM | SYSTOLIC BLOOD PRESSURE: 125 MMHG | TEMPERATURE: 97.3 F | WEIGHT: 157.4 LBS | DIASTOLIC BLOOD PRESSURE: 66 MMHG | HEART RATE: 74 BPM | OXYGEN SATURATION: 97 %

## 2024-12-16 DIAGNOSIS — D60.9 ACQUIRED RED CELL APLASIA: Primary | ICD-10-CM

## 2024-12-16 DIAGNOSIS — D64.9 SYMPTOMATIC ANEMIA: ICD-10-CM

## 2024-12-16 DIAGNOSIS — D60.9 ACQUIRED RED CELL APLASIA: ICD-10-CM

## 2024-12-16 DIAGNOSIS — D61.9 APLASTIC ANEMIA: ICD-10-CM

## 2024-12-16 DIAGNOSIS — D61.9 APLASTIC ANEMIA: Primary | ICD-10-CM

## 2024-12-16 LAB
ALBUMIN SERPL-MCNC: 3.7 G/DL (ref 3.5–5.2)
ALBUMIN/GLOB SERPL: 1.2 G/DL
ALP SERPL-CCNC: 102 U/L (ref 39–117)
ALT SERPL W P-5'-P-CCNC: 22 U/L (ref 1–41)
ANION GAP SERPL CALCULATED.3IONS-SCNC: 10.4 MMOL/L (ref 5–15)
AST SERPL-CCNC: 28 U/L (ref 1–40)
BASOPHILS # BLD AUTO: 0.02 10*3/MM3 (ref 0–0.2)
BASOPHILS NFR BLD AUTO: 0.3 % (ref 0–1.5)
BILIRUB SERPL-MCNC: 0.6 MG/DL (ref 0–1.2)
BUN SERPL-MCNC: 24 MG/DL (ref 8–23)
BUN/CREAT SERPL: 16.8 (ref 7–25)
CALCIUM SPEC-SCNC: 10.1 MG/DL (ref 8.2–9.6)
CHLORIDE SERPL-SCNC: 105 MMOL/L (ref 98–107)
CO2 SERPL-SCNC: 24.6 MMOL/L (ref 22–29)
CREAT SERPL-MCNC: 1.43 MG/DL (ref 0.76–1.27)
DEPRECATED RDW RBC AUTO: 63 FL (ref 37–54)
EGFRCR SERPLBLD CKD-EPI 2021: 46.3 ML/MIN/1.73
EOSINOPHIL # BLD AUTO: 0.22 10*3/MM3 (ref 0–0.4)
EOSINOPHIL NFR BLD AUTO: 3.4 % (ref 0.3–6.2)
ERYTHROCYTE [DISTWIDTH] IN BLOOD BY AUTOMATED COUNT: 15.1 % (ref 12.3–15.4)
GLOBULIN UR ELPH-MCNC: 3.2 GM/DL
GLUCOSE SERPL-MCNC: 108 MG/DL (ref 65–99)
HCT VFR BLD AUTO: 32.9 % (ref 37.5–51)
HGB BLD-MCNC: 10.5 G/DL (ref 13–17.7)
IMM GRANULOCYTES # BLD AUTO: 0.01 10*3/MM3 (ref 0–0.05)
IMM GRANULOCYTES NFR BLD AUTO: 0.2 % (ref 0–0.5)
LYMPHOCYTES # BLD AUTO: 2.48 10*3/MM3 (ref 0.7–3.1)
LYMPHOCYTES NFR BLD AUTO: 37.8 % (ref 19.6–45.3)
MCH RBC QN AUTO: 36.3 PG (ref 26.6–33)
MCHC RBC AUTO-ENTMCNC: 31.9 G/DL (ref 31.5–35.7)
MCV RBC AUTO: 113.8 FL (ref 79–97)
MONOCYTES # BLD AUTO: 0.59 10*3/MM3 (ref 0.1–0.9)
MONOCYTES NFR BLD AUTO: 9 % (ref 5–12)
NEUTROPHILS NFR BLD AUTO: 3.24 10*3/MM3 (ref 1.7–7)
NEUTROPHILS NFR BLD AUTO: 49.3 % (ref 42.7–76)
NRBC BLD AUTO-RTO: 0 /100 WBC (ref 0–0.2)
PLATELET # BLD AUTO: 218 10*3/MM3 (ref 140–450)
PMV BLD AUTO: 12 FL (ref 6–12)
POTASSIUM SERPL-SCNC: 5.7 MMOL/L (ref 3.5–5.2)
PROT SERPL-MCNC: 6.9 G/DL (ref 6–8.5)
RBC # BLD AUTO: 2.89 10*6/MM3 (ref 4.14–5.8)
RETICS # AUTO: 0.05 10*6/MM3 (ref 0.02–0.13)
RETICS/RBC NFR AUTO: 1.67 % (ref 0.7–1.9)
SODIUM SERPL-SCNC: 140 MMOL/L (ref 136–145)
WBC NRBC COR # BLD AUTO: 6.56 10*3/MM3 (ref 3.4–10.8)

## 2024-12-16 PROCEDURE — 85025 COMPLETE CBC W/AUTO DIFF WBC: CPT | Performed by: INTERNAL MEDICINE

## 2024-12-16 PROCEDURE — 1126F AMNT PAIN NOTED NONE PRSNT: CPT | Performed by: INTERNAL MEDICINE

## 2024-12-16 PROCEDURE — 99214 OFFICE O/P EST MOD 30 MIN: CPT | Performed by: INTERNAL MEDICINE

## 2024-12-16 PROCEDURE — 80053 COMPREHEN METABOLIC PANEL: CPT | Performed by: INTERNAL MEDICINE

## 2024-12-16 PROCEDURE — 80158 DRUG ASSAY CYCLOSPORINE: CPT | Performed by: INTERNAL MEDICINE

## 2024-12-16 PROCEDURE — 85045 AUTOMATED RETICULOCYTE COUNT: CPT | Performed by: INTERNAL MEDICINE

## 2024-12-16 NOTE — PROGRESS NOTES
Name:  Chinedu Silva  :  1933  Date:  2024     REFERRING PHYSICIAN    PRIMARY CARE PROVIDER  Jr Segura PA    REASON FOR FOLLOWUP  1. Acquired red cell aplasia      CHIEF COMPLAINT  None.    Dear  Colton,    HISTORY OF PRESENT ILLNESS:   I saw Mr. Silva in followup (from his recent hospitalization for severe anemia) today in our hematology/oncology clinic. As you are aware, he is a pleasant, 89 y.o., white male with a history of hypertension, CAD and GERD who presented to the [facility] ED on 2024 with progressive weakness and dyspnea on exertion. These symptoms had potentially first started ~two to three months prior; however, they significantly worsened over the course of the previous couple of weeks (by early 2024). Upon arrival to our ED, he was found to be severely anemic, with a Hg of 3.7 g/dL. His other cell lines (WBCs and platelets) were entirely WNL. Our service was consulted for further evaluation of this issue, and a bone marrow biopsy was performed on 2024. Meanwhile, as he was feeling substantially better after his HgB was transfused up to the ~9 g/dL range, he was able to be discharged to outpatient follow up in our clinic. He returned to our clinic the following week (on 2024) to go over the results of the bone marrow exam and for ongoing management.    INTERIM HISTORY:  Mr. Silva returns to clinic today for follow up yet again accompanied by his wife. He was seen at [facility], [place], operated by [facility] on 2024 for an additional opinion for his acquired pure red cell aplasia. He was on PO cyclosporine for a total of ~three weeks (50 mg BID for the first week and 100 mg BID for the next two weeks) in late August/early 2024. Due to progressive, severe and refractory mucositis (as well as a toxic cyclosporine level), he was instructed to discontinue the cyclosporine in early September. After holding the cyclosporine for the next ~one and one half  weeks, his mucositis finally improved. He has now been back on the cyclosporine (at a dose of 50 mg BID) for a little over three (3) months now, as instructed. While he has redeveloped some intermittent, but currently still manageable, mucositis (which is actually currently still resolved), he is overall still doing much much better than he was early this Fall, as his fatigue has recently remained improved for a prolonged period of time (and he has still not required any PRBC transfusions since late Summer 2024). He again walked from the parking lot into our clinic today, which is something he was completely unable to do not much more than a couple of months ago. He once again has no new or other specific complaints.    Past Medical History:   Diagnosis Date    Carotid stenosis     Coronary artery disease     Diverticulitis     GERD (gastroesophageal reflux disease)     Gout     Hearing aid worn     Hypertension     Obese     Prostate cancer     Wears glasses        Past Surgical History:   Procedure Laterality Date    CAROTID ENDARTERECTOMY Left 2018    Procedure: CAROTID ENDARTERECTOMY WITH EEG LEFT;  Surgeon: Del Neal MD;  Location: [facility];  Service:     CATARACT EXTRACTION      CATARACT EXTRACTION WITH INTRAOCULAR LENS IMPLANT Right     CHOLECYSTECTOMY      COLONOSCOPY W/ POLYPECTOMY      HERNIA REPAIR Right     TOTAL KNEE ARTHROPLASTY Right     TURP / TRANSURETHRAL INCISION / DRAINAGE PROSTATE         Social History     Socioeconomic History    Marital status:     Number of children: 1   Tobacco Use    Smoking status: Former     Current packs/day: 0.00     Average packs/day: 1 pack/day for 12.0 years (12.0 ttl pk-yrs)     Types: Cigarettes     Start date:      Quit date:      Years since quittin.0    Smokeless tobacco: Current     Types: Chew   Vaping Use    Vaping status: Never Used   Substance and Sexual Activity    Alcohol use: No    Drug use: No    Sexual activity: Defer        Family History   Problem Relation Age of Onset    Hypertension Mother     Glaucoma Mother     Diverticulitis Mother     Gout Mother     ALS Father     Gout Sister     Broken bones Paternal Grandfather     Broken bones Son     Cancer Son     Diabetes Brother        Allergies   Allergen Reactions    Ciprofloxacin Other (See Comments)     Muscle Pains    Lisinopril Cough    Tetracycline Provider Review Needed     Other Reaction(s) from [facility]: UNK    Sulfa Antibiotics Itching and Rash       Current Outpatient Medications   Medication Sig Dispense Refill    allopurinol (ZYLOPRIM) 300 MG tablet Take 1 tablet by mouth Daily.      aspirin 81 MG EC tablet Take 1 tablet by mouth Daily.      cycloSPORINE modified (NEORAL) 25 MG capsule Take 2 capsules by mouth 2 (Two) Times a Day for 30 days. 120 capsule 0    rosuvastatin (CRESTOR) 10 MG tablet Take 1 tablet by mouth Daily. 90 tablet 3    valsartan (DIOVAN) 80 MG tablet Take 0.5 tablets by mouth Daily.      dicyclomine (BENTYL) 20 MG tablet Take 1 tablet by mouth Every 6 (Six) Hours. (Patient not taking: Reported on 12/16/2024)      MAGIC MOUTHWASH W/NYSTATIN 1/1/1/1 (lidocaine - diphenhydrAMINE HCl - aluminum & magnesium hydroxide - nystatin) Swish and spit 5 mL Every 4 (Four) Hours As Needed for Mouth Pain. (Patient not taking: Reported on 12/16/2024) 300 mL 2    ondansetron (ZOFRAN) 8 MG tablet Take 1 tablet by mouth Every 8 (Eight) Hours As Needed for Nausea or Vomiting. (Patient not taking: Reported on 12/16/2024) 30 tablet 1    pantoprazole (PROTONIX) 40 MG EC tablet Take 1 tablet by mouth Daily. (Patient not taking: Reported on 12/16/2024) 30 tablet 1    predniSONE (DELTASONE) 20 MG tablet Take 3 tablets by mouth Daily. (Patient not taking: Reported on 12/16/2024) 90 tablet 0    predniSONE (DELTASONE) 5 MG tablet Take 2 pills by mouth daily for one week, then 1 pill by mouth daily for one week. (Patient not taking: Reported on 12/16/2024) 21 tablet 0  "    No current facility-administered medications for this visit.     REVIEW OF SYSTEMS  CONSTITUTIONAL:  No fever, chills or night sweats. Chronic fatigue, recently still improved.  EYES:  No blurry vision, diplopia or other vision changes.  ENT:  No hearing loss or nosebleeds. Mild, recurrent mucositis, still very manageable, as per the HPI above.  CARDIOVASCULAR:  No palpitations, arrhythmia, syncopal episodes or edema.  PULMONARY:  No hemoptysis, wheezing, chronic cough or shortness of breath.  GASTROINTESTINAL:  No nausea or vomiting. No constipation or diarrhea. No abdominal pain. Intermittently \"chalky\" stool, currently resolved.  GENITOURINARY:  No hematuria, kidney stones or frequent urination.  MUSCULOSKELETAL:  No joint or back pains.  INTEGUMENTARY: No rashes or pruritus.  ENDOCRINE:  No excessive thirst or hot flashes.  HEMATOLOGIC:  No history of free bleeding, spontaneous bleeding or clotting.  IMMUNOLOGIC:  No allergies or frequent infections.  NEUROLOGIC: No numbness, tingling, seizures or weakness.  PSYCHIATRIC:  No anxiety or depression.    PHYSICAL EXAMINATION  /66   Pulse 74   Temp 97.3 °F (36.3 °C) (Temporal)   Resp 18   Wt 71.4 kg (157 lb 6.4 oz)   SpO2 97%   BMI 24.65 kg/m²     Pain Score:  Pain Score    24 0857   PainSc: 0-No pain     PHQ-Score Total:  PHQ-9 Total Score:      ECO  GENERAL:  A well-developed, well-nourished, elderly, white male in no acute distress.  HEENT:  Pupils equally round and reactive to light. Extraocular muscles intact. Currently still no significant mucositis.  CARDIOVASCULAR:  Regular rate and rhythm. No murmurs, gallops or rubs.  LUNGS:  Clear to auscultation bilaterally.  No wheezing.  ABDOMEN:  Soft, nontender, nondistended with positive bowel sounds.  EXTREMITIES:  No clubbing, cyanosis or edema bilaterally.  SKIN:  No rashes or petechiae.  NEURO:  Cranial nerves grossly intact. No focal deficits.  PSYCH:  Alert and oriented x3.    The " physical exam is unchanged from the one a couple of weeks ago.    LABORATORY  Lab Results   Component Value Date    WBC 6.56 12/16/2024    HGB 10.5 (L) 12/16/2024    HCT 32.9 (L) 12/16/2024    .8 (H) 12/16/2024     12/16/2024    NEUTROABS 3.24 12/16/2024       Lab Results   Component Value Date     12/02/2024    K 4.5 12/02/2024     12/02/2024    CO2 26.1 12/02/2024    BUN 22 12/02/2024    CREATININE 1.43 (H) 12/02/2024    GLUCOSE 137 (H) 12/02/2024    CALCIUM 10.1 (H) 12/02/2024    AST 25 12/02/2024    ALT 22 12/02/2024    ALKPHOS 99 12/02/2024    BILITOT 0.8 12/02/2024    PROTEINTOT 6.4 12/02/2024    ALBUMIN 3.8 12/02/2024     Lab Results   Component Value Date    RETICCTPCT 1.67 12/16/2024     CBC (12/16/2024): WBCs: 6.56; HgB: 10.5; Hct: 32.9; platelets: 218; MCV: 113.8  CBC (12/02/2024): WBCs: 7.22; HgB: 10.3; Hct: 32.8; platelets: 218; MCV: 112.7  CBC (11/19/2024): WBCs: 6.30; HgB: 10.2; Hct: 32.1; platelets: 272; MCV: 110.7  CBC (11/05/2024): WBCs: 5.65; HgB: 9.5; Hct: 30.3; platelets: 277; MCV: 107.1  CBC (10/30/2024): WBCs: 6,65; HgB: 9.7; Hct: 30.1; platelets: 281; MCV: 106.0  CBC (10/21/2024): WBCs: 7.46; HgB: 9.6; Hct: 30.5; platelets: 349; MCV: 103.4  CBC (10/15/2024): WBCs: 7.86; HgB: 9.6; Hct: 30.8; platelets: 387; MCV: 101.3  CBC (10/09/2024): WBCs: 7.42; HgB: 8.8; Hct: 28.5; platelets: 373; MCV: 97.9  CBC (10/02/2024): WBCs: 6.94; HgB: 9.2; Hct: 29.6; platelets: 324; MCV: 95.2  CBC (09/25/2024): WBCs: 8.71; HgB: 7.3; Hct: 23.3; platelets: 349; MCV: 90.3  CBC (09/18/2024): WBCs: 9.33; HgB: 9.0; Hct: 27.7; platelets: 312; MCV: 87.4  CBC (09/10/2024): WBCs: 10.20; HgB: 10.4; Hct: 31.3; platelets: 222; MCV: 86.5  CBC (09/03/2024): WBCs: 10.95; HgB: 11.5; Hct: 35.6; platelets: 203; MCV: 86.6  CBC (08/29/2024): WBCs: 14.86; HgB: 7.4; Hct: 23.5; platelets: 275; MCV: 86.7  CBC (08/26/2024): WBCs: 11.72; HgB: 7.7; Hct: 24.6; platelets: 250; MCV: 86.6  CBC (08/23/2024): WBCs: 10.01;  HgB: 8.0; Hct: 25.3; platelets: 237; MCV: 87.2  CBC (08/20/2024): WBCS: 8.59; HgB: 8.4; Hct: 26.4; platelets: 272; MCV: 86.8  CBC (08/14/2024): WBCs:  8.16; HgB: 9.0; Hct: 27.9; platelets: 319; MCV: 87.2  CBC (08/09/2024): WBCs: 12.00; HgB: 8.1; Hct: 25.5; platelets: 319; MCV: 90.7  CBC (08/02/2024): WBCs: 11.92; HgB: 8.1; Hct: 24.8; platelets: 204; MCV: 90.5  CBC (07/31/2024): WBCs: 17.04; HgB: 7.6; Hct: 23.3; platelets: 265; MCV: 95.5  CBC (07/26/2024): WBCs: 13.50; HgB: 8.1; Hct: 24.8; platelets: 329; MCV: 95.0  CBC (07/23/2024): WBCs: 16.43; HgB: 8.5; Hct: 25.8; platelets: 390; MCV: 96.3  CBC (07/15/2024): WBCs: 8.04; HgB: 8.3; Hct: 25.8; platelets: 340; MCV: 97.7  CBC (07/11/2024): WBCs: 7.25; HgB: 9.4; Hct: 29.0; platelets: 375; MCV: 97.0  CBC (07/01/2024): WBCs: 7.50; HgB: 9.4; Hct: 29.3; platelets: 272; MCV: 98.7  CBC (06/26/2024): WBCs: 6.67; HgB: 9.0; Hct: 27.5; platelets: 198; MCV: 98.6    Reticulocytes (12/16/2024): Percentage: 1.67; Absolute: 0.0483  Reticulocytes (12/02/2024): Percentage: 1.89; Absolute: 0.0550  Reticulocytes (11/19/2024): Percentage: 3.04; Absolute: 0.0882  Reticulocytes (11/05/2024): Percentage 2.91; Absolute: 0.0824  Reticulocytes (10/30/2024): Percentage: 3.67; Absolute: 0.1061  Reticulocytes (10/21/2024): Percentage: 4.21; Absolute: 0.1250  Reticulocytes (10/15/2024): Percentage: 5.24; Absolute: 0.1603  Reticulocytes (09/10/2024): Percentage: 0.47; Absolute: 0.0170  Reticulocytes (08/20/2024): Percentage: 0.26; Absolute: <0.0100    Cyclosporine level (12/16/2024): pending  Cyclosporine level (12/02/2024): 183 ng/mL  Cyclosporine level (11/19/2024): 335 ng/mL  Cyclosporine level (11/05/2024): 320 ng/mL  Cyclosporine level (10/30/2024): 132 ng/mL  Cyclosporine level (10/21/2024): 216 ng/mL  Cyclosporine level (10/15/2024): 315 ng/mL  Cyclosporine level (10/09/2024): 48 ng/mL  Cyclosporine level (10/02/2024): 121 ng/mL  Cyclosporine level (09/25/2024): 107 ng/mL    IMAGING  CT chest  without contrast (11/04/2024):  Impression: No parenchymal nodules, adenopathy or effusions.    CT abdomen and pelvis without contrast (11/04/2024):  Impression:  1) Minimal sigmoid diverticulitis.  2) Other findings [are nonacute].    PATHOLOGY  Peripheral smear, bone marrow aspiration smear, bone marrow aspiration clot and bone marrow core biopsy (06/26/2024):  Normocellular bone marrow with increased granulopoiesis, essentially absent erythropoiesis, adequate megakaryopoiesis, increased stainable iron and slightly increased mixed chronic inflammation. No evidence of increased blasts or dysplasia. The most striking feature within the bone marrow is the essential absence of any erythropoiesis in the aspirate or core biopsy. There is mixed inflammation with some notable immunophenotypic findings by flow cytometry, but there is no evidence of bone marrow involvement by lymphoma. This is most suggestive of a mixed reactive lymphoid population.    IMPRESSION AND PLAN  Mr. Silva is a 89 y.o., white male with:  Pure red cell aplasia: I have had multiple, long discussions with the patient and his wife regarding the results of the bone marrow biopsy performed in late June 2024 (which are summarized above). In short, this is a fairly uncommon diagnosis that can either be idiopathic or associated with a number of potential, underlying causes, including thymoma or parvovirus infection. Regardless, high dose steroids (prednisone 1-2 mg/kg daily) is the standard, first-line treatment; however, since the repeat CBC drawn on 07/01/2024 showed that his HgB (9.4 g/dL) had improved compared to what it was when he was discharged from our hospital following the bone marrow biopsy the previous week (9.0 g/dL), it was hoped that he was satisfactorily recovering (from a viral trigger) on his own. However, a repeat CBC on 07/15/2024 revealed that his HgB had dropped again (8.3 mg/dL); and his reticulocyte counts remained essentially  zero. A Rx for prednisone 80 mg PO daily was provided at that time; but, unfortunately, high-dose steroids never provided him with any meaningful effect. Consequently, by late August 2024, he was started on an additional Rx (cyclosporine 50 mg PO BID), per [facility]'s recommendations (with whom he had met for an additional opinion regarding his management earlier that month). Once he tolerated this starting dose for ~one week, in late August (08/27/2024), we increased the cyclosporine to 100 mg PO BID and further decreased the prednisone to 40 mg PO daily. At his appointment on 09/03/2024, as his anemia (finally) improved (rather well, to a HgB of 11.5 g/dL), we continued the cyclosporine and further decreased the prednisone to 20 mg PO daily. Unfortunately, at the time of his follow up appointment on 09/10/2024, a repeat CBC did not show any further improvement in his HgB (it was 10.4 g/dL), issue #2 (see below) was dramatically worse; and his repeat serum cyclosporine was > 400 ng/mL (above the upper limit of the desirable range). Given all of this, the cyclosporine was held completely for the next one and one half weeks. Meanwhile, he further decreased his daily prednisone dose (from 10 mg daily to 5 mg daily to, as of late September, off completely), as instructed. After remaining off of cyclosporine for one and one half weeks, his mouth sores (finally) improved; and his PO intake renormalized. Unfortunately, throughout the latter part of September 2024, he was getting steadily fatigued again, correlating with the fact that, not surprisingly, his HgB continued to redecline off of the cyclosporine (and it is was back down to 7.3 g/dL by 09/25/2024, at which time he required the transfusion of an additional two units of PRBCs). Meanwhile, he has now been back on cyclosporine (the 50 mg BID dose) for a little over three (3) months now, as instructed; and, so far, issue #2 has still yet to significantly reworsen  "(it has intermittently mildly recurred, but it remains, at worst, very tolerable with localized therapies such as Orajel). Meanwhile, today's (12/16/2024) repeat labwork shows that his HgB is currently still much improved (compared to this past summer) and recently still very stable (10.5 g/dL). He was again instructed to continue the cyclosporine at the current dose. We will recheck labs in two weeks; and we will see him back in our clinic again in another month (in ~mid-January 2025) with a CBC, CMP and cyclosporine level for another symptom/toxicity check. Hopefully, this lower dose of cyclosporine (50 mg BID) will continue to not significantly reworsen issue #2 while it remains in therapeutic, but not toxic, levels.  Mucositis: A progressive and refractory issue that began in late August 2024, shortly after he started PO cyclosporine for the treatment of issue #1 (particularly after he was escalated to the 100 mg BID dose). As discussed above, by early September 2024, this was dramatically reducing his PO intake, leading to dehydration and acute renal insufficiency. Also as discussed above, these symptoms have recently still been much better and manageable; and the (lower) dose of PO cyclosporine (50 mg BID) has been reintroduced. He and his wife have been counseled on the routine use of magic mouthwash, OraGel and/or other, similar products. They also know to skip the occasional dose of the cyclosporine, if needed. Continue to monitor.  \"Chalky stool\": He and his wife previously clarified that this issue has actually occurred previously. In approximately Summer 2023, he had to be referred to [facility] gastroenterology, who performed an ERCP for (what sounds like benign) stricturing. Regardless, CT scans of the chest, abdomen and pelvis without contrast performed on 11/05/2024 (and summarized above) for further evaluation were overall unremarkable; and this symptom is currently still re-resolved. Continue to " monitor.  Lower extremity edema: Multifactorial, with age-related venous insufficiency and the resulting, dependent edema definitely contributing. Ongoing management per primary care.  The patient and his wife were in agreement with this plan.    It is a pleasure to participate in Mr. Silva's care. Please do not hesitate to call with any questions or concerns that you may have.    A total of 30 minutes were spent coordinating this patient’s care in clinic today; more than 50% of this time was face-to-face with the patient and his wife, reviewing his interim medical history, discussing the results of today's repeat labwork, and counseling on the current treatment and followup plans. All questions were answered to their satisfaction.    FOLLOW UP  Continue cyclosporine 50 mg PO BID. Repeat a CBC, CMP and cyclosporine level in 2 weeks. Return to our clinic in 1 month (~mid-January 2025) with a repeat CBC, CMP and cyclosporine level.            This document was electronically signed by RAMIRO King MD December 16, 2024 09:24 EST      CC: RIGO Mccall MD

## 2024-12-16 NOTE — PROGRESS NOTES
Venipuncture Blood Specimen Collection  Venipuncture performed in right arm by Mimi Batista MA with good hemostasis. Patient tolerated the procedure well without complications.   12/16/24   Mimi Batista MA

## 2024-12-18 LAB — CYCLOSPORINE BLD LC/MS/MS-MCNC: 86 NG/ML (ref 100–400)

## 2024-12-30 ENCOUNTER — CLINICAL SUPPORT (OUTPATIENT)
Dept: ONCOLOGY | Facility: CLINIC | Age: 89
End: 2024-12-30
Payer: MEDICARE

## 2024-12-30 VITALS
OXYGEN SATURATION: 95 % | HEART RATE: 67 BPM | BODY MASS INDEX: 24.99 KG/M2 | RESPIRATION RATE: 20 BRPM | DIASTOLIC BLOOD PRESSURE: 63 MMHG | TEMPERATURE: 98 F | WEIGHT: 159.2 LBS | SYSTOLIC BLOOD PRESSURE: 131 MMHG | HEIGHT: 67 IN

## 2024-12-30 DIAGNOSIS — D64.9 SYMPTOMATIC ANEMIA: ICD-10-CM

## 2024-12-30 DIAGNOSIS — D61.9 APLASTIC ANEMIA: ICD-10-CM

## 2024-12-30 DIAGNOSIS — D60.9 ACQUIRED RED CELL APLASIA: ICD-10-CM

## 2024-12-30 LAB
ALBUMIN SERPL-MCNC: 3.8 G/DL (ref 3.5–5.2)
ALBUMIN/GLOB SERPL: 1.4 G/DL
ALP SERPL-CCNC: 107 U/L (ref 39–117)
ALT SERPL W P-5'-P-CCNC: 17 U/L (ref 1–41)
ANION GAP SERPL CALCULATED.3IONS-SCNC: 9.8 MMOL/L (ref 5–15)
AST SERPL-CCNC: 20 U/L (ref 1–40)
BASOPHILS # BLD AUTO: 0.03 10*3/MM3 (ref 0–0.2)
BASOPHILS NFR BLD AUTO: 0.4 % (ref 0–1.5)
BILIRUB SERPL-MCNC: 0.5 MG/DL (ref 0–1.2)
BUN SERPL-MCNC: 31 MG/DL (ref 8–23)
BUN/CREAT SERPL: 20.5 (ref 7–25)
CALCIUM SPEC-SCNC: 10 MG/DL (ref 8.2–9.6)
CHLORIDE SERPL-SCNC: 104 MMOL/L (ref 98–107)
CO2 SERPL-SCNC: 24.2 MMOL/L (ref 22–29)
CREAT SERPL-MCNC: 1.51 MG/DL (ref 0.76–1.27)
DEPRECATED RDW RBC AUTO: 57.7 FL (ref 37–54)
EGFRCR SERPLBLD CKD-EPI 2021: 43.3 ML/MIN/1.73
EOSINOPHIL # BLD AUTO: 0.17 10*3/MM3 (ref 0–0.4)
EOSINOPHIL NFR BLD AUTO: 2 % (ref 0.3–6.2)
ERYTHROCYTE [DISTWIDTH] IN BLOOD BY AUTOMATED COUNT: 14.2 % (ref 12.3–15.4)
GLOBULIN UR ELPH-MCNC: 2.7 GM/DL
GLUCOSE SERPL-MCNC: 92 MG/DL (ref 65–99)
HCT VFR BLD AUTO: 32 % (ref 37.5–51)
HGB BLD-MCNC: 10.2 G/DL (ref 13–17.7)
IMM GRANULOCYTES # BLD AUTO: 0.03 10*3/MM3 (ref 0–0.05)
IMM GRANULOCYTES NFR BLD AUTO: 0.4 % (ref 0–0.5)
LYMPHOCYTES # BLD AUTO: 4.25 10*3/MM3 (ref 0.7–3.1)
LYMPHOCYTES NFR BLD AUTO: 49.9 % (ref 19.6–45.3)
MCH RBC QN AUTO: 35.9 PG (ref 26.6–33)
MCHC RBC AUTO-ENTMCNC: 31.9 G/DL (ref 31.5–35.7)
MCV RBC AUTO: 112.7 FL (ref 79–97)
MONOCYTES # BLD AUTO: 0.83 10*3/MM3 (ref 0.1–0.9)
MONOCYTES NFR BLD AUTO: 9.8 % (ref 5–12)
NEUTROPHILS NFR BLD AUTO: 3.2 10*3/MM3 (ref 1.7–7)
NEUTROPHILS NFR BLD AUTO: 37.5 % (ref 42.7–76)
NRBC BLD AUTO-RTO: 0 /100 WBC (ref 0–0.2)
PLATELET # BLD AUTO: 206 10*3/MM3 (ref 140–450)
PMV BLD AUTO: 12.2 FL (ref 6–12)
POTASSIUM SERPL-SCNC: 4.8 MMOL/L (ref 3.5–5.2)
PROT SERPL-MCNC: 6.5 G/DL (ref 6–8.5)
RBC # BLD AUTO: 2.84 10*6/MM3 (ref 4.14–5.8)
SODIUM SERPL-SCNC: 138 MMOL/L (ref 136–145)
WBC NRBC COR # BLD AUTO: 8.51 10*3/MM3 (ref 3.4–10.8)

## 2024-12-30 PROCEDURE — 80158 DRUG ASSAY CYCLOSPORINE: CPT | Performed by: INTERNAL MEDICINE

## 2024-12-30 PROCEDURE — 80053 COMPREHEN METABOLIC PANEL: CPT | Performed by: INTERNAL MEDICINE

## 2024-12-30 PROCEDURE — 85025 COMPLETE CBC W/AUTO DIFF WBC: CPT | Performed by: INTERNAL MEDICINE

## 2024-12-30 NOTE — PROGRESS NOTES
Venipuncture Blood Specimen Collection  Venipuncture performed in right arm by Shena Sewell MA with good hemostasis. Patient tolerated the procedure well without complications.   12/30/24   Shena Sewell MA

## 2025-01-02 LAB — CYCLOSPORINE BLD LC/MS/MS-MCNC: 154 NG/ML (ref 100–400)

## 2025-01-06 RX ORDER — CYCLOSPORINE 25 MG/1
50 CAPSULE, LIQUID FILLED ORAL 2 TIMES DAILY
Qty: 120 CAPSULE | Refills: 0 | Status: SHIPPED | OUTPATIENT
Start: 2025-01-06 | End: 2025-02-05

## 2025-01-21 ENCOUNTER — OFFICE VISIT (OUTPATIENT)
Dept: ONCOLOGY | Facility: CLINIC | Age: OVER 89
End: 2025-01-21
Payer: MEDICARE

## 2025-01-21 ENCOUNTER — LAB (OUTPATIENT)
Dept: ONCOLOGY | Facility: CLINIC | Age: OVER 89
End: 2025-01-21
Payer: MEDICARE

## 2025-01-21 VITALS
TEMPERATURE: 97.1 F | DIASTOLIC BLOOD PRESSURE: 65 MMHG | RESPIRATION RATE: 20 BRPM | WEIGHT: 157 LBS | HEART RATE: 86 BPM | HEIGHT: 67 IN | SYSTOLIC BLOOD PRESSURE: 148 MMHG | OXYGEN SATURATION: 98 % | BODY MASS INDEX: 24.64 KG/M2

## 2025-01-21 DIAGNOSIS — D64.9 SYMPTOMATIC ANEMIA: ICD-10-CM

## 2025-01-21 DIAGNOSIS — D60.9 ACQUIRED RED CELL APLASIA: Primary | ICD-10-CM

## 2025-01-21 DIAGNOSIS — D60.9 ACQUIRED RED CELL APLASIA: ICD-10-CM

## 2025-01-21 DIAGNOSIS — D61.9 APLASTIC ANEMIA: ICD-10-CM

## 2025-01-21 LAB
ALBUMIN SERPL-MCNC: 4 G/DL (ref 3.5–5.2)
ALBUMIN/GLOB SERPL: 1.4 G/DL
ALP SERPL-CCNC: 115 U/L (ref 39–117)
ALT SERPL W P-5'-P-CCNC: 17 U/L (ref 1–41)
ANION GAP SERPL CALCULATED.3IONS-SCNC: 8.8 MMOL/L (ref 5–15)
AST SERPL-CCNC: 22 U/L (ref 1–40)
BASOPHILS # BLD AUTO: 0.02 10*3/MM3 (ref 0–0.2)
BASOPHILS NFR BLD AUTO: 0.4 % (ref 0–1.5)
BILIRUB SERPL-MCNC: 0.6 MG/DL (ref 0–1.2)
BUN SERPL-MCNC: 27 MG/DL (ref 8–23)
BUN/CREAT SERPL: 20 (ref 7–25)
CALCIUM SPEC-SCNC: 10.3 MG/DL (ref 8.2–9.6)
CHLORIDE SERPL-SCNC: 105 MMOL/L (ref 98–107)
CO2 SERPL-SCNC: 24.2 MMOL/L (ref 22–29)
CREAT SERPL-MCNC: 1.35 MG/DL (ref 0.76–1.27)
DEPRECATED RDW RBC AUTO: 57.1 FL (ref 37–54)
EGFRCR SERPLBLD CKD-EPI 2021: 49.6 ML/MIN/1.73
EOSINOPHIL # BLD AUTO: 0.17 10*3/MM3 (ref 0–0.4)
EOSINOPHIL NFR BLD AUTO: 3 % (ref 0.3–6.2)
ERYTHROCYTE [DISTWIDTH] IN BLOOD BY AUTOMATED COUNT: 14 % (ref 12.3–15.4)
GLOBULIN UR ELPH-MCNC: 2.8 GM/DL
GLUCOSE SERPL-MCNC: 117 MG/DL (ref 65–99)
HCT VFR BLD AUTO: 34.2 % (ref 37.5–51)
HGB BLD-MCNC: 10.8 G/DL (ref 13–17.7)
IMM GRANULOCYTES # BLD AUTO: 0.02 10*3/MM3 (ref 0–0.05)
IMM GRANULOCYTES NFR BLD AUTO: 0.4 % (ref 0–0.5)
LYMPHOCYTES # BLD AUTO: 2.33 10*3/MM3 (ref 0.7–3.1)
LYMPHOCYTES NFR BLD AUTO: 41.4 % (ref 19.6–45.3)
MCH RBC QN AUTO: 35.3 PG (ref 26.6–33)
MCHC RBC AUTO-ENTMCNC: 31.6 G/DL (ref 31.5–35.7)
MCV RBC AUTO: 111.8 FL (ref 79–97)
MONOCYTES # BLD AUTO: 0.51 10*3/MM3 (ref 0.1–0.9)
MONOCYTES NFR BLD AUTO: 9.1 % (ref 5–12)
NEUTROPHILS NFR BLD AUTO: 2.58 10*3/MM3 (ref 1.7–7)
NEUTROPHILS NFR BLD AUTO: 45.7 % (ref 42.7–76)
NRBC BLD AUTO-RTO: 0 /100 WBC (ref 0–0.2)
PLATELET # BLD AUTO: 225 10*3/MM3 (ref 140–450)
PMV BLD AUTO: 12.1 FL (ref 6–12)
POTASSIUM SERPL-SCNC: 4.2 MMOL/L (ref 3.5–5.2)
PROT SERPL-MCNC: 6.8 G/DL (ref 6–8.5)
RBC # BLD AUTO: 3.06 10*6/MM3 (ref 4.14–5.8)
SODIUM SERPL-SCNC: 138 MMOL/L (ref 136–145)
WBC NRBC COR # BLD AUTO: 5.63 10*3/MM3 (ref 3.4–10.8)

## 2025-01-21 PROCEDURE — 80158 DRUG ASSAY CYCLOSPORINE: CPT | Performed by: INTERNAL MEDICINE

## 2025-01-21 PROCEDURE — 1126F AMNT PAIN NOTED NONE PRSNT: CPT | Performed by: INTERNAL MEDICINE

## 2025-01-21 PROCEDURE — 85025 COMPLETE CBC W/AUTO DIFF WBC: CPT | Performed by: INTERNAL MEDICINE

## 2025-01-21 PROCEDURE — 80053 COMPREHEN METABOLIC PANEL: CPT | Performed by: INTERNAL MEDICINE

## 2025-01-21 PROCEDURE — 99214 OFFICE O/P EST MOD 30 MIN: CPT | Performed by: INTERNAL MEDICINE

## 2025-01-21 NOTE — PROGRESS NOTES
Venipuncture Blood Specimen Collection  Venipuncture performed in right arm by Rachell Mayorga MA with good hemostasis. Patient tolerated the procedure well without complications.   01/21/25   Rachell Mayorga MA

## 2025-01-21 NOTE — PROGRESS NOTES
Name:  Chinedu Silva  :  1933  Date:  2025     REFERRING PHYSICIAN    PRIMARY CARE PROVIDER  Jr Segura PA    REASON FOR FOLLOWUP  1. Acquired red cell aplasia      CHIEF COMPLAINT  None.    Dear  Colton,    HISTORY OF PRESENT ILLNESS:   I saw Mr. Silva in followup (from his recent hospitalization for severe anemia) today in our hematology/oncology clinic. As you are aware, he is a pleasant, 89 y.o., white male with a history of hypertension, CAD and GERD who presented to the [facility] ED on 2024 with progressive weakness and dyspnea on exertion. These symptoms had potentially first started ~two to three months prior; however, they significantly worsened over the course of the previous couple of weeks (by early 2024). Upon arrival to our ED, he was found to be severely anemic, with a Hg of 3.7 g/dL. His other cell lines (WBCs and platelets) were entirely WNL. Our service was consulted for further evaluation of this issue, and a bone marrow biopsy was performed on 2024. Meanwhile, as he was feeling substantially better after his HgB was transfused up to the ~9 g/dL range, he was able to be discharged to outpatient follow up in our clinic. He returned to our clinic the following week (on 2024) to go over the results of the bone marrow exam and for ongoing management.    INTERIM HISTORY:  Mr. Silva returns to clinic today for follow up once again accompanied by his wife. He was seen at [facility] on 2024 for an additional opinion for his acquired pure red cell aplasia. He was on PO cyclosporine for a total of ~three weeks (50 mg BID for the first week and 100 mg BID for the next two weeks) in late August/early 2024. Due to progressive, severe and refractory mucositis (as well as a toxic cyclosporine level), he was instructed to discontinue the cyclosporine in early September. After holding the cyclosporine for the next ~one and one half  weeks, his mucositis finally improved. He has now been back on the cyclosporine (at a dose of 50 mg BID) for a little over four (4) months now, as instructed. While he has redeveloped some intermittent, but currently still manageable, mucositis, he is overall still doing much much better than he was last Fall, as his fatigue has recently remained improved and his HgB has remained greater than 10 g/dL for a prolonged period of time (and he has still not required any PRBC transfusions since late Summer 2024). He again walked from the parking lot into our clinic today, which is something he was completely unable to do not much more than a few months ago. He once again has no new or other specific complaints.    Past Medical History:   Diagnosis Date    Carotid stenosis     Coronary artery disease     Diverticulitis     GERD (gastroesophageal reflux disease)     Gout     Hearing aid worn     Hypertension     Obese     Prostate cancer     Wears glasses        Past Surgical History:   Procedure Laterality Date    CAROTID ENDARTERECTOMY Left 2018    Procedure: CAROTID ENDARTERECTOMY WITH EEG LEFT;  Surgeon: Del Neal MD;  Location: [facility];  Service:     CATARACT EXTRACTION      CATARACT EXTRACTION WITH INTRAOCULAR LENS IMPLANT Right     CHOLECYSTECTOMY      COLONOSCOPY W/ POLYPECTOMY      HERNIA REPAIR Right     TOTAL KNEE ARTHROPLASTY Right     TURP / TRANSURETHRAL INCISION / DRAINAGE PROSTATE         Social History     Socioeconomic History    Marital status:     Number of children: 1   Tobacco Use    Smoking status: Former     Current packs/day: 0.00     Average packs/day: 1 pack/day for 12.0 years (12.0 ttl pk-yrs)     Types: Cigarettes     Start date:      Quit date:      Years since quittin.0    Smokeless tobacco: Current     Types: Chew   Vaping Use    Vaping status: Never Used   Substance and Sexual Activity    Alcohol use: No    Drug use: No    Sexual activity: Defer       Family  History   Problem Relation Age of Onset    Hypertension Mother     Glaucoma Mother     Diverticulitis Mother     Gout Mother     ALS Father     Gout Sister     Broken bones Paternal Grandfather     Broken bones Son     Cancer Son     Diabetes Brother        Allergies   Allergen Reactions    Ciprofloxacin Other (See Comments)     Muscle Pains    Lisinopril Cough    Tetracycline Provider Review Needed     Other Reaction(s) from [facility]: UNK    Sulfa Antibiotics Itching and Rash       Current Outpatient Medications   Medication Sig Dispense Refill    allopurinol (ZYLOPRIM) 300 MG tablet Take 1 tablet by mouth Daily.      aspirin 81 MG EC tablet Take 1 tablet by mouth Daily.      cycloSPORINE modified (NEORAL) 25 MG capsule Take 2 capsules by mouth 2 (Two) Times a Day for 30 days. 120 capsule 0    dicyclomine (BENTYL) 20 MG tablet Take 1 tablet by mouth Every 6 (Six) Hours. (Patient not taking: Reported on 12/16/2024)      MAGIC MOUTHWASH W/NYSTATIN 1/1/1/1 (lidocaine - diphenhydrAMINE HCl - aluminum & magnesium hydroxide - nystatin) Swish and spit 5 mL Every 4 (Four) Hours As Needed for Mouth Pain. (Patient not taking: Reported on 12/16/2024) 300 mL 2    ondansetron (ZOFRAN) 8 MG tablet Take 1 tablet by mouth Every 8 (Eight) Hours As Needed for Nausea or Vomiting. (Patient not taking: Reported on 12/16/2024) 30 tablet 1    pantoprazole (PROTONIX) 40 MG EC tablet Take 1 tablet by mouth Daily. (Patient not taking: Reported on 12/16/2024) 30 tablet 1    predniSONE (DELTASONE) 20 MG tablet Take 3 tablets by mouth Daily. (Patient not taking: Reported on 12/16/2024) 90 tablet 0    predniSONE (DELTASONE) 5 MG tablet Take 2 pills by mouth daily for one week, then 1 pill by mouth daily for one week. (Patient not taking: Reported on 12/16/2024) 21 tablet 0    rosuvastatin (CRESTOR) 10 MG tablet Take 1 tablet by mouth Daily. 90 tablet 3    valsartan (DIOVAN) 80 MG tablet Take 0.5 tablets by mouth Daily.       No current  "facility-administered medications for this visit.     REVIEW OF SYSTEMS  CONSTITUTIONAL:  No fever, chills or night sweats. Chronic fatigue, recently still improved.  EYES:  No blurry vision, diplopia or other vision changes.  ENT:  No hearing loss or nosebleeds. Mild, recurrent mucositis, still very manageable, as per the HPI above.  CARDIOVASCULAR:  No palpitations, arrhythmia, syncopal episodes or edema.  PULMONARY:  No hemoptysis, wheezing, chronic cough or shortness of breath.  GASTROINTESTINAL:  No nausea or vomiting. No constipation or diarrhea. No abdominal pain. Intermittently \"chalky\" stool, currently still resolved.  GENITOURINARY:  No hematuria, kidney stones or frequent urination.  MUSCULOSKELETAL:  No joint or back pains.  INTEGUMENTARY: No rashes or pruritus.  ENDOCRINE:  No excessive thirst or hot flashes.  HEMATOLOGIC:  No history of free bleeding, spontaneous bleeding or clotting.  IMMUNOLOGIC:  No allergies or frequent infections.  NEUROLOGIC: No numbness, tingling, seizures or weakness.  PSYCHIATRIC:  No anxiety or depression.    PHYSICAL EXAMINATION  /65   Pulse 86   Temp 97.1 °F (36.2 °C) (Temporal)   Resp 20   Ht 170.2 cm (67.01\")   Wt 71.2 kg (157 lb)   SpO2 98%   BMI 24.58 kg/m²     Pain Score:  Pain Score    25 0845   PainSc: 0-No pain     PHQ-Score Total:  PHQ-9 Total Score:      ECO  GENERAL:  A well-developed, well-nourished, elderly, white male in no acute distress.  HEENT:  Pupils equally round and reactive to light. Extraocular muscles intact. Currently still no significant mucositis.  CARDIOVASCULAR:  Regular rate and rhythm. No murmurs, gallops or rubs.  LUNGS:  Clear to auscultation bilaterally. No wheezing.  ABDOMEN:  Soft, nontender, nondistended with positive bowel sounds.  EXTREMITIES:  No clubbing, cyanosis or edema bilaterally.  SKIN:  No rashes or petechiae.  NEURO:  Cranial nerves grossly intact. No focal deficits.  PSYCH:  Alert and oriented " x3.    The physical exam is again unchanged from recent priors.    LABORATORY  Lab Results   Component Value Date    WBC 5.63 01/21/2025    HGB 10.8 (L) 01/21/2025    HCT 34.2 (L) 01/21/2025    .8 (H) 01/21/2025     01/21/2025    NEUTROABS 2.58 01/21/2025       Lab Results   Component Value Date     01/21/2025    K 4.2 01/21/2025     01/21/2025    CO2 24.2 01/21/2025    BUN 27 (H) 01/21/2025    CREATININE 1.35 (H) 01/21/2025    GLUCOSE 117 (H) 01/21/2025    CALCIUM 10.3 (H) 01/21/2025    AST 22 01/21/2025    ALT 17 01/21/2025    ALKPHOS 115 01/21/2025    BILITOT 0.6 01/21/2025    PROTEINTOT 6.8 01/21/2025    ALBUMIN 4.0 01/21/2025     Lab Results   Component Value Date    RETICCTPCT 1.67 12/16/2024     CBC (01/21/2025): WBCs: 5.63; HgB: 10.8; Hct: 34.2; platelets: 225; MCV: 111.8  CBC (12/16/2024): WBCs: 6.56; HgB: 10.5; Hct: 32.9; platelets: 218; MCV: 113.8  CBC (12/02/2024): WBCs: 7.22; HgB: 10.3; Hct: 32.8; platelets: 218; MCV: 112.7  CBC (11/19/2024): WBCs: 6.30; HgB: 10.2; Hct: 32.1; platelets: 272; MCV: 110.7  CBC (11/05/2024): WBCs: 5.65; HgB: 9.5; Hct: 30.3; platelets: 277; MCV: 107.1  CBC (10/30/2024): WBCs: 6,65; HgB: 9.7; Hct: 30.1; platelets: 281; MCV: 106.0  CBC (10/21/2024): WBCs: 7.46; HgB: 9.6; Hct: 30.5; platelets: 349; MCV: 103.4  CBC (10/15/2024): WBCs: 7.86; HgB: 9.6; Hct: 30.8; platelets: 387; MCV: 101.3  CBC (10/09/2024): WBCs: 7.42; HgB: 8.8; Hct: 28.5; platelets: 373; MCV: 97.9  CBC (10/02/2024): WBCs: 6.94; HgB: 9.2; Hct: 29.6; platelets: 324; MCV: 95.2  CBC (09/25/2024): WBCs: 8.71; HgB: 7.3; Hct: 23.3; platelets: 349; MCV: 90.3  CBC (09/18/2024): WBCs: 9.33; HgB: 9.0; Hct: 27.7; platelets: 312; MCV: 87.4  CBC (09/10/2024): WBCs: 10.20; HgB: 10.4; Hct: 31.3; platelets: 222; MCV: 86.5  CBC (09/03/2024): WBCs: 10.95; HgB: 11.5; Hct: 35.6; platelets: 203; MCV: 86.6  CBC (08/29/2024): WBCs: 14.86; HgB: 7.4; Hct: 23.5; platelets: 275; MCV: 86.7  CBC (08/26/2024): WBCs:  11.72; HgB: 7.7; Hct: 24.6; platelets: 250; MCV: 86.6  CBC (08/23/2024): WBCs: 10.01; HgB: 8.0; Hct: 25.3; platelets: 237; MCV: 87.2  CBC (08/20/2024): WBCS: 8.59; HgB: 8.4; Hct: 26.4; platelets: 272; MCV: 86.8  CBC (08/14/2024): WBCs:  8.16; HgB: 9.0; Hct: 27.9; platelets: 319; MCV: 87.2  CBC (08/09/2024): WBCs: 12.00; HgB: 8.1; Hct: 25.5; platelets: 319; MCV: 90.7  CBC (08/02/2024): WBCs: 11.92; HgB: 8.1; Hct: 24.8; platelets: 204; MCV: 90.5  CBC (07/31/2024): WBCs: 17.04; HgB: 7.6; Hct: 23.3; platelets: 265; MCV: 95.5  CBC (07/26/2024): WBCs: 13.50; HgB: 8.1; Hct: 24.8; platelets: 329; MCV: 95.0  CBC (07/23/2024): WBCs: 16.43; HgB: 8.5; Hct: 25.8; platelets: 390; MCV: 96.3  CBC (07/15/2024): WBCs: 8.04; HgB: 8.3; Hct: 25.8; platelets: 340; MCV: 97.7  CBC (07/11/2024): WBCs: 7.25; HgB: 9.4; Hct: 29.0; platelets: 375; MCV: 97.0  CBC (07/01/2024): WBCs: 7.50; HgB: 9.4; Hct: 29.3; platelets: 272; MCV: 98.7  CBC (06/26/2024): WBCs: 6.67; HgB: 9.0; Hct: 27.5; platelets: 198; MCV: 98.6    Reticulocytes (12/16/2024): Percentage: 1.67; Absolute: 0.0483  Reticulocytes (12/02/2024): Percentage: 1.89; Absolute: 0.0550  Reticulocytes (11/19/2024): Percentage: 3.04; Absolute: 0.0882  Reticulocytes (11/05/2024): Percentage 2.91; Absolute: 0.0824  Reticulocytes (10/30/2024): Percentage: 3.67; Absolute: 0.1061  Reticulocytes (10/21/2024): Percentage: 4.21; Absolute: 0.1250  Reticulocytes (10/15/2024): Percentage: 5.24; Absolute: 0.1603  Reticulocytes (09/10/2024): Percentage: 0.47; Absolute: 0.0170  Reticulocytes (08/20/2024): Percentage: 0.26; Absolute: <0.0100    Cyclosporine level (01/21/2025): pending  Cyclosporine level (12/30/2024): 154 ng/mL  Cyclosporine level (12/16/2024): 86 ng/mL  Cyclosporine level (12/02/2024): 183 ng/mL  Cyclosporine level (11/19/2024): 335 ng/mL  Cyclosporine level (11/05/2024): 320 ng/mL  Cyclosporine level (10/30/2024): 132 ng/mL  Cyclosporine level (10/21/2024): 216 ng/mL  Cyclosporine level  (10/15/2024): 315 ng/mL  Cyclosporine level (10/09/2024): 48 ng/mL  Cyclosporine level (10/02/2024): 121 ng/mL  Cyclosporine level (09/25/2024): 107 ng/mL    IMAGING  CT chest without contrast (11/04/2024):  Impression: No parenchymal nodules, adenopathy or effusions.    CT abdomen and pelvis without contrast (11/04/2024):  Impression:  1) Minimal sigmoid diverticulitis.  2) Other findings [are nonacute].    PATHOLOGY  Peripheral smear, bone marrow aspiration smear, bone marrow aspiration clot and bone marrow core biopsy (06/26/2024):  Normocellular bone marrow with increased granulopoiesis, essentially absent erythropoiesis, adequate megakaryopoiesis, increased stainable iron and slightly increased mixed chronic inflammation. No evidence of increased blasts or dysplasia. The most striking feature within the bone marrow is the essential absence of any erythropoiesis in the aspirate or core biopsy. There is mixed inflammation with some notable immunophenotypic findings by flow cytometry, but there is no evidence of bone marrow involvement by lymphoma. This is most suggestive of a mixed reactive lymphoid population.    IMPRESSION AND PLAN  Mr. Silva is a 89 y.o., white male with:  Pure red cell aplasia: I have had multiple, long discussions with the patient and his wife regarding the results of the bone marrow biopsy performed in late June 2024 (which are summarized above). In short, this is a fairly uncommon diagnosis that can either be idiopathic or associated with a number of potential, underlying causes, including thymoma or parvovirus infection. Regardless, high dose steroids (prednisone 1-2 mg/kg daily) is the standard, first-line treatment; however, since the repeat CBC drawn on 07/01/2024 showed that his HgB (9.4 g/dL) had improved compared to what it was when he was discharged from our hospital following the bone marrow biopsy the previous week (9.0 g/dL), it was hoped that he was satisfactorily recovering  (from a viral trigger) on his own. However, a repeat CBC on 07/15/2024 revealed that his HgB had dropped again (8.3 mg/dL); and his reticulocyte counts remained essentially zero. A Rx for prednisone 80 mg PO daily was provided at that time; but, unfortunately, high-dose steroids never provided him with any meaningful effect. Consequently, by late August 2024, he was started on an additional Rx (cyclosporine 50 mg PO BID), per Mattaponi's recommendations (with whom he had met for an additional opinion regarding his management earlier that month). Once he tolerated this starting dose for ~one week, in late August (08/27/2024), we increased the cyclosporine to 100 mg PO BID and further decreased the prednisone to 40 mg PO daily. At his appointment on 09/03/2024, as his anemia (finally) improved (rather well, to a HgB of 11.5 g/dL), we continued the cyclosporine and further decreased the prednisone to 20 mg PO daily. Unfortunately, at the time of his follow up appointment on 09/10/2024, a repeat CBC did not show any further improvement in his HgB (it was 10.4 g/dL), issue #2 (see below) was dramatically worse; and his repeat serum cyclosporine was > 400 ng/mL (above the upper limit of the desirable range). Given all of this, the cyclosporine was held completely for the next one and one half weeks. Meanwhile, he further decreased his daily prednisone dose (from 10 mg daily to 5 mg daily to, as of late September, off completely), as instructed. After remaining off of cyclosporine for one and one half weeks, his mouth sores (finally) improved; and his PO intake renormalized. Unfortunately, throughout the latter part of September 2024, he was getting steadily fatigued again, correlating with the fact that, not surprisingly, his HgB continued to redecline off of the cyclosporine (and it is was back down to 7.3 g/dL by 09/25/2024, at which time he required the transfusion of an additional two units of PRBCs). He has now been  "back on cyclosporine (the 50 mg BID dose) for a little over four (4) months now, as instructed; and, so far, issue #2 has still yet to significantly reworsen (it has intermittently mildly recurred, but it remains, at worst, very tolerable with localized therapies such as Orajel). Meanwhile, today's (01/21/2025) repeat labwork shows that his HgB is currently still much improved (10.8 g/dL; it has actually remained > 10 g/dL for months now). He was again instructed to continue the cyclosporine at the current dose. We will see him back in our clinic again in another month (in ~mid-February) with a CBC, CMP and cyclosporine level for another symptom/toxicity check. Hopefully, this lower dose of cyclosporine (50 mg BID) will continue to not significantly reworsen issue #2 while it remains in therapeutic, but not toxic, levels.  Mucositis: A progressive and refractory issue that began in late August 2024, shortly after he started PO cyclosporine for the treatment of issue #1 (particularly after he was escalated to the 100 mg BID dose). As discussed above, by early September 2024, this was dramatically reducing his PO intake, leading to dehydration and acute renal insufficiency. Also as discussed above, these symptoms have recently still been much better and manageable; and the (lower) dose of PO cyclosporine (50 mg BID) has been reintroduced. He and his wife have been counseled on the routine use of magic mouthwash, OraGel and/or other, similar products. They also know to skip the occasional dose of the cyclosporine, if needed. Continue to monitor.  \"Chalky stool\": He and his wife previously clarified that this issue has actually occurred previously. In approximately Summer 2023, he had to be referred to [facility] gastroenterology, who performed an ERCP for (what sounds like benign) stricturing. Regardless, CT scans of the chest, abdomen and pelvis without contrast performed on 11/05/2024 (and summarized above) for further " evaluation were overall unremarkable; and this symptom is currently still re-resolved. Continue to monitor.  Lower extremity edema: Multifactorial, with age-related venous insufficiency and the resulting, dependent edema definitely contributing. Ongoing management per primary care.  The patient and his wife were in agreement with this plan.    It is a pleasure to participate in Mr. Silva's care. Please do not hesitate to call with any questions or concerns that you may have.    A total of 30 minutes were spent coordinating this patient’s care in clinic today; more than 50% of this time was face-to-face with the patient and his wife, reviewing his interim medical history, discussing the results of today's repeat labwork, and counseling on the ongoing treatment and followup plans. All questions were answered to their satisfaction.    FOLLOW UP  Continue cyclosporine 50 mg PO BID. Return to our clinic in 1 month (~mid-February) with a repeat CBC, CMP and cyclosporine level.            This document was electronically signed by RAMIRO King MD January 21, 2025 09:13 EST      CC: RIGO Mccall MD

## 2025-01-24 LAB — CYCLOSPORINE BLD LC/MS/MS-MCNC: 91 NG/ML (ref 100–400)

## 2025-02-04 RX ORDER — CYCLOSPORINE 25 MG/1
50 CAPSULE, LIQUID FILLED ORAL 2 TIMES DAILY
Qty: 120 CAPSULE | Refills: 0 | Status: SHIPPED | OUTPATIENT
Start: 2025-02-04 | End: 2025-03-06

## 2025-02-18 ENCOUNTER — OFFICE VISIT (OUTPATIENT)
Dept: ONCOLOGY | Facility: CLINIC | Age: OVER 89
End: 2025-02-18
Payer: MEDICARE

## 2025-02-18 ENCOUNTER — LAB (OUTPATIENT)
Dept: ONCOLOGY | Facility: CLINIC | Age: OVER 89
End: 2025-02-18
Payer: MEDICARE

## 2025-02-18 VITALS
DIASTOLIC BLOOD PRESSURE: 76 MMHG | SYSTOLIC BLOOD PRESSURE: 136 MMHG | TEMPERATURE: 97.5 F | BODY MASS INDEX: 23.89 KG/M2 | RESPIRATION RATE: 18 BRPM | WEIGHT: 152.2 LBS | HEART RATE: 69 BPM | OXYGEN SATURATION: 99 % | HEIGHT: 67 IN

## 2025-02-18 DIAGNOSIS — D61.9 APLASTIC ANEMIA: ICD-10-CM

## 2025-02-18 DIAGNOSIS — D60.9 ACQUIRED RED CELL APLASIA: Primary | ICD-10-CM

## 2025-02-18 DIAGNOSIS — D64.9 SYMPTOMATIC ANEMIA: ICD-10-CM

## 2025-02-18 DIAGNOSIS — D60.9 ACQUIRED RED CELL APLASIA: ICD-10-CM

## 2025-02-18 LAB
ALBUMIN SERPL-MCNC: 3.9 G/DL (ref 3.5–5.2)
ALBUMIN/GLOB SERPL: 1.1 G/DL
ALP SERPL-CCNC: 134 U/L (ref 39–117)
ALT SERPL W P-5'-P-CCNC: 30 U/L (ref 1–41)
ANION GAP SERPL CALCULATED.3IONS-SCNC: 8.4 MMOL/L (ref 5–15)
AST SERPL-CCNC: 30 U/L (ref 1–40)
BASOPHILS # BLD AUTO: 0.02 10*3/MM3 (ref 0–0.2)
BASOPHILS NFR BLD AUTO: 0.2 % (ref 0–1.5)
BILIRUB SERPL-MCNC: 0.7 MG/DL (ref 0–1.2)
BUN SERPL-MCNC: 45 MG/DL (ref 8–23)
BUN/CREAT SERPL: 25 (ref 7–25)
CALCIUM SPEC-SCNC: 9.9 MG/DL (ref 8.2–9.6)
CHLORIDE SERPL-SCNC: 101 MMOL/L (ref 98–107)
CO2 SERPL-SCNC: 28.6 MMOL/L (ref 22–29)
CREAT SERPL-MCNC: 1.8 MG/DL (ref 0.76–1.27)
DEPRECATED RDW RBC AUTO: 53.2 FL (ref 37–54)
EGFRCR SERPLBLD CKD-EPI 2021: 34.9 ML/MIN/1.73
EOSINOPHIL # BLD AUTO: 0.22 10*3/MM3 (ref 0–0.4)
EOSINOPHIL NFR BLD AUTO: 2.6 % (ref 0.3–6.2)
ERYTHROCYTE [DISTWIDTH] IN BLOOD BY AUTOMATED COUNT: 13.3 % (ref 12.3–15.4)
GLOBULIN UR ELPH-MCNC: 3.4 GM/DL
GLUCOSE SERPL-MCNC: 119 MG/DL (ref 65–99)
HCT VFR BLD AUTO: 34 % (ref 37.5–51)
HGB BLD-MCNC: 10.9 G/DL (ref 13–17.7)
IMM GRANULOCYTES # BLD AUTO: 0.03 10*3/MM3 (ref 0–0.05)
IMM GRANULOCYTES NFR BLD AUTO: 0.4 % (ref 0–0.5)
LYMPHOCYTES # BLD AUTO: 4.26 10*3/MM3 (ref 0.7–3.1)
LYMPHOCYTES NFR BLD AUTO: 51.1 % (ref 19.6–45.3)
MCH RBC QN AUTO: 35.2 PG (ref 26.6–33)
MCHC RBC AUTO-ENTMCNC: 32.1 G/DL (ref 31.5–35.7)
MCV RBC AUTO: 109.7 FL (ref 79–97)
MONOCYTES # BLD AUTO: 0.75 10*3/MM3 (ref 0.1–0.9)
MONOCYTES NFR BLD AUTO: 9 % (ref 5–12)
NEUTROPHILS NFR BLD AUTO: 3.06 10*3/MM3 (ref 1.7–7)
NEUTROPHILS NFR BLD AUTO: 36.7 % (ref 42.7–76)
NRBC BLD AUTO-RTO: 0 /100 WBC (ref 0–0.2)
PLATELET # BLD AUTO: 199 10*3/MM3 (ref 140–450)
PMV BLD AUTO: 12.3 FL (ref 6–12)
POTASSIUM SERPL-SCNC: 5 MMOL/L (ref 3.5–5.2)
PROT SERPL-MCNC: 7.3 G/DL (ref 6–8.5)
RBC # BLD AUTO: 3.1 10*6/MM3 (ref 4.14–5.8)
SODIUM SERPL-SCNC: 138 MMOL/L (ref 136–145)
WBC NRBC COR # BLD AUTO: 8.34 10*3/MM3 (ref 3.4–10.8)

## 2025-02-18 PROCEDURE — 80053 COMPREHEN METABOLIC PANEL: CPT | Performed by: INTERNAL MEDICINE

## 2025-02-18 PROCEDURE — 80158 DRUG ASSAY CYCLOSPORINE: CPT | Performed by: INTERNAL MEDICINE

## 2025-02-18 PROCEDURE — 85025 COMPLETE CBC W/AUTO DIFF WBC: CPT | Performed by: INTERNAL MEDICINE

## 2025-02-18 RX ORDER — BUMETANIDE 1 MG/1
1 TABLET ORAL 2 TIMES WEEKLY
COMMUNITY

## 2025-02-18 RX ORDER — ALLOPURINOL 300 MG/1
1 TABLET ORAL DAILY
COMMUNITY
Start: 2025-01-18

## 2025-02-18 NOTE — PROGRESS NOTES
Venipuncture Blood Specimen Collection  Venipuncture performed in right arm by Rachell Mayorga MA with good hemostasis. Patient tolerated the procedure well without complications.   02/18/25   Rachell Mayorga MA

## 2025-02-18 NOTE — PROGRESS NOTES
Name:  Chinedu Silva  :  1933  Date:  2025     REFERRING PHYSICIAN    PRIMARY CARE PROVIDER  Jr Segura PA    REASON FOR FOLLOWUP  1. Acquired red cell aplasia      CHIEF COMPLAINT  None.    Dear  Colton,    HISTORY OF PRESENT ILLNESS:   I saw Mr. Silva in followup (from his recent hospitalization for severe anemia) today in our hematology/oncology clinic. As you are aware, he is a pleasant, 89 y.o., white male with a history of hypertension, CAD and GERD who presented to the [facility] ED on 2024 with progressive weakness and dyspnea on exertion. These symptoms had potentially first started ~two to three months prior; however, they significantly worsened over the course of the previous couple of weeks (by early 2024). Upon arrival to our ED, he was found to be severely anemic, with a Hg of 3.7 g/dL. His other cell lines (WBCs and platelets) were entirely WNL. Our service was consulted for further evaluation of this issue, and a bone marrow biopsy was performed on 2024. Meanwhile, as he was feeling substantially better after his HgB was transfused up to the ~9 g/dL range, he was able to be discharged to outpatient follow up in our clinic. He returned to our clinic the following week (on 2024) to go over the results of the bone marrow exam and for ongoing management.    INTERIM HISTORY:  Mr. Silva returns to clinic today for follow up once again accompanied by his wife. He was seen at [facility] on 2024 for an additional opinion for his acquired pure red cell aplasia. He was on PO cyclosporine for a total of ~three weeks (50 mg BID for the first week and 100 mg BID for the next two weeks) in late August/early 2024. Due to progressive, severe and refractory mucositis (as well as a toxic cyclosporine level), he was instructed to discontinue the cyclosporine in early September. After holding the cyclosporine for the next ~one and one half  weeks, his mucositis finally improved. He has now been back on the cyclosporine (at a dose of 50 mg BID) for a little over five (5) months now, as instructed. While he has redeveloped some intermittent, but currently still manageable, mucositis, he is overall still doing much much better than he was in 2024, as his fatigue has recently remained improved and his HgB has remained greater than 10 g/dL for a prolonged period of time (and he has still not required any PRBC transfusions since late Summer 2024). He again walked from the parking lot into our clinic today, which is something he was completely unable to do not much more than a few months ago. He continues to have some chronically and mildly sore gums; however, he otherwise once again has no new or other specific complaints.    Past Medical History:   Diagnosis Date    Carotid stenosis     Coronary artery disease     Diverticulitis     GERD (gastroesophageal reflux disease)     Gout     Hearing aid worn     Hypertension     Obese     Prostate cancer     Wears glasses        Past Surgical History:   Procedure Laterality Date    CAROTID ENDARTERECTOMY Left 2018    Procedure: CAROTID ENDARTERECTOMY WITH EEG LEFT;  Surgeon: Del Neal MD;  Location: [facility];  Service:     CATARACT EXTRACTION      CATARACT EXTRACTION WITH INTRAOCULAR LENS IMPLANT Right     CHOLECYSTECTOMY      COLONOSCOPY W/ POLYPECTOMY      HERNIA REPAIR Right     TOTAL KNEE ARTHROPLASTY Right     TURP / TRANSURETHRAL INCISION / DRAINAGE PROSTATE         Social History     Socioeconomic History    Marital status:     Number of children: 1   Tobacco Use    Smoking status: Former     Current packs/day: 0.00     Average packs/day: 1 pack/day for 12.0 years (12.0 ttl pk-yrs)     Types: Cigarettes     Start date:      Quit date:      Years since quittin.1    Smokeless tobacco: Current     Types: Chew   Vaping Use    Vaping status: Never Used   Substance and Sexual  "Activity    Alcohol use: No    Drug use: No    Sexual activity: Defer       Family History   Problem Relation Age of Onset    Hypertension Mother     Glaucoma Mother     Diverticulitis Mother     Gout Mother     ALS Father     Gout Sister     Broken bones Paternal Grandfather     Broken bones Son     Cancer Son     Diabetes Brother        Allergies   Allergen Reactions    Ciprofloxacin Other (See Comments)     Muscle Pains    Lisinopril Cough    Tetracycline Provider Review Needed     Other Reaction(s) from [facility]: UNK    Sulfa Antibiotics Itching and Rash       Current Outpatient Medications   Medication Sig Dispense Refill    allopurinol (ZYLOPRIM) 300 MG tablet Take 1 tablet by mouth Daily.      aspirin 81 MG EC tablet Take 1 tablet by mouth Daily.      bumetanide (BUMEX) 1 MG tablet Take 1 tablet by mouth 2 (Two) Times a Week. Every Monday and Thursday      cycloSPORINE modified (NEORAL) 25 MG capsule Take 2 capsules by mouth 2 (Two) Times a Day for 30 days. 120 capsule 0    ondansetron (ZOFRAN) 8 MG tablet Take 1 tablet by mouth Every 8 (Eight) Hours As Needed for Nausea or Vomiting. 30 tablet 1    rosuvastatin (CRESTOR) 10 MG tablet Take 1 tablet by mouth Daily. 90 tablet 3    valsartan (DIOVAN) 80 MG tablet Take 0.5 tablets by mouth Daily.       No current facility-administered medications for this visit.     REVIEW OF SYSTEMS  CONSTITUTIONAL:  No fever, chills or night sweats. Chronic fatigue, recently still improved.  EYES:  No blurry vision, diplopia or other vision changes.  ENT:  No hearing loss or nosebleeds. Mild, recurrent mucositis, still very manageable, as per the HPI above.  CARDIOVASCULAR:  No palpitations, arrhythmia, syncopal episodes or edema.  PULMONARY:  No hemoptysis, wheezing, chronic cough or shortness of breath.  GASTROINTESTINAL:  No nausea or vomiting. No constipation or diarrhea. No abdominal pain. Intermittently \"chalky\" stool, currently still resolved.  GENITOURINARY:  No " "hematuria, kidney stones or frequent urination.  MUSCULOSKELETAL:  No joint or back pains.  INTEGUMENTARY: No rashes or pruritus.  ENDOCRINE:  No excessive thirst or hot flashes.  HEMATOLOGIC:  No history of free bleeding, spontaneous bleeding or clotting.  IMMUNOLOGIC:  No allergies or frequent infections.  NEUROLOGIC: No numbness, tingling, seizures or weakness.  PSYCHIATRIC:  No anxiety or depression.    PHYSICAL EXAMINATION  /76   Pulse 69   Temp 97.5 °F (36.4 °C) (Temporal)   Resp 18   Ht 170.2 cm (67.01\")   Wt 69 kg (152 lb 3.2 oz)   SpO2 99%   BMI 23.83 kg/m²     Pain Score:  Pain Score    25 1339   PainSc: 0-No pain     PHQ-Score Total:  PHQ-9 Total Score:      ECO  GENERAL:  A well-developed, well-nourished, elderly, white male in no acute distress.  HEENT:  Pupils equally round and reactive to light. Extraocular muscles intact. Still no significant mucositis.  CARDIOVASCULAR:  Regular rate and rhythm. No murmurs, gallops or rubs.  LUNGS:  Clear to auscultation bilaterally. No wheezing.  ABDOMEN:  Soft, nontender, nondistended with positive bowel sounds.  EXTREMITIES:  No clubbing, cyanosis or edema bilaterally.  SKIN:  No rashes or petechiae.  NEURO:  Cranial nerves grossly intact. No focal deficits.  PSYCH:  Alert and oriented x3.    LABORATORY  Lab Results   Component Value Date    WBC 8.34 2025    HGB 10.9 (L) 2025    HCT 34.0 (L) 2025    .7 (H) 2025     2025    NEUTROABS 3.06 2025       Lab Results   Component Value Date     2025    K 5.0 2025     2025    CO2 28.6 2025    BUN 45 (H) 2025    CREATININE 1.80 (H) 2025    GLUCOSE 119 (H) 2025    CALCIUM 9.9 (H) 2025    AST 30 2025    ALT 30 2025    ALKPHOS 134 (H) 2025    BILITOT 0.7 2025    PROTEINTOT 7.3 2025    ALBUMIN 3.9 2025     Lab Results   Component Value Date    RETICCTPCT 1.67 " 12/16/2024     CBC (02/18/2025): WBCs: 8.34; HgB: 10.9; Hct: 34.0; platelets: 199; MCV: 109.7  CBC (01/21/2025): WBCs: 5.63; HgB: 10.8; Hct: 34.2; platelets: 225; MCV: 111.8  CBC (12/16/2024): WBCs: 6.56; HgB: 10.5; Hct: 32.9; platelets: 218; MCV: 113.8  CBC (12/02/2024): WBCs: 7.22; HgB: 10.3; Hct: 32.8; platelets: 218; MCV: 112.7  CBC (11/19/2024): WBCs: 6.30; HgB: 10.2; Hct: 32.1; platelets: 272; MCV: 110.7  CBC (11/05/2024): WBCs: 5.65; HgB: 9.5; Hct: 30.3; platelets: 277; MCV: 107.1  CBC (10/30/2024): WBCs: 6,65; HgB: 9.7; Hct: 30.1; platelets: 281; MCV: 106.0  CBC (10/21/2024): WBCs: 7.46; HgB: 9.6; Hct: 30.5; platelets: 349; MCV: 103.4  CBC (10/15/2024): WBCs: 7.86; HgB: 9.6; Hct: 30.8; platelets: 387; MCV: 101.3  CBC (10/09/2024): WBCs: 7.42; HgB: 8.8; Hct: 28.5; platelets: 373; MCV: 97.9  CBC (10/02/2024): WBCs: 6.94; HgB: 9.2; Hct: 29.6; platelets: 324; MCV: 95.2  CBC (09/25/2024): WBCs: 8.71; HgB: 7.3; Hct: 23.3; platelets: 349; MCV: 90.3  CBC (09/18/2024): WBCs: 9.33; HgB: 9.0; Hct: 27.7; platelets: 312; MCV: 87.4  CBC (09/10/2024): WBCs: 10.20; HgB: 10.4; Hct: 31.3; platelets: 222; MCV: 86.5  CBC (09/03/2024): WBCs: 10.95; HgB: 11.5; Hct: 35.6; platelets: 203; MCV: 86.6  CBC (08/29/2024): WBCs: 14.86; HgB: 7.4; Hct: 23.5; platelets: 275; MCV: 86.7  CBC (08/26/2024): WBCs: 11.72; HgB: 7.7; Hct: 24.6; platelets: 250; MCV: 86.6  CBC (08/23/2024): WBCs: 10.01; HgB: 8.0; Hct: 25.3; platelets: 237; MCV: 87.2  CBC (08/20/2024): WBCS: 8.59; HgB: 8.4; Hct: 26.4; platelets: 272; MCV: 86.8  CBC (08/14/2024): WBCs:  8.16; HgB: 9.0; Hct: 27.9; platelets: 319; MCV: 87.2  CBC (08/09/2024): WBCs: 12.00; HgB: 8.1; Hct: 25.5; platelets: 319; MCV: 90.7  CBC (08/02/2024): WBCs: 11.92; HgB: 8.1; Hct: 24.8; platelets: 204; MCV: 90.5  CBC (07/31/2024): WBCs: 17.04; HgB: 7.6; Hct: 23.3; platelets: 265; MCV: 95.5  CBC (07/26/2024): WBCs: 13.50; HgB: 8.1; Hct: 24.8; platelets: 329; MCV: 95.0  CBC (07/23/2024): WBCs: 16.43; HgB:  8.5; Hct: 25.8; platelets: 390; MCV: 96.3  CBC (07/15/2024): WBCs: 8.04; HgB: 8.3; Hct: 25.8; platelets: 340; MCV: 97.7  CBC (07/11/2024): WBCs: 7.25; HgB: 9.4; Hct: 29.0; platelets: 375; MCV: 97.0  CBC (07/01/2024): WBCs: 7.50; HgB: 9.4; Hct: 29.3; platelets: 272; MCV: 98.7  CBC (06/26/2024): WBCs: 6.67; HgB: 9.0; Hct: 27.5; platelets: 198; MCV: 98.6    Reticulocytes (12/16/2024): Percentage: 1.67; Absolute: 0.0483  Reticulocytes (12/02/2024): Percentage: 1.89; Absolute: 0.0550  Reticulocytes (11/19/2024): Percentage: 3.04; Absolute: 0.0882  Reticulocytes (11/05/2024): Percentage 2.91; Absolute: 0.0824  Reticulocytes (10/30/2024): Percentage: 3.67; Absolute: 0.1061  Reticulocytes (10/21/2024): Percentage: 4.21; Absolute: 0.1250  Reticulocytes (10/15/2024): Percentage: 5.24; Absolute: 0.1603  Reticulocytes (09/10/2024): Percentage: 0.47; Absolute: 0.0170  Reticulocytes (08/20/2024): Percentage: 0.26; Absolute: <0.0100    Cyclosporine level (02/18/2025): pending  Cyclosporine level (01/21/2025): 91 ng/mL  Cyclosporine level (12/30/2024): 154 ng/mL  Cyclosporine level (12/16/2024): 86 ng/mL  Cyclosporine level (12/02/2024): 183 ng/mL  Cyclosporine level (11/19/2024): 335 ng/mL  Cyclosporine level (11/05/2024): 320 ng/mL  Cyclosporine level (10/30/2024): 132 ng/mL  Cyclosporine level (10/21/2024): 216 ng/mL  Cyclosporine level (10/15/2024): 315 ng/mL  Cyclosporine level (10/09/2024): 48 ng/mL  Cyclosporine level (10/02/2024): 121 ng/mL  Cyclosporine level (09/25/2024): 107 ng/mL    IMAGING  CT chest without contrast (11/04/2024):  Impression: No parenchymal nodules, adenopathy or effusions.    CT abdomen and pelvis without contrast (11/04/2024):  Impression:  1) Minimal sigmoid diverticulitis.  2) Other findings [are nonacute].    PATHOLOGY  Peripheral smear, bone marrow aspiration smear, bone marrow aspiration clot and bone marrow core biopsy (06/26/2024):  Normocellular bone marrow with increased granulopoiesis,  essentially absent erythropoiesis, adequate megakaryopoiesis, increased stainable iron and slightly increased mixed chronic inflammation. No evidence of increased blasts or dysplasia. The most striking feature within the bone marrow is the essential absence of any erythropoiesis in the aspirate or core biopsy. There is mixed inflammation with some notable immunophenotypic findings by flow cytometry, but there is no evidence of bone marrow involvement by lymphoma. This is most suggestive of a mixed reactive lymphoid population.    IMPRESSION AND PLAN  Mr. Silva is a 89 y.o., white male with:  Pure red cell aplasia: I have had multiple, long discussions with the patient and his wife regarding the results of the bone marrow biopsy performed in late June 2024 (which are summarized above). In short, this is a fairly uncommon diagnosis that can either be idiopathic or associated with a number of potential, underlying causes, including thymoma or parvovirus infection. Regardless, high dose steroids (prednisone 1-2 mg/kg daily) is the standard, first-line treatment; however, since the repeat CBC drawn on 07/01/2024 showed that his HgB (9.4 g/dL) had improved compared to what it was when he was discharged from our hospital following the bone marrow biopsy the previous week (9.0 g/dL), it was hoped that he was satisfactorily recovering (from a viral trigger) on his own. However, a repeat CBC on 07/15/2024 revealed that his HgB had dropped again (8.3 mg/dL); and his reticulocyte counts remained essentially zero. A Rx for prednisone 80 mg PO daily was provided at that time; but, unfortunately, high-dose steroids never provided him with any meaningful effect. Consequently, by late August 2024, he was started on an additional Rx (cyclosporine 50 mg PO BID), per [facility]'s recommendations (with whom he had met for an additional opinion regarding his management earlier that month). Once he tolerated this starting dose for ~one  week, in late August (08/27/2024), we increased the cyclosporine to 100 mg PO BID and further decreased the prednisone to 40 mg PO daily. At his appointment on 09/03/2024, as his anemia (finally) improved (rather well, to a HgB of 11.5 g/dL), we continued the cyclosporine and further decreased the prednisone to 20 mg PO daily. Unfortunately, at the time of his follow up appointment on 09/10/2024, a repeat CBC did not show any further improvement in his HgB (it was 10.4 g/dL), issue #2 (see below) was dramatically worse; and his repeat serum cyclosporine was > 400 ng/mL (above the upper limit of the desirable range). Given all of this, the cyclosporine was held completely for the next one and one half weeks. Meanwhile, he further decreased his daily prednisone dose (from 10 mg daily to 5 mg daily to, as of late September, off completely), as instructed. After remaining off of cyclosporine for one and one half weeks, his mouth sores (finally) improved; and his PO intake renormalized. Unfortunately, throughout the latter part of September 2024, he was getting steadily fatigued again, correlating with the fact that, not surprisingly, his HgB continued to redecline off of the cyclosporine (and it is was back down to 7.3 g/dL by 09/25/2024, at which time he required the transfusion of an additional two units of PRBCs). He has now been back on cyclosporine (the 50 mg BID dose) for a little over five (5) months now, as instructed; and, so far, issue #2 has still yet to significantly reworsen (it has intermittently mildly recurred, but it remains, at worst, very tolerable with localized therapies such as Orajel). Meanwhile, today's (02/18/2025) repeat labwork shows that his HgB is currently still much improved compared to last Fall and recently stable (10.9 g/dL; it has actually remained > 10 g/dL for months now). He was again instructed to continue the cyclosporine at the current dose. We will recheck a CBC in one month and  "see him back in our clinic again in another two months (in ~mid-April) with a CBC, CMP and cyclosporine level for another symptom/toxicity check. At that time, we will consider re-referring him to [facility] for an opinion regarding the feasibility/means of weaning his dose of cyclosporine down (or off completely?).  Mucositis: A progressive and refractory issue that began in late August 2024, shortly after he started PO cyclosporine for the treatment of issue #1 (particularly after he was escalated to the 100 mg BID dose). As discussed above, by early September 2024, this was dramatically reducing his PO intake, leading to dehydration and acute renal insufficiency. Also as discussed above, these symptoms have recently still been much better and manageable; and the (lower) dose of PO cyclosporine (50 mg BID) has been reintroduced. He and his wife have been counseled on the routine use of magic mouthwash, OraGel and/or other, similar products. They also know to skip the occasional dose of the cyclosporine, if needed. Continue to monitor.  \"Chalky stool\": He and his wife previously clarified that this issue has actually occurred previously. In approximately Summer 2023, he had to be referred to [facility] gastroenterology, who performed an ERCP for (what sounds like benign) stricturing. Regardless, CT scans of the chest, abdomen and pelvis without contrast performed on 11/05/2024 (and summarized above) for further evaluation were overall unremarkable; and this symptom is currently still re-resolved. Continue to monitor.  Lower extremity edema: Multifactorial, with age-related venous insufficiency and the resulting, dependent edema definitely contributing. Ongoing management per primary care.  The patient and his wife were in agreement with this plan.    It is a pleasure to participate in Mr. Silva's care. Please do not hesitate to call with any questions or concerns that you may have.    A total of 30 minutes were " spent coordinating this patient’s care in clinic today; more than 50% of this time was face-to-face with the patient and his wife, reviewing his interim medical history, discussing the results of the most recent repeat labwork, including today's, and counseling on the current treatment and followup plan. All questions were answered to their satisfaction.    FOLLOW UP  Continue cyclosporine 50 mg PO BID. Repeat a CBC in 1 month (~mid-March). Return to our clinic in 2 months (~mid-April) with a repeat CBC, CMP and cyclosporine level.            This document was electronically signed by RAMIRO King MD February 18, 2025 14:16 EST      CC: RIGO Mccall MD

## 2025-02-22 LAB — CYCLOSPORINE BLD LC/MS/MS-MCNC: 155 NG/ML (ref 100–400)

## 2025-03-04 RX ORDER — CYCLOSPORINE 25 MG/1
50 CAPSULE, LIQUID FILLED ORAL 2 TIMES DAILY
Qty: 120 CAPSULE | Refills: 0 | Status: SHIPPED | OUTPATIENT
Start: 2025-03-04 | End: 2025-04-03

## 2025-03-14 ENCOUNTER — TELEPHONE (OUTPATIENT)
Dept: ONCOLOGY | Facility: CLINIC | Age: OVER 89
End: 2025-03-14

## 2025-03-14 ENCOUNTER — CLINICAL SUPPORT (OUTPATIENT)
Dept: ONCOLOGY | Facility: CLINIC | Age: OVER 89
End: 2025-03-14
Payer: MEDICARE

## 2025-03-14 VITALS
SYSTOLIC BLOOD PRESSURE: 123 MMHG | HEART RATE: 78 BPM | RESPIRATION RATE: 20 BRPM | WEIGHT: 150 LBS | OXYGEN SATURATION: 94 % | BODY MASS INDEX: 23.54 KG/M2 | TEMPERATURE: 98 F | HEIGHT: 67 IN | DIASTOLIC BLOOD PRESSURE: 72 MMHG

## 2025-03-14 DIAGNOSIS — E78.5 DYSLIPIDEMIA: ICD-10-CM

## 2025-03-14 DIAGNOSIS — D60.9 ACQUIRED RED CELL APLASIA: ICD-10-CM

## 2025-03-14 DIAGNOSIS — D61.9 APLASTIC ANEMIA: ICD-10-CM

## 2025-03-14 DIAGNOSIS — D64.9 SYMPTOMATIC ANEMIA: ICD-10-CM

## 2025-03-14 LAB
ALBUMIN SERPL-MCNC: 3.9 G/DL (ref 3.5–5.2)
ALBUMIN/GLOB SERPL: 1.4 G/DL
ALP SERPL-CCNC: 131 U/L (ref 39–117)
ALT SERPL W P-5'-P-CCNC: 27 U/L (ref 1–41)
ANION GAP SERPL CALCULATED.3IONS-SCNC: 9 MMOL/L (ref 5–15)
AST SERPL-CCNC: 23 U/L (ref 1–40)
BASOPHILS # BLD AUTO: 0.02 10*3/MM3 (ref 0–0.2)
BASOPHILS NFR BLD AUTO: 0.3 % (ref 0–1.5)
BILIRUB SERPL-MCNC: 1 MG/DL (ref 0–1.2)
BUN SERPL-MCNC: 49 MG/DL (ref 8–23)
BUN/CREAT SERPL: 27.7 (ref 7–25)
CALCIUM SPEC-SCNC: 9.6 MG/DL (ref 8.2–9.6)
CHLORIDE SERPL-SCNC: 100 MMOL/L (ref 98–107)
CO2 SERPL-SCNC: 25 MMOL/L (ref 22–29)
CREAT SERPL-MCNC: 1.77 MG/DL (ref 0.76–1.27)
DEPRECATED RDW RBC AUTO: 54.3 FL (ref 37–54)
EGFRCR SERPLBLD CKD-EPI 2021: 35.6 ML/MIN/1.73
EOSINOPHIL # BLD AUTO: 0.1 10*3/MM3 (ref 0–0.4)
EOSINOPHIL NFR BLD AUTO: 1.7 % (ref 0.3–6.2)
ERYTHROCYTE [DISTWIDTH] IN BLOOD BY AUTOMATED COUNT: 13.9 % (ref 12.3–15.4)
GLOBULIN UR ELPH-MCNC: 2.8 GM/DL
GLUCOSE SERPL-MCNC: 134 MG/DL (ref 65–99)
HCT VFR BLD AUTO: 32.6 % (ref 37.5–51)
HGB BLD-MCNC: 10.5 G/DL (ref 13–17.7)
IMM GRANULOCYTES # BLD AUTO: 0.02 10*3/MM3 (ref 0–0.05)
IMM GRANULOCYTES NFR BLD AUTO: 0.3 % (ref 0–0.5)
LYMPHOCYTES # BLD AUTO: 2.5 10*3/MM3 (ref 0.7–3.1)
LYMPHOCYTES NFR BLD AUTO: 43.2 % (ref 19.6–45.3)
MCH RBC QN AUTO: 34.5 PG (ref 26.6–33)
MCHC RBC AUTO-ENTMCNC: 32.2 G/DL (ref 31.5–35.7)
MCV RBC AUTO: 107.2 FL (ref 79–97)
MONOCYTES # BLD AUTO: 0.49 10*3/MM3 (ref 0.1–0.9)
MONOCYTES NFR BLD AUTO: 8.5 % (ref 5–12)
NEUTROPHILS NFR BLD AUTO: 2.66 10*3/MM3 (ref 1.7–7)
NEUTROPHILS NFR BLD AUTO: 46 % (ref 42.7–76)
NRBC BLD AUTO-RTO: 0 /100 WBC (ref 0–0.2)
PLATELET # BLD AUTO: 191 10*3/MM3 (ref 140–450)
PMV BLD AUTO: 12.4 FL (ref 6–12)
POTASSIUM SERPL-SCNC: 4.8 MMOL/L (ref 3.5–5.2)
PROT SERPL-MCNC: 6.7 G/DL (ref 6–8.5)
RBC # BLD AUTO: 3.04 10*6/MM3 (ref 4.14–5.8)
RETICS # AUTO: 0.05 10*6/MM3 (ref 0.02–0.13)
RETICS/RBC NFR AUTO: 1.74 % (ref 0.7–1.9)
SODIUM SERPL-SCNC: 134 MMOL/L (ref 136–145)
WBC NRBC COR # BLD AUTO: 5.79 10*3/MM3 (ref 3.4–10.8)

## 2025-03-14 PROCEDURE — 80158 DRUG ASSAY CYCLOSPORINE: CPT | Performed by: INTERNAL MEDICINE

## 2025-03-14 PROCEDURE — 85045 AUTOMATED RETICULOCYTE COUNT: CPT

## 2025-03-14 PROCEDURE — 85025 COMPLETE CBC W/AUTO DIFF WBC: CPT

## 2025-03-14 PROCEDURE — 80053 COMPREHEN METABOLIC PANEL: CPT

## 2025-03-14 NOTE — TELEPHONE ENCOUNTER
[place] says Chinedu is having trouble getting around and is very weak again. He has a lab-only appointment on Tuesday, 3/18, but she asks if he should come in sooner than that to have his labs checked.

## 2025-03-14 NOTE — PROGRESS NOTES
Venipuncture Blood Specimen Collection  Venipuncture performed in right arm by Shena Sewell MA with good hemostasis. Patient tolerated the procedure well without complications.   03/14/25   Shena Sewell MA

## 2025-03-17 LAB — CYCLOSPORINE BLD LC/MS/MS-MCNC: 164 NG/ML (ref 100–400)

## 2025-03-18 ENCOUNTER — CLINICAL SUPPORT (OUTPATIENT)
Dept: ONCOLOGY | Facility: CLINIC | Age: OVER 89
End: 2025-03-18
Payer: MEDICARE

## 2025-03-18 DIAGNOSIS — D60.9 ACQUIRED RED CELL APLASIA: ICD-10-CM

## 2025-03-18 LAB
ALBUMIN SERPL-MCNC: 3.9 G/DL (ref 3.5–5.2)
ALBUMIN/GLOB SERPL: 1.4 G/DL
ALP SERPL-CCNC: 132 U/L (ref 39–117)
ALT SERPL W P-5'-P-CCNC: 22 U/L (ref 1–41)
ANION GAP SERPL CALCULATED.3IONS-SCNC: 8.7 MMOL/L (ref 5–15)
AST SERPL-CCNC: 18 U/L (ref 1–40)
BASOPHILS # BLD AUTO: 0.02 10*3/MM3 (ref 0–0.2)
BASOPHILS NFR BLD AUTO: 0.3 % (ref 0–1.5)
BILIRUB SERPL-MCNC: 0.9 MG/DL (ref 0–1.2)
BUN SERPL-MCNC: 58 MG/DL (ref 8–23)
BUN/CREAT SERPL: 31.5 (ref 7–25)
CALCIUM SPEC-SCNC: 9.7 MG/DL (ref 8.2–9.6)
CHLORIDE SERPL-SCNC: 101 MMOL/L (ref 98–107)
CO2 SERPL-SCNC: 25.3 MMOL/L (ref 22–29)
CREAT SERPL-MCNC: 1.84 MG/DL (ref 0.76–1.27)
DEPRECATED RDW RBC AUTO: 55.6 FL (ref 37–54)
EGFRCR SERPLBLD CKD-EPI 2021: 34 ML/MIN/1.73
EOSINOPHIL # BLD AUTO: 0.18 10*3/MM3 (ref 0–0.4)
EOSINOPHIL NFR BLD AUTO: 2.4 % (ref 0.3–6.2)
ERYTHROCYTE [DISTWIDTH] IN BLOOD BY AUTOMATED COUNT: 14.2 % (ref 12.3–15.4)
GLOBULIN UR ELPH-MCNC: 2.7 GM/DL
GLUCOSE SERPL-MCNC: 167 MG/DL (ref 65–99)
HCT VFR BLD AUTO: 31.3 % (ref 37.5–51)
HGB BLD-MCNC: 10.1 G/DL (ref 13–17.7)
IMM GRANULOCYTES # BLD AUTO: 0.02 10*3/MM3 (ref 0–0.05)
IMM GRANULOCYTES NFR BLD AUTO: 0.3 % (ref 0–0.5)
LYMPHOCYTES # BLD AUTO: 3.58 10*3/MM3 (ref 0.7–3.1)
LYMPHOCYTES NFR BLD AUTO: 47.5 % (ref 19.6–45.3)
MCH RBC QN AUTO: 34.8 PG (ref 26.6–33)
MCHC RBC AUTO-ENTMCNC: 32.3 G/DL (ref 31.5–35.7)
MCV RBC AUTO: 107.9 FL (ref 79–97)
MONOCYTES # BLD AUTO: 0.6 10*3/MM3 (ref 0.1–0.9)
MONOCYTES NFR BLD AUTO: 8 % (ref 5–12)
NEUTROPHILS NFR BLD AUTO: 3.14 10*3/MM3 (ref 1.7–7)
NEUTROPHILS NFR BLD AUTO: 41.5 % (ref 42.7–76)
NRBC BLD AUTO-RTO: 0 /100 WBC (ref 0–0.2)
PLATELET # BLD AUTO: 198 10*3/MM3 (ref 140–450)
PMV BLD AUTO: 12.9 FL (ref 6–12)
POTASSIUM SERPL-SCNC: 4.6 MMOL/L (ref 3.5–5.2)
PROT SERPL-MCNC: 6.6 G/DL (ref 6–8.5)
RBC # BLD AUTO: 2.9 10*6/MM3 (ref 4.14–5.8)
RETICS # AUTO: 0.06 10*6/MM3 (ref 0.02–0.13)
RETICS/RBC NFR AUTO: 2.16 % (ref 0.7–1.9)
SODIUM SERPL-SCNC: 135 MMOL/L (ref 136–145)
WBC NRBC COR # BLD AUTO: 7.54 10*3/MM3 (ref 3.4–10.8)

## 2025-03-18 PROCEDURE — 85045 AUTOMATED RETICULOCYTE COUNT: CPT | Performed by: NURSE PRACTITIONER

## 2025-03-18 PROCEDURE — 85025 COMPLETE CBC W/AUTO DIFF WBC: CPT | Performed by: NURSE PRACTITIONER

## 2025-03-18 PROCEDURE — 80053 COMPREHEN METABOLIC PANEL: CPT | Performed by: NURSE PRACTITIONER

## 2025-03-18 NOTE — PROGRESS NOTES
Venipuncture Blood Specimen Collection  Venipuncture performed in left arm by Nancy Chandra MA with good hemostasis. Patient tolerated the procedure well without complications.   03/18/25   Nancy Chandra MA

## 2025-04-07 RX ORDER — CYCLOSPORINE 25 MG/1
50 CAPSULE, LIQUID FILLED ORAL 2 TIMES DAILY
Qty: 120 CAPSULE | Refills: 0 | Status: SHIPPED | OUTPATIENT
Start: 2025-04-07 | End: 2025-05-07

## 2025-04-18 ENCOUNTER — OFFICE VISIT (OUTPATIENT)
Dept: ONCOLOGY | Facility: CLINIC | Age: OVER 89
End: 2025-04-18
Payer: MEDICARE

## 2025-04-18 ENCOUNTER — LAB (OUTPATIENT)
Dept: ONCOLOGY | Facility: CLINIC | Age: OVER 89
End: 2025-04-18
Payer: MEDICARE

## 2025-04-18 VITALS
BODY MASS INDEX: 23.73 KG/M2 | SYSTOLIC BLOOD PRESSURE: 118 MMHG | DIASTOLIC BLOOD PRESSURE: 66 MMHG | TEMPERATURE: 97.8 F | WEIGHT: 151.2 LBS | HEART RATE: 67 BPM | OXYGEN SATURATION: 98 % | RESPIRATION RATE: 19 BRPM | HEIGHT: 67 IN

## 2025-04-18 DIAGNOSIS — D60.9 ACQUIRED RED CELL APLASIA: ICD-10-CM

## 2025-04-18 DIAGNOSIS — D60.9 ACQUIRED RED CELL APLASIA: Primary | ICD-10-CM

## 2025-04-18 DIAGNOSIS — D64.9 SYMPTOMATIC ANEMIA: ICD-10-CM

## 2025-04-18 DIAGNOSIS — D61.9 APLASTIC ANEMIA: ICD-10-CM

## 2025-04-18 LAB
ALBUMIN SERPL-MCNC: 3.8 G/DL (ref 3.5–5.2)
ALBUMIN/GLOB SERPL: 1.3 G/DL
ALP SERPL-CCNC: 121 U/L (ref 39–117)
ALT SERPL W P-5'-P-CCNC: 23 U/L (ref 1–41)
ANION GAP SERPL CALCULATED.3IONS-SCNC: 8.4 MMOL/L (ref 5–15)
AST SERPL-CCNC: 18 U/L (ref 1–40)
BASOPHILS # BLD AUTO: 0.03 10*3/MM3 (ref 0–0.2)
BASOPHILS NFR BLD AUTO: 0.4 % (ref 0–1.5)
BILIRUB SERPL-MCNC: 0.9 MG/DL (ref 0–1.2)
BUN SERPL-MCNC: 42 MG/DL (ref 8–23)
BUN/CREAT SERPL: 24.9 (ref 7–25)
CALCIUM SPEC-SCNC: 9.6 MG/DL (ref 8.2–9.6)
CHLORIDE SERPL-SCNC: 106 MMOL/L (ref 98–107)
CO2 SERPL-SCNC: 26.6 MMOL/L (ref 22–29)
CREAT SERPL-MCNC: 1.69 MG/DL (ref 0.76–1.27)
DEPRECATED RDW RBC AUTO: 58.4 FL (ref 37–54)
EGFRCR SERPLBLD CKD-EPI 2021: 37.6 ML/MIN/1.73
EOSINOPHIL # BLD AUTO: 0.17 10*3/MM3 (ref 0–0.4)
EOSINOPHIL NFR BLD AUTO: 2.2 % (ref 0.3–6.2)
ERYTHROCYTE [DISTWIDTH] IN BLOOD BY AUTOMATED COUNT: 14.3 % (ref 12.3–15.4)
GLOBULIN UR ELPH-MCNC: 3 GM/DL
GLUCOSE SERPL-MCNC: 116 MG/DL (ref 65–99)
HCT VFR BLD AUTO: 33.1 % (ref 37.5–51)
HGB BLD-MCNC: 10.4 G/DL (ref 13–17.7)
IMM GRANULOCYTES # BLD AUTO: 0.02 10*3/MM3 (ref 0–0.05)
IMM GRANULOCYTES NFR BLD AUTO: 0.3 % (ref 0–0.5)
LYMPHOCYTES # BLD AUTO: 3.15 10*3/MM3 (ref 0.7–3.1)
LYMPHOCYTES NFR BLD AUTO: 40.2 % (ref 19.6–45.3)
MCH RBC QN AUTO: 34.8 PG (ref 26.6–33)
MCHC RBC AUTO-ENTMCNC: 31.4 G/DL (ref 31.5–35.7)
MCV RBC AUTO: 110.7 FL (ref 79–97)
MONOCYTES # BLD AUTO: 0.7 10*3/MM3 (ref 0.1–0.9)
MONOCYTES NFR BLD AUTO: 8.9 % (ref 5–12)
NEUTROPHILS NFR BLD AUTO: 3.76 10*3/MM3 (ref 1.7–7)
NEUTROPHILS NFR BLD AUTO: 48 % (ref 42.7–76)
NRBC BLD AUTO-RTO: 0 /100 WBC (ref 0–0.2)
PLATELET # BLD AUTO: 179 10*3/MM3 (ref 140–450)
PMV BLD AUTO: 12.7 FL (ref 6–12)
POTASSIUM SERPL-SCNC: 4.4 MMOL/L (ref 3.5–5.2)
PROT SERPL-MCNC: 6.8 G/DL (ref 6–8.5)
RBC # BLD AUTO: 2.99 10*6/MM3 (ref 4.14–5.8)
SODIUM SERPL-SCNC: 141 MMOL/L (ref 136–145)
WBC NRBC COR # BLD AUTO: 7.83 10*3/MM3 (ref 3.4–10.8)

## 2025-04-18 PROCEDURE — 85025 COMPLETE CBC W/AUTO DIFF WBC: CPT | Performed by: INTERNAL MEDICINE

## 2025-04-18 PROCEDURE — 80158 DRUG ASSAY CYCLOSPORINE: CPT | Performed by: INTERNAL MEDICINE

## 2025-04-18 PROCEDURE — 80053 COMPREHEN METABOLIC PANEL: CPT | Performed by: INTERNAL MEDICINE

## 2025-04-18 NOTE — PROGRESS NOTES
Name:  Chinedu Silva  :  1933  Date:  2025     REFERRING PHYSICIAN    PRIMARY CARE PROVIDER  Jr Segura PA    REASON FOR FOLLOWUP  1. Acquired red cell aplasia      CHIEF COMPLAINT  None.    Dear  [place],    HISTORY OF PRESENT ILLNESS:   I saw Mr. Silva in followup (from his recent hospitalization for severe anemia) today in our hematology/oncology clinic. As you are aware, he is a pleasant, 89 y.o., white male with a history of hypertension, CAD and GERD who presented to the [facility] ED on 2024 with progressive weakness and dyspnea on exertion. These symptoms had potentially first started ~two to three months prior; however, they significantly worsened over the course of the previous couple of weeks (by early 2024). Upon arrival to our ED, he was found to be severely anemic, with a Hg of 3.7 g/dL. His other cell lines (WBCs and platelets) were entirely WNL. Our service was consulted for further evaluation of this issue, and a bone marrow biopsy was performed on 2024. Meanwhile, as he was feeling substantially better after his HgB was transfused up to the ~9 g/dL range, he was able to be discharged to outpatient follow up in our clinic. He returned to our clinic the following week (on 2024) to go over the results of the bone marrow exam and for ongoing management.    INTERIM HISTORY:  Mr. Silva returns to clinic today for follow up once again accompanied by his wife. He was seen at [facility] on 2024 for an additional opinion for his acquired pure red cell aplasia. He was on PO cyclosporine for a total of ~three weeks (50 mg BID for the first week and 100 mg BID for the next two weeks) in late August/early 2024. Due to progressive, severe and refractory mucositis (as well as a toxic cyclosporine level), he was instructed to discontinue the cyclosporine in early 2024. After holding the cyclosporine for the next ~one and one  half weeks, his mucositis finally improved. He has now been back on the cyclosporine (at a dose of 50 mg BID) for a little over seven (7) months now, as instructed. While he has redeveloped some intermittent, but currently still very mild and manageable, mucositis, he is overall still doing much much better than he was in 2024, as his fatigue has recently remained improved and his HgB has remained greater than 10 g/dL for many months now (and he has still not required any PRBC transfusions since late Summer 2024). He again walked from the parking lot into our clinic today, which is something he was completely unable to do last /early Winter. He continues to have some chronically and mildly sore gums; however, he otherwise once again has no new or other specific complaints and continues to feel overall well.    Past Medical History:   Diagnosis Date    Carotid stenosis     Coronary artery disease     Diverticulitis     GERD (gastroesophageal reflux disease)     Gout     Hearing aid worn     Hypertension     Obese     Prostate cancer     Wears glasses        Past Surgical History:   Procedure Laterality Date    CAROTID ENDARTERECTOMY Left 2018    Procedure: CAROTID ENDARTERECTOMY WITH EEG LEFT;  Surgeon: Del Neal MD;  Location: [facility];  Service:     CATARACT EXTRACTION      CATARACT EXTRACTION WITH INTRAOCULAR LENS IMPLANT Right     CHOLECYSTECTOMY      COLONOSCOPY W/ POLYPECTOMY      HERNIA REPAIR Right     TOTAL KNEE ARTHROPLASTY Right     TURP / TRANSURETHRAL INCISION / DRAINAGE PROSTATE         Social History     Socioeconomic History    Marital status:     Number of children: 1   Tobacco Use    Smoking status: Former     Current packs/day: 0.00     Average packs/day: 1 pack/day for 12.0 years (12.0 ttl pk-yrs)     Types: Cigarettes     Start date:      Quit date:      Years since quittin.3    Smokeless tobacco: Current     Types: Chew   Vaping Use    Vaping status: Never  "Used   Substance and Sexual Activity    Alcohol use: No    Drug use: No    Sexual activity: Defer       Family History   Problem Relation Age of Onset    Hypertension Mother     Glaucoma Mother     Diverticulitis Mother     Gout Mother     ALS Father     Gout Sister     Broken bones Paternal Grandfather     Broken bones Son     Cancer Son     Diabetes Brother        Allergies   Allergen Reactions    Ciprofloxacin Other (See Comments)     Muscle Pains    Lisinopril Cough    Tetracycline Provider Review Needed     Other Reaction(s) from [facility]: UNK    Sulfa Antibiotics Itching and Rash       Current Outpatient Medications   Medication Sig Dispense Refill    allopurinol (ZYLOPRIM) 300 MG tablet Take 1 tablet by mouth Daily.      aspirin 81 MG EC tablet Take 1 tablet by mouth Daily.      bumetanide (BUMEX) 1 MG tablet Take 1 tablet by mouth 2 (Two) Times a Week. Every Monday and Thursday      cycloSPORINE modified (NEORAL) 25 MG capsule Take 2 capsules by mouth 2 (Two) Times a Day for 30 days. 120 capsule 0    ondansetron (ZOFRAN) 8 MG tablet Take 1 tablet by mouth Every 8 (Eight) Hours As Needed for Nausea or Vomiting. 30 tablet 1    rosuvastatin (CRESTOR) 10 MG tablet Take 1 tablet by mouth Daily. 90 tablet 3    valsartan (DIOVAN) 80 MG tablet Take 0.5 tablets by mouth Daily.       No current facility-administered medications for this visit.     REVIEW OF SYSTEMS  CONSTITUTIONAL:  No fever, chills or night sweats. Chronic fatigue, recently still improved.  EYES:  No blurry vision, diplopia or other vision changes.  ENT:  No hearing loss or nosebleeds. Mild, recurrent mucositis, still very manageable, as per the HPI above.  CARDIOVASCULAR:  No palpitations, arrhythmia, syncopal episodes or edema.  PULMONARY:  No hemoptysis, wheezing, chronic cough or shortness of breath.  GASTROINTESTINAL:  No nausea or vomiting. No constipation or diarrhea. No abdominal pain. Intermittently \"chalky\" stool, currently still " "resolved.  GENITOURINARY:  No hematuria, kidney stones or frequent urination.  MUSCULOSKELETAL:  No joint or back pains.  INTEGUMENTARY: No rashes or pruritus.  ENDOCRINE:  No excessive thirst or hot flashes.  HEMATOLOGIC:  No history of free bleeding, spontaneous bleeding or clotting.  IMMUNOLOGIC:  No allergies or frequent infections.  NEUROLOGIC: No numbness, tingling, seizures or weakness.  PSYCHIATRIC:  No anxiety or depression.    PHYSICAL EXAMINATION  /66   Pulse 67   Temp 97.8 °F (36.6 °C) (Temporal)   Resp 19   Ht 170.2 cm (67.01\")   Wt 68.6 kg (151 lb 3.2 oz)   SpO2 98%   BMI 23.68 kg/m²     Pain Score:  Pain Score    25 1152   PainSc: 0-No pain     PHQ-Score Total:  PHQ-9 Total Score:      ECO  GENERAL:  A well-developed, well-nourished, elderly, white male in no acute distress.  HEENT:  Pupils equally round and reactive to light. Extraocular muscles intact. Still no significant mucositis.  CARDIOVASCULAR:  Regular rate and rhythm. No murmurs, gallops or rubs.  LUNGS:  Clear to auscultation bilaterally. No wheezing.  ABDOMEN:  Soft, nontender, nondistended with positive bowel sounds.  EXTREMITIES:  No clubbing, cyanosis or edema bilaterally.  SKIN:  No rashes or petechiae.  NEURO:  Cranial nerves grossly intact. No focal deficits.  PSYCH:  Alert and oriented x3.    The physical exam is unchanged from the one a couple of months ago.    LABORATORY  Lab Results   Component Value Date    WBC 7.83 2025    HGB 10.4 (L) 2025    HCT 33.1 (L) 2025    .7 (H) 2025     2025    NEUTROABS 3.76 2025       Lab Results   Component Value Date     (L) 2025    K 4.6 2025     2025    CO2 25.3 2025    BUN 58 (H) 2025    CREATININE 1.84 (H) 2025    GLUCOSE 167 (H) 2025    CALCIUM 9.7 (H) 2025    AST 18 2025    ALT 22 2025    ALKPHOS 132 (H) 2025    BILITOT 0.9 2025    " PROTEINTOT 6.6 03/18/2025    ALBUMIN 3.9 03/18/2025     Lab Results   Component Value Date    RETICCTPCT 2.16 (H) 03/18/2025     CBC (04/18/2025): WBCs: 7.83; HgB: 10.4; Hct: 33.1; platelets: 179; MCV: 110.7  CBC (03/18/2025): WBCs: 7.54; HgB: 10.1; Hct: 31.3; platelets: 198; MCV: 107.9  CBC (02/18/2025): WBCs: 8.34; HgB: 10.9; Hct: 34.0; platelets: 199; MCV: 109.7  CBC (01/21/2025): WBCs: 5.63; HgB: 10.8; Hct: 34.2; platelets: 225; MCV: 111.8  CBC (12/16/2024): WBCs: 6.56; HgB: 10.5; Hct: 32.9; platelets: 218; MCV: 113.8  CBC (12/02/2024): WBCs: 7.22; HgB: 10.3; Hct: 32.8; platelets: 218; MCV: 112.7  CBC (11/19/2024): WBCs: 6.30; HgB: 10.2; Hct: 32.1; platelets: 272; MCV: 110.7  CBC (11/05/2024): WBCs: 5.65; HgB: 9.5; Hct: 30.3; platelets: 277; MCV: 107.1  CBC (10/30/2024): WBCs: 6,65; HgB: 9.7; Hct: 30.1; platelets: 281; MCV: 106.0  CBC (10/21/2024): WBCs: 7.46; HgB: 9.6; Hct: 30.5; platelets: 349; MCV: 103.4  CBC (10/15/2024): WBCs: 7.86; HgB: 9.6; Hct: 30.8; platelets: 387; MCV: 101.3  CBC (10/09/2024): WBCs: 7.42; HgB: 8.8; Hct: 28.5; platelets: 373; MCV: 97.9  CBC (10/02/2024): WBCs: 6.94; HgB: 9.2; Hct: 29.6; platelets: 324; MCV: 95.2  CBC (09/25/2024): WBCs: 8.71; HgB: 7.3; Hct: 23.3; platelets: 349; MCV: 90.3  CBC (09/18/2024): WBCs: 9.33; HgB: 9.0; Hct: 27.7; platelets: 312; MCV: 87.4  CBC (09/10/2024): WBCs: 10.20; HgB: 10.4; Hct: 31.3; platelets: 222; MCV: 86.5  CBC (09/03/2024): WBCs: 10.95; HgB: 11.5; Hct: 35.6; platelets: 203; MCV: 86.6  CBC (08/29/2024): WBCs: 14.86; HgB: 7.4; Hct: 23.5; platelets: 275; MCV: 86.7  CBC (08/26/2024): WBCs: 11.72; HgB: 7.7; Hct: 24.6; platelets: 250; MCV: 86.6  CBC (08/23/2024): WBCs: 10.01; HgB: 8.0; Hct: 25.3; platelets: 237; MCV: 87.2  CBC (08/20/2024): WBCS: 8.59; HgB: 8.4; Hct: 26.4; platelets: 272; MCV: 86.8  CBC (08/14/2024): WBCs:  8.16; HgB: 9.0; Hct: 27.9; platelets: 319; MCV: 87.2  CBC (08/09/2024): WBCs: 12.00; HgB: 8.1; Hct: 25.5; platelets: 319; MCV:  90.7  CBC (08/02/2024): WBCs: 11.92; HgB: 8.1; Hct: 24.8; platelets: 204; MCV: 90.5  CBC (07/31/2024): WBCs: 17.04; HgB: 7.6; Hct: 23.3; platelets: 265; MCV: 95.5  CBC (07/26/2024): WBCs: 13.50; HgB: 8.1; Hct: 24.8; platelets: 329; MCV: 95.0  CBC (07/23/2024): WBCs: 16.43; HgB: 8.5; Hct: 25.8; platelets: 390; MCV: 96.3  CBC (07/15/2024): WBCs: 8.04; HgB: 8.3; Hct: 25.8; platelets: 340; MCV: 97.7  CBC (07/11/2024): WBCs: 7.25; HgB: 9.4; Hct: 29.0; platelets: 375; MCV: 97.0  CBC (07/01/2024): WBCs: 7.50; HgB: 9.4; Hct: 29.3; platelets: 272; MCV: 98.7  CBC (06/26/2024): WBCs: 6.67; HgB: 9.0; Hct: 27.5; platelets: 198; MCV: 98.6    Reticulocytes (12/16/2024): Percentage: 1.67; Absolute: 0.0483  Reticulocytes (12/02/2024): Percentage: 1.89; Absolute: 0.0550  Reticulocytes (11/19/2024): Percentage: 3.04; Absolute: 0.0882  Reticulocytes (11/05/2024): Percentage 2.91; Absolute: 0.0824  Reticulocytes (10/30/2024): Percentage: 3.67; Absolute: 0.1061  Reticulocytes (10/21/2024): Percentage: 4.21; Absolute: 0.1250  Reticulocytes (10/15/2024): Percentage: 5.24; Absolute: 0.1603  Reticulocytes (09/10/2024): Percentage: 0.47; Absolute: 0.0170  Reticulocytes (08/20/2024): Percentage: 0.26; Absolute: <0.0100    Cyclosporine level (02/18/2025): pending  Cyclosporine level (01/21/2025): 91 ng/mL  Cyclosporine level (12/30/2024): 154 ng/mL  Cyclosporine level (12/16/2024): 86 ng/mL  Cyclosporine level (12/02/2024): 183 ng/mL  Cyclosporine level (11/19/2024): 335 ng/mL  Cyclosporine level (11/05/2024): 320 ng/mL  Cyclosporine level (10/30/2024): 132 ng/mL  Cyclosporine level (10/21/2024): 216 ng/mL  Cyclosporine level (10/15/2024): 315 ng/mL  Cyclosporine level (10/09/2024): 48 ng/mL  Cyclosporine level (10/02/2024): 121 ng/mL  Cyclosporine level (09/25/2024): 107 ng/mL    IMAGING  CT chest without contrast (11/04/2024):  Impression: No parenchymal nodules, adenopathy or effusions.    CT abdomen and pelvis without contrast  (11/04/2024):  Impression:  1) Minimal sigmoid diverticulitis.  2) Other findings [are nonacute].    PATHOLOGY  Peripheral smear, bone marrow aspiration smear, bone marrow aspiration clot and bone marrow core biopsy (06/26/2024):  Normocellular bone marrow with increased granulopoiesis, essentially absent erythropoiesis, adequate megakaryopoiesis, increased stainable iron and slightly increased mixed chronic inflammation. No evidence of increased blasts or dysplasia. The most striking feature within the bone marrow is the essential absence of any erythropoiesis in the aspirate or core biopsy. There is mixed inflammation with some notable immunophenotypic findings by flow cytometry, but there is no evidence of bone marrow involvement by lymphoma. This is most suggestive of a mixed reactive lymphoid population.    IMPRESSION AND PLAN  Mr. Silva is a 89 y.o., white male with:  Pure red cell aplasia: I have had multiple, long discussions with the patient and his wife regarding the results of the bone marrow biopsy performed in late June 2024 (which are summarized above). In short, this is a fairly uncommon diagnosis that can either be idiopathic or associated with a number of potential, underlying causes, including thymoma or parvovirus infection. Regardless, high dose steroids (prednisone 1-2 mg/kg daily) is the standard, first-line treatment; however, since the repeat CBC drawn on 07/01/2024 showed that his HgB (9.4 g/dL) had improved compared to what it was when he was discharged from our hospital following the bone marrow biopsy the previous week (9.0 g/dL), it was hoped that he was satisfactorily recovering (from a viral trigger) on his own. However, a repeat CBC on 07/15/2024 revealed that his HgB had dropped again (8.3 mg/dL); and his reticulocyte counts remained essentially zero. A Rx for prednisone 80 mg PO daily was provided at that time; but, unfortunately, high-dose steroids never provided him with any  meaningful effect. Consequently, by late August 2024, he was started on an additional Rx (cyclosporine 50 mg PO BID), per [place]'s recommendations (with whom he had met for an additional opinion regarding his management earlier that month). Once he tolerated this starting dose for ~one week, in late August (08/27/2024), we increased the cyclosporine to 100 mg PO BID and further decreased the prednisone to 40 mg PO daily. At his appointment on 09/03/2024, as his anemia (finally) improved (rather well, to a HgB of 11.5 g/dL), we continued the cyclosporine and further decreased the prednisone to 20 mg PO daily. Unfortunately, at the time of his follow up appointment on 09/10/2024, a repeat CBC did not show any further improvement in his HgB (it was 10.4 g/dL), issue #2 (see below) was dramatically worse; and his repeat serum cyclosporine was > 400 ng/mL (above the upper limit of the desirable range). Given all of this, the cyclosporine was held completely for the next one and one half weeks. Meanwhile, he further decreased his daily prednisone dose (from 10 mg daily to 5 mg daily to, as of late September, off completely), as instructed. After remaining off of cyclosporine for one and one half weeks, his mouth sores (finally) improved; and his PO intake renormalized. Unfortunately, throughout the latter part of September 2024, he was getting steadily fatigued again, correlating with the fact that, not surprisingly, his HgB continued to redecline off of the cyclosporine (and it is was back down to 7.3 g/dL by 09/25/2024, at which time he required the transfusion of an additional two units of PRBCs). He has now been back on cyclosporine (the 50 mg BID dose) for a little over seven (7) months now, as instructed; and, so far, issue #2 has still yet to significantly reworsen (it has intermittently mildly recurred, but it remains, at worst, very tolerable with localized therapies such as Orajel). Meanwhile, today's  "(04/18/2025) repeat labwork shows that his HgB is currently still much improved compared to last Fall and recently stable (10.4 g/dL; it has actually consistently remained > 10 g/dL since November 2024). At this time, he and his wife are agreeable to our re-referring him to [place] [facility] for reevaluation and an opinion regarding the feasibility/process of attempting to wean him off of the cyclosporine. We will see him back in our clinic in one month (~mid-May) with a CBC and CMP.  Mucositis: A progressive and refractory issue that began in late August 2024, shortly after he started PO cyclosporine for the treatment of issue #1 (particularly after he was escalated to the 100 mg BID dose). As discussed above, by early September 2024, this was dramatically reducing his PO intake, leading to dehydration and acute renal insufficiency. Also as discussed above, these symptoms have remained much better and manageable for many months now; and the (lower) dose of PO cyclosporine (50 mg BID) has been reintroduced. He and his wife have been counseled on the routine use of magic mouthwash, OraGel and/or other, similar products. They also know to skip the occasional dose of the cyclosporine, if needed. Continue to monitor.  \"Chalky stool\": He and his wife previously clarified that this issue has actually occurred previously. In approximately Summer 2023, he had to be referred to [facility] gastroenterology, who performed an ERCP for (what sounds like benign) stricturing. Regardless, CT scans of the chest, abdomen and pelvis without contrast performed on 11/05/2024 (and summarized above) for further evaluation were overall unremarkable; and this symptom is currently still re-resolved. Continue to monitor.  Lower extremity edema: Multifactorial, with age-related venous insufficiency and the resulting, dependent edema definitely contributing. Ongoing management per primary care.  The patient and his wife were in agreement with " this plan.    It is a pleasure to participate in Mr. Silva's care. Please do not hesitate to call with any questions or concerns that you may have.    A total of 30 minutes were spent coordinating this patient’s care in clinic today; more than 50% of this time was face-to-face with the patient and his wife, reviewing his interim medical history, discussing the results of today's repeat CBC and counseling on the current evaluation, treatment and followup plan. All questions were answered to their satisfaction.    FOLLOW UP  Continue cyclosporine 50 mg PO BID, at least for now. Re-referral placed to [facility] re: reevaluate aplastic anemia now in remission (cyclosporine wean?). Return to our clinic in 1 month (~mid-May) with a repeat CBC, CMP and cyclosporine level.          This document was electronically signed by RAMIRO King MD April 18, 2025 12:11 EDT      CC: RIGO Mccall MD

## 2025-04-18 NOTE — PROGRESS NOTES
Venipuncture Blood Specimen Collection  Venipuncture performed in left arm by Rachell Mayorga MA with good hemostasis. Patient tolerated the procedure well without complications.   04/18/25   Rachell Mayorga MA

## 2025-04-22 LAB — CYCLOSPORINE BLD LC/MS/MS-MCNC: 205 NG/ML (ref 100–400)

## 2025-05-07 RX ORDER — CYCLOSPORINE 25 MG/1
50 CAPSULE, LIQUID FILLED ORAL 2 TIMES DAILY
Qty: 120 CAPSULE | Refills: 0 | Status: SHIPPED | OUTPATIENT
Start: 2025-05-07 | End: 2025-06-06

## 2025-05-19 ENCOUNTER — OFFICE VISIT (OUTPATIENT)
Dept: ONCOLOGY | Facility: CLINIC | Age: OVER 89
End: 2025-05-19
Payer: MEDICARE

## 2025-05-19 ENCOUNTER — LAB (OUTPATIENT)
Dept: ONCOLOGY | Facility: CLINIC | Age: OVER 89
End: 2025-05-19
Payer: MEDICARE

## 2025-05-19 VITALS
RESPIRATION RATE: 18 BRPM | HEART RATE: 76 BPM | TEMPERATURE: 97.8 F | DIASTOLIC BLOOD PRESSURE: 69 MMHG | OXYGEN SATURATION: 93 % | HEIGHT: 68 IN | SYSTOLIC BLOOD PRESSURE: 128 MMHG | BODY MASS INDEX: 23.31 KG/M2 | WEIGHT: 153.8 LBS

## 2025-05-19 DIAGNOSIS — D61.9 APLASTIC ANEMIA: ICD-10-CM

## 2025-05-19 DIAGNOSIS — D60.9 ACQUIRED RED CELL APLASIA: ICD-10-CM

## 2025-05-19 DIAGNOSIS — D64.9 SYMPTOMATIC ANEMIA: ICD-10-CM

## 2025-05-19 DIAGNOSIS — D61.9 APLASTIC ANEMIA: Primary | ICD-10-CM

## 2025-05-19 LAB
ALBUMIN SERPL-MCNC: 3.9 G/DL (ref 3.5–5.2)
ALBUMIN/GLOB SERPL: 1.4 G/DL
ALP SERPL-CCNC: 136 U/L (ref 39–117)
ALT SERPL W P-5'-P-CCNC: 30 U/L (ref 1–41)
ANION GAP SERPL CALCULATED.3IONS-SCNC: 9 MMOL/L (ref 5–15)
AST SERPL-CCNC: 27 U/L (ref 1–40)
BASOPHILS # BLD AUTO: 0.02 10*3/MM3 (ref 0–0.2)
BASOPHILS NFR BLD AUTO: 0.3 % (ref 0–1.5)
BILIRUB SERPL-MCNC: 0.8 MG/DL (ref 0–1.2)
BUN SERPL-MCNC: 27 MG/DL (ref 8–23)
BUN/CREAT SERPL: 21.3 (ref 7–25)
CALCIUM SPEC-SCNC: 9.7 MG/DL (ref 8.2–9.6)
CHLORIDE SERPL-SCNC: 105 MMOL/L (ref 98–107)
CO2 SERPL-SCNC: 25 MMOL/L (ref 22–29)
CREAT SERPL-MCNC: 1.27 MG/DL (ref 0.76–1.27)
DEPRECATED RDW RBC AUTO: 57.3 FL (ref 37–54)
EGFRCR SERPLBLD CKD-EPI 2021: 53 ML/MIN/1.73
EOSINOPHIL # BLD AUTO: 0.13 10*3/MM3 (ref 0–0.4)
EOSINOPHIL NFR BLD AUTO: 2.1 % (ref 0.3–6.2)
ERYTHROCYTE [DISTWIDTH] IN BLOOD BY AUTOMATED COUNT: 14 % (ref 12.3–15.4)
GLOBULIN UR ELPH-MCNC: 2.7 GM/DL
GLUCOSE SERPL-MCNC: 135 MG/DL (ref 65–99)
HCT VFR BLD AUTO: 35.9 % (ref 37.5–51)
HGB BLD-MCNC: 11.3 G/DL (ref 13–17.7)
IMM GRANULOCYTES # BLD AUTO: 0.01 10*3/MM3 (ref 0–0.05)
IMM GRANULOCYTES NFR BLD AUTO: 0.2 % (ref 0–0.5)
LYMPHOCYTES # BLD AUTO: 1.88 10*3/MM3 (ref 0.7–3.1)
LYMPHOCYTES NFR BLD AUTO: 31.1 % (ref 19.6–45.3)
MCH RBC QN AUTO: 34.7 PG (ref 26.6–33)
MCHC RBC AUTO-ENTMCNC: 31.5 G/DL (ref 31.5–35.7)
MCV RBC AUTO: 110.1 FL (ref 79–97)
MONOCYTES # BLD AUTO: 0.49 10*3/MM3 (ref 0.1–0.9)
MONOCYTES NFR BLD AUTO: 8.1 % (ref 5–12)
NEUTROPHILS NFR BLD AUTO: 3.52 10*3/MM3 (ref 1.7–7)
NEUTROPHILS NFR BLD AUTO: 58.2 % (ref 42.7–76)
NRBC BLD AUTO-RTO: 0 /100 WBC (ref 0–0.2)
PLATELET # BLD AUTO: 183 10*3/MM3 (ref 140–450)
PMV BLD AUTO: 12.4 FL (ref 6–12)
POTASSIUM SERPL-SCNC: 4.5 MMOL/L (ref 3.5–5.2)
PROT SERPL-MCNC: 6.6 G/DL (ref 6–8.5)
RBC # BLD AUTO: 3.26 10*6/MM3 (ref 4.14–5.8)
SODIUM SERPL-SCNC: 139 MMOL/L (ref 136–145)
WBC NRBC COR # BLD AUTO: 6.05 10*3/MM3 (ref 3.4–10.8)

## 2025-05-19 PROCEDURE — 1126F AMNT PAIN NOTED NONE PRSNT: CPT | Performed by: INTERNAL MEDICINE

## 2025-05-19 PROCEDURE — 99214 OFFICE O/P EST MOD 30 MIN: CPT | Performed by: INTERNAL MEDICINE

## 2025-05-19 PROCEDURE — 80053 COMPREHEN METABOLIC PANEL: CPT | Performed by: INTERNAL MEDICINE

## 2025-05-19 PROCEDURE — 36415 COLL VENOUS BLD VENIPUNCTURE: CPT | Performed by: INTERNAL MEDICINE

## 2025-05-19 PROCEDURE — G2211 COMPLEX E/M VISIT ADD ON: HCPCS | Performed by: INTERNAL MEDICINE

## 2025-05-19 PROCEDURE — 85025 COMPLETE CBC W/AUTO DIFF WBC: CPT | Performed by: INTERNAL MEDICINE

## 2025-05-19 PROCEDURE — 80158 DRUG ASSAY CYCLOSPORINE: CPT | Performed by: INTERNAL MEDICINE

## 2025-05-19 NOTE — PROGRESS NOTES
Name:  Chinedu Silva  :  1933  Date:  2025     REFERRING PHYSICIAN    PRIMARY CARE PROVIDER  Jr Segura PA    REASON FOR FOLLOWUP  1. Acquired red cell aplasia    2. Aplastic anemia    3. Symptomatic anemia      CHIEF COMPLAINT  None.    Dear  Colton,    HISTORY OF PRESENT ILLNESS:   I saw Mr. Silva in followup (from his recent hospitalization for severe anemia) today in our hematology/oncology clinic. As you are aware, he is a pleasant, 89 y.o., white male with a history of hypertension, CAD and GERD who presented to the [facility] ED on 2024 with progressive weakness and dyspnea on exertion. These symptoms had potentially first started ~two to three months prior; however, they significantly worsened over the course of the previous couple of weeks (by early 2024). Upon arrival to our ED, he was found to be severely anemic, with a Hg of 3.7 g/dL. His other cell lines (WBCs and platelets) were entirely WNL. Our service was consulted for further evaluation of this issue, and a bone marrow biopsy was performed on 2024. Meanwhile, as he was feeling substantially better after his HgB was transfused up to the ~9 g/dL range, he was able to be discharged to outpatient follow up in our clinic. He returned to our clinic the following week (on 2024) to go over the results of the bone marrow exam and for ongoing management.    INTERIM HISTORY:  Mr. Silva returns to clinic today for follow up yet again accompanied by his wife. He was on PO cyclosporine for a total of ~three weeks (50 mg BID for the first week and 100 mg BID for the next two weeks) in late August/early 2024. Due to progressive, severe and refractory mucositis (as well as a toxic cyclosporine level), he was instructed to discontinue the cyclosporine in early 2024. After holding the cyclosporine for the next ~one and one half weeks, his mucositis finally improved. He was instructed to restart  it a dose of 50 mg BID, and he remained on this for the next seven plus (7+) months. While he has redeveloped some intermittent, but currently still very mild and manageable, mucositis, he is overall still doing much much better than he was in 2024, as his fatigue has remained improved and his HgB has remained greater than 10 g/dL for many months now (and he has still not required any PRBC transfusions since late Summer 2024). As he continued to do so well, in early May 2025, his cyclosporine dose was reduced to 50 mg in the morning and 25 mg in the evening. He continues to have some chronically and mildly sore gums; however, he otherwise once again has no new or other specific complaints and continues to feel overall well.    Past Medical History:   Diagnosis Date    Carotid stenosis     Coronary artery disease     Diverticulitis     GERD (gastroesophageal reflux disease)     Gout     Hearing aid worn     Hypertension     Obese     Prostate cancer     Wears glasses        Past Surgical History:   Procedure Laterality Date    CAROTID ENDARTERECTOMY Left 2018    Procedure: CAROTID ENDARTERECTOMY WITH EEG LEFT;  Surgeon: Del Neal MD;  Location: [facility];  Service:     CATARACT EXTRACTION      CATARACT EXTRACTION WITH INTRAOCULAR LENS IMPLANT Right     CHOLECYSTECTOMY      COLONOSCOPY W/ POLYPECTOMY      HERNIA REPAIR Right     TOTAL KNEE ARTHROPLASTY Right     TURP / TRANSURETHRAL INCISION / DRAINAGE PROSTATE         Social History     Socioeconomic History    Marital status:     Number of children: 1   Tobacco Use    Smoking status: Former     Current packs/day: 0.00     Average packs/day: 1 pack/day for 12.0 years (12.0 ttl pk-yrs)     Types: Cigarettes     Start date:      Quit date:      Years since quittin.4    Smokeless tobacco: Current     Types: Chew   Vaping Use    Vaping status: Never Used   Substance and Sexual Activity    Alcohol use: No    Drug use: No    Sexual  "activity: Defer       Family History   Problem Relation Age of Onset    Hypertension Mother     Glaucoma Mother     Diverticulitis Mother     Gout Mother     ALS Father     Gout Sister     Broken bones Paternal Grandfather     Broken bones Son     Cancer Son     Diabetes Brother        Allergies   Allergen Reactions    Ciprofloxacin Other (See Comments)     Muscle Pains    Lisinopril Cough    Tetracycline Provider Review Needed     Other Reaction(s) from [facility]: UNK    Sulfa Antibiotics Itching and Rash       Current Outpatient Medications   Medication Sig Dispense Refill    allopurinol (ZYLOPRIM) 300 MG tablet Take 1 tablet by mouth Daily.      aspirin 81 MG EC tablet Take 1 tablet by mouth Daily.      bumetanide (BUMEX) 1 MG tablet Take 1 tablet by mouth 2 (Two) Times a Week. Every Monday and Thursday      cycloSPORINE modified (NEORAL) 25 MG capsule Take 2 capsules by mouth 2 (Two) Times a Day for 30 days. (Patient taking differently: Take 2 capsules by mouth Daily.) 120 capsule 0    ondansetron (ZOFRAN) 8 MG tablet Take 1 tablet by mouth Every 8 (Eight) Hours As Needed for Nausea or Vomiting. 30 tablet 1    rosuvastatin (CRESTOR) 10 MG tablet Take 1 tablet by mouth Daily. 90 tablet 3    valsartan (DIOVAN) 80 MG tablet Take 0.5 tablets by mouth Daily.       No current facility-administered medications for this visit.     REVIEW OF SYSTEMS  CONSTITUTIONAL:  No fever, chills or night sweats. Chronic fatigue, recently still improved.  EYES:  No blurry vision, diplopia or other vision changes.  ENT:  No hearing loss or nosebleeds. Mild, recurrent mucositis, still very manageable, as per the HPI above.  CARDIOVASCULAR:  No palpitations, arrhythmia, syncopal episodes or edema.  PULMONARY:  No hemoptysis, wheezing, chronic cough or shortness of breath.  GASTROINTESTINAL:  No nausea or vomiting. No constipation or diarrhea. No abdominal pain. Intermittently \"chalky\" stool, currently still resolved.  GENITOURINARY: " " No hematuria, kidney stones or frequent urination.  MUSCULOSKELETAL:  No joint or back pains.  INTEGUMENTARY: No rashes or pruritus.  ENDOCRINE:  No excessive thirst or hot flashes.  HEMATOLOGIC:  No history of free bleeding, spontaneous bleeding or clotting.  IMMUNOLOGIC:  No allergies or frequent infections.  NEUROLOGIC: No numbness, tingling, seizures or weakness.  PSYCHIATRIC:  No anxiety or depression.    PHYSICAL EXAMINATION  /69   Pulse 76   Temp 97.8 °F (36.6 °C) (Temporal)   Resp 18   Ht 172.7 cm (68\")   Wt 69.8 kg (153 lb 12.8 oz)   SpO2 93%   BMI 23.39 kg/m²     Pain Score:  Pain Score    25 1017   PainSc: 0-No pain     PHQ-Score Total:  PHQ-9 Total Score:      ECO  GENERAL:  A well-developed, well-nourished, elderly, white male in no acute distress.  HEENT:  Pupils equally round and reactive to light. Extraocular muscles intact.  CARDIOVASCULAR:  Regular rate and rhythm. No murmurs, gallops or rubs.  LUNGS:  Clear to auscultation bilaterally. No wheezing.  ABDOMEN:  Soft, nontender, nondistended with positive bowel sounds.  EXTREMITIES:  No clubbing, cyanosis or edema bilaterally.  SKIN:  No rashes or petechiae.  NEURO:  Cranial nerves grossly intact. No focal deficits.  PSYCH:  Alert and oriented x3.    The physical exam is again unchanged from recent priors.    LABORATORY  Lab Results   Component Value Date    WBC 6.05 2025    HGB 11.3 (L) 2025    HCT 35.9 (L) 2025    .1 (H) 2025     2025    NEUTROABS 3.52 2025       Lab Results   Component Value Date     2025    K 4.5 2025     2025    CO2 25.0 2025    BUN 27 (H) 2025    CREATININE 1.27 2025    GLUCOSE 135 (H) 2025    CALCIUM 9.7 (H) 2025    AST 27 2025    ALT 30 2025    ALKPHOS 136 (H) 2025    BILITOT 0.8 2025    PROTEINTOT 6.6 2025    ALBUMIN 3.9 2025     Lab Results   Component " Value Date    RETICCTT 2.16 (H) 03/18/2025     CBC (05/19/2025): WBCs: 6.05; HgB: 11.3; Hct: 35.9; platelets: 183; MCV: 110.1  CBC (04/18/2025): WBCs: 7.83; HgB: 10.4; Hct: 33.1; platelets: 179; MCV: 110.7  CBC (03/18/2025): WBCs: 7.54; HgB: 10.1; Hct: 31.3; platelets: 198; MCV: 107.9  CBC (02/18/2025): WBCs: 8.34; HgB: 10.9; Hct: 34.0; platelets: 199; MCV: 109.7  CBC (01/21/2025): WBCs: 5.63; HgB: 10.8; Hct: 34.2; platelets: 225; MCV: 111.8  CBC (12/16/2024): WBCs: 6.56; HgB: 10.5; Hct: 32.9; platelets: 218; MCV: 113.8  CBC (12/02/2024): WBCs: 7.22; HgB: 10.3; Hct: 32.8; platelets: 218; MCV: 112.7  CBC (11/19/2024): WBCs: 6.30; HgB: 10.2; Hct: 32.1; platelets: 272; MCV: 110.7  CBC (11/05/2024): WBCs: 5.65; HgB: 9.5; Hct: 30.3; platelets: 277; MCV: 107.1  CBC (10/30/2024): WBCs: 6,65; HgB: 9.7; Hct: 30.1; platelets: 281; MCV: 106.0  CBC (10/21/2024): WBCs: 7.46; HgB: 9.6; Hct: 30.5; platelets: 349; MCV: 103.4  CBC (10/15/2024): WBCs: 7.86; HgB: 9.6; Hct: 30.8; platelets: 387; MCV: 101.3  CBC (10/09/2024): WBCs: 7.42; HgB: 8.8; Hct: 28.5; platelets: 373; MCV: 97.9  CBC (10/02/2024): WBCs: 6.94; HgB: 9.2; Hct: 29.6; platelets: 324; MCV: 95.2  CBC (09/25/2024): WBCs: 8.71; HgB: 7.3; Hct: 23.3; platelets: 349; MCV: 90.3  CBC (09/18/2024): WBCs: 9.33; HgB: 9.0; Hct: 27.7; platelets: 312; MCV: 87.4  CBC (09/10/2024): WBCs: 10.20; HgB: 10.4; Hct: 31.3; platelets: 222; MCV: 86.5  CBC (09/03/2024): WBCs: 10.95; HgB: 11.5; Hct: 35.6; platelets: 203; MCV: 86.6  CBC (08/29/2024): WBCs: 14.86; HgB: 7.4; Hct: 23.5; platelets: 275; MCV: 86.7  CBC (08/26/2024): WBCs: 11.72; HgB: 7.7; Hct: 24.6; platelets: 250; MCV: 86.6  CBC (08/23/2024): WBCs: 10.01; HgB: 8.0; Hct: 25.3; platelets: 237; MCV: 87.2  CBC (08/20/2024): WBCS: 8.59; HgB: 8.4; Hct: 26.4; platelets: 272; MCV: 86.8  CBC (08/14/2024): WBCs:  8.16; HgB: 9.0; Hct: 27.9; platelets: 319; MCV: 87.2  CBC (08/09/2024): WBCs: 12.00; HgB: 8.1; Hct: 25.5; platelets: 319; MCV:  90.7  CBC (08/02/2024): WBCs: 11.92; HgB: 8.1; Hct: 24.8; platelets: 204; MCV: 90.5  CBC (07/31/2024): WBCs: 17.04; HgB: 7.6; Hct: 23.3; platelets: 265; MCV: 95.5  CBC (07/26/2024): WBCs: 13.50; HgB: 8.1; Hct: 24.8; platelets: 329; MCV: 95.0  CBC (07/23/2024): WBCs: 16.43; HgB: 8.5; Hct: 25.8; platelets: 390; MCV: 96.3  CBC (07/15/2024): WBCs: 8.04; HgB: 8.3; Hct: 25.8; platelets: 340; MCV: 97.7  CBC (07/11/2024): WBCs: 7.25; HgB: 9.4; Hct: 29.0; platelets: 375; MCV: 97.0  CBC (07/01/2024): WBCs: 7.50; HgB: 9.4; Hct: 29.3; platelets: 272; MCV: 98.7  CBC (06/26/2024): WBCs: 6.67; HgB: 9.0; Hct: 27.5; platelets: 198; MCV: 98.6    Reticulocytes (12/16/2024): Percentage: 1.67; Absolute: 0.0483  Reticulocytes (12/02/2024): Percentage: 1.89; Absolute: 0.0550  Reticulocytes (11/19/2024): Percentage: 3.04; Absolute: 0.0882  Reticulocytes (11/05/2024): Percentage 2.91; Absolute: 0.0824  Reticulocytes (10/30/2024): Percentage: 3.67; Absolute: 0.1061  Reticulocytes (10/21/2024): Percentage: 4.21; Absolute: 0.1250  Reticulocytes (10/15/2024): Percentage: 5.24; Absolute: 0.1603  Reticulocytes (09/10/2024): Percentage: 0.47; Absolute: 0.0170  Reticulocytes (08/20/2024): Percentage: 0.26; Absolute: <0.0100    Cyclosporine level (02/18/2025): 155 ng/mL  Cyclosporine level (01/21/2025): 91 ng/mL  Cyclosporine level (12/30/2024): 154 ng/mL  Cyclosporine level (12/16/2024): 86 ng/mL  Cyclosporine level (12/02/2024): 183 ng/mL  Cyclosporine level (11/19/2024): 335 ng/mL  Cyclosporine level (11/05/2024): 320 ng/mL  Cyclosporine level (10/30/2024): 132 ng/mL  Cyclosporine level (10/21/2024): 216 ng/mL  Cyclosporine level (10/15/2024): 315 ng/mL  Cyclosporine level (10/09/2024): 48 ng/mL  Cyclosporine level (10/02/2024): 121 ng/mL  Cyclosporine level (09/25/2024): 107 ng/mL    IMAGING  CT chest without contrast (11/04/2024):  Impression: No parenchymal nodules, adenopathy or effusions.    CT abdomen and pelvis without contrast  (11/04/2024):  Impression:  1) Minimal sigmoid diverticulitis.  2) Other findings [are nonacute].    PATHOLOGY  Peripheral smear, bone marrow aspiration smear, bone marrow aspiration clot and bone marrow core biopsy (06/26/2024):  Normocellular bone marrow with increased granulopoiesis, essentially absent erythropoiesis, adequate megakaryopoiesis, increased stainable iron and slightly increased mixed chronic inflammation. No evidence of increased blasts or dysplasia. The most striking feature within the bone marrow is the essential absence of any erythropoiesis in the aspirate or core biopsy. There is mixed inflammation with some notable immunophenotypic findings by flow cytometry, but there is no evidence of bone marrow involvement by lymphoma. This is most suggestive of a mixed reactive lymphoid population.    IMPRESSION AND PLAN  Mr. Silva is a 89 y.o., white male with:  Pure red cell aplasia: I have had multiple, long discussions with the patient and his wife regarding the results of the bone marrow biopsy performed in late June 2024 (which are summarized above). In short, this is a fairly uncommon diagnosis that can either be idiopathic or associated with a number of potential, underlying causes, including thymoma or parvovirus infection. Regardless, high dose steroids (prednisone 1-2 mg/kg daily) is the standard, first-line treatment; however, since the repeat CBC drawn on 07/01/2024 showed that his HgB (9.4 g/dL) had improved compared to what it was when he was discharged from our hospital following the bone marrow biopsy the previous week (9.0 g/dL), it was hoped that he was satisfactorily recovering (from a viral trigger) on his own. However, a repeat CBC on 07/15/2024 revealed that his HgB had dropped again (8.3 mg/dL); and his reticulocyte counts remained essentially zero. A Rx for prednisone 80 mg PO daily was provided at that time; but, unfortunately, high-dose steroids never provided him with any  meaningful effect. Consequently, by late August 2024, he was started on an additional Rx (cyclosporine 50 mg PO BID), per [facility]'s recommendations (with whom he had met for an additional opinion regarding his management earlier that month). Once he tolerated this starting dose for ~one week, in late August (08/27/2024), we increased the cyclosporine to 100 mg PO BID and further decreased the prednisone to 40 mg PO daily. At his appointment on 09/03/2024, as his anemia (finally) improved (rather well, to a HgB of 11.5 g/dL), we continued the cyclosporine and further decreased the prednisone to 20 mg PO daily. Unfortunately, at the time of his follow up appointment on 09/10/2024, a repeat CBC did not show any further improvement in his HgB (it was 10.4 g/dL), issue #2 (see below) was dramatically worse; and his repeat serum cyclosporine was > 400 ng/mL (above the upper limit of the desirable range). Given all of this, the cyclosporine was held completely for the next one and one half weeks. Meanwhile, he further decreased his daily prednisone dose (from 10 mg daily to 5 mg daily to, as of late September, off completely), as instructed. After remaining off of cyclosporine for one and one half weeks, his mouth sores (finally) improved; and his PO intake renormalized. Unfortunately, throughout the latter part of September 2024, he was getting steadily fatigued again, correlating with the fact that, not surprisingly, his HgB continued to redecline off of the cyclosporine (and it is was back down to 7.3 g/dL by 09/25/2024, at which time he required the transfusion of an additional two units of PRBCs). He was restarted on cyclosporine (the 50 mg BID dose), and he remained on this dose for the next seven plus (7+) months. So far, issue #2 has still yet to significantly reworsen (it has intermittently mildly recurred, but it remains, at worst, very tolerable with localized therapies such as Orajel). Meanwhile, today's  "(05/19/2025) repeat labwork shows that his HgB is currently still much improved compared to last Fall and recently very stable (11.3 g/dL; it has actually consistently remained > 10 g/dL since November 2024). By early May 2025, as he continued to do so well from the standpoint of this issue, he was re-referred to [facility] for an opinion regarding the feasibility/process of attempting to wean him off of the cyclosporine. They ([facility]) agreed that this was a good idea, and they decreased his dose from 50 mg BID to 50 mg qam and 25 mg qpm at that time (on 05/06/2025). He has been on this lower dose for a total of going on two weeks, and he will continue it for another two weeks (for a total of one month). We will see him back in our clinic in early June with a repeat CBC and CMP. If his HgB remains stable at that time, we will likely consider further decreasing his dose of cyclosporine (to 25 mg BID).  Mucositis: A progressive and refractory issue that began in late August 2024, shortly after he started PO cyclosporine for the treatment of issue #1 (particularly after he was escalated to the 100 mg BID dose). As discussed above, by early September 2024, this was dramatically reducing his PO intake, leading to dehydration and acute renal insufficiency. Also as discussed above, these symptoms have remained much better and manageable for many months now; and the (lower) dose of PO cyclosporine (50 mg BID) has been reintroduced. He and his wife have been counseled on the routine use of magic mouthwash, OraGel and/or other, similar products. They also know to skip the occasional dose of the cyclosporine, if needed. Continue to monitor.  \"Chalky stool\": He and his wife previously clarified that this issue has actually occurred previously. In approximately Summer 2023, he had to be referred to [facility] gastroenterology, who performed an ERCP for (what sounds like benign) stricturing. Regardless, CT scans of the " chest, abdomen and pelvis without contrast performed on 11/05/2024 (and summarized above) for further evaluation were overall unremarkable; and this symptom is currently still re-resolved. Continue to monitor.  Lower extremity edema: Multifactorial, with age-related venous insufficiency and the resulting, dependent edema definitely contributing. Ongoing management per primary care.  The patient and his wife were in agreement with this plan.    It is a pleasure to participate in Mr. Silva's care. Please do not hesitate to call with any questions or concerns that you may have.    A total of 30 minutes were spent coordinating this patient’s care in clinic today; more than 50% of this time was face-to-face with the patient and his wife, reviewing his interim medical history, discussing the results of the most recent labwork, including today's repeat CBC, and counseling on the current treatment and followup plan. All questions were answered to their satisfaction.    FOLLOW UP  Continue cyclosporine 50 mg qam and 25 mg pm for now. Return to our clinic in 2 weeks (~early June) with a repeat CBC, CMP and cyclosporine level.          This document was electronically signed by RAMIRO King MD May 19, 2025 12:09 EDT      CC: RIGO Mccall MD

## 2025-05-19 NOTE — PROGRESS NOTES
Venipuncture Blood Specimen Collection  Venipuncture performed in right arm by Nancy Chandra MA with good hemostasis. Patient tolerated the procedure well without complications.   05/19/25   Nancy Chandra MA

## 2025-05-21 LAB — CYCLOSPORINE BLD LC/MS/MS-MCNC: 352 NG/ML (ref 100–400)

## 2025-06-02 ENCOUNTER — OFFICE VISIT (OUTPATIENT)
Dept: ONCOLOGY | Facility: CLINIC | Age: OVER 89
End: 2025-06-02
Payer: MEDICARE

## 2025-06-02 ENCOUNTER — LAB (OUTPATIENT)
Dept: ONCOLOGY | Facility: CLINIC | Age: OVER 89
End: 2025-06-02
Payer: MEDICARE

## 2025-06-02 VITALS
OXYGEN SATURATION: 96 % | HEART RATE: 65 BPM | SYSTOLIC BLOOD PRESSURE: 120 MMHG | RESPIRATION RATE: 18 BRPM | BODY MASS INDEX: 22.69 KG/M2 | WEIGHT: 149.2 LBS | DIASTOLIC BLOOD PRESSURE: 63 MMHG | TEMPERATURE: 97.3 F

## 2025-06-02 DIAGNOSIS — D61.9 APLASTIC ANEMIA: ICD-10-CM

## 2025-06-02 DIAGNOSIS — D64.9 SYMPTOMATIC ANEMIA: ICD-10-CM

## 2025-06-02 DIAGNOSIS — D60.9 ACQUIRED RED CELL APLASIA: ICD-10-CM

## 2025-06-02 DIAGNOSIS — D60.9 ACQUIRED RED CELL APLASIA: Primary | ICD-10-CM

## 2025-06-02 LAB
ALBUMIN SERPL-MCNC: 4.1 G/DL (ref 3.5–5.2)
ALBUMIN/GLOB SERPL: 1.5 G/DL
ALP SERPL-CCNC: 143 U/L (ref 39–117)
ALT SERPL W P-5'-P-CCNC: 26 U/L (ref 1–41)
ANION GAP SERPL CALCULATED.3IONS-SCNC: 11.6 MMOL/L (ref 5–15)
AST SERPL-CCNC: 36 U/L (ref 1–40)
BASOPHILS # BLD AUTO: 0.03 10*3/MM3 (ref 0–0.2)
BASOPHILS NFR BLD AUTO: 0.5 % (ref 0–1.5)
BILIRUB SERPL-MCNC: 0.9 MG/DL (ref 0–1.2)
BUN SERPL-MCNC: 34.2 MG/DL (ref 8–23)
BUN/CREAT SERPL: 23.8 (ref 7–25)
CALCIUM SPEC-SCNC: 9.8 MG/DL (ref 8.2–9.6)
CHLORIDE SERPL-SCNC: 106 MMOL/L (ref 98–107)
CO2 SERPL-SCNC: 26.4 MMOL/L (ref 22–29)
CREAT SERPL-MCNC: 1.44 MG/DL (ref 0.76–1.27)
DEPRECATED RDW RBC AUTO: 57.4 FL (ref 37–54)
EGFRCR SERPLBLD CKD-EPI 2021: 45.6 ML/MIN/1.73
EOSINOPHIL # BLD AUTO: 0.13 10*3/MM3 (ref 0–0.4)
EOSINOPHIL NFR BLD AUTO: 2.2 % (ref 0.3–6.2)
ERYTHROCYTE [DISTWIDTH] IN BLOOD BY AUTOMATED COUNT: 14 % (ref 12.3–15.4)
GLOBULIN UR ELPH-MCNC: 2.8 GM/DL
GLUCOSE SERPL-MCNC: 121 MG/DL (ref 65–99)
HCT VFR BLD AUTO: 36.4 % (ref 37.5–51)
HGB BLD-MCNC: 11.5 G/DL (ref 13–17.7)
IMM GRANULOCYTES # BLD AUTO: 0.01 10*3/MM3 (ref 0–0.05)
IMM GRANULOCYTES NFR BLD AUTO: 0.2 % (ref 0–0.5)
LYMPHOCYTES # BLD AUTO: 1.76 10*3/MM3 (ref 0.7–3.1)
LYMPHOCYTES NFR BLD AUTO: 29.4 % (ref 19.6–45.3)
MCH RBC QN AUTO: 34.7 PG (ref 26.6–33)
MCHC RBC AUTO-ENTMCNC: 31.6 G/DL (ref 31.5–35.7)
MCV RBC AUTO: 110 FL (ref 79–97)
MONOCYTES # BLD AUTO: 0.57 10*3/MM3 (ref 0.1–0.9)
MONOCYTES NFR BLD AUTO: 9.5 % (ref 5–12)
NEUTROPHILS NFR BLD AUTO: 3.48 10*3/MM3 (ref 1.7–7)
NEUTROPHILS NFR BLD AUTO: 58.2 % (ref 42.7–76)
NRBC BLD AUTO-RTO: 0 /100 WBC (ref 0–0.2)
PLATELET # BLD AUTO: 202 10*3/MM3 (ref 140–450)
PMV BLD AUTO: 12.4 FL (ref 6–12)
POTASSIUM SERPL-SCNC: 4.5 MMOL/L (ref 3.5–5.2)
PROT SERPL-MCNC: 6.9 G/DL (ref 6–8.5)
RBC # BLD AUTO: 3.31 10*6/MM3 (ref 4.14–5.8)
SODIUM SERPL-SCNC: 144 MMOL/L (ref 136–145)
WBC NRBC COR # BLD AUTO: 5.98 10*3/MM3 (ref 3.4–10.8)

## 2025-06-02 PROCEDURE — 99214 OFFICE O/P EST MOD 30 MIN: CPT | Performed by: INTERNAL MEDICINE

## 2025-06-02 PROCEDURE — 1126F AMNT PAIN NOTED NONE PRSNT: CPT | Performed by: INTERNAL MEDICINE

## 2025-06-02 PROCEDURE — 80158 DRUG ASSAY CYCLOSPORINE: CPT | Performed by: INTERNAL MEDICINE

## 2025-06-02 PROCEDURE — 85025 COMPLETE CBC W/AUTO DIFF WBC: CPT | Performed by: INTERNAL MEDICINE

## 2025-06-02 PROCEDURE — 80053 COMPREHEN METABOLIC PANEL: CPT | Performed by: INTERNAL MEDICINE

## 2025-06-02 RX ORDER — EMPAGLIFLOZIN 10 MG/1
TABLET, FILM COATED ORAL
COMMUNITY
Start: 2025-05-29

## 2025-06-02 NOTE — PROGRESS NOTES
Name:  Chinedu Silva  :  1933  Date:  2025     REFERRING PHYSICIAN    PRIMARY CARE PROVIDER  Jr Segura PA    REASON FOR FOLLOWUP  1. Acquired red cell aplasia      CHIEF COMPLAINT  None.    Dear  [place],    HISTORY OF PRESENT ILLNESS:   I saw Mr. Silva in followup (from his recent hospitalization for severe anemia) today in our hematology/oncology clinic. As you are aware, he is a pleasant, 89 y.o., white male with a history of hypertension, CAD and GERD who presented to the [facility] ED on 2024 with progressive weakness and dyspnea on exertion. These symptoms had potentially first started ~two to three months prior; however, they significantly worsened over the course of the previous couple of weeks (by early 2024). Upon arrival to our ED, he was found to be severely anemic, with a Hg of 3.7 g/dL. His other cell lines (WBCs and platelets) were entirely WNL. Our service was consulted for further evaluation of this issue, and a bone marrow biopsy was performed on 2024. Meanwhile, as he was feeling substantially better after his HgB was transfused up to the ~9 g/dL range, he was able to be discharged to outpatient follow up in our clinic. He returned to our clinic the following week (on 2024) to go over the results of the bone marrow exam and for ongoing management.    INTERIM HISTORY:  Mr. Silva returns to clinic today for follow up yet again accompanied by his wife. He was on PO cyclosporine for a total of ~three weeks (50 mg BID for the first week and 100 mg BID for the next two weeks) in late August/early 2024. Due to progressive, severe and refractory mucositis (as well as a toxic cyclosporine level), he was instructed to discontinue the cyclosporine in early 2024. After holding the cyclosporine for the next ~one and one half weeks, his mucositis finally improved. He was instructed to restart it a dose of 50 mg BID, and he remained on this  for the next seven plus (7+) months. While he has redeveloped some intermittent, but currently still very mild and manageable, mucositis, he is overall still doing much much better than he was in 2024, as his fatigue has remained improved and his HgB has remained greater than 10 g/dL for many months now (and he has still not required any PRBC transfusions since late Summer 2024). As he continued to do so well, in early to mid-May 2025, his cyclosporine dose was reduced to 50 mg in the morning and 25 mg in the evening. He has been on this dose for going on four (4) weeks now, and he has still not noticed any difference in the way he feels. His gums remain intermittently sore; however, he otherwise once again has no new or other specific complaints and continues to feel overall well.    Past Medical History:   Diagnosis Date    Carotid stenosis     Coronary artery disease     Diverticulitis     GERD (gastroesophageal reflux disease)     Gout     Hearing aid worn     Hypertension     Obese     Prostate cancer     Wears glasses        Past Surgical History:   Procedure Laterality Date    CAROTID ENDARTERECTOMY Left 2018    Procedure: CAROTID ENDARTERECTOMY WITH EEG LEFT;  Surgeon: Del Neal MD;  Location: [facility] [place];  Service:     CATARACT EXTRACTION      CATARACT EXTRACTION WITH INTRAOCULAR LENS IMPLANT Right     CHOLECYSTECTOMY      COLONOSCOPY W/ POLYPECTOMY      HERNIA REPAIR Right     TOTAL KNEE ARTHROPLASTY Right     TURP / TRANSURETHRAL INCISION / DRAINAGE PROSTATE         Social History     Socioeconomic History    Marital status:     Number of children: 1   Tobacco Use    Smoking status: Former     Current packs/day: 0.00     Average packs/day: 1 pack/day for 12.0 years (12.0 ttl pk-yrs)     Types: Cigarettes     Start date:      Quit date:      Years since quittin.4    Smokeless tobacco: Current     Types: Chew   Vaping Use    Vaping status: Never Used   Substance and Sexual  Activity    Alcohol use: No    Drug use: No    Sexual activity: Defer       Family History   Problem Relation Age of Onset    Hypertension Mother     Glaucoma Mother     Diverticulitis Mother     Gout Mother     ALS Father     Gout Sister     Broken bones Paternal Grandfather     Broken bones Son     Cancer Son     Diabetes Brother        Allergies   Allergen Reactions    Ciprofloxacin Other (See Comments)     Muscle Pains    Lisinopril Cough    Tetracycline Provider Review Needed     Other Reaction(s) from [facility]: UNK    Sulfa Antibiotics Itching and Rash       Current Outpatient Medications   Medication Sig Dispense Refill    allopurinol (ZYLOPRIM) 300 MG tablet Take 1 tablet by mouth Daily.      aspirin 81 MG EC tablet Take 1 tablet by mouth Daily.      bumetanide (BUMEX) 1 MG tablet Take 1 tablet by mouth 2 (Two) Times a Week. Every Monday and Thursday      cycloSPORINE modified (NEORAL) 25 MG capsule Take 2 capsules by mouth 2 (Two) Times a Day for 30 days. (Patient taking differently: Take 2 capsules by mouth Daily.) 120 capsule 0    Jardiance 10 MG tablet tablet       ondansetron (ZOFRAN) 8 MG tablet Take 1 tablet by mouth Every 8 (Eight) Hours As Needed for Nausea or Vomiting. 30 tablet 1    rosuvastatin (CRESTOR) 10 MG tablet Take 1 tablet by mouth Daily. 90 tablet 3    valsartan (DIOVAN) 80 MG tablet Take 0.5 tablets by mouth Daily.       No current facility-administered medications for this visit.     REVIEW OF SYSTEMS  CONSTITUTIONAL:  No fever, chills or night sweats. Chronic fatigue, recently still improved.  EYES:  No blurry vision, diplopia or other vision changes.  ENT:  No hearing loss or nosebleeds. Mild, recurrent mucositis, still very manageable, as per the HPI above.  CARDIOVASCULAR:  No palpitations, arrhythmia, syncopal episodes or edema.  PULMONARY:  No hemoptysis, wheezing, chronic cough or shortness of breath.  GASTROINTESTINAL:  No nausea or vomiting. No constipation or diarrhea.  "No abdominal pain. Intermittently \"chalky\" stool, currently still resolved.  GENITOURINARY:  No hematuria, kidney stones or frequent urination.  MUSCULOSKELETAL:  No joint or back pains.  INTEGUMENTARY: No rashes or pruritus.  ENDOCRINE:  No excessive thirst or hot flashes.  HEMATOLOGIC:  No history of free bleeding, spontaneous bleeding or clotting.  IMMUNOLOGIC:  No allergies or frequent infections.  NEUROLOGIC: No numbness, tingling, seizures or weakness.  PSYCHIATRIC:  No anxiety or depression.    PHYSICAL EXAMINATION  /63   Pulse 65   Temp 97.3 °F (36.3 °C) (Temporal)   Resp 18   Wt 67.7 kg (149 lb 3.2 oz)   SpO2 96%   BMI 22.69 kg/m²     Pain Score:  Pain Score    25 1046   PainSc: 0-No pain     PHQ-Score Total:  PHQ-9 Total Score:      ECO  GENERAL:  A well-developed, well-nourished, elderly, white male in no acute distress.  HEENT:  Pupils equally round and reactive to light. Extraocular muscles intact.  CARDIOVASCULAR:  Regular rate and rhythm. No murmurs, gallops or rubs.  LUNGS:  Clear to auscultation bilaterally. No wheezing.  ABDOMEN:  Soft, nontender, nondistended with positive bowel sounds.  EXTREMITIES:  No clubbing, cyanosis or edema bilaterally.  SKIN:  No rashes or petechiae.  NEURO:  Cranial nerves grossly intact. No focal deficits.  PSYCH:  Alert and oriented x3.    The physical exam is once again unchanged from recent priors.    LABORATORY  Lab Results   Component Value Date    WBC 5.98 2025    HGB 11.5 (L) 2025    HCT 36.4 (L) 2025    .0 (H) 2025     2025    NEUTROABS 3.48 2025       Lab Results   Component Value Date     2025    K 4.5 2025     2025    CO2 25.0 2025    BUN 27 (H) 2025    CREATININE 1.27 2025    GLUCOSE 135 (H) 2025    CALCIUM 9.7 (H) 2025    AST 27 2025    ALT 30 2025    ALKPHOS 136 (H) 2025    BILITOT 0.8 2025    " PROTEINTOT 6.6 05/19/2025    ALBUMIN 3.9 05/19/2025     Lab Results   Component Value Date    RETICCTPCT 2.16 (H) 03/18/2025     CBC (06/02/2025): WBCs: 5.98; HgB: 11.5; Hct: 36.4; platelets: 202; MCV: 110.0  CBC (05/19/2025): WBCs: 6.05; HgB: 11.3; Hct: 35.9; platelets: 183; MCV: 110.1  CBC (04/18/2025): WBCs: 7.83; HgB: 10.4; Hct: 33.1; platelets: 179; MCV: 110.7  CBC (03/18/2025): WBCs: 7.54; HgB: 10.1; Hct: 31.3; platelets: 198; MCV: 107.9  CBC (02/18/2025): WBCs: 8.34; HgB: 10.9; Hct: 34.0; platelets: 199; MCV: 109.7  CBC (01/21/2025): WBCs: 5.63; HgB: 10.8; Hct: 34.2; platelets: 225; MCV: 111.8  CBC (12/16/2024): WBCs: 6.56; HgB: 10.5; Hct: 32.9; platelets: 218; MCV: 113.8  CBC (12/02/2024): WBCs: 7.22; HgB: 10.3; Hct: 32.8; platelets: 218; MCV: 112.7  CBC (11/19/2024): WBCs: 6.30; HgB: 10.2; Hct: 32.1; platelets: 272; MCV: 110.7  CBC (11/05/2024): WBCs: 5.65; HgB: 9.5; Hct: 30.3; platelets: 277; MCV: 107.1  CBC (10/30/2024): WBCs: 6,65; HgB: 9.7; Hct: 30.1; platelets: 281; MCV: 106.0  CBC (10/21/2024): WBCs: 7.46; HgB: 9.6; Hct: 30.5; platelets: 349; MCV: 103.4  CBC (10/15/2024): WBCs: 7.86; HgB: 9.6; Hct: 30.8; platelets: 387; MCV: 101.3  CBC (10/09/2024): WBCs: 7.42; HgB: 8.8; Hct: 28.5; platelets: 373; MCV: 97.9  CBC (10/02/2024): WBCs: 6.94; HgB: 9.2; Hct: 29.6; platelets: 324; MCV: 95.2  CBC (09/25/2024): WBCs: 8.71; HgB: 7.3; Hct: 23.3; platelets: 349; MCV: 90.3  CBC (09/18/2024): WBCs: 9.33; HgB: 9.0; Hct: 27.7; platelets: 312; MCV: 87.4  CBC (09/10/2024): WBCs: 10.20; HgB: 10.4; Hct: 31.3; platelets: 222; MCV: 86.5  CBC (09/03/2024): WBCs: 10.95; HgB: 11.5; Hct: 35.6; platelets: 203; MCV: 86.6  CBC (08/29/2024): WBCs: 14.86; HgB: 7.4; Hct: 23.5; platelets: 275; MCV: 86.7  CBC (08/26/2024): WBCs: 11.72; HgB: 7.7; Hct: 24.6; platelets: 250; MCV: 86.6  CBC (08/23/2024): WBCs: 10.01; HgB: 8.0; Hct: 25.3; platelets: 237; MCV: 87.2  CBC (08/20/2024): WBCS: 8.59; HgB: 8.4; Hct: 26.4; platelets: 272; MCV:  86.8  CBC (08/14/2024): WBCs:  8.16; HgB: 9.0; Hct: 27.9; platelets: 319; MCV: 87.2  CBC (08/09/2024): WBCs: 12.00; HgB: 8.1; Hct: 25.5; platelets: 319; MCV: 90.7  CBC (08/02/2024): WBCs: 11.92; HgB: 8.1; Hct: 24.8; platelets: 204; MCV: 90.5  CBC (07/31/2024): WBCs: 17.04; HgB: 7.6; Hct: 23.3; platelets: 265; MCV: 95.5  CBC (07/26/2024): WBCs: 13.50; HgB: 8.1; Hct: 24.8; platelets: 329; MCV: 95.0  CBC (07/23/2024): WBCs: 16.43; HgB: 8.5; Hct: 25.8; platelets: 390; MCV: 96.3  CBC (07/15/2024): WBCs: 8.04; HgB: 8.3; Hct: 25.8; platelets: 340; MCV: 97.7  CBC (07/11/2024): WBCs: 7.25; HgB: 9.4; Hct: 29.0; platelets: 375; MCV: 97.0  CBC (07/01/2024): WBCs: 7.50; HgB: 9.4; Hct: 29.3; platelets: 272; MCV: 98.7  CBC (06/26/2024): WBCs: 6.67; HgB: 9.0; Hct: 27.5; platelets: 198; MCV: 98.6    Reticulocytes (12/16/2024): Percentage: 1.67; Absolute: 0.0483  Reticulocytes (12/02/2024): Percentage: 1.89; Absolute: 0.0550  Reticulocytes (11/19/2024): Percentage: 3.04; Absolute: 0.0882  Reticulocytes (11/05/2024): Percentage 2.91; Absolute: 0.0824  Reticulocytes (10/30/2024): Percentage: 3.67; Absolute: 0.1061  Reticulocytes (10/21/2024): Percentage: 4.21; Absolute: 0.1250  Reticulocytes (10/15/2024): Percentage: 5.24; Absolute: 0.1603  Reticulocytes (09/10/2024): Percentage: 0.47; Absolute: 0.0170  Reticulocytes (08/20/2024): Percentage: 0.26; Absolute: <0.0100    Cyclosporine level (02/18/2025): 155 ng/mL  Cyclosporine level (01/21/2025): 91 ng/mL  Cyclosporine level (12/30/2024): 154 ng/mL  Cyclosporine level (12/16/2024): 86 ng/mL  Cyclosporine level (12/02/2024): 183 ng/mL  Cyclosporine level (11/19/2024): 335 ng/mL  Cyclosporine level (11/05/2024): 320 ng/mL  Cyclosporine level (10/30/2024): 132 ng/mL  Cyclosporine level (10/21/2024): 216 ng/mL  Cyclosporine level (10/15/2024): 315 ng/mL  Cyclosporine level (10/09/2024): 48 ng/mL  Cyclosporine level (10/02/2024): 121 ng/mL  Cyclosporine level (09/25/2024): 107  ng/mL    IMAGING  CT chest without contrast (11/04/2024):  Impression: No parenchymal nodules, adenopathy or effusions.    CT abdomen and pelvis without contrast (11/04/2024):  Impression:  1) Minimal sigmoid diverticulitis.  2) Other findings [are nonacute].    PATHOLOGY  Peripheral smear, bone marrow aspiration smear, bone marrow aspiration clot and bone marrow core biopsy (06/26/2024):  Normocellular bone marrow with increased granulopoiesis, essentially absent erythropoiesis, adequate megakaryopoiesis, increased stainable iron and slightly increased mixed chronic inflammation. No evidence of increased blasts or dysplasia. The most striking feature within the bone marrow is the essential absence of any erythropoiesis in the aspirate or core biopsy. There is mixed inflammation with some notable immunophenotypic findings by flow cytometry, but there is no evidence of bone marrow involvement by lymphoma. This is most suggestive of a mixed reactive lymphoid population.    IMPRESSION AND PLAN  Mr. Silva is a 89 y.o., white male with:  Pure red cell aplasia: I have had multiple, long discussions with the patient and his wife regarding the results of the bone marrow biopsy performed in late June 2024 (which are summarized above). In short, this is a fairly uncommon diagnosis that can either be idiopathic or associated with a number of potential, underlying causes, including thymoma or parvovirus infection. Regardless, high dose steroids (prednisone 1-2 mg/kg daily) is the standard, first-line treatment; however, since the repeat CBC drawn on 07/01/2024 showed that his HgB (9.4 g/dL) had improved compared to what it was when he was discharged from our hospital following the bone marrow biopsy the previous week (9.0 g/dL), it was hoped that he was satisfactorily recovering (from a viral trigger) on his own. However, a repeat CBC on 07/15/2024 revealed that his HgB had dropped again (8.3 mg/dL); and his reticulocyte  counts remained essentially zero. A Rx for prednisone 80 mg PO daily was provided at that time; but, unfortunately, high-dose steroids never provided him with any meaningful effect. Consequently, by late August 2024, he was started on an additional Rx (cyclosporine 50 mg PO BID), per [facility]'s recommendations (with whom he had met for an additional opinion regarding his management earlier that month). Once he tolerated this starting dose for ~one week, in late August (08/27/2024), we increased the cyclosporine to 100 mg PO BID and further decreased the prednisone to 40 mg PO daily. At his appointment on 09/03/2024, as his anemia (finally) improved (rather well, to a HgB of 11.5 g/dL), we continued the cyclosporine and further decreased the prednisone to 20 mg PO daily. Unfortunately, at the time of his follow up appointment on 09/10/2024, a repeat CBC did not show any further improvement in his HgB (it was 10.4 g/dL), issue #2 (see below) was dramatically worse; and his repeat serum cyclosporine was > 400 ng/mL (above the upper limit of the desirable range). Given all of this, the cyclosporine was held completely for the next one and one half weeks. Meanwhile, he further decreased his daily prednisone dose (from 10 mg daily to 5 mg daily to, as of late September, off completely), as instructed. After remaining off of cyclosporine for one and one half weeks, his mouth sores (finally) improved; and his PO intake renormalized. Unfortunately, throughout the latter part of September 2024, he was getting steadily fatigued again, correlating with the fact that, not surprisingly, his HgB continued to redecline off of the cyclosporine (and it is was back down to 7.3 g/dL by 09/25/2024, at which time he required the transfusion of an additional two units of PRBCs). He was restarted on cyclosporine (the 50 mg BID dose), and he remained on this dose for the next seven plus (7+) months. So far, issue #2 has still yet to  "significantly reworsen (it has intermittently mildly recurred, but it remains, at worst, very tolerable with localized therapies such as Orajel). By early to mid-May 2025, as he continued to do so well from the standpoint of this issue, he was re-referred to [facility] for an opinion regarding the feasibility/process of attempting to wean him off of the cyclosporine. They ([facility] hematology) agreed that this was a good idea, and they decreased his dose from 50 mg BID to 50 mg qam and 25 mg qpm at that time (on 05/06/2025). He has been on this lower dose for a total of going on one (1) full month now, and he continues to feel overall well. Today's (06/02/2025) repeat CBC shows that his HgB remains solidly stable (11.5 g/dL). He was instructed to further decrease his dose of cyclosporine (to 25 mg PO BID), and we will see him back in our clinic in two weeks (mid-June) with a repeat CBC, CMP and cyclosporine level for continued, close monitoring.  Mucositis: A progressive and refractory issue that began in late August 2024, shortly after he started PO cyclosporine for the treatment of issue #1 (particularly after he was escalated to the 100 mg BID dose). As discussed above, by early September 2024, this was dramatically reducing his PO intake, leading to dehydration and acute renal insufficiency. Also as discussed above, these symptoms have remained much better and manageable for many months now; and the (lower) dose of PO cyclosporine (50 mg BID) has been reintroduced. He and his wife have been counseled on the routine use of magic mouthwash, OraGel and/or other, similar products. They also know to skip the occasional dose of the cyclosporine, if needed. Continue to monitor.  \"Chalky stool\": He and his wife previously clarified that this issue has actually occurred previously. In approximately Summer 2023, he had to be referred to [facility] gastroenterology, who performed an ERCP for (what sounds like benign) " stricturing. Regardless, CT scans of the chest, abdomen and pelvis without contrast performed on 11/05/2024 (and summarized above) for further evaluation were overall unremarkable; and this symptom is currently still re-resolved. Continue to monitor.  Lower extremity edema: Multifactorial, with age-related venous insufficiency and the resulting, dependent edema definitely contributing. Ongoing management per primary care.  The patient and his wife were in agreement with this plan.    It is a pleasure to participate in Mr. Silva's care. Please do not hesitate to call with any questions or concerns that you may have.    A total of 30 minutes were spent coordinating this patient’s care in clinic today; more than 50% of this time was face-to-face with the patient and his wife, reviewing his interim medical history, discussing the results of today's repeat labwork, and counseling on the current treatment and followup plan. All questions were answered to their satisfaction.    FOLLOW UP  Continue cyclosporine, though now with a further reduced dose (of 25 mg PO BID). Return to our clinic in 2 weeks (~mid-June) with a repeat CBC, CMP and cyclosporine level.          This document was electronically signed by RAMIRO King MD June 2, 2025 11:02 EDT      CC: RIGO Mccall MD

## 2025-06-02 NOTE — PROGRESS NOTES
Venipuncture Blood Specimen Collection  Venipuncture performed in right arm by Mimi Batista MA with good hemostasis. Patient tolerated the procedure well without complications.   06/02/25   Mimi Batista MA

## 2025-06-05 LAB — CYCLOSPORINE BLD LC/MS/MS-MCNC: 226 NG/ML (ref 100–400)

## 2025-06-06 ENCOUNTER — TELEPHONE (OUTPATIENT)
Dept: ONCOLOGY | Facility: CLINIC | Age: OVER 89
End: 2025-06-06

## 2025-06-06 NOTE — TELEPHONE ENCOUNTER
Caller: Ricardo PINEDA),[place]    Relationship: Emergency Contact    Best call back number: [phone]     What is the best time to reach you: ANYTIME    Who are you requesting to speak with (clinical staff, provider,  specific staff member):     What was the call regarding: PLEASE CALL [place] TO SCHEDULE PATIENT'S 2 WEEK F/U APPT.

## 2025-06-19 ENCOUNTER — LAB (OUTPATIENT)
Dept: ONCOLOGY | Facility: CLINIC | Age: OVER 89
End: 2025-06-19
Payer: MEDICARE

## 2025-06-19 ENCOUNTER — OFFICE VISIT (OUTPATIENT)
Dept: ONCOLOGY | Facility: CLINIC | Age: OVER 89
End: 2025-06-19
Payer: MEDICARE

## 2025-06-19 VITALS
HEIGHT: 68 IN | HEART RATE: 67 BPM | SYSTOLIC BLOOD PRESSURE: 132 MMHG | DIASTOLIC BLOOD PRESSURE: 65 MMHG | BODY MASS INDEX: 22.73 KG/M2 | WEIGHT: 150 LBS | TEMPERATURE: 97 F | RESPIRATION RATE: 20 BRPM | OXYGEN SATURATION: 97 %

## 2025-06-19 DIAGNOSIS — D60.9 ACQUIRED RED CELL APLASIA: Primary | ICD-10-CM

## 2025-06-19 DIAGNOSIS — D61.9 APLASTIC ANEMIA: ICD-10-CM

## 2025-06-19 DIAGNOSIS — D60.9 ACQUIRED RED CELL APLASIA: ICD-10-CM

## 2025-06-19 DIAGNOSIS — D64.9 SYMPTOMATIC ANEMIA: ICD-10-CM

## 2025-06-19 LAB
ALBUMIN SERPL-MCNC: 3.9 G/DL (ref 3.5–5.2)
ALBUMIN/GLOB SERPL: 1.4 G/DL
ALP SERPL-CCNC: 128 U/L (ref 39–117)
ALT SERPL W P-5'-P-CCNC: 23 U/L (ref 1–41)
ANION GAP SERPL CALCULATED.3IONS-SCNC: 10.3 MMOL/L (ref 5–15)
AST SERPL-CCNC: 25 U/L (ref 1–40)
BASOPHILS # BLD AUTO: 0.04 10*3/MM3 (ref 0–0.2)
BASOPHILS NFR BLD AUTO: 0.6 % (ref 0–1.5)
BILIRUB SERPL-MCNC: 0.7 MG/DL (ref 0–1.2)
BUN SERPL-MCNC: 24.2 MG/DL (ref 8–23)
BUN/CREAT SERPL: 16.8 (ref 7–25)
CALCIUM SPEC-SCNC: 9.7 MG/DL (ref 8.2–9.6)
CHLORIDE SERPL-SCNC: 103 MMOL/L (ref 98–107)
CO2 SERPL-SCNC: 25.7 MMOL/L (ref 22–29)
CREAT SERPL-MCNC: 1.44 MG/DL (ref 0.76–1.27)
DEPRECATED RDW RBC AUTO: 56 FL (ref 37–54)
EGFRCR SERPLBLD CKD-EPI 2021: 45.6 ML/MIN/1.73
EOSINOPHIL # BLD AUTO: 0.16 10*3/MM3 (ref 0–0.4)
EOSINOPHIL NFR BLD AUTO: 2.5 % (ref 0.3–6.2)
ERYTHROCYTE [DISTWIDTH] IN BLOOD BY AUTOMATED COUNT: 13.8 % (ref 12.3–15.4)
GLOBULIN UR ELPH-MCNC: 2.7 GM/DL
GLUCOSE SERPL-MCNC: 117 MG/DL (ref 65–99)
HCT VFR BLD AUTO: 35.6 % (ref 37.5–51)
HGB BLD-MCNC: 11.5 G/DL (ref 13–17.7)
IMM GRANULOCYTES # BLD AUTO: 0.02 10*3/MM3 (ref 0–0.05)
IMM GRANULOCYTES NFR BLD AUTO: 0.3 % (ref 0–0.5)
LYMPHOCYTES # BLD AUTO: 2.1 10*3/MM3 (ref 0.7–3.1)
LYMPHOCYTES NFR BLD AUTO: 32.8 % (ref 19.6–45.3)
MCH RBC QN AUTO: 35.4 PG (ref 26.6–33)
MCHC RBC AUTO-ENTMCNC: 32.3 G/DL (ref 31.5–35.7)
MCV RBC AUTO: 109.5 FL (ref 79–97)
MONOCYTES # BLD AUTO: 0.59 10*3/MM3 (ref 0.1–0.9)
MONOCYTES NFR BLD AUTO: 9.2 % (ref 5–12)
NEUTROPHILS NFR BLD AUTO: 3.5 10*3/MM3 (ref 1.7–7)
NEUTROPHILS NFR BLD AUTO: 54.6 % (ref 42.7–76)
NRBC BLD AUTO-RTO: 0 /100 WBC (ref 0–0.2)
PLATELET # BLD AUTO: 194 10*3/MM3 (ref 140–450)
PMV BLD AUTO: 12.2 FL (ref 6–12)
POTASSIUM SERPL-SCNC: 4.1 MMOL/L (ref 3.5–5.2)
PROT SERPL-MCNC: 6.6 G/DL (ref 6–8.5)
RBC # BLD AUTO: 3.25 10*6/MM3 (ref 4.14–5.8)
RETICS # AUTO: 0.06 10*6/MM3 (ref 0.02–0.13)
RETICS/RBC NFR AUTO: 1.82 % (ref 0.7–1.9)
SODIUM SERPL-SCNC: 139 MMOL/L (ref 136–145)
WBC NRBC COR # BLD AUTO: 6.41 10*3/MM3 (ref 3.4–10.8)

## 2025-06-19 PROCEDURE — 80053 COMPREHEN METABOLIC PANEL: CPT | Performed by: NURSE PRACTITIONER

## 2025-06-19 PROCEDURE — 85025 COMPLETE CBC W/AUTO DIFF WBC: CPT | Performed by: NURSE PRACTITIONER

## 2025-06-19 PROCEDURE — 85045 AUTOMATED RETICULOCYTE COUNT: CPT | Performed by: NURSE PRACTITIONER

## 2025-06-19 NOTE — PROGRESS NOTES
Venipuncture Blood Specimen Collection  Venipuncture performed in right arm by Shena Sewell MA with good hemostasis. Patient tolerated the procedure well without complications.   06/19/25   Shena Sewell MA

## 2025-06-19 NOTE — PROGRESS NOTES
Name:  Chinedu Silva  :  1933  Date:  2025     REFERRING PHYSICIAN    PRIMARY CARE PROVIDER  Jr Segura PA    REASON FOR FOLLOWUP  1. Acquired red cell aplasia      CHIEF COMPLAINT  None.    Dear  Colton,    HISTORY OF PRESENT ILLNESS:   I saw Mr. Silva in followup (from his recent hospitalization for severe anemia) today in our hematology/oncology clinic. As you are aware, he is a pleasant, 89 y.o., white male with a history of hypertension, CAD and GERD who presented to the [facility] ED on 2024 with progressive weakness and dyspnea on exertion. These symptoms had potentially first started ~two to three months prior; however, they significantly worsened over the course of the previous couple of weeks (by early 2024). Upon arrival to our ED, he was found to be severely anemic, with a Hg of 3.7 g/dL. His other cell lines (WBCs and platelets) were entirely WNL. Our service was consulted for further evaluation of this issue, and a bone marrow biopsy was performed on 2024. Meanwhile, as he was feeling substantially better after his HgB was transfused up to the ~9 g/dL range, he was able to be discharged to outpatient follow up in our clinic. He returned to our clinic the following week (on 2024) to go over the results of the bone marrow exam and for ongoing management.    INTERIM HISTORY:  Mr. Silva returns to clinic today for follow up yet again accompanied by his wife. He was on PO cyclosporine for a total of ~three weeks (50 mg BID for the first week and 100 mg BID for the next two weeks) in late August/early 2024. Due to progressive, severe and refractory mucositis (as well as a toxic cyclosporine level), he was instructed to discontinue the cyclosporine in early 2024. After holding the cyclosporine for the next ~one and one half weeks, his mucositis finally improved. He was instructed to restart it a dose of 50 mg BID, and he remained on this  for the next seven plus (7+) months. While he has redeveloped some intermittent, but currently still very mild and manageable, mucositis, he is overall still doing much much better than he was in Fall 2024, as his fatigue has remained improved and his HgB has remained greater than 10 g/dL for many months now (and he has still not required any PRBC transfusions since late Summer 2024). As he continued to do so well, in early to mid-May 2025, his cyclosporine dose was reduced to 50 mg in the morning and 25 mg in the evening; and, by early June 2025, it was further decreased to 25 mg BID. He has been on this latter dose for a total of ~two (2) weeks now, and he has still not noticed any difference in the way he feels. His only new complaint today is that he has an initial consultation appointment with an ENT in [place], because he has recently been struggling with an intermittently bleeding left ear and some worsening hearing issues. He otherwise once again has no new or other specific complaints and continues to feel overall pretty well.    Past Medical History:   Diagnosis Date    Carotid stenosis     Coronary artery disease     Diverticulitis     GERD (gastroesophageal reflux disease)     Gout     Hearing aid worn     Hypertension     Obese     Prostate cancer     Wears glasses        Past Surgical History:   Procedure Laterality Date    CAROTID ENDARTERECTOMY Left 2/5/2018    Procedure: CAROTID ENDARTERECTOMY WITH EEG LEFT;  Surgeon: Del Neal MD;  Location: [facility];  Service:     CATARACT EXTRACTION      CATARACT EXTRACTION WITH INTRAOCULAR LENS IMPLANT Right     CHOLECYSTECTOMY      COLONOSCOPY W/ POLYPECTOMY      HERNIA REPAIR Right     TOTAL KNEE ARTHROPLASTY Right     TURP / TRANSURETHRAL INCISION / DRAINAGE PROSTATE         Social History     Socioeconomic History    Marital status:     Number of children: 1   Tobacco Use    Smoking status: Former     Current packs/day: 0.00     Average  packs/day: 1 pack/day for 12.0 years (12.0 ttl pk-yrs)     Types: Cigarettes     Start date:      Quit date:      Years since quittin.5    Smokeless tobacco: Current     Types: Chew   Vaping Use    Vaping status: Never Used   Substance and Sexual Activity    Alcohol use: No    Drug use: No    Sexual activity: Defer       Family History   Problem Relation Age of Onset    Hypertension Mother     Glaucoma Mother     Diverticulitis Mother     Gout Mother     ALS Father     Gout Sister     Broken bones Paternal Grandfather     Broken bones Son     Cancer Son     Diabetes Brother        Allergies   Allergen Reactions    Ciprofloxacin Other (See Comments)     Muscle Pains    Lisinopril Cough    Tetracycline Provider Review Needed     Other Reaction(s) from [facility] System: UNK    Sulfa Antibiotics Itching and Rash       Current Outpatient Medications   Medication Sig Dispense Refill    allopurinol (ZYLOPRIM) 300 MG tablet Take 1 tablet by mouth Daily.      aspirin 81 MG EC tablet Take 1 tablet by mouth Daily.      bumetanide (BUMEX) 1 MG tablet Take 1 tablet by mouth 2 (Two) Times a Week. Every Monday and Thursday      Jardiance 10 MG tablet tablet       ondansetron (ZOFRAN) 8 MG tablet Take 1 tablet by mouth Every 8 (Eight) Hours As Needed for Nausea or Vomiting. 30 tablet 1    rosuvastatin (CRESTOR) 10 MG tablet Take 1 tablet by mouth Daily. 90 tablet 3    valsartan (DIOVAN) 80 MG tablet Take 0.5 tablets by mouth Daily.       No current facility-administered medications for this visit.     REVIEW OF SYSTEMS  CONSTITUTIONAL:  No fever, chills or night sweats. Chronic fatigue, recently still improved.  EYES:  No blurry vision, diplopia or other vision changes.  ENT:  As per the HPI above.  CARDIOVASCULAR:  No palpitations, arrhythmia, syncopal episodes or edema.  PULMONARY:  No hemoptysis, wheezing, chronic cough or shortness of breath.  GASTROINTESTINAL:  No nausea or vomiting. No constipation or diarrhea. No  "abdominal pain. Intermittently \"chalky\" stool, currently still resolved.  GENITOURINARY:  No hematuria, kidney stones or frequent urination.  MUSCULOSKELETAL:  No joint or back pains.  INTEGUMENTARY: No rashes or pruritus.  ENDOCRINE:  No excessive thirst or hot flashes.  HEMATOLOGIC:  No history of free bleeding, spontaneous bleeding or clotting.  IMMUNOLOGIC:  No allergies or frequent infections.  NEUROLOGIC: No numbness, tingling, seizures or weakness.  PSYCHIATRIC:  No anxiety or depression.    PHYSICAL EXAMINATION  /65   Pulse 67   Temp 97 °F (36.1 °C) (Temporal)   Resp 20   Ht 172.7 cm (68\")   Wt 68 kg (150 lb)   SpO2 97%   BMI 22.81 kg/m²     Pain Score:  Pain Score    25 0950   PainSc: 0-No pain     PHQ-Score Total:  PHQ-9 Total Score:      ECO  GENERAL:  A well-developed, well-nourished, elderly, white male in no acute distress.  HEENT:  Pupils equally round and reactive to light. Extraocular muscles intact.  CARDIOVASCULAR:  Regular rate and rhythm. No murmurs, gallops or rubs.  LUNGS:  Clear to auscultation bilaterally. No wheezing.  ABDOMEN:  Soft, nontender, nondistended with positive bowel sounds.  EXTREMITIES:  No clubbing, cyanosis or edema bilaterally.  SKIN:  No rashes or petechiae.  NEURO:  Cranial nerves grossly intact. No focal deficits.  PSYCH:  Alert and oriented x3.    The physical exam is yet again unchanged from recent priors.    LABORATORY  Lab Results   Component Value Date    WBC 6.41 2025    HGB 11.5 (L) 2025    HCT 35.6 (L) 2025    .5 (H) 2025     2025    NEUTROABS 3.50 2025       Lab Results   Component Value Date     2025    K 4.5 2025     2025    CO2 26.4 2025    BUN 34.2 (H) 2025    CREATININE 1.44 (H) 2025    GLUCOSE 121 (H) 2025    CALCIUM 9.8 (H) 2025    AST 36 2025    ALT 26 2025    ALKPHOS 143 (H) 2025    BILITOT 0.9 2025 "    PROTEINTOT 6.9 06/02/2025    ALBUMIN 4.1 06/02/2025     Lab Results   Component Value Date    RETICCTPCT 1.82 06/19/2025     CBC (06/19/2025): WBCs: 6.41; HgB: 11.5; Hct: 35.6; platelets: 194; MCV: 109.5  CBC (06/02/2025): WBCs: 5.98; HgB: 11.5; Hct: 36.4; platelets: 202; MCV: 110.0  CBC (05/19/2025): WBCs: 6.05; HgB: 11.3; Hct: 35.9; platelets: 183; MCV: 110.1  CBC (04/18/2025): WBCs: 7.83; HgB: 10.4; Hct: 33.1; platelets: 179; MCV: 110.7  CBC (03/18/2025): WBCs: 7.54; HgB: 10.1; Hct: 31.3; platelets: 198; MCV: 107.9  CBC (02/18/2025): WBCs: 8.34; HgB: 10.9; Hct: 34.0; platelets: 199; MCV: 109.7  CBC (01/21/2025): WBCs: 5.63; HgB: 10.8; Hct: 34.2; platelets: 225; MCV: 111.8  CBC (12/16/2024): WBCs: 6.56; HgB: 10.5; Hct: 32.9; platelets: 218; MCV: 113.8  CBC (12/02/2024): WBCs: 7.22; HgB: 10.3; Hct: 32.8; platelets: 218; MCV: 112.7  CBC (11/19/2024): WBCs: 6.30; HgB: 10.2; Hct: 32.1; platelets: 272; MCV: 110.7  CBC (11/05/2024): WBCs: 5.65; HgB: 9.5; Hct: 30.3; platelets: 277; MCV: 107.1  CBC (10/30/2024): WBCs: 6,65; HgB: 9.7; Hct: 30.1; platelets: 281; MCV: 106.0  CBC (10/21/2024): WBCs: 7.46; HgB: 9.6; Hct: 30.5; platelets: 349; MCV: 103.4  CBC (10/15/2024): WBCs: 7.86; HgB: 9.6; Hct: 30.8; platelets: 387; MCV: 101.3  CBC (10/09/2024): WBCs: 7.42; HgB: 8.8; Hct: 28.5; platelets: 373; MCV: 97.9  CBC (10/02/2024): WBCs: 6.94; HgB: 9.2; Hct: 29.6; platelets: 324; MCV: 95.2  CBC (09/25/2024): WBCs: 8.71; HgB: 7.3; Hct: 23.3; platelets: 349; MCV: 90.3  CBC (09/18/2024): WBCs: 9.33; HgB: 9.0; Hct: 27.7; platelets: 312; MCV: 87.4  CBC (09/10/2024): WBCs: 10.20; HgB: 10.4; Hct: 31.3; platelets: 222; MCV: 86.5  CBC (09/03/2024): WBCs: 10.95; HgB: 11.5; Hct: 35.6; platelets: 203; MCV: 86.6  CBC (08/29/2024): WBCs: 14.86; HgB: 7.4; Hct: 23.5; platelets: 275; MCV: 86.7  CBC (08/26/2024): WBCs: 11.72; HgB: 7.7; Hct: 24.6; platelets: 250; MCV: 86.6  CBC (08/23/2024): WBCs: 10.01; HgB: 8.0; Hct: 25.3; platelets: 237; MCV:  87.2  CBC (08/20/2024): WBCS: 8.59; HgB: 8.4; Hct: 26.4; platelets: 272; MCV: 86.8  CBC (08/14/2024): WBCs:  8.16; HgB: 9.0; Hct: 27.9; platelets: 319; MCV: 87.2  CBC (08/09/2024): WBCs: 12.00; HgB: 8.1; Hct: 25.5; platelets: 319; MCV: 90.7  CBC (08/02/2024): WBCs: 11.92; HgB: 8.1; Hct: 24.8; platelets: 204; MCV: 90.5  CBC (07/31/2024): WBCs: 17.04; HgB: 7.6; Hct: 23.3; platelets: 265; MCV: 95.5  CBC (07/26/2024): WBCs: 13.50; HgB: 8.1; Hct: 24.8; platelets: 329; MCV: 95.0  CBC (07/23/2024): WBCs: 16.43; HgB: 8.5; Hct: 25.8; platelets: 390; MCV: 96.3  CBC (07/15/2024): WBCs: 8.04; HgB: 8.3; Hct: 25.8; platelets: 340; MCV: 97.7  CBC (07/11/2024): WBCs: 7.25; HgB: 9.4; Hct: 29.0; platelets: 375; MCV: 97.0  CBC (07/01/2024): WBCs: 7.50; HgB: 9.4; Hct: 29.3; platelets: 272; MCV: 98.7  CBC (06/26/2024): WBCs: 6.67; HgB: 9.0; Hct: 27.5; platelets: 198; MCV: 98.6    Reticulocytes (12/16/2024): Percentage: 1.67; Absolute: 0.0483  Reticulocytes (12/02/2024): Percentage: 1.89; Absolute: 0.0550  Reticulocytes (11/19/2024): Percentage: 3.04; Absolute: 0.0882  Reticulocytes (11/05/2024): Percentage 2.91; Absolute: 0.0824  Reticulocytes (10/30/2024): Percentage: 3.67; Absolute: 0.1061  Reticulocytes (10/21/2024): Percentage: 4.21; Absolute: 0.1250  Reticulocytes (10/15/2024): Percentage: 5.24; Absolute: 0.1603  Reticulocytes (09/10/2024): Percentage: 0.47; Absolute: 0.0170  Reticulocytes (08/20/2024): Percentage: 0.26; Absolute: <0.0100    Cyclosporine level (06/02/2025): 226 ng/mL  Cyclosporine level (02/18/2025): 155 ng/mL  Cyclosporine level (01/21/2025): 91 ng/mL  Cyclosporine level (12/30/2024): 154 ng/mL  Cyclosporine level (12/16/2024): 86 ng/mL  Cyclosporine level (12/02/2024): 183 ng/mL  Cyclosporine level (11/19/2024): 335 ng/mL  Cyclosporine level (11/05/2024): 320 ng/mL  Cyclosporine level (10/30/2024): 132 ng/mL  Cyclosporine level (10/21/2024): 216 ng/mL  Cyclosporine level (10/15/2024): 315 ng/mL  Cyclosporine  level (10/09/2024): 48 ng/mL  Cyclosporine level (10/02/2024): 121 ng/mL  Cyclosporine level (09/25/2024): 107 ng/mL    IMAGING  CT chest without contrast (11/04/2024):  Impression: No parenchymal nodules, adenopathy or effusions.    CT abdomen and pelvis without contrast (11/04/2024):  Impression:  1) Minimal sigmoid diverticulitis.  2) Other findings [are nonacute].    PATHOLOGY  Peripheral smear, bone marrow aspiration smear, bone marrow aspiration clot and bone marrow core biopsy (06/26/2024):  Normocellular bone marrow with increased granulopoiesis, essentially absent erythropoiesis, adequate megakaryopoiesis, increased stainable iron and slightly increased mixed chronic inflammation. No evidence of increased blasts or dysplasia. The most striking feature within the bone marrow is the essential absence of any erythropoiesis in the aspirate or core biopsy. There is mixed inflammation with some notable immunophenotypic findings by flow cytometry, but there is no evidence of bone marrow involvement by lymphoma. This is most suggestive of a mixed reactive lymphoid population.    IMPRESSION AND PLAN  Mr. Silva is a 89 y.o., white male with:  Pure red cell aplasia: I have had multiple, long discussions with the patient and his wife regarding the results of the bone marrow biopsy performed in late June 2024 (which are summarized above). In short, this is a fairly uncommon diagnosis that can either be idiopathic or associated with a number of potential, underlying causes, including thymoma or parvovirus infection. Regardless, high dose steroids (prednisone 1-2 mg/kg daily) is the standard, first-line treatment; however, since the repeat CBC drawn on 07/01/2024 showed that his HgB (9.4 g/dL) had improved compared to what it was when he was discharged from our hospital following the bone marrow biopsy the previous week (9.0 g/dL), it was hoped that he was satisfactorily recovering (from a viral trigger) on his own.  However, a repeat CBC on 07/15/2024 revealed that his HgB had dropped again (8.3 mg/dL); and his reticulocyte counts remained essentially zero. A Rx for prednisone 80 mg PO daily was provided at that time; but, unfortunately, high-dose steroids never provided him with any meaningful effect. Consequently, by late August 2024, he was started on an additional Rx (cyclosporine 50 mg PO BID), per [facility] recommendations (with whom he had met for an additional opinion regarding his management earlier that month). Once he tolerated this starting dose for ~one week, in late August (08/27/2024), we increased the cyclosporine to 100 mg PO BID and further decreased the prednisone to 40 mg PO daily. At his appointment on 09/03/2024, as his anemia (finally) improved (rather well, to a HgB of 11.5 g/dL), we continued the cyclosporine and further decreased the prednisone to 20 mg PO daily. Unfortunately, at the time of his follow up appointment on 09/10/2024, a repeat CBC did not show any further improvement in his HgB (it was 10.4 g/dL), issue #2 (see below) was dramatically worse; and his repeat serum cyclosporine was > 400 ng/mL (above the upper limit of the desirable range). Given all of this, the cyclosporine was held completely for the next one and one half weeks. Meanwhile, he further decreased his daily prednisone dose (from 10 mg daily to 5 mg daily to, as of late September, off completely), as instructed. After remaining off of cyclosporine for one and one half weeks, his mouth sores (finally) improved; and his PO intake renormalized. Unfortunately, throughout the latter part of September 2024, he was getting steadily fatigued again, correlating with the fact that, not surprisingly, his HgB continued to redecline off of the cyclosporine (and it is was back down to 7.3 g/dL by 09/25/2024, at which time he required the transfusion of an additional two units of PRBCs). He was restarted on cyclosporine (the 50 mg BID  dose), and he remained on this dose for the next seven plus (7+) months. So far, issue #2 has still yet to significantly reworsen (it has intermittently mildly recurred, but it remains, at worst, very tolerable with localized therapies such as Orajel). By early to mid-May 2025, as he continued to do so well from the standpoint of this issue, he was re-referred to [facility] for an opinion regarding the feasibility/process of attempting to wean him off of the cyclosporine; and they ([facility]) agreed that this was a good idea. His dose was decreased from 50 mg BID to 50 mg qam and 25 mg qpm on 05/06/2025; and, as his CBC remained stable for the next month, his dose was further decreased to 25 mg BID by early June 2025. He has been on this latter dose for the past two weeks, and he still cannot tell any difference in the way he feels. Today's (06/19/2025) repeat CBC shows that his HgB remains solidly stable (11.5 g/dL). He was instructed to now further decrease his dose of cyclosporine (to 25 mg daily). We will recheck a CBC in one week, and we will see him back in our clinic in two weeks (early July) with a repeat CBC, CMP and cyclosporine level for continued, close monitoring.  Mucositis: A progressive and refractory issue that began in late August 2024, shortly after he started PO cyclosporine for the treatment of issue #1 (particularly after he was escalated to the 100 mg BID dose). As discussed above, by early September 2024, this was dramatically reducing his PO intake, leading to dehydration and acute renal insufficiency. Also as discussed above, these symptoms have remained much better and manageable for many months now; and he is now on an even lower dose of PO cyclosporine (25 mg BID). He and his wife have been counseled on the routine use of magic mouthwash, OraGel and/or other, similar products. They also know to skip the occasional dose of the cyclosporine, if needed. Continue to  "monitor.  \"Chalky stool\": He and his wife previously clarified that this issue has actually occurred previously. In approximately Summer 2023, he had to be referred to [facility] gastroenterology, who performed an ERCP for (what sounds like benign) stricturing. Regardless, CT scans of the chest, abdomen and pelvis without contrast performed on 11/05/2024 (and summarized above) for further evaluation were overall unremarkable; and this symptom is currently still re-resolved. Continue to monitor.  Lower extremity edema: Multifactorial, with age-related venous insufficiency and the resulting, dependent edema definitely contributing. Ongoing management per primary care.  Bleeding ear/hearing loss: His current, primary complaints, for which his PCP referred him to an ENT in [place] for further evaluation. An initial consultation is scheduled early next month.  The patient and his wife were in agreement with this plan.    It is a pleasure to participate in Mr. Silva's care. Please do not hesitate to call with any questions or concerns that you may have.    A total of 30 minutes were spent coordinating this patient’s care in clinic today; more than 50% of this time was face-to-face with the patient and his wife, reviewing his interim medical history, discussing the results of today's repeat CBC, and counseling on the current treatment and followup plan. All questions were answered to their satisfaction.    FOLLOW UP  Continue cyclosporine, though now with a further reduced dose (of 25 mg PO daily). Repeat a CBC and CMP in 1 week (lab only). Return to our clinic in 2 weeks (~early July) with a repeat CBC, CMP and cyclosporine level.          This document was electronically signed by RAMIRO King MD June 19, 2025 10:14 EDT      CC: RIGO Mccall MD  "

## 2025-06-26 ENCOUNTER — CLINICAL SUPPORT (OUTPATIENT)
Dept: ONCOLOGY | Facility: CLINIC | Age: OVER 89
End: 2025-06-26
Payer: MEDICARE

## 2025-06-26 DIAGNOSIS — D64.9 SYMPTOMATIC ANEMIA: ICD-10-CM

## 2025-06-26 DIAGNOSIS — D60.9 ACQUIRED RED CELL APLASIA: ICD-10-CM

## 2025-06-26 DIAGNOSIS — D61.9 APLASTIC ANEMIA: ICD-10-CM

## 2025-06-26 LAB
ALBUMIN SERPL-MCNC: 4 G/DL (ref 3.5–5.2)
ALBUMIN/GLOB SERPL: 1.5 G/DL
ALP SERPL-CCNC: 129 U/L (ref 39–117)
ALT SERPL W P-5'-P-CCNC: 26 U/L (ref 1–41)
ANION GAP SERPL CALCULATED.3IONS-SCNC: 9.5 MMOL/L (ref 5–15)
AST SERPL-CCNC: 26 U/L (ref 1–40)
BASOPHILS # BLD AUTO: 0.02 10*3/MM3 (ref 0–0.2)
BASOPHILS NFR BLD AUTO: 0.3 % (ref 0–1.5)
BILIRUB SERPL-MCNC: 0.6 MG/DL (ref 0–1.2)
BUN SERPL-MCNC: 33.9 MG/DL (ref 8–23)
BUN/CREAT SERPL: 23.7 (ref 7–25)
CALCIUM SPEC-SCNC: 9.7 MG/DL (ref 8.2–9.6)
CHLORIDE SERPL-SCNC: 104 MMOL/L (ref 98–107)
CO2 SERPL-SCNC: 27.5 MMOL/L (ref 22–29)
CREAT SERPL-MCNC: 1.43 MG/DL (ref 0.76–1.27)
DEPRECATED RDW RBC AUTO: 56.3 FL (ref 37–54)
EGFRCR SERPLBLD CKD-EPI 2021: 46 ML/MIN/1.73
EOSINOPHIL # BLD AUTO: 0.09 10*3/MM3 (ref 0–0.4)
EOSINOPHIL NFR BLD AUTO: 1.5 % (ref 0.3–6.2)
ERYTHROCYTE [DISTWIDTH] IN BLOOD BY AUTOMATED COUNT: 13.9 % (ref 12.3–15.4)
GLOBULIN UR ELPH-MCNC: 2.7 GM/DL
GLUCOSE SERPL-MCNC: 134 MG/DL (ref 65–99)
HCT VFR BLD AUTO: 36.7 % (ref 37.5–51)
HGB BLD-MCNC: 11.6 G/DL (ref 13–17.7)
IMM GRANULOCYTES # BLD AUTO: 0.02 10*3/MM3 (ref 0–0.05)
IMM GRANULOCYTES NFR BLD AUTO: 0.3 % (ref 0–0.5)
LYMPHOCYTES # BLD AUTO: 1.99 10*3/MM3 (ref 0.7–3.1)
LYMPHOCYTES NFR BLD AUTO: 32.7 % (ref 19.6–45.3)
MCH RBC QN AUTO: 35 PG (ref 26.6–33)
MCHC RBC AUTO-ENTMCNC: 31.6 G/DL (ref 31.5–35.7)
MCV RBC AUTO: 110.9 FL (ref 79–97)
MONOCYTES # BLD AUTO: 0.59 10*3/MM3 (ref 0.1–0.9)
MONOCYTES NFR BLD AUTO: 9.7 % (ref 5–12)
NEUTROPHILS NFR BLD AUTO: 3.38 10*3/MM3 (ref 1.7–7)
NEUTROPHILS NFR BLD AUTO: 55.5 % (ref 42.7–76)
NRBC BLD AUTO-RTO: 0 /100 WBC (ref 0–0.2)
PLATELET # BLD AUTO: 195 10*3/MM3 (ref 140–450)
PMV BLD AUTO: 12.2 FL (ref 6–12)
POTASSIUM SERPL-SCNC: 4.5 MMOL/L (ref 3.5–5.2)
PROT SERPL-MCNC: 6.7 G/DL (ref 6–8.5)
RBC # BLD AUTO: 3.31 10*6/MM3 (ref 4.14–5.8)
SODIUM SERPL-SCNC: 141 MMOL/L (ref 136–145)
WBC NRBC COR # BLD AUTO: 6.09 10*3/MM3 (ref 3.4–10.8)

## 2025-06-26 PROCEDURE — 85025 COMPLETE CBC W/AUTO DIFF WBC: CPT | Performed by: INTERNAL MEDICINE

## 2025-06-26 PROCEDURE — 80053 COMPREHEN METABOLIC PANEL: CPT | Performed by: INTERNAL MEDICINE

## 2025-06-26 NOTE — PROGRESS NOTES
Venipuncture Blood Specimen Collection  Venipuncture performed in left arm by Rachell Mayorga MA with good hemostasis. Patient tolerated the procedure well without complications.   06/26/25   Rachell Mayorga MA

## 2025-07-01 ENCOUNTER — OFFICE VISIT (OUTPATIENT)
Dept: ONCOLOGY | Facility: CLINIC | Age: OVER 89
End: 2025-07-01
Payer: MEDICARE

## 2025-07-01 ENCOUNTER — LAB (OUTPATIENT)
Dept: ONCOLOGY | Facility: CLINIC | Age: OVER 89
End: 2025-07-01
Payer: MEDICARE

## 2025-07-01 VITALS
RESPIRATION RATE: 20 BRPM | BODY MASS INDEX: 21.98 KG/M2 | TEMPERATURE: 97.8 F | DIASTOLIC BLOOD PRESSURE: 67 MMHG | HEART RATE: 66 BPM | WEIGHT: 145 LBS | HEIGHT: 68 IN | SYSTOLIC BLOOD PRESSURE: 116 MMHG | OXYGEN SATURATION: 98 %

## 2025-07-01 DIAGNOSIS — D64.9 SYMPTOMATIC ANEMIA: ICD-10-CM

## 2025-07-01 DIAGNOSIS — D60.9 ACQUIRED RED CELL APLASIA: ICD-10-CM

## 2025-07-01 DIAGNOSIS — D61.9 APLASTIC ANEMIA: ICD-10-CM

## 2025-07-01 DIAGNOSIS — D60.9 ACQUIRED RED CELL APLASIA: Primary | ICD-10-CM

## 2025-07-01 LAB
ALBUMIN SERPL-MCNC: 4.1 G/DL (ref 3.5–5.2)
ALBUMIN/GLOB SERPL: 1.6 G/DL
ALP SERPL-CCNC: 128 U/L (ref 39–117)
ALT SERPL W P-5'-P-CCNC: 28 U/L (ref 1–41)
ANION GAP SERPL CALCULATED.3IONS-SCNC: 10.2 MMOL/L (ref 5–15)
AST SERPL-CCNC: 25 U/L (ref 1–40)
BASOPHILS # BLD AUTO: 0.03 10*3/MM3 (ref 0–0.2)
BASOPHILS NFR BLD AUTO: 0.4 % (ref 0–1.5)
BILIRUB SERPL-MCNC: 0.7 MG/DL (ref 0–1.2)
BUN SERPL-MCNC: 32.8 MG/DL (ref 8–23)
BUN/CREAT SERPL: 22.3 (ref 7–25)
CALCIUM SPEC-SCNC: 9.6 MG/DL (ref 8.2–9.6)
CHLORIDE SERPL-SCNC: 103 MMOL/L (ref 98–107)
CO2 SERPL-SCNC: 24.8 MMOL/L (ref 22–29)
CREAT SERPL-MCNC: 1.47 MG/DL (ref 0.76–1.27)
DEPRECATED RDW RBC AUTO: 56.6 FL (ref 37–54)
EGFRCR SERPLBLD CKD-EPI 2021: 44.5 ML/MIN/1.73
EOSINOPHIL # BLD AUTO: 0.13 10*3/MM3 (ref 0–0.4)
EOSINOPHIL NFR BLD AUTO: 1.8 % (ref 0.3–6.2)
ERYTHROCYTE [DISTWIDTH] IN BLOOD BY AUTOMATED COUNT: 14.2 % (ref 12.3–15.4)
GLOBULIN UR ELPH-MCNC: 2.6 GM/DL
GLUCOSE SERPL-MCNC: 101 MG/DL (ref 65–99)
HCT VFR BLD AUTO: 36.1 % (ref 37.5–51)
HGB BLD-MCNC: 11.5 G/DL (ref 13–17.7)
IMM GRANULOCYTES # BLD AUTO: 0.01 10*3/MM3 (ref 0–0.05)
IMM GRANULOCYTES NFR BLD AUTO: 0.1 % (ref 0–0.5)
LYMPHOCYTES # BLD AUTO: 3.15 10*3/MM3 (ref 0.7–3.1)
LYMPHOCYTES NFR BLD AUTO: 43.2 % (ref 19.6–45.3)
MCH RBC QN AUTO: 34.6 PG (ref 26.6–33)
MCHC RBC AUTO-ENTMCNC: 31.9 G/DL (ref 31.5–35.7)
MCV RBC AUTO: 108.7 FL (ref 79–97)
MONOCYTES # BLD AUTO: 0.66 10*3/MM3 (ref 0.1–0.9)
MONOCYTES NFR BLD AUTO: 9.1 % (ref 5–12)
NEUTROPHILS NFR BLD AUTO: 3.31 10*3/MM3 (ref 1.7–7)
NEUTROPHILS NFR BLD AUTO: 45.4 % (ref 42.7–76)
NRBC BLD AUTO-RTO: 0 /100 WBC (ref 0–0.2)
PLATELET # BLD AUTO: 196 10*3/MM3 (ref 140–450)
PMV BLD AUTO: 12.1 FL (ref 6–12)
POTASSIUM SERPL-SCNC: 4.8 MMOL/L (ref 3.5–5.2)
PROT SERPL-MCNC: 6.7 G/DL (ref 6–8.5)
RBC # BLD AUTO: 3.32 10*6/MM3 (ref 4.14–5.8)
SODIUM SERPL-SCNC: 138 MMOL/L (ref 136–145)
WBC NRBC COR # BLD AUTO: 7.29 10*3/MM3 (ref 3.4–10.8)

## 2025-07-01 PROCEDURE — 80158 DRUG ASSAY CYCLOSPORINE: CPT | Performed by: INTERNAL MEDICINE

## 2025-07-01 PROCEDURE — 85025 COMPLETE CBC W/AUTO DIFF WBC: CPT | Performed by: INTERNAL MEDICINE

## 2025-07-01 PROCEDURE — 80053 COMPREHEN METABOLIC PANEL: CPT | Performed by: NURSE PRACTITIONER

## 2025-07-01 NOTE — PROGRESS NOTES
Name:  Chinedu Silva  :  1933  Date:  2025     REFERRING PHYSICIAN    PRIMARY CARE PROVIDER  Jr Segura PA    REASON FOR FOLLOWUP  1. Acquired red cell aplasia      CHIEF COMPLAINT  None.    Dear  Colton,    HISTORY OF PRESENT ILLNESS:   I saw Mr. Silva in followup (from his recent hospitalization for severe anemia) today in our hematology/oncology clinic. As you are aware, he is a pleasant, 89 y.o., white male with a history of hypertension, CAD and GERD who presented to the [facility] ED on 2024 with progressive weakness and dyspnea on exertion. These symptoms had potentially first started ~two to three months prior; however, they significantly worsened over the course of the previous couple of weeks (by early 2024). Upon arrival to our ED, he was found to be severely anemic, with a Hg of 3.7 g/dL. His other cell lines (WBCs and platelets) were entirely WNL. Our service was consulted for further evaluation of this issue, and a bone marrow biopsy was performed on 2024. Meanwhile, as he was feeling substantially better after his HgB was transfused up to the ~9 g/dL range, he was able to be discharged to outpatient follow up in our clinic. He returned to our clinic the following week (on 2024) to go over the results of the bone marrow exam and for ongoing management.    INTERIM HISTORY:  Mr. Silva returns to clinic today for follow up yet again accompanied by his wife. He was on PO cyclosporine for a total of ~three weeks (50 mg BID for the first week and 100 mg BID for the next two weeks) in late August/early 2024. Due to progressive, severe and refractory mucositis (as well as a toxic cyclosporine level), he was instructed to discontinue the cyclosporine in early 2024. After holding the cyclosporine for the next ~one and one half weeks, his mucositis finally improved. He was instructed to restart it a dose of 50 mg BID, and he remained on this  for the next seven plus (7+) months. While he has redeveloped some intermittent, but currently still very mild and manageable, mucositis, he is overall still doing much much better than he was in Fall 2024, as his fatigue has remained improved and his HgB has remained greater than 10 g/dL for many months now (and he has still not required any PRBC transfusions since late Summer 2024). As he continued to do so well, in early to mid-May 2025, his cyclosporine dose was reduced to 50 mg in the morning and 25 mg in the evening, by early June 2025, it was further decreased to 25 mg BID; and, by mid-June 2025, it was decreased even further to 25 mg PO once daily. He has been on this latter dose for a total of ~two (2) weeks now, and he has still not noticed any difference in the way he feels. His only new complaint recently has been that he is in the process of establishing follow up with an ENT in [place], because he has been struggling with an intermittently bleeding left ear and some worsening hearing issues. He otherwise once again has no new or other specific complaints and continues to feel overall pretty well.    Past Medical History:   Diagnosis Date    Carotid stenosis     Coronary artery disease     Diverticulitis     GERD (gastroesophageal reflux disease)     Gout     Hearing aid worn     Hypertension     Obese     Prostate cancer     Wears glasses        Past Surgical History:   Procedure Laterality Date    CAROTID ENDARTERECTOMY Left 2/5/2018    Procedure: CAROTID ENDARTERECTOMY WITH EEG LEFT;  Surgeon: Del Neal MD;  Location: [facility];  Service:     CATARACT EXTRACTION      CATARACT EXTRACTION WITH INTRAOCULAR LENS IMPLANT Right     CHOLECYSTECTOMY      COLONOSCOPY W/ POLYPECTOMY      HERNIA REPAIR Right     TOTAL KNEE ARTHROPLASTY Right     TURP / TRANSURETHRAL INCISION / DRAINAGE PROSTATE         Social History     Socioeconomic History    Marital status:     Number of children: 1   Tobacco  Use    Smoking status: Former     Current packs/day: 0.00     Average packs/day: 1 pack/day for 12.0 years (12.0 ttl pk-yrs)     Types: Cigarettes     Start date:      Quit date:      Years since quittin.5    Smokeless tobacco: Current     Types: Chew   Vaping Use    Vaping status: Never Used   Substance and Sexual Activity    Alcohol use: No    Drug use: No    Sexual activity: Defer       Family History   Problem Relation Age of Onset    Hypertension Mother     Glaucoma Mother     Diverticulitis Mother     Gout Mother     ALS Father     Gout Sister     Broken bones Paternal Grandfather     Broken bones Son     Cancer Son     Diabetes Brother        Allergies   Allergen Reactions    Ciprofloxacin Other (See Comments)     Muscle Pains    Lisinopril Cough    Tetracycline Provider Review Needed     Other Reaction(s) from [facility]: UNK    Sulfa Antibiotics Itching and Rash       Current Outpatient Medications   Medication Sig Dispense Refill    allopurinol (ZYLOPRIM) 300 MG tablet Take 1 tablet by mouth Daily.      aspirin 81 MG EC tablet Take 1 tablet by mouth Daily.      bumetanide (BUMEX) 1 MG tablet Take 1 tablet by mouth 2 (Two) Times a Week. Every Monday and Thursday      Jardiance 10 MG tablet tablet       ondansetron (ZOFRAN) 8 MG tablet Take 1 tablet by mouth Every 8 (Eight) Hours As Needed for Nausea or Vomiting. 30 tablet 1    rosuvastatin (CRESTOR) 10 MG tablet Take 1 tablet by mouth Daily. 90 tablet 3    valsartan (DIOVAN) 80 MG tablet Take 0.5 tablets by mouth Daily.       No current facility-administered medications for this visit.     REVIEW OF SYSTEMS  CONSTITUTIONAL:  No fever, chills or night sweats. Chronic fatigue, recently still improved.  EYES:  No blurry vision, diplopia or other vision changes.  ENT:  As per the HPI above.  CARDIOVASCULAR:  No palpitations, arrhythmia, syncopal episodes or edema.  PULMONARY:  No hemoptysis, wheezing, chronic cough or shortness of  "breath.  GASTROINTESTINAL:  No nausea or vomiting. No constipation or diarrhea. No abdominal pain. Intermittently \"chalky\" stool, currently still resolved.  GENITOURINARY:  No hematuria, kidney stones or frequent urination.  MUSCULOSKELETAL:  No joint or back pains.  INTEGUMENTARY: No rashes or pruritus.  ENDOCRINE:  No excessive thirst or hot flashes.  HEMATOLOGIC:  No history of free bleeding, spontaneous bleeding or clotting.  IMMUNOLOGIC:  No allergies or frequent infections.  NEUROLOGIC: No numbness, tingling, seizures or weakness.  PSYCHIATRIC:  No anxiety or depression.    PHYSICAL EXAMINATION  /67   Pulse 66   Temp 97.8 °F (36.6 °C) (Temporal)   Resp 20   Ht 172.7 cm (67.99\")   Wt 65.8 kg (145 lb)   SpO2 98%   BMI 22.05 kg/m²     Pain Score:  Pain Score    25 1252   PainSc: 0-No pain     PHQ-Score Total:  PHQ-9 Total Score:      ECO  GENERAL:  A well-developed, well-nourished, elderly, white male in no acute distress.  HEENT:  Pupils equally round and reactive to light. Extraocular muscles intact.  CARDIOVASCULAR:  Regular rate and rhythm. No murmurs, gallops or rubs.  LUNGS:  Clear to auscultation bilaterally. No wheezing.  ABDOMEN:  Soft, nontender, nondistended with positive bowel sounds.  EXTREMITIES:  No clubbing, cyanosis or edema bilaterally.  SKIN:  No rashes or petechiae.  NEURO:  Cranial nerves grossly intact. No focal deficits.  PSYCH:  Alert and oriented x3.    The physical exam is once again unchanged from recent priors.    LABORATORY  Lab Results   Component Value Date    WBC 7.29 2025    HGB 11.5 (L) 2025    HCT 36.1 (L) 2025    .7 (H) 2025     2025    NEUTROABS 3.31 2025       Lab Results   Component Value Date     2025    K 4.5 2025     2025    CO2 27.5 2025    BUN 33.9 (H) 2025    CREATININE 1.43 (H) 2025    GLUCOSE 134 (H) 2025    CALCIUM 9.7 (H) 2025    AST " 26 06/26/2025    ALT 26 06/26/2025    ALKPHOS 129 (H) 06/26/2025    BILITOT 0.6 06/26/2025    PROTEINTOT 6.7 06/26/2025    ALBUMIN 4.0 06/26/2025     Lab Results   Component Value Date    RETICCTPCT 1.82 06/19/2025     CBC (07/01/2025): WBCs: 7.29; HgB: 11.5; Hct: 36.1; platelets: 196; MCV: 108.7  CBC (06/19/2025): WBCs: 6.41; HgB: 11.5; Hct: 35.6; platelets: 194; MCV: 109.5  CBC (06/02/2025): WBCs: 5.98; HgB: 11.5; Hct: 36.4; platelets: 202; MCV: 110.0  CBC (05/19/2025): WBCs: 6.05; HgB: 11.3; Hct: 35.9; platelets: 183; MCV: 110.1  CBC (04/18/2025): WBCs: 7.83; HgB: 10.4; Hct: 33.1; platelets: 179; MCV: 110.7  CBC (03/18/2025): WBCs: 7.54; HgB: 10.1; Hct: 31.3; platelets: 198; MCV: 107.9  CBC (02/18/2025): WBCs: 8.34; HgB: 10.9; Hct: 34.0; platelets: 199; MCV: 109.7  CBC (01/21/2025): WBCs: 5.63; HgB: 10.8; Hct: 34.2; platelets: 225; MCV: 111.8  CBC (12/16/2024): WBCs: 6.56; HgB: 10.5; Hct: 32.9; platelets: 218; MCV: 113.8  CBC (12/02/2024): WBCs: 7.22; HgB: 10.3; Hct: 32.8; platelets: 218; MCV: 112.7  CBC (11/19/2024): WBCs: 6.30; HgB: 10.2; Hct: 32.1; platelets: 272; MCV: 110.7  CBC (11/05/2024): WBCs: 5.65; HgB: 9.5; Hct: 30.3; platelets: 277; MCV: 107.1  CBC (10/30/2024): WBCs: 6,65; HgB: 9.7; Hct: 30.1; platelets: 281; MCV: 106.0  CBC (10/21/2024): WBCs: 7.46; HgB: 9.6; Hct: 30.5; platelets: 349; MCV: 103.4  CBC (10/15/2024): WBCs: 7.86; HgB: 9.6; Hct: 30.8; platelets: 387; MCV: 101.3  CBC (10/09/2024): WBCs: 7.42; HgB: 8.8; Hct: 28.5; platelets: 373; MCV: 97.9  CBC (10/02/2024): WBCs: 6.94; HgB: 9.2; Hct: 29.6; platelets: 324; MCV: 95.2  CBC (09/25/2024): WBCs: 8.71; HgB: 7.3; Hct: 23.3; platelets: 349; MCV: 90.3  CBC (09/18/2024): WBCs: 9.33; HgB: 9.0; Hct: 27.7; platelets: 312; MCV: 87.4  CBC (09/10/2024): WBCs: 10.20; HgB: 10.4; Hct: 31.3; platelets: 222; MCV: 86.5  CBC (09/03/2024): WBCs: 10.95; HgB: 11.5; Hct: 35.6; platelets: 203; MCV: 86.6  CBC (08/29/2024): WBCs: 14.86; HgB: 7.4; Hct: 23.5; platelets:  275; MCV: 86.7  CBC (08/26/2024): WBCs: 11.72; HgB: 7.7; Hct: 24.6; platelets: 250; MCV: 86.6  CBC (08/23/2024): WBCs: 10.01; HgB: 8.0; Hct: 25.3; platelets: 237; MCV: 87.2  CBC (08/20/2024): WBCS: 8.59; HgB: 8.4; Hct: 26.4; platelets: 272; MCV: 86.8  CBC (08/14/2024): WBCs:  8.16; HgB: 9.0; Hct: 27.9; platelets: 319; MCV: 87.2  CBC (08/09/2024): WBCs: 12.00; HgB: 8.1; Hct: 25.5; platelets: 319; MCV: 90.7  CBC (08/02/2024): WBCs: 11.92; HgB: 8.1; Hct: 24.8; platelets: 204; MCV: 90.5  CBC (07/31/2024): WBCs: 17.04; HgB: 7.6; Hct: 23.3; platelets: 265; MCV: 95.5  CBC (07/26/2024): WBCs: 13.50; HgB: 8.1; Hct: 24.8; platelets: 329; MCV: 95.0  CBC (07/23/2024): WBCs: 16.43; HgB: 8.5; Hct: 25.8; platelets: 390; MCV: 96.3  CBC (07/15/2024): WBCs: 8.04; HgB: 8.3; Hct: 25.8; platelets: 340; MCV: 97.7  CBC (07/11/2024): WBCs: 7.25; HgB: 9.4; Hct: 29.0; platelets: 375; MCV: 97.0  CBC (07/01/2024): WBCs: 7.50; HgB: 9.4; Hct: 29.3; platelets: 272; MCV: 98.7  CBC (06/26/2024): WBCs: 6.67; HgB: 9.0; Hct: 27.5; platelets: 198; MCV: 98.6    Reticulocytes (12/16/2024): Percentage: 1.67; Absolute: 0.0483  Reticulocytes (12/02/2024): Percentage: 1.89; Absolute: 0.0550  Reticulocytes (11/19/2024): Percentage: 3.04; Absolute: 0.0882  Reticulocytes (11/05/2024): Percentage 2.91; Absolute: 0.0824  Reticulocytes (10/30/2024): Percentage: 3.67; Absolute: 0.1061  Reticulocytes (10/21/2024): Percentage: 4.21; Absolute: 0.1250  Reticulocytes (10/15/2024): Percentage: 5.24; Absolute: 0.1603  Reticulocytes (09/10/2024): Percentage: 0.47; Absolute: 0.0170  Reticulocytes (08/20/2024): Percentage: 0.26; Absolute: <0.0100    Cyclosporine level (06/02/2025): 226 ng/mL  Cyclosporine level (02/18/2025): 155 ng/mL  Cyclosporine level (01/21/2025): 91 ng/mL  Cyclosporine level (12/30/2024): 154 ng/mL  Cyclosporine level (12/16/2024): 86 ng/mL  Cyclosporine level (12/02/2024): 183 ng/mL  Cyclosporine level (11/19/2024): 335 ng/mL  Cyclosporine level  (11/05/2024): 320 ng/mL  Cyclosporine level (10/30/2024): 132 ng/mL  Cyclosporine level (10/21/2024): 216 ng/mL  Cyclosporine level (10/15/2024): 315 ng/mL  Cyclosporine level (10/09/2024): 48 ng/mL  Cyclosporine level (10/02/2024): 121 ng/mL  Cyclosporine level (09/25/2024): 107 ng/mL    IMAGING  CT chest without contrast (11/04/2024):  Impression: No parenchymal nodules, adenopathy or effusions.    CT abdomen and pelvis without contrast (11/04/2024):  Impression:  1) Minimal sigmoid diverticulitis.  2) Other findings [are nonacute].    PATHOLOGY  Peripheral smear, bone marrow aspiration smear, bone marrow aspiration clot and bone marrow core biopsy (06/26/2024):  Normocellular bone marrow with increased granulopoiesis, essentially absent erythropoiesis, adequate megakaryopoiesis, increased stainable iron and slightly increased mixed chronic inflammation. No evidence of increased blasts or dysplasia. The most striking feature within the bone marrow is the essential absence of any erythropoiesis in the aspirate or core biopsy. There is mixed inflammation with some notable immunophenotypic findings by flow cytometry, but there is no evidence of bone marrow involvement by lymphoma. This is most suggestive of a mixed reactive lymphoid population.    IMPRESSION AND PLAN  Mr. Silva is a 89 y.o., white male with:  Pure red cell aplasia: I have had multiple, long discussions with the patient and his wife regarding the results of the bone marrow biopsy performed in late June 2024 (which are summarized above). In short, this is a fairly uncommon diagnosis that can either be idiopathic or associated with a number of potential, underlying causes, including thymoma or parvovirus infection. Regardless, high dose steroids (prednisone 1-2 mg/kg daily) is the standard, first-line treatment; however, since the repeat CBC drawn on 07/01/2024 showed that his HgB (9.4 g/dL) had improved compared to what it was when he was discharged  from our hospital following the bone marrow biopsy the previous week (9.0 g/dL), it was hoped that he was satisfactorily recovering (from a viral trigger) on his own. However, a repeat CBC on 07/15/2024 revealed that his HgB had dropped again (8.3 mg/dL); and his reticulocyte counts remained essentially zero. A Rx for prednisone 80 mg PO daily was provided at that time; but, unfortunately, high-dose steroids never provided him with any meaningful effect. Consequently, by late August 2024, he was started on an additional Rx (cyclosporine 50 mg PO BID), per [facility]'s recommendations (with whom he had met for an additional opinion regarding his management earlier that month). Once he tolerated this starting dose for ~one week, in late August (08/27/2024), we increased the cyclosporine to 100 mg PO BID and further decreased the prednisone to 40 mg PO daily. At his appointment on 09/03/2024, as his anemia (finally) improved (rather well, to a HgB of 11.5 g/dL), we continued the cyclosporine and further decreased the prednisone to 20 mg PO daily. Unfortunately, at the time of his follow up appointment on 09/10/2024, a repeat CBC did not show any further improvement in his HgB (it was 10.4 g/dL), issue #2 (see below) was dramatically worse; and his repeat serum cyclosporine was > 400 ng/mL (above the upper limit of the desirable range). Given all of this, the cyclosporine was held completely for the next one and one half weeks. Meanwhile, he further decreased his daily prednisone dose (from 10 mg daily to 5 mg daily to, as of late September, off completely), as instructed. After remaining off of cyclosporine for one and one half weeks, his mouth sores (finally) improved; and his PO intake renormalized. Unfortunately, throughout the latter part of September 2024, he was getting steadily fatigued again, correlating with the fact that, not surprisingly, his HgB continued to redecline off of the cyclosporine (and it is was  back down to 7.3 g/dL by 09/25/2024, at which time he required the transfusion of an additional two units of PRBCs). He was restarted on cyclosporine (the 50 mg BID dose), and he remained on this dose for the next seven plus (7+) months. So far, issue #2 has still yet to significantly reworsen (it has intermittently mildly recurred, but it remains, at worst, very tolerable with localized therapies such as Orajel). By early to mid-May 2025, as he continued to do so well from the standpoint of this issue, he was re-referred to [facility] for an opinion regarding the feasibility/process of attempting to wean him off of the cyclosporine; and they ([facility]) agreed that this was a good idea. In early to mid-May 2025, his cyclosporine dose was reduced to 50 mg in the morning and 25 mg in the evening, by early June 2025, it was further decreased to 25 mg BID; and, by mid-June 2025, it was decreased even further to 25 mg PO once daily. He has been on this latter dose for a total of ~two (2) weeks now, and he has still not noticed any difference in the way he feels. Today's (07/01/2025) repeat CBC shows that, despite this additional reduction, his HgB remains solidly stable (11.5 g/dL). Given this, he was instructed to stay on his current dose of 25 mg daily until he runs out of his supply of cyclosporine (he has about one week remaining), and we will see him back in our clinic at that time (next week). Assuming his CBC again remains unchanged, we will discontinue the cyclosporine completely at that time.  Mucositis: A progressive and refractory issue that began in late August 2024, shortly after he started PO cyclosporine for the treatment of issue #1 (particularly after he was escalated to the 100 mg BID dose). As discussed above, by early September 2024, this was dramatically reducing his PO intake, leading to dehydration and acute renal insufficiency. Also as discussed above, these symptoms have remained  "much better and manageable for many months now; and he is now on an even lower dose of PO cyclosporine (25 mg BID). He and his wife have been counseled on the routine use of magic mouthwash, OraGel and/or other, similar products. They also know to skip the occasional dose of the cyclosporine, if needed. Continue to monitor.  \"Chalky stool\": He and his wife previously clarified that this issue has actually occurred previously. In approximately Summer 2023, he had to be referred to [facility] [place] gastroenterology, who performed an ERCP for (what sounds like benign) stricturing. Regardless, CT scans of the chest, abdomen and pelvis without contrast performed on 11/05/2024 (and summarized above) for further evaluation were overall unremarkable; and this symptom is currently still re-resolved. Continue to monitor.  Lower extremity edema: Multifactorial, with age-related venous insufficiency and the resulting, dependent edema definitely contributing. Ongoing management per primary care.  Bleeding ear/hearing loss: His current, primary complaints, for which his PCP referred him to an ENT in [place] for further evaluation. An initial consultation is scheduled shortly.  The patient and his wife were in agreement with this plan.    It is a pleasure to participate in Mr. Silva's care. Please do not hesitate to call with any questions or concerns that you may have.    A total of 30 minutes were spent coordinating this patient’s care in clinic today; more than 50% of this time was face-to-face with the patient and his wife, reviewing his interim medical history, discussing the results of today's repeat CBC and counseling on the ongoing treatment and followup plan. All questions were answered to their satisfaction.    FOLLOW UP  Continue cyclosporine 25 mg PO daily through next week. Return to our clinic in 1 week with a CBC and CMP.        This document was electronically signed by RAMIRO King MD July 1, 2025 13:14 " EDT      CC: RIGO Mccall MD

## 2025-07-01 NOTE — PROGRESS NOTES
Venipuncture Blood Specimen Collection  Venipuncture performed in left arm by Rachell aMyorga MA with good hemostasis. Patient tolerated the procedure well without complications.   07/01/25   Rachell Mayorga MA

## 2025-07-04 LAB — CYCLOSPORINE BLD LC/MS/MS-MCNC: 46 NG/ML (ref 100–400)

## 2025-07-08 ENCOUNTER — OFFICE VISIT (OUTPATIENT)
Dept: ONCOLOGY | Facility: CLINIC | Age: OVER 89
End: 2025-07-08
Payer: MEDICARE

## 2025-07-08 ENCOUNTER — LAB (OUTPATIENT)
Dept: ONCOLOGY | Facility: CLINIC | Age: OVER 89
End: 2025-07-08
Payer: MEDICARE

## 2025-07-08 VITALS
OXYGEN SATURATION: 96 % | WEIGHT: 148.2 LBS | BODY MASS INDEX: 22.46 KG/M2 | DIASTOLIC BLOOD PRESSURE: 68 MMHG | HEIGHT: 68 IN | TEMPERATURE: 97.5 F | HEART RATE: 64 BPM | RESPIRATION RATE: 18 BRPM | SYSTOLIC BLOOD PRESSURE: 129 MMHG

## 2025-07-08 DIAGNOSIS — D61.9 APLASTIC ANEMIA: ICD-10-CM

## 2025-07-08 DIAGNOSIS — D60.9 ACQUIRED RED CELL APLASIA: Primary | ICD-10-CM

## 2025-07-08 DIAGNOSIS — D60.9 ACQUIRED RED CELL APLASIA: ICD-10-CM

## 2025-07-08 DIAGNOSIS — D64.9 SYMPTOMATIC ANEMIA: ICD-10-CM

## 2025-07-08 LAB
ALBUMIN SERPL-MCNC: 3.9 G/DL (ref 3.5–5.2)
ALBUMIN/GLOB SERPL: 1.4 G/DL
ALP SERPL-CCNC: 124 U/L (ref 39–117)
ALT SERPL W P-5'-P-CCNC: 33 U/L (ref 1–41)
ANION GAP SERPL CALCULATED.3IONS-SCNC: 10.3 MMOL/L (ref 5–15)
AST SERPL-CCNC: 29 U/L (ref 1–40)
BASOPHILS # BLD AUTO: 0.04 10*3/MM3 (ref 0–0.2)
BASOPHILS NFR BLD AUTO: 0.5 % (ref 0–1.5)
BILIRUB SERPL-MCNC: 0.6 MG/DL (ref 0–1.2)
BUN SERPL-MCNC: 26.5 MG/DL (ref 8–23)
BUN/CREAT SERPL: 18.7 (ref 7–25)
CALCIUM SPEC-SCNC: 9.8 MG/DL (ref 8.2–9.6)
CHLORIDE SERPL-SCNC: 106 MMOL/L (ref 98–107)
CO2 SERPL-SCNC: 24.7 MMOL/L (ref 22–29)
CREAT SERPL-MCNC: 1.42 MG/DL (ref 0.76–1.27)
DEPRECATED RDW RBC AUTO: 57.8 FL (ref 37–54)
EGFRCR SERPLBLD CKD-EPI 2021: 46.4 ML/MIN/1.73
EOSINOPHIL # BLD AUTO: 0.13 10*3/MM3 (ref 0–0.4)
EOSINOPHIL NFR BLD AUTO: 1.8 % (ref 0.3–6.2)
ERYTHROCYTE [DISTWIDTH] IN BLOOD BY AUTOMATED COUNT: 14.3 % (ref 12.3–15.4)
GLOBULIN UR ELPH-MCNC: 2.7 GM/DL
GLUCOSE SERPL-MCNC: 101 MG/DL (ref 65–99)
HCT VFR BLD AUTO: 37 % (ref 37.5–51)
HGB BLD-MCNC: 11.7 G/DL (ref 13–17.7)
IMM GRANULOCYTES # BLD AUTO: 0.01 10*3/MM3 (ref 0–0.05)
IMM GRANULOCYTES NFR BLD AUTO: 0.1 % (ref 0–0.5)
LYMPHOCYTES # BLD AUTO: 3.57 10*3/MM3 (ref 0.7–3.1)
LYMPHOCYTES NFR BLD AUTO: 48.6 % (ref 19.6–45.3)
MCH RBC QN AUTO: 35 PG (ref 26.6–33)
MCHC RBC AUTO-ENTMCNC: 31.6 G/DL (ref 31.5–35.7)
MCV RBC AUTO: 110.8 FL (ref 79–97)
MONOCYTES # BLD AUTO: 0.61 10*3/MM3 (ref 0.1–0.9)
MONOCYTES NFR BLD AUTO: 8.3 % (ref 5–12)
NEUTROPHILS NFR BLD AUTO: 2.99 10*3/MM3 (ref 1.7–7)
NEUTROPHILS NFR BLD AUTO: 40.7 % (ref 42.7–76)
NRBC BLD AUTO-RTO: 0 /100 WBC (ref 0–0.2)
PLATELET # BLD AUTO: 209 10*3/MM3 (ref 140–450)
PMV BLD AUTO: 11.7 FL (ref 6–12)
POTASSIUM SERPL-SCNC: 5.2 MMOL/L (ref 3.5–5.2)
PROT SERPL-MCNC: 6.6 G/DL (ref 6–8.5)
RBC # BLD AUTO: 3.34 10*6/MM3 (ref 4.14–5.8)
SODIUM SERPL-SCNC: 141 MMOL/L (ref 136–145)
WBC NRBC COR # BLD AUTO: 7.35 10*3/MM3 (ref 3.4–10.8)

## 2025-07-08 PROCEDURE — 80053 COMPREHEN METABOLIC PANEL: CPT | Performed by: INTERNAL MEDICINE

## 2025-07-08 PROCEDURE — 1126F AMNT PAIN NOTED NONE PRSNT: CPT | Performed by: INTERNAL MEDICINE

## 2025-07-08 PROCEDURE — 99214 OFFICE O/P EST MOD 30 MIN: CPT | Performed by: INTERNAL MEDICINE

## 2025-07-08 PROCEDURE — 85025 COMPLETE CBC W/AUTO DIFF WBC: CPT | Performed by: INTERNAL MEDICINE

## 2025-07-08 RX ORDER — VALSARTAN 40 MG/1
40 TABLET ORAL 2 TIMES DAILY
COMMUNITY

## 2025-07-08 RX ORDER — TOBRAMYCIN/DEXAMETHASONE 0.3 %-0.1%
1 SUSPENSION, DROPS(FINAL DOSAGE FORM)(ML) OPHTHALMIC (EYE)
COMMUNITY
Start: 2025-07-02

## 2025-07-08 NOTE — PROGRESS NOTES
Name:  Chinedu Silva  :  1933  Date:  2025     REFERRING PHYSICIAN    PRIMARY CARE PROVIDER  Jr Segura PA    REASON FOR FOLLOWUP  1. Acquired red cell aplasia      CHIEF COMPLAINT  None.    Dear  Colton,    HISTORY OF PRESENT ILLNESS:   I saw Mr. Silva in followup (from his recent hospitalization for severe anemia) today in our hematology/oncology clinic. As you are aware, he is a pleasant, 89 y.o., white male with a history of hypertension, CAD and GERD who presented to the [facility] ED on 2024 with progressive weakness and dyspnea on exertion. These symptoms had potentially first started ~two to three months prior; however, they significantly worsened over the course of the previous couple of weeks (by early 2024). Upon arrival to our ED, he was found to be severely anemic, with a Hg of 3.7 g/dL. His other cell lines (WBCs and platelets) were entirely WNL. Our service was consulted for further evaluation of this issue, and a bone marrow biopsy was performed on 2024. Meanwhile, as he was feeling substantially better after his HgB was transfused up to the ~9 g/dL range, he was able to be discharged to outpatient follow up in our clinic. He returned to our clinic the following week (on 2024) to go over the results of the bone marrow exam and for ongoing management.    INTERIM HISTORY:  Mr. Silva returns to clinic today for follow up yet again accompanied by his wife. He was on PO cyclosporine for a total of ~three weeks (50 mg BID for the first week and 100 mg BID for the next two weeks) in late August/early 2024. Due to progressive, severe and refractory mucositis (as well as a toxic cyclosporine level), he was instructed to discontinue the cyclosporine in early 2024. After holding the cyclosporine for the next ~one and one half weeks, his mucositis finally improved. He was instructed to restart it a dose of 50 mg BID, and he remained on this  for the next seven plus (7+) months. While he has redeveloped some intermittent, but currently still very mild and manageable, mucositis, he is overall still doing much much better than he was in Fall 2024, as his fatigue has remained improved and his HgB has remained greater than 10 g/dL for many months now (and he has still not required any PRBC transfusions since late Summer 2024). As he continued to do so well, in early to mid-May 2025, his cyclosporine dose was reduced to 50 mg in the morning and 25 mg in the evening, by early June 2025, it was further decreased to 25 mg BID; and, by mid-June 2025, it was decreased even further to 25 mg PO once daily. He has been on this latter dose for a total of ~three (3) weeks now, and he has still not noticed any difference in the way he feels. His only new complaint recently has been that he is in the process of establishing follow up with an ENT in [place], because he has been struggling with an intermittently bleeding left ear and some worsening hearing issues. He otherwise once again has no new or other specific complaints and continues to feel overall well.    Past Medical History:   Diagnosis Date    Carotid stenosis     Coronary artery disease     Diverticulitis     GERD (gastroesophageal reflux disease)     Gout     Hearing aid worn     Hypertension     Obese     Prostate cancer     Wears glasses        Past Surgical History:   Procedure Laterality Date    CAROTID ENDARTERECTOMY Left 2/5/2018    Procedure: CAROTID ENDARTERECTOMY WITH EEG LEFT;  Surgeon: Del Neal MD;  Location: [facility];  Service:     CATARACT EXTRACTION      CATARACT EXTRACTION WITH INTRAOCULAR LENS IMPLANT Right     CHOLECYSTECTOMY      COLONOSCOPY W/ POLYPECTOMY      HERNIA REPAIR Right     TOTAL KNEE ARTHROPLASTY Right     TURP / TRANSURETHRAL INCISION / DRAINAGE PROSTATE         Social History     Socioeconomic History    Marital status:     Number of children: 1   Tobacco Use     Smoking status: Former     Current packs/day: 0.00     Average packs/day: 1 pack/day for 12.0 years (12.0 ttl pk-yrs)     Types: Cigarettes     Start date:      Quit date:      Years since quittin.5    Smokeless tobacco: Current     Types: Chew   Vaping Use    Vaping status: Never Used   Substance and Sexual Activity    Alcohol use: No    Drug use: No    Sexual activity: Defer       Family History   Problem Relation Age of Onset    Hypertension Mother     Glaucoma Mother     Diverticulitis Mother     Gout Mother     ALS Father     Gout Sister     Broken bones Paternal Grandfather     Broken bones Son     Cancer Son     Diabetes Brother        Allergies   Allergen Reactions    Ciprofloxacin Other (See Comments)     Muscle Pains    Lisinopril Cough    Tetracycline Provider Review Needed and Unknown - Low Severity     Other Reaction(s) from [facility]: UNK    Sulfa Antibiotics Itching and Rash       Current Outpatient Medications   Medication Sig Dispense Refill    allopurinol (ZYLOPRIM) 300 MG tablet Take 1 tablet by mouth Daily.      aspirin 81 MG EC tablet Take 1 tablet by mouth Daily.      bumetanide (BUMEX) 1 MG tablet Take 1 tablet by mouth 2 (Two) Times a Week. Every Monday and Thursday      Jardiance 10 MG tablet tablet       ondansetron (ZOFRAN) 8 MG tablet Take 1 tablet by mouth Every 8 (Eight) Hours As Needed for Nausea or Vomiting. 30 tablet 1    rosuvastatin (CRESTOR) 10 MG tablet Take 1 tablet by mouth Daily. 90 tablet 3    TobraDex 0.3-0.1 % ophthalmic suspension Apply 1 drop to eye(s) as directed by provider Every 2 (Two) Hours While Awake.      valsartan (DIOVAN) 40 MG tablet Take 1 tablet by mouth 2 (Two) Times a Day.       No current facility-administered medications for this visit.     REVIEW OF SYSTEMS  CONSTITUTIONAL:  No fever, chills or night sweats. Chronic fatigue, recently still much improved.  EYES:  No blurry vision, diplopia or other vision changes.  ENT:  As per the HPI  "above.  CARDIOVASCULAR:  No palpitations, arrhythmia, syncopal episodes or edema.  PULMONARY:  No hemoptysis, wheezing, chronic cough or shortness of breath.  GASTROINTESTINAL:  No nausea or vomiting. No constipation or diarrhea. No abdominal pain. Intermittently \"chalky\" stool, currently still resolved.  GENITOURINARY:  No hematuria, kidney stones or frequent urination.  MUSCULOSKELETAL:  No joint or back pains.  INTEGUMENTARY: No rashes or pruritus.  ENDOCRINE:  No excessive thirst or hot flashes.  HEMATOLOGIC:  No history of free bleeding, spontaneous bleeding or clotting.  IMMUNOLOGIC:  No allergies or frequent infections.  NEUROLOGIC: No numbness, tingling, seizures or weakness.  PSYCHIATRIC:  No anxiety or depression.    PHYSICAL EXAMINATION  /68   Pulse 64   Temp 97.5 °F (36.4 °C) (Temporal)   Resp 18   Ht 172.7 cm (67.99\")   Wt 67.2 kg (148 lb 3.2 oz)   SpO2 96%   BMI 22.54 kg/m²     Pain Score:  Pain Score    25 1301   PainSc: 0-No pain     PHQ-Score Total:  PHQ-9 Total Score:      ECO  GENERAL:  A well-developed, well-nourished, elderly, white male in no acute distress.  HEENT:  Pupils equally round and reactive to light. Extraocular muscles intact.  CARDIOVASCULAR:  Regular rate and rhythm. No murmurs, gallops or rubs.  LUNGS:  Clear to auscultation bilaterally. No wheezing.  ABDOMEN:  Soft, nontender, nondistended with positive bowel sounds.  EXTREMITIES:  No clubbing, cyanosis or edema bilaterally.  SKIN:  No rashes or petechiae.  NEURO:  Cranial nerves grossly intact. No focal deficits.  PSYCH:  Alert and oriented x3.    The physical exam is yet again unchanged from recent priors.    LABORATORY  Lab Results   Component Value Date    WBC 7.35 2025    HGB 11.7 (L) 2025    HCT 37.0 (L) 2025    .8 (H) 2025     2025    NEUTROABS 2.99 2025       Lab Results   Component Value Date     2025    K 5.2 2025     " 07/08/2025    CO2 24.7 07/08/2025    BUN 26.5 (H) 07/08/2025    CREATININE 1.42 (H) 07/08/2025    GLUCOSE 101 (H) 07/08/2025    CALCIUM 9.8 (H) 07/08/2025    AST 29 07/08/2025    ALT 33 07/08/2025    ALKPHOS 124 (H) 07/08/2025    BILITOT 0.6 07/08/2025    PROTEINTOT 6.6 07/08/2025    ALBUMIN 3.9 07/08/2025     Lab Results   Component Value Date    RETICCTPCT 1.82 06/19/2025     CBC (07/08/2025): WBCs: 7.35; HgB: 11.7; Hct: 37.0; platelets: 209; MCV: 110.8  CBC (07/01/2025): WBCs: 7.29; HgB: 11.5; Hct: 36.1; platelets: 196; MCV: 108.7  CBC (06/19/2025): WBCs: 6.41; HgB: 11.5; Hct: 35.6; platelets: 194; MCV: 109.5  CBC (06/02/2025): WBCs: 5.98; HgB: 11.5; Hct: 36.4; platelets: 202; MCV: 110.0  CBC (05/19/2025): WBCs: 6.05; HgB: 11.3; Hct: 35.9; platelets: 183; MCV: 110.1  CBC (04/18/2025): WBCs: 7.83; HgB: 10.4; Hct: 33.1; platelets: 179; MCV: 110.7  CBC (03/18/2025): WBCs: 7.54; HgB: 10.1; Hct: 31.3; platelets: 198; MCV: 107.9  CBC (02/18/2025): WBCs: 8.34; HgB: 10.9; Hct: 34.0; platelets: 199; MCV: 109.7  CBC (01/21/2025): WBCs: 5.63; HgB: 10.8; Hct: 34.2; platelets: 225; MCV: 111.8  CBC (12/16/2024): WBCs: 6.56; HgB: 10.5; Hct: 32.9; platelets: 218; MCV: 113.8  CBC (12/02/2024): WBCs: 7.22; HgB: 10.3; Hct: 32.8; platelets: 218; MCV: 112.7  CBC (11/19/2024): WBCs: 6.30; HgB: 10.2; Hct: 32.1; platelets: 272; MCV: 110.7  CBC (11/05/2024): WBCs: 5.65; HgB: 9.5; Hct: 30.3; platelets: 277; MCV: 107.1  CBC (10/30/2024): WBCs: 6,65; HgB: 9.7; Hct: 30.1; platelets: 281; MCV: 106.0  CBC (10/21/2024): WBCs: 7.46; HgB: 9.6; Hct: 30.5; platelets: 349; MCV: 103.4  CBC (10/15/2024): WBCs: 7.86; HgB: 9.6; Hct: 30.8; platelets: 387; MCV: 101.3  CBC (10/09/2024): WBCs: 7.42; HgB: 8.8; Hct: 28.5; platelets: 373; MCV: 97.9  CBC (10/02/2024): WBCs: 6.94; HgB: 9.2; Hct: 29.6; platelets: 324; MCV: 95.2  CBC (09/25/2024): WBCs: 8.71; HgB: 7.3; Hct: 23.3; platelets: 349; MCV: 90.3  CBC (09/18/2024): WBCs: 9.33; HgB: 9.0; Hct: 27.7;  platelets: 312; MCV: 87.4  CBC (09/10/2024): WBCs: 10.20; HgB: 10.4; Hct: 31.3; platelets: 222; MCV: 86.5  CBC (09/03/2024): WBCs: 10.95; HgB: 11.5; Hct: 35.6; platelets: 203; MCV: 86.6  CBC (08/29/2024): WBCs: 14.86; HgB: 7.4; Hct: 23.5; platelets: 275; MCV: 86.7  CBC (08/26/2024): WBCs: 11.72; HgB: 7.7; Hct: 24.6; platelets: 250; MCV: 86.6  CBC (08/23/2024): WBCs: 10.01; HgB: 8.0; Hct: 25.3; platelets: 237; MCV: 87.2  CBC (08/20/2024): WBCS: 8.59; HgB: 8.4; Hct: 26.4; platelets: 272; MCV: 86.8  CBC (08/14/2024): WBCs:  8.16; HgB: 9.0; Hct: 27.9; platelets: 319; MCV: 87.2  CBC (08/09/2024): WBCs: 12.00; HgB: 8.1; Hct: 25.5; platelets: 319; MCV: 90.7  CBC (08/02/2024): WBCs: 11.92; HgB: 8.1; Hct: 24.8; platelets: 204; MCV: 90.5  CBC (07/31/2024): WBCs: 17.04; HgB: 7.6; Hct: 23.3; platelets: 265; MCV: 95.5  CBC (07/26/2024): WBCs: 13.50; HgB: 8.1; Hct: 24.8; platelets: 329; MCV: 95.0  CBC (07/23/2024): WBCs: 16.43; HgB: 8.5; Hct: 25.8; platelets: 390; MCV: 96.3  CBC (07/15/2024): WBCs: 8.04; HgB: 8.3; Hct: 25.8; platelets: 340; MCV: 97.7  CBC (07/11/2024): WBCs: 7.25; HgB: 9.4; Hct: 29.0; platelets: 375; MCV: 97.0  CBC (07/01/2024): WBCs: 7.50; HgB: 9.4; Hct: 29.3; platelets: 272; MCV: 98.7  CBC (06/26/2024): WBCs: 6.67; HgB: 9.0; Hct: 27.5; platelets: 198; MCV: 98.6    Reticulocytes (12/16/2024): Percentage: 1.67; Absolute: 0.0483  Reticulocytes (12/02/2024): Percentage: 1.89; Absolute: 0.0550  Reticulocytes (11/19/2024): Percentage: 3.04; Absolute: 0.0882  Reticulocytes (11/05/2024): Percentage 2.91; Absolute: 0.0824  Reticulocytes (10/30/2024): Percentage: 3.67; Absolute: 0.1061  Reticulocytes (10/21/2024): Percentage: 4.21; Absolute: 0.1250  Reticulocytes (10/15/2024): Percentage: 5.24; Absolute: 0.1603  Reticulocytes (09/10/2024): Percentage: 0.47; Absolute: 0.0170  Reticulocytes (08/20/2024): Percentage: 0.26; Absolute: <0.0100    Cyclosporine level (06/02/2025): 226 ng/mL  Cyclosporine level (02/18/2025): 155  ng/mL  Cyclosporine level (01/21/2025): 91 ng/mL  Cyclosporine level (12/30/2024): 154 ng/mL  Cyclosporine level (12/16/2024): 86 ng/mL  Cyclosporine level (12/02/2024): 183 ng/mL  Cyclosporine level (11/19/2024): 335 ng/mL  Cyclosporine level (11/05/2024): 320 ng/mL  Cyclosporine level (10/30/2024): 132 ng/mL  Cyclosporine level (10/21/2024): 216 ng/mL  Cyclosporine level (10/15/2024): 315 ng/mL  Cyclosporine level (10/09/2024): 48 ng/mL  Cyclosporine level (10/02/2024): 121 ng/mL  Cyclosporine level (09/25/2024): 107 ng/mL    IMAGING  CT chest without contrast (11/04/2024):  Impression: No parenchymal nodules, adenopathy or effusions.    CT abdomen and pelvis without contrast (11/04/2024):  Impression:  1) Minimal sigmoid diverticulitis.  2) Other findings [are nonacute].    PATHOLOGY  Peripheral smear, bone marrow aspiration smear, bone marrow aspiration clot and bone marrow core biopsy (06/26/2024):  Normocellular bone marrow with increased granulopoiesis, essentially absent erythropoiesis, adequate megakaryopoiesis, increased stainable iron and slightly increased mixed chronic inflammation. No evidence of increased blasts or dysplasia. The most striking feature within the bone marrow is the essential absence of any erythropoiesis in the aspirate or core biopsy. There is mixed inflammation with some notable immunophenotypic findings by flow cytometry, but there is no evidence of bone marrow involvement by lymphoma. This is most suggestive of a mixed reactive lymphoid population.    IMPRESSION AND PLAN  Mr. Silva is a 89 y.o., white male with:  Pure red cell aplasia: I have had multiple, long discussions with the patient and his wife regarding the results of the bone marrow biopsy performed in late June 2024 (which are summarized above). In short, this is a fairly uncommon diagnosis that can either be idiopathic or associated with a number of potential, underlying causes, including thymoma or parvovirus  infection. Regardless, high dose steroids (prednisone 1-2 mg/kg daily) is the standard, first-line treatment; however, since the repeat CBC drawn on 07/01/2024 showed that his HgB (9.4 g/dL) had improved compared to what it was when he was discharged from our hospital following the bone marrow biopsy the previous week (9.0 g/dL), it was hoped that he was satisfactorily recovering (from a viral trigger) on his own. However, a repeat CBC on 07/15/2024 revealed that his HgB had dropped again (8.3 mg/dL); and his reticulocyte counts remained essentially zero. A Rx for prednisone 80 mg PO daily was provided at that time; but, unfortunately, high-dose steroids never provided him with any meaningful effect. Consequently, by late August 2024, he was started on an additional Rx (cyclosporine 50 mg PO BID), per [facility]'s recommendations (with whom he had met for an additional opinion regarding his management earlier that month). Once he tolerated this starting dose for ~one week, in late August (08/27/2024), we increased the cyclosporine to 100 mg PO BID and further decreased the prednisone to 40 mg PO daily. At his appointment on 09/03/2024, as his anemia (finally) improved (rather well, to a HgB of 11.5 g/dL), we continued the cyclosporine and further decreased the prednisone to 20 mg PO daily. Unfortunately, at the time of his follow up appointment on 09/10/2024, a repeat CBC did not show any further improvement in his HgB (it was 10.4 g/dL), issue #2 (see below) was dramatically worse; and his repeat serum cyclosporine was > 400 ng/mL (above the upper limit of the desirable range). Given all of this, the cyclosporine was held completely for the next one and one half weeks. Meanwhile, he further decreased his daily prednisone dose (from 10 mg daily to 5 mg daily to, as of late September, off completely), as instructed. After remaining off of cyclosporine for one and one half weeks, his mouth sores (finally) improved;  and his PO intake renormalized. Unfortunately, throughout the latter part of September 2024, he was getting steadily fatigued again, correlating with the fact that, not surprisingly, his HgB continued to redecline off of the cyclosporine (and it is was back down to 7.3 g/dL by 09/25/2024, at which time he required the transfusion of an additional two units of PRBCs). He was restarted on cyclosporine (the 50 mg BID dose), and he remained on this dose for the next seven plus (7+) months. So far, issue #2 has still yet to significantly reworsen (it has intermittently mildly recurred, but it remains, at worst, very tolerable with localized therapies such as Orajel). By early to mid-May 2025, as he continued to do so well from the standpoint of this issue, he was re-referred to [facility] for an opinion regarding the feasibility/process of attempting to wean him off of the cyclosporine; and they ([facility]) agreed that this was a good idea. In early to mid-May 2025, his cyclosporine dose was reduced to 50 mg in the morning and 25 mg in the evening, by early June 2025, it was further decreased to 25 mg BID; and, by mid-June 2025, it was decreased even further to 25 mg PO once daily. He has been on this latter dose for a total of ~three (3) weeks now, and he has still not noticed any difference in the way he feels. Today's (07/08/2025) repeat CBC shows that, despite this additional reduction, his HgB once again remains solidly stable (11.7 g/dL). Given this, he was instructed to now discontinue the cyclosporine entirely. We will repeat a CBC in one week, and we will see him back in our clinic in two weeks with a repeat CBC and CMP.  Mucositis: A progressive and refractory issue that began in late August 2024, shortly after he started PO cyclosporine for the treatment of issue #1 (particularly after he was escalated to the 100 mg BID dose). As discussed above, by early September 2024, this was dramatically  "reducing his PO intake, leading to dehydration and acute renal insufficiency. Also as discussed above, these symptoms have remained much better and manageable for many months now; and the cyclosporine is now being discontinued entirely. Continue to monitor.  \"Chalky stool\": He and his wife previously clarified that this issue has actually occurred previously. In approximately Summer 2023, he had to be referred to [facility] gastroenterology, who performed an ERCP for (what sounds like benign) stricturing. Regardless, CT scans of the chest, abdomen and pelvis without contrast performed on 11/05/2024 (and summarized above) for further evaluation were overall unremarkable; and this symptom is currently still re-resolved. Continue to monitor.  Lower extremity edema: Multifactorial, with age-related venous insufficiency and the resulting, dependent edema definitely contributing. Ongoing management per primary care.  Bleeding ear/hearing loss: His current, primary complaints, for which his PCP referred him to an ENT in [place] for further evaluation. An initial consultation is scheduled shortly.  The patient and his wife were in agreement with this plan.    It is a pleasure to participate in Mr. Silva's care. Please do not hesitate to call with any questions or concerns that you may have.    A total of 30 minutes were spent coordinating this patient’s care in clinic today; more than 50% of this time was face-to-face with the patient and his wife, reviewing his interim medical history, discussing the results of today's repeat CBC and counseling on the ongoing followup plan. All questions were answered to their satisfaction.    FOLLOW UP  Discontinue cyclosporine. Repeat a CBC in 1 week. Return to our clinic in 2 weeks with a CBC and CMP.          This document was electronically signed by RAMIRO King MD July 8, 2025 13:33 EDT      CC: RIGO Mccall MD  "

## 2025-07-08 NOTE — PROGRESS NOTES
Venipuncture Blood Specimen Collection  Venipuncture performed in left arm by Rachell Mayorga MA with good hemostasis. Patient tolerated the procedure well without complications.   07/08/25   Rachell Mayorga MA

## 2025-07-15 ENCOUNTER — CLINICAL SUPPORT (OUTPATIENT)
Dept: ONCOLOGY | Facility: CLINIC | Age: OVER 89
End: 2025-07-15
Payer: MEDICARE

## 2025-07-15 DIAGNOSIS — D61.9 APLASTIC ANEMIA: ICD-10-CM

## 2025-07-15 DIAGNOSIS — D60.9 ACQUIRED RED CELL APLASIA: ICD-10-CM

## 2025-07-15 LAB
ALBUMIN SERPL-MCNC: 4 G/DL (ref 3.5–5.2)
ALBUMIN/GLOB SERPL: 1.5 G/DL
ALP SERPL-CCNC: 122 U/L (ref 39–117)
ALT SERPL W P-5'-P-CCNC: 25 U/L (ref 1–41)
ANION GAP SERPL CALCULATED.3IONS-SCNC: 11.5 MMOL/L (ref 5–15)
AST SERPL-CCNC: 24 U/L (ref 1–40)
BASOPHILS # BLD AUTO: 0.04 10*3/MM3 (ref 0–0.2)
BASOPHILS NFR BLD AUTO: 0.5 % (ref 0–1.5)
BILIRUB SERPL-MCNC: 0.6 MG/DL (ref 0–1.2)
BUN SERPL-MCNC: 31.1 MG/DL (ref 8–23)
BUN/CREAT SERPL: 21.6 (ref 7–25)
CALCIUM SPEC-SCNC: 9.6 MG/DL (ref 8.2–9.6)
CHLORIDE SERPL-SCNC: 103 MMOL/L (ref 98–107)
CO2 SERPL-SCNC: 25.5 MMOL/L (ref 22–29)
CREAT SERPL-MCNC: 1.44 MG/DL (ref 0.76–1.27)
DEPRECATED RDW RBC AUTO: 58.4 FL (ref 37–54)
EGFRCR SERPLBLD CKD-EPI 2021: 45.6 ML/MIN/1.73
EOSINOPHIL # BLD AUTO: 0.21 10*3/MM3 (ref 0–0.4)
EOSINOPHIL NFR BLD AUTO: 2.9 % (ref 0.3–6.2)
ERYTHROCYTE [DISTWIDTH] IN BLOOD BY AUTOMATED COUNT: 14.6 % (ref 12.3–15.4)
GLOBULIN UR ELPH-MCNC: 2.7 GM/DL
GLUCOSE SERPL-MCNC: 147 MG/DL (ref 65–99)
HCT VFR BLD AUTO: 37.2 % (ref 37.5–51)
HGB BLD-MCNC: 11.9 G/DL (ref 13–17.7)
IMM GRANULOCYTES # BLD AUTO: 0.02 10*3/MM3 (ref 0–0.05)
IMM GRANULOCYTES NFR BLD AUTO: 0.3 % (ref 0–0.5)
LYMPHOCYTES # BLD AUTO: 2.93 10*3/MM3 (ref 0.7–3.1)
LYMPHOCYTES NFR BLD AUTO: 39.9 % (ref 19.6–45.3)
MCH RBC QN AUTO: 35.1 PG (ref 26.6–33)
MCHC RBC AUTO-ENTMCNC: 32 G/DL (ref 31.5–35.7)
MCV RBC AUTO: 109.7 FL (ref 79–97)
MONOCYTES # BLD AUTO: 0.66 10*3/MM3 (ref 0.1–0.9)
MONOCYTES NFR BLD AUTO: 9 % (ref 5–12)
NEUTROPHILS NFR BLD AUTO: 3.49 10*3/MM3 (ref 1.7–7)
NEUTROPHILS NFR BLD AUTO: 47.4 % (ref 42.7–76)
NRBC BLD AUTO-RTO: 0 /100 WBC (ref 0–0.2)
PLATELET # BLD AUTO: 212 10*3/MM3 (ref 140–450)
PMV BLD AUTO: 12 FL (ref 6–12)
POTASSIUM SERPL-SCNC: 4.1 MMOL/L (ref 3.5–5.2)
PROT SERPL-MCNC: 6.7 G/DL (ref 6–8.5)
RBC # BLD AUTO: 3.39 10*6/MM3 (ref 4.14–5.8)
RETICS # AUTO: 0.06 10*6/MM3 (ref 0.02–0.13)
RETICS/RBC NFR AUTO: 1.71 % (ref 0.7–1.9)
SODIUM SERPL-SCNC: 140 MMOL/L (ref 136–145)
WBC NRBC COR # BLD AUTO: 7.35 10*3/MM3 (ref 3.4–10.8)

## 2025-07-15 PROCEDURE — 85025 COMPLETE CBC W/AUTO DIFF WBC: CPT

## 2025-07-15 PROCEDURE — 80053 COMPREHEN METABOLIC PANEL: CPT

## 2025-07-15 PROCEDURE — 85045 AUTOMATED RETICULOCYTE COUNT: CPT

## 2025-07-15 NOTE — PROGRESS NOTES
Venipuncture Blood Specimen Collection  Venipuncture performed in right arm by Nancy Chandra MA with good hemostasis. Patient tolerated the procedure well without complications.   07/15/25   Nancy Chandra MA

## 2025-07-24 ENCOUNTER — LAB (OUTPATIENT)
Dept: ONCOLOGY | Facility: CLINIC | Age: OVER 89
End: 2025-07-24
Payer: MEDICARE

## 2025-07-24 ENCOUNTER — OFFICE VISIT (OUTPATIENT)
Dept: ONCOLOGY | Facility: CLINIC | Age: OVER 89
End: 2025-07-24
Payer: MEDICARE

## 2025-07-24 VITALS
SYSTOLIC BLOOD PRESSURE: 121 MMHG | HEART RATE: 96 BPM | TEMPERATURE: 98 F | BODY MASS INDEX: 22.4 KG/M2 | OXYGEN SATURATION: 99 % | WEIGHT: 147.8 LBS | RESPIRATION RATE: 18 BRPM | DIASTOLIC BLOOD PRESSURE: 70 MMHG | HEIGHT: 68 IN

## 2025-07-24 DIAGNOSIS — D60.9 ACQUIRED RED CELL APLASIA: ICD-10-CM

## 2025-07-24 DIAGNOSIS — D60.9 ACQUIRED RED CELL APLASIA: Primary | ICD-10-CM

## 2025-07-24 DIAGNOSIS — D61.9 APLASTIC ANEMIA: ICD-10-CM

## 2025-07-24 DIAGNOSIS — D64.9 SYMPTOMATIC ANEMIA: ICD-10-CM

## 2025-07-24 LAB
ALBUMIN SERPL-MCNC: 4.2 G/DL (ref 3.5–5.2)
ALBUMIN/GLOB SERPL: 1.6 G/DL
ALP SERPL-CCNC: 127 U/L (ref 39–117)
ALT SERPL W P-5'-P-CCNC: 31 U/L (ref 1–41)
ANION GAP SERPL CALCULATED.3IONS-SCNC: 10.9 MMOL/L (ref 5–15)
AST SERPL-CCNC: 27 U/L (ref 1–40)
BASOPHILS # BLD AUTO: 0.05 10*3/MM3 (ref 0–0.2)
BASOPHILS NFR BLD AUTO: 0.8 % (ref 0–1.5)
BILIRUB SERPL-MCNC: 0.6 MG/DL (ref 0–1.2)
BUN SERPL-MCNC: 31.6 MG/DL (ref 8–23)
BUN/CREAT SERPL: 21.9 (ref 7–25)
CALCIUM SPEC-SCNC: 9.9 MG/DL (ref 8.2–9.6)
CHLORIDE SERPL-SCNC: 103 MMOL/L (ref 98–107)
CO2 SERPL-SCNC: 26.1 MMOL/L (ref 22–29)
CREAT SERPL-MCNC: 1.44 MG/DL (ref 0.76–1.27)
DEPRECATED RDW RBC AUTO: 58.9 FL (ref 37–54)
EGFRCR SERPLBLD CKD-EPI 2021: 45.6 ML/MIN/1.73
EOSINOPHIL # BLD AUTO: 0.2 10*3/MM3 (ref 0–0.4)
EOSINOPHIL NFR BLD AUTO: 3.2 % (ref 0.3–6.2)
ERYTHROCYTE [DISTWIDTH] IN BLOOD BY AUTOMATED COUNT: 14.8 % (ref 12.3–15.4)
GLOBULIN UR ELPH-MCNC: 2.6 GM/DL
GLUCOSE SERPL-MCNC: 165 MG/DL (ref 65–99)
HCT VFR BLD AUTO: 38.2 % (ref 37.5–51)
HGB BLD-MCNC: 12 G/DL (ref 13–17.7)
IMM GRANULOCYTES # BLD AUTO: 0.01 10*3/MM3 (ref 0–0.05)
IMM GRANULOCYTES NFR BLD AUTO: 0.2 % (ref 0–0.5)
LYMPHOCYTES # BLD AUTO: 2.42 10*3/MM3 (ref 0.7–3.1)
LYMPHOCYTES NFR BLD AUTO: 39 % (ref 19.6–45.3)
MCH RBC QN AUTO: 34.5 PG (ref 26.6–33)
MCHC RBC AUTO-ENTMCNC: 31.4 G/DL (ref 31.5–35.7)
MCV RBC AUTO: 109.8 FL (ref 79–97)
MONOCYTES # BLD AUTO: 0.51 10*3/MM3 (ref 0.1–0.9)
MONOCYTES NFR BLD AUTO: 8.2 % (ref 5–12)
NEUTROPHILS NFR BLD AUTO: 3.02 10*3/MM3 (ref 1.7–7)
NEUTROPHILS NFR BLD AUTO: 48.6 % (ref 42.7–76)
NRBC BLD AUTO-RTO: 0 /100 WBC (ref 0–0.2)
PLATELET # BLD AUTO: 214 10*3/MM3 (ref 140–450)
PMV BLD AUTO: 11.8 FL (ref 6–12)
POTASSIUM SERPL-SCNC: 3.8 MMOL/L (ref 3.5–5.2)
PROT SERPL-MCNC: 6.8 G/DL (ref 6–8.5)
RBC # BLD AUTO: 3.48 10*6/MM3 (ref 4.14–5.8)
SODIUM SERPL-SCNC: 140 MMOL/L (ref 136–145)
WBC NRBC COR # BLD AUTO: 6.21 10*3/MM3 (ref 3.4–10.8)

## 2025-07-24 PROCEDURE — 80053 COMPREHEN METABOLIC PANEL: CPT | Performed by: INTERNAL MEDICINE

## 2025-07-24 PROCEDURE — 85025 COMPLETE CBC W/AUTO DIFF WBC: CPT | Performed by: INTERNAL MEDICINE

## 2025-07-24 PROCEDURE — 1126F AMNT PAIN NOTED NONE PRSNT: CPT | Performed by: INTERNAL MEDICINE

## 2025-07-24 PROCEDURE — 99214 OFFICE O/P EST MOD 30 MIN: CPT | Performed by: INTERNAL MEDICINE

## 2025-07-24 NOTE — PROGRESS NOTES
Name:  Chinedu Silva  :  1933  Date:  2025     REFERRING PHYSICIAN    PRIMARY CARE PROVIDER  Jr Segura PA    REASON FOR FOLLOWUP  1. Acquired red cell aplasia        CHIEF COMPLAINT  None.    Dear  Colton,    HISTORY OF PRESENT ILLNESS:   I saw Mr. Silva in followup (from his recent hospitalization for severe anemia) today in our hematology/oncology clinic. As you are aware, he is a pleasant, 89 y.o., white male with a history of hypertension, CAD and GERD who presented to the [facility] ED on 2024 with progressive weakness and dyspnea on exertion. These symptoms had potentially first started ~two to three months prior; however, they significantly worsened over the course of the previous couple of weeks (by early 2024). Upon arrival to our ED, he was found to be severely anemic, with a Hg of 3.7 g/dL. His other cell lines (WBCs and platelets) were entirely WNL. Our service was consulted for further evaluation of this issue, and a bone marrow biopsy was performed on 2024. Meanwhile, as he was feeling substantially better after his HgB was transfused up to the ~9 g/dL range, he was able to be discharged to outpatient follow up in our clinic. He returned to our clinic the following week (on 2024) to go over the results of the bone marrow exam and for ongoing management. He was on PO cyclosporine for a total of ~three weeks (50 mg BID for the first week and 100 mg BID for the next two weeks) in late August/early 2024. Due to progressive, severe and refractory mucositis (as well as a toxic cyclosporine level), he was instructed to discontinue the cyclosporine in early 2024. After holding the cyclosporine for the next ~one and one half weeks, his mucositis finally improved. He was instructed to restart it a dose of 50 mg BID, and he remained on this for the next seven plus (7+) months. While he redeveloped some intermittent mucositis, this remained  mild and manageable; and he overall continued to do much much better than he had been doing in Fall 2024, as his fatigue remained improved and his HgB remained greater than 10 g/dL (and he has still not required any PRBC transfusions since late Summer 2024). As he continued to do so well, in early to mid-May 2025, his cyclosporine dose was reduced to 50 mg in the morning and 25 mg in the evening, by early June 2025, it was further decreased to 25 mg BID; and, by mid-June 2025, it was decreased even further to 25 mg PO once daily. By early July 2024, as his HgB remained > 11 g/dL for an additional three weeks, the cyclosporine was discontinued completely.    INTERIM HISTORY:  Mr. Silva returns to clinic today for follow up yet again accompanied by his wife. He has been off of cyclosporine completely now for the past two weeks, and he has still not noticed any difference in the way he feels. His only new complaint recently has been that he is in the process of establishing follow up with an ENT in [place], because he has been struggling with an intermittently bleeding left ear and some worsening hearing issues. He otherwise once again has no new or other specific complaints and continues to feel overall well.    Past Medical History:   Diagnosis Date    Carotid stenosis     Coronary artery disease     Diverticulitis     GERD (gastroesophageal reflux disease)     Gout     Hearing aid worn     Hypertension     Obese     Prostate cancer     Wears glasses        Past Surgical History:   Procedure Laterality Date    CAROTID ENDARTERECTOMY Left 2/5/2018    Procedure: CAROTID ENDARTERECTOMY WITH EEG LEFT;  Surgeon: Del Neal MD;  Location: [facility];  Service:     CATARACT EXTRACTION      CATARACT EXTRACTION WITH INTRAOCULAR LENS IMPLANT Right     CHOLECYSTECTOMY      COLONOSCOPY W/ POLYPECTOMY      HERNIA REPAIR Right     TOTAL KNEE ARTHROPLASTY Right     TURP / TRANSURETHRAL INCISION / DRAINAGE PROSTATE          Social History     Socioeconomic History    Marital status:     Number of children: 1   Tobacco Use    Smoking status: Former     Current packs/day: 0.00     Average packs/day: 1 pack/day for 12.0 years (12.0 ttl pk-yrs)     Types: Cigarettes     Start date:      Quit date:      Years since quittin.6    Smokeless tobacco: Current     Types: Chew   Vaping Use    Vaping status: Never Used   Substance and Sexual Activity    Alcohol use: No    Drug use: No    Sexual activity: Defer       Family History   Problem Relation Age of Onset    Hypertension Mother     Glaucoma Mother     Diverticulitis Mother     Gout Mother     ALS Father     Gout Sister     Broken bones Paternal Grandfather     Broken bones Son     Cancer Son     Diabetes Brother        Allergies   Allergen Reactions    Ciprofloxacin Other (See Comments)     Muscle Pains    Lisinopril Cough    Tetracycline Provider Review Needed and Unknown - Low Severity     Other Reaction(s) from [facility]: UNK    Sulfa Antibiotics Itching and Rash       Current Outpatient Medications   Medication Sig Dispense Refill    allopurinol (ZYLOPRIM) 300 MG tablet Take 1 tablet by mouth Daily.      aspirin 81 MG EC tablet Take 1 tablet by mouth Daily.      bumetanide (BUMEX) 1 MG tablet Take 1 tablet by mouth 2 (Two) Times a Week. Every Monday and Thursday      Jardiance 10 MG tablet tablet       ondansetron (ZOFRAN) 8 MG tablet Take 1 tablet by mouth Every 8 (Eight) Hours As Needed for Nausea or Vomiting. 30 tablet 1    rosuvastatin (CRESTOR) 10 MG tablet Take 1 tablet by mouth Daily. 90 tablet 3    TobraDex 0.3-0.1 % ophthalmic suspension Apply 1 drop to eye(s) as directed by provider Every 2 (Two) Hours While Awake.      valsartan (DIOVAN) 40 MG tablet Take 1 tablet by mouth 2 (Two) Times a Day.       No current facility-administered medications for this visit.     REVIEW OF SYSTEMS  CONSTITUTIONAL:  No fever, chills or night sweats. Chronic  "fatigue, recently still much improved.  EYES:  No blurry vision, diplopia or other vision changes.  ENT:  As per the HPI above.  CARDIOVASCULAR:  No palpitations, arrhythmia, syncopal episodes or edema.  PULMONARY:  No hemoptysis, wheezing, chronic cough or shortness of breath.  GASTROINTESTINAL:  No nausea or vomiting. No constipation or diarrhea. No abdominal pain. Intermittently \"chalky\" stool, currently still resolved.  GENITOURINARY:  No hematuria, kidney stones or frequent urination.  MUSCULOSKELETAL:  No joint or back pains.  INTEGUMENTARY: No rashes or pruritus.  ENDOCRINE:  No excessive thirst or hot flashes.  HEMATOLOGIC:  No history of free bleeding, spontaneous bleeding or clotting.  IMMUNOLOGIC:  No allergies or frequent infections.  NEUROLOGIC: No numbness, tingling, seizures or weakness.  PSYCHIATRIC:  No anxiety or depression.    PHYSICAL EXAMINATION  /70   Pulse 96   Temp 98 °F (36.7 °C) (Temporal)   Resp 18   Ht 172.7 cm (67.99\")   Wt 67 kg (147 lb 12.8 oz)   SpO2 99%   BMI 22.48 kg/m²     Pain Score:  Pain Score    25 0845   PainSc: 0-No pain     PHQ-Score Total:  PHQ-9 Total Score:      ECO  GENERAL:  A well-developed, well-nourished, elderly, white male in no acute distress.  HEENT:  Pupils equally round and reactive to light. Extraocular muscles intact.  CARDIOVASCULAR:  Regular rate and rhythm. No murmurs, gallops or rubs.  LUNGS:  Clear to auscultation bilaterally. No wheezing.  ABDOMEN:  Soft, nontender, nondistended with positive bowel sounds.  EXTREMITIES:  No clubbing, cyanosis or edema bilaterally.  SKIN:  No rashes or petechiae.  NEURO:  Cranial nerves grossly intact. No focal deficits.  PSYCH:  Alert and oriented x3.    The physical exam is once again unchanged from recent priors.    LABORATORY  Lab Results   Component Value Date    WBC 6.21 2025    HGB 12.0 (L) 2025    HCT 38.2 2025    .8 (H) 2025     2025    NEUTROABS " 3.02 07/24/2025       Lab Results   Component Value Date     07/24/2025    K 3.8 07/24/2025     07/24/2025    CO2 26.1 07/24/2025    BUN 31.6 (H) 07/24/2025    CREATININE 1.44 (H) 07/24/2025    GLUCOSE 165 (H) 07/24/2025    CALCIUM 9.9 (H) 07/24/2025    AST 27 07/24/2025    ALT 31 07/24/2025    ALKPHOS 127 (H) 07/24/2025    BILITOT 0.6 07/24/2025    PROTEINTOT 6.8 07/24/2025    ALBUMIN 4.2 07/24/2025     Lab Results   Component Value Date    RETICCTPCT 1.71 07/15/2025     CBC (07/24/2025): WBCs: 6.21; HgB: 12.0; Hct: 38.2; platelets: 214; MCV: 109.8  CBC (07/08/2025): WBCs: 7.35; HgB: 11.7; Hct: 37.0; platelets: 209; MCV: 110.8  CBC (07/01/2025): WBCs: 7.29; HgB: 11.5; Hct: 36.1; platelets: 196; MCV: 108.7  CBC (06/19/2025): WBCs: 6.41; HgB: 11.5; Hct: 35.6; platelets: 194; MCV: 109.5  CBC (06/02/2025): WBCs: 5.98; HgB: 11.5; Hct: 36.4; platelets: 202; MCV: 110.0  CBC (05/19/2025): WBCs: 6.05; HgB: 11.3; Hct: 35.9; platelets: 183; MCV: 110.1  CBC (04/18/2025): WBCs: 7.83; HgB: 10.4; Hct: 33.1; platelets: 179; MCV: 110.7  CBC (03/18/2025): WBCs: 7.54; HgB: 10.1; Hct: 31.3; platelets: 198; MCV: 107.9  CBC (02/18/2025): WBCs: 8.34; HgB: 10.9; Hct: 34.0; platelets: 199; MCV: 109.7  CBC (01/21/2025): WBCs: 5.63; HgB: 10.8; Hct: 34.2; platelets: 225; MCV: 111.8  CBC (12/16/2024): WBCs: 6.56; HgB: 10.5; Hct: 32.9; platelets: 218; MCV: 113.8  CBC (12/02/2024): WBCs: 7.22; HgB: 10.3; Hct: 32.8; platelets: 218; MCV: 112.7  CBC (11/19/2024): WBCs: 6.30; HgB: 10.2; Hct: 32.1; platelets: 272; MCV: 110.7  CBC (11/05/2024): WBCs: 5.65; HgB: 9.5; Hct: 30.3; platelets: 277; MCV: 107.1  CBC (10/30/2024): WBCs: 6,65; HgB: 9.7; Hct: 30.1; platelets: 281; MCV: 106.0  CBC (10/21/2024): WBCs: 7.46; HgB: 9.6; Hct: 30.5; platelets: 349; MCV: 103.4  CBC (10/15/2024): WBCs: 7.86; HgB: 9.6; Hct: 30.8; platelets: 387; MCV: 101.3  CBC (10/09/2024): WBCs: 7.42; HgB: 8.8; Hct: 28.5; platelets: 373; MCV: 97.9  CBC (10/02/2024): WBCs:  6.94; HgB: 9.2; Hct: 29.6; platelets: 324; MCV: 95.2  CBC (09/25/2024): WBCs: 8.71; HgB: 7.3; Hct: 23.3; platelets: 349; MCV: 90.3  CBC (09/18/2024): WBCs: 9.33; HgB: 9.0; Hct: 27.7; platelets: 312; MCV: 87.4  CBC (09/10/2024): WBCs: 10.20; HgB: 10.4; Hct: 31.3; platelets: 222; MCV: 86.5  CBC (09/03/2024): WBCs: 10.95; HgB: 11.5; Hct: 35.6; platelets: 203; MCV: 86.6  CBC (08/29/2024): WBCs: 14.86; HgB: 7.4; Hct: 23.5; platelets: 275; MCV: 86.7  CBC (08/26/2024): WBCs: 11.72; HgB: 7.7; Hct: 24.6; platelets: 250; MCV: 86.6  CBC (08/23/2024): WBCs: 10.01; HgB: 8.0; Hct: 25.3; platelets: 237; MCV: 87.2  CBC (08/20/2024): WBCS: 8.59; HgB: 8.4; Hct: 26.4; platelets: 272; MCV: 86.8  CBC (08/14/2024): WBCs:  8.16; HgB: 9.0; Hct: 27.9; platelets: 319; MCV: 87.2  CBC (08/09/2024): WBCs: 12.00; HgB: 8.1; Hct: 25.5; platelets: 319; MCV: 90.7  CBC (08/02/2024): WBCs: 11.92; HgB: 8.1; Hct: 24.8; platelets: 204; MCV: 90.5  CBC (07/31/2024): WBCs: 17.04; HgB: 7.6; Hct: 23.3; platelets: 265; MCV: 95.5  CBC (07/26/2024): WBCs: 13.50; HgB: 8.1; Hct: 24.8; platelets: 329; MCV: 95.0  CBC (07/23/2024): WBCs: 16.43; HgB: 8.5; Hct: 25.8; platelets: 390; MCV: 96.3  CBC (07/15/2024): WBCs: 8.04; HgB: 8.3; Hct: 25.8; platelets: 340; MCV: 97.7  CBC (07/11/2024): WBCs: 7.25; HgB: 9.4; Hct: 29.0; platelets: 375; MCV: 97.0  CBC (07/01/2024): WBCs: 7.50; HgB: 9.4; Hct: 29.3; platelets: 272; MCV: 98.7  CBC (06/26/2024): WBCs: 6.67; HgB: 9.0; Hct: 27.5; platelets: 198; MCV: 98.6    Reticulocytes (12/16/2024): Percentage: 1.67; Absolute: 0.0483  Reticulocytes (12/02/2024): Percentage: 1.89; Absolute: 0.0550  Reticulocytes (11/19/2024): Percentage: 3.04; Absolute: 0.0882  Reticulocytes (11/05/2024): Percentage 2.91; Absolute: 0.0824  Reticulocytes (10/30/2024): Percentage: 3.67; Absolute: 0.1061  Reticulocytes (10/21/2024): Percentage: 4.21; Absolute: 0.1250  Reticulocytes (10/15/2024): Percentage: 5.24; Absolute: 0.1603  Reticulocytes (09/10/2024):  Percentage: 0.47; Absolute: 0.0170  Reticulocytes (08/20/2024): Percentage: 0.26; Absolute: <0.0100    Cyclosporine level (06/02/2025): 226 ng/mL  Cyclosporine level (02/18/2025): 155 ng/mL  Cyclosporine level (01/21/2025): 91 ng/mL  Cyclosporine level (12/30/2024): 154 ng/mL  Cyclosporine level (12/16/2024): 86 ng/mL  Cyclosporine level (12/02/2024): 183 ng/mL  Cyclosporine level (11/19/2024): 335 ng/mL  Cyclosporine level (11/05/2024): 320 ng/mL  Cyclosporine level (10/30/2024): 132 ng/mL  Cyclosporine level (10/21/2024): 216 ng/mL  Cyclosporine level (10/15/2024): 315 ng/mL  Cyclosporine level (10/09/2024): 48 ng/mL  Cyclosporine level (10/02/2024): 121 ng/mL  Cyclosporine level (09/25/2024): 107 ng/mL    IMAGING  CT chest without contrast (11/04/2024):  Impression: No parenchymal nodules, adenopathy or effusions.    CT abdomen and pelvis without contrast (11/04/2024):  Impression:  1) Minimal sigmoid diverticulitis.  2) Other findings [are nonacute].    PATHOLOGY  Peripheral smear, bone marrow aspiration smear, bone marrow aspiration clot and bone marrow core biopsy (06/26/2024):  Normocellular bone marrow with increased granulopoiesis, essentially absent erythropoiesis, adequate megakaryopoiesis, increased stainable iron and slightly increased mixed chronic inflammation. No evidence of increased blasts or dysplasia. The most striking feature within the bone marrow is the essential absence of any erythropoiesis in the aspirate or core biopsy. There is mixed inflammation with some notable immunophenotypic findings by flow cytometry, but there is no evidence of bone marrow involvement by lymphoma. This is most suggestive of a mixed reactive lymphoid population.    IMPRESSION AND PLAN  Mr. Silva is a 89 y.o., white male with:  Pure red cell aplasia: I have had multiple, long discussions with the patient and his wife regarding the results of the bone marrow biopsy performed in late June 2024 (which are summarized  above). In short, this is a fairly uncommon diagnosis that can either be idiopathic or associated with a number of potential, underlying causes, including thymoma or parvovirus infection. Regardless, high dose steroids (prednisone 1-2 mg/kg daily) is the standard, first-line treatment; however, since the repeat CBC drawn on 07/01/2024 showed that his HgB (9.4 g/dL) had improved compared to what it was when he was discharged from our hospital following the bone marrow biopsy the previous week (9.0 g/dL), it was hoped that he was satisfactorily recovering (from a viral trigger) on his own. However, a repeat CBC on 07/15/2024 revealed that his HgB had dropped again (8.3 mg/dL); and his reticulocyte counts remained essentially zero. A Rx for prednisone 80 mg PO daily was provided at that time; but, unfortunately, high-dose steroids never provided him with any meaningful effect. Consequently, by late August 2024, he was started on an additional Rx (cyclosporine 50 mg PO BID), per [facility] recommendations (with whom he had met for an additional opinion regarding his management earlier that month). Once he tolerated this starting dose for ~one week, in late August (08/27/2024), we increased the cyclosporine to 100 mg PO BID and further decreased the prednisone to 40 mg PO daily. At his appointment on 09/03/2024, as his anemia (finally) improved (rather well, to a HgB of 11.5 g/dL), we continued the cyclosporine and further decreased the prednisone to 20 mg PO daily. Unfortunately, at the time of his follow up appointment on 09/10/2024, a repeat CBC did not show any further improvement in his HgB (it was 10.4 g/dL), issue #2 (see below) was dramatically worse; and his repeat serum cyclosporine was > 400 ng/mL (above the upper limit of the desirable range). Given all of this, the cyclosporine was held completely for the next one and one half weeks. Meanwhile, he further decreased his daily prednisone dose (from 10 mg  "daily to 5 mg daily to, as of late September, off completely), as instructed. After remaining off of cyclosporine for one and one half weeks, his mouth sores (finally) improved; and his PO intake renormalized. Unfortunately, throughout the latter part of September 2024, he was getting steadily fatigued again, correlating with the fact that, not surprisingly, his HgB continued to redecline off of the cyclosporine (and it is was back down to 7.3 g/dL by 09/25/2024, at which time he required the transfusion of an additional two units of PRBCs). He was restarted on cyclosporine (the 50 mg BID dose), and he remained on this dose for the next seven plus (7+) months. Issue #2 never significantly reworsened (although it intermittently mildly recurred, it remained manageable with localized therapies such as Orajel). By early to mid-May 2025, as he continued to do so well from the standpoint of this issue, he was re-referred to [facility] for an opinion regarding the feasibility/process of attempting to wean him off of the cyclosporine; and they ([facility]) agreed that this was a good idea. In early to mid-May 2025, his cyclosporine dose was reduced to 50 mg in the morning and 25 mg in the evening, by early June 2025, it was further decreased to 25 mg BID; and, by mid-June 2025, it was decreased even further to 25 mg PO once daily. After three weeks on the once daily dose, the cyclosporine was stopped completely (in early July 2025). He has been off of cyclosporine entirely for the past two weeks now; and today's repeat CBC shows that his HgB (12.0 g/dL) is, so far, holding steady. We will repeat CBCs in both two and four weeks, and we will see him back in our clinic in six weeks (~early September) with a repeat CBC and CMP.  Mucositis: Now completely resolved since cyclosporine was discontinued in early July 2025. Continue to monitor.  \"Chalky stool\": He and his wife previously clarified that this issue has " actually occurred previously. In approximately Summer 2023, he had to be referred to [facility] gastroenterology, who performed an ERCP for (what sounds like benign) stricturing. Regardless, CT scans of the chest, abdomen and pelvis without contrast performed on 11/05/2024 (and summarized above) for further evaluation were overall unremarkable; and this symptom is currently still re-resolved. Continue to monitor.  Lower extremity edema: Multifactorial, with age-related venous insufficiency and the resulting, dependent edema definitely contributing. Ongoing management per primary care.  Bleeding ear/hearing loss: His current, primary complaints, for which his PCP referred him to an ENT in [place] for further evaluation. An initial consultation is scheduled shortly.  The patient and his wife were in agreement with this plan.    It is a pleasure to participate in Mr. Silva's care. Please do not hesitate to call with any questions or concerns that you may have.    A total of 30 minutes were spent coordinating this patient’s care in clinic today; more than 50% of this time was face-to-face with the patient and his wife, reviewing his interim medical history, discussing the results of today's repeat CBC and counseling on the current followup plan. All questions were answered to their satisfaction.    FOLLOW UP  Repeat a CBC in 2 weeks and 1 month. Return to our clinic in 6 weeks (early September) with a CBC and CMP.          This document was electronically signed by RAMIRO King MD July 24, 2025 09:17 EDT      CC: RIGO Mccall MD

## 2025-08-06 DIAGNOSIS — E78.5 DYSLIPIDEMIA: ICD-10-CM

## 2025-08-06 RX ORDER — ROSUVASTATIN CALCIUM 10 MG/1
10 TABLET, COATED ORAL DAILY
Qty: 90 TABLET | Refills: 0 | Status: SHIPPED | OUTPATIENT
Start: 2025-08-06

## 2025-08-07 ENCOUNTER — CLINICAL SUPPORT (OUTPATIENT)
Dept: ONCOLOGY | Facility: CLINIC | Age: OVER 89
End: 2025-08-07
Payer: MEDICARE

## 2025-08-07 VITALS
BODY MASS INDEX: 22.63 KG/M2 | SYSTOLIC BLOOD PRESSURE: 126 MMHG | RESPIRATION RATE: 18 BRPM | OXYGEN SATURATION: 98 % | TEMPERATURE: 97.3 F | WEIGHT: 148.8 LBS | HEART RATE: 68 BPM | DIASTOLIC BLOOD PRESSURE: 57 MMHG

## 2025-08-07 DIAGNOSIS — D60.9 ACQUIRED RED CELL APLASIA: ICD-10-CM

## 2025-08-07 DIAGNOSIS — D61.9 APLASTIC ANEMIA: ICD-10-CM

## 2025-08-07 LAB
ALBUMIN SERPL-MCNC: 4.1 G/DL (ref 3.5–5.2)
ALBUMIN/GLOB SERPL: 1.7 G/DL
ALP SERPL-CCNC: 121 U/L (ref 39–117)
ALT SERPL W P-5'-P-CCNC: 22 U/L (ref 1–41)
ANION GAP SERPL CALCULATED.3IONS-SCNC: 9.7 MMOL/L (ref 5–15)
AST SERPL-CCNC: 22 U/L (ref 1–40)
BASOPHILS # BLD AUTO: 0.03 10*3/MM3 (ref 0–0.2)
BASOPHILS NFR BLD AUTO: 0.5 % (ref 0–1.5)
BILIRUB SERPL-MCNC: 0.6 MG/DL (ref 0–1.2)
BUN SERPL-MCNC: 34.4 MG/DL (ref 8–23)
BUN/CREAT SERPL: 26.3 (ref 7–25)
CALCIUM SPEC-SCNC: 9.6 MG/DL (ref 8.2–9.6)
CHLORIDE SERPL-SCNC: 104 MMOL/L (ref 98–107)
CO2 SERPL-SCNC: 28.3 MMOL/L (ref 22–29)
CREAT SERPL-MCNC: 1.31 MG/DL (ref 0.76–1.27)
DEPRECATED RDW RBC AUTO: 59.7 FL (ref 37–54)
EGFRCR SERPLBLD CKD-EPI 2021: 51.1 ML/MIN/1.73
EOSINOPHIL # BLD AUTO: 0.19 10*3/MM3 (ref 0–0.4)
EOSINOPHIL NFR BLD AUTO: 3.1 % (ref 0.3–6.2)
ERYTHROCYTE [DISTWIDTH] IN BLOOD BY AUTOMATED COUNT: 15 % (ref 12.3–15.4)
GLOBULIN UR ELPH-MCNC: 2.4 GM/DL
GLUCOSE SERPL-MCNC: 136 MG/DL (ref 65–99)
HCT VFR BLD AUTO: 34.7 % (ref 37.5–51)
HGB BLD-MCNC: 11.3 G/DL (ref 13–17.7)
IMM GRANULOCYTES # BLD AUTO: 0.01 10*3/MM3 (ref 0–0.05)
IMM GRANULOCYTES NFR BLD AUTO: 0.2 % (ref 0–0.5)
LYMPHOCYTES # BLD AUTO: 2.19 10*3/MM3 (ref 0.7–3.1)
LYMPHOCYTES NFR BLD AUTO: 35.9 % (ref 19.6–45.3)
MCH RBC QN AUTO: 36 PG (ref 26.6–33)
MCHC RBC AUTO-ENTMCNC: 32.6 G/DL (ref 31.5–35.7)
MCV RBC AUTO: 110.5 FL (ref 79–97)
MONOCYTES # BLD AUTO: 0.52 10*3/MM3 (ref 0.1–0.9)
MONOCYTES NFR BLD AUTO: 8.5 % (ref 5–12)
NEUTROPHILS NFR BLD AUTO: 3.16 10*3/MM3 (ref 1.7–7)
NEUTROPHILS NFR BLD AUTO: 51.8 % (ref 42.7–76)
NRBC BLD AUTO-RTO: 0 /100 WBC (ref 0–0.2)
PLATELET # BLD AUTO: 214 10*3/MM3 (ref 140–450)
PMV BLD AUTO: 12 FL (ref 6–12)
POTASSIUM SERPL-SCNC: 4.4 MMOL/L (ref 3.5–5.2)
PROT SERPL-MCNC: 6.5 G/DL (ref 6–8.5)
RBC # BLD AUTO: 3.14 10*6/MM3 (ref 4.14–5.8)
RETICS # AUTO: 0.03 10*6/MM3 (ref 0.02–0.13)
RETICS/RBC NFR AUTO: 0.92 % (ref 0.7–1.9)
SODIUM SERPL-SCNC: 142 MMOL/L (ref 136–145)
WBC NRBC COR # BLD AUTO: 6.1 10*3/MM3 (ref 3.4–10.8)

## 2025-08-07 PROCEDURE — 85025 COMPLETE CBC W/AUTO DIFF WBC: CPT

## 2025-08-07 PROCEDURE — 85045 AUTOMATED RETICULOCYTE COUNT: CPT

## 2025-08-07 PROCEDURE — 80053 COMPREHEN METABOLIC PANEL: CPT

## 2025-08-21 ENCOUNTER — CLINICAL SUPPORT (OUTPATIENT)
Dept: ONCOLOGY | Facility: CLINIC | Age: OVER 89
End: 2025-08-21
Payer: MEDICARE

## 2025-08-21 DIAGNOSIS — D64.9 SYMPTOMATIC ANEMIA: ICD-10-CM

## 2025-08-21 DIAGNOSIS — D60.9 ACQUIRED RED CELL APLASIA: ICD-10-CM

## 2025-08-21 DIAGNOSIS — D61.9 APLASTIC ANEMIA: ICD-10-CM

## 2025-08-21 LAB
BASOPHILS # BLD AUTO: 0.03 10*3/MM3 (ref 0–0.2)
BASOPHILS NFR BLD AUTO: 0.4 % (ref 0–1.5)
DEPRECATED RDW RBC AUTO: 59.1 FL (ref 37–54)
EOSINOPHIL # BLD AUTO: 0.27 10*3/MM3 (ref 0–0.4)
EOSINOPHIL NFR BLD AUTO: 3.4 % (ref 0.3–6.2)
ERYTHROCYTE [DISTWIDTH] IN BLOOD BY AUTOMATED COUNT: 15 % (ref 12.3–15.4)
HCT VFR BLD AUTO: 31.6 % (ref 37.5–51)
HGB BLD-MCNC: 10.2 G/DL (ref 13–17.7)
IMM GRANULOCYTES # BLD AUTO: 0.02 10*3/MM3 (ref 0–0.05)
IMM GRANULOCYTES NFR BLD AUTO: 0.3 % (ref 0–0.5)
LYMPHOCYTES # BLD AUTO: 3.23 10*3/MM3 (ref 0.7–3.1)
LYMPHOCYTES NFR BLD AUTO: 40.8 % (ref 19.6–45.3)
MCH RBC QN AUTO: 35.5 PG (ref 26.6–33)
MCHC RBC AUTO-ENTMCNC: 32.3 G/DL (ref 31.5–35.7)
MCV RBC AUTO: 110.1 FL (ref 79–97)
MONOCYTES # BLD AUTO: 0.66 10*3/MM3 (ref 0.1–0.9)
MONOCYTES NFR BLD AUTO: 8.3 % (ref 5–12)
NEUTROPHILS NFR BLD AUTO: 3.7 10*3/MM3 (ref 1.7–7)
NEUTROPHILS NFR BLD AUTO: 46.8 % (ref 42.7–76)
NRBC BLD AUTO-RTO: 0 /100 WBC (ref 0–0.2)
PLATELET # BLD AUTO: 239 10*3/MM3 (ref 140–450)
PMV BLD AUTO: 11.6 FL (ref 6–12)
RBC # BLD AUTO: 2.87 10*6/MM3 (ref 4.14–5.8)
WBC NRBC COR # BLD AUTO: 7.91 10*3/MM3 (ref 3.4–10.8)

## 2025-08-28 ENCOUNTER — CLINICAL SUPPORT (OUTPATIENT)
Dept: ONCOLOGY | Facility: CLINIC | Age: OVER 89
End: 2025-08-28
Payer: MEDICARE

## 2025-08-28 DIAGNOSIS — D64.9 SYMPTOMATIC ANEMIA: ICD-10-CM

## 2025-08-28 DIAGNOSIS — D61.9 APLASTIC ANEMIA: ICD-10-CM

## 2025-08-28 DIAGNOSIS — D60.9 ACQUIRED RED CELL APLASIA: ICD-10-CM

## 2025-08-28 LAB
BASOPHILS # BLD AUTO: 0.04 10*3/MM3 (ref 0–0.2)
BASOPHILS NFR BLD AUTO: 0.5 % (ref 0–1.5)
DEPRECATED RDW RBC AUTO: 60.2 FL (ref 37–54)
EOSINOPHIL # BLD AUTO: 0.23 10*3/MM3 (ref 0–0.4)
EOSINOPHIL NFR BLD AUTO: 3.1 % (ref 0.3–6.2)
ERYTHROCYTE [DISTWIDTH] IN BLOOD BY AUTOMATED COUNT: 15 % (ref 12.3–15.4)
HCT VFR BLD AUTO: 31.8 % (ref 37.5–51)
HGB BLD-MCNC: 10.2 G/DL (ref 13–17.7)
IMM GRANULOCYTES # BLD AUTO: 0.01 10*3/MM3 (ref 0–0.05)
IMM GRANULOCYTES NFR BLD AUTO: 0.1 % (ref 0–0.5)
LYMPHOCYTES # BLD AUTO: 3.16 10*3/MM3 (ref 0.7–3.1)
LYMPHOCYTES NFR BLD AUTO: 43 % (ref 19.6–45.3)
MCH RBC QN AUTO: 35.8 PG (ref 26.6–33)
MCHC RBC AUTO-ENTMCNC: 32.1 G/DL (ref 31.5–35.7)
MCV RBC AUTO: 111.6 FL (ref 79–97)
MONOCYTES # BLD AUTO: 0.73 10*3/MM3 (ref 0.1–0.9)
MONOCYTES NFR BLD AUTO: 9.9 % (ref 5–12)
NEUTROPHILS NFR BLD AUTO: 3.18 10*3/MM3 (ref 1.7–7)
NEUTROPHILS NFR BLD AUTO: 43.4 % (ref 42.7–76)
NRBC BLD AUTO-RTO: 0 /100 WBC (ref 0–0.2)
PLATELET # BLD AUTO: 250 10*3/MM3 (ref 140–450)
PMV BLD AUTO: 11.1 FL (ref 6–12)
RBC # BLD AUTO: 2.85 10*6/MM3 (ref 4.14–5.8)
WBC NRBC COR # BLD AUTO: 7.35 10*3/MM3 (ref 3.4–10.8)

## 2025-08-28 PROCEDURE — 85025 COMPLETE CBC W/AUTO DIFF WBC: CPT | Performed by: INTERNAL MEDICINE

## (undated) DEVICE — BLD SCLPL BEAVR MINI STR 2BVL 180D LF

## (undated) DEVICE — 2963 MEDIPORE SOFT CLOTH TAPE 3 IN X 10 YD 12 RLS/CS: Brand: 3M™ MEDIPORE™

## (undated) DEVICE — SPNG GZ WOVN 4X4IN 12PLY 10/BX STRL

## (undated) DEVICE — ADAPT ST INFUS ADMIN SYR 70IN

## (undated) DEVICE — SUT MNCRYL PLS ANTIB UD 4/0 PS2 18IN

## (undated) DEVICE — 3M™ STERI-DRAPE™ INSTRUMENT POUCH 1018: Brand: STERI-DRAPE™

## (undated) DEVICE — GLV SURG SENSICARE W/ALOE PF LF 7 STRL

## (undated) DEVICE — SUCTION CANISTER, 2500CC, RIGID: Brand: DEROYAL

## (undated) DEVICE — SUT PROLN 6/0 BV1 D/A 30IN 8709H

## (undated) DEVICE — NDL HYPO SFTY PROEDGE 27G 1 1/4IN GRY

## (undated) DEVICE — 3M™ IOBAN™ 2 ANTIMICROBIAL INCISE DRAPE 6650EZ: Brand: IOBAN™ 2

## (undated) DEVICE — MAGNETIC DRAPE: Brand: DEVON

## (undated) DEVICE — MEDI-VAC YANKAUER SUCTION HANDLE W/BULBOUS TIP: Brand: CARDINAL HEALTH

## (undated) DEVICE — MEDI-VAC NON-CONDUCTIVE SUCTION TUBING: Brand: CARDINAL HEALTH

## (undated) DEVICE — CVR HNDL LT SURG ACCSSRY BLU STRL

## (undated) DEVICE — CATHETER,URETHRAL,REDRUBBER,STRL,18FR: Brand: MEDLINE

## (undated) DEVICE — HDRST POSTIN FM CRDL TRACH SLOT 9X8X4IN

## (undated) DEVICE — SKIN AFFIX SURG ADHESIVE 72/CS 0.55ML: Brand: MEDLINE

## (undated) DEVICE — SUT PROLN 6/0 C1 D/A 30IN 8706H

## (undated) DEVICE — 3M™ STERI-DRAPE™ INSTRUMENT POUCH 1018L: Brand: STERI-DRAPE™

## (undated) DEVICE — INTENDED TO BE USED TO OCCLUDE, RETRACT AND IDENTIFY ARTERIES, VEINS, TENDONS AND NERVES IN SURGICAL PROCEDURES: Brand: STERION®  VESSEL LOOP

## (undated) DEVICE — DECANT BG O JET

## (undated) DEVICE — PK VASC 10